# Patient Record
Sex: FEMALE | Race: WHITE | Employment: OTHER | URBAN - METROPOLITAN AREA
[De-identification: names, ages, dates, MRNs, and addresses within clinical notes are randomized per-mention and may not be internally consistent; named-entity substitution may affect disease eponyms.]

---

## 2017-11-22 LAB — ANTIBODY: NONREACTIVE

## 2018-07-30 ENCOUNTER — HOSPITAL ENCOUNTER (OUTPATIENT)
Dept: GENERAL RADIOLOGY | Age: 68
Discharge: HOME | End: 2018-07-30
Payer: MEDICARE

## 2018-07-30 DIAGNOSIS — M54.32 BACK PAIN WITH LEFT-SIDED SCIATICA: ICD-10-CM

## 2018-07-30 PROCEDURE — 72100 X-RAY EXAM L-S SPINE 2/3 VWS: CPT

## 2018-11-15 ENCOUNTER — OFFICE VISIT (OUTPATIENT)
Dept: FAMILY MEDICINE CLINIC | Age: 68
End: 2018-11-15
Payer: MEDICARE

## 2018-11-15 VITALS
OXYGEN SATURATION: 96 % | DIASTOLIC BLOOD PRESSURE: 80 MMHG | WEIGHT: 171 LBS | HEIGHT: 63 IN | HEART RATE: 102 BPM | BODY MASS INDEX: 30.3 KG/M2 | SYSTOLIC BLOOD PRESSURE: 138 MMHG

## 2018-11-15 DIAGNOSIS — Z78.0 ASYMPTOMATIC MENOPAUSAL STATE: ICD-10-CM

## 2018-11-15 DIAGNOSIS — E78.00 PURE HYPERCHOLESTEROLEMIA: ICD-10-CM

## 2018-11-15 DIAGNOSIS — Z82.0 FAMILY HISTORY OF ALZHEIMER'S DISEASE: ICD-10-CM

## 2018-11-15 DIAGNOSIS — I10 BENIGN ESSENTIAL HTN: Primary | ICD-10-CM

## 2018-11-15 DIAGNOSIS — Z86.2 HISTORY OF ANEMIA: ICD-10-CM

## 2018-11-15 DIAGNOSIS — K21.9 GASTROESOPHAGEAL REFLUX DISEASE WITHOUT ESOPHAGITIS: ICD-10-CM

## 2018-11-15 DIAGNOSIS — Z12.39 SCREENING FOR BREAST CANCER: ICD-10-CM

## 2018-11-15 DIAGNOSIS — M47.26 OSTEOARTHRITIS OF SPINE WITH RADICULOPATHY, LUMBAR REGION: ICD-10-CM

## 2018-11-15 PROCEDURE — G8400 PT W/DXA NO RESULTS DOC: HCPCS | Performed by: INTERNAL MEDICINE

## 2018-11-15 PROCEDURE — G8427 DOCREV CUR MEDS BY ELIG CLIN: HCPCS | Performed by: INTERNAL MEDICINE

## 2018-11-15 PROCEDURE — 1123F ACP DISCUSS/DSCN MKR DOCD: CPT | Performed by: INTERNAL MEDICINE

## 2018-11-15 PROCEDURE — 99204 OFFICE O/P NEW MOD 45 MIN: CPT | Performed by: INTERNAL MEDICINE

## 2018-11-15 PROCEDURE — G8482 FLU IMMUNIZE ORDER/ADMIN: HCPCS | Performed by: INTERNAL MEDICINE

## 2018-11-15 PROCEDURE — 1101F PT FALLS ASSESS-DOCD LE1/YR: CPT | Performed by: INTERNAL MEDICINE

## 2018-11-15 PROCEDURE — 1036F TOBACCO NON-USER: CPT | Performed by: INTERNAL MEDICINE

## 2018-11-15 PROCEDURE — 4040F PNEUMOC VAC/ADMIN/RCVD: CPT | Performed by: INTERNAL MEDICINE

## 2018-11-15 PROCEDURE — 1090F PRES/ABSN URINE INCON ASSESS: CPT | Performed by: INTERNAL MEDICINE

## 2018-11-15 PROCEDURE — G8417 CALC BMI ABV UP PARAM F/U: HCPCS | Performed by: INTERNAL MEDICINE

## 2018-11-15 PROCEDURE — 3017F COLORECTAL CA SCREEN DOC REV: CPT | Performed by: INTERNAL MEDICINE

## 2018-11-15 RX ORDER — LISINOPRIL 10 MG/1
10 TABLET ORAL DAILY
COMMUNITY
End: 2019-01-07 | Stop reason: SDUPTHER

## 2018-11-15 ASSESSMENT — PATIENT HEALTH QUESTIONNAIRE - PHQ9
SUM OF ALL RESPONSES TO PHQ QUESTIONS 1-9: 2
SUM OF ALL RESPONSES TO PHQ9 QUESTIONS 1 & 2: 2
2. FEELING DOWN, DEPRESSED OR HOPELESS: 1
SUM OF ALL RESPONSES TO PHQ QUESTIONS 1-9: 2
1. LITTLE INTEREST OR PLEASURE IN DOING THINGS: 1

## 2018-11-15 NOTE — PROGRESS NOTES
well-nourished. HENT:   Head: Normocephalic. Eyes: Pupils are equal, round, and reactive to light. Conjunctivae are normal. No scleral icterus. Neck: Normal range of motion. Neck supple. No thyromegaly present. Cardiovascular: Normal rate and regular rhythm. Pulmonary/Chest: Breath sounds normal. No respiratory distress. Abdominal: Soft. Musculoskeletal: Normal range of motion. She exhibits tenderness. Low back tenderness on palpation   Lymphadenopathy:     She has no cervical adenopathy. Neurological: She is alert and oriented to person, place, and time. No cranial nerve deficit. Psychiatric: She has a normal mood and affect. Her behavior is normal. Judgment and thought content normal.       ASSESSMENT/PLAN:  1. Benign essential HTN    - lisinopril (PRINIVIL;ZESTRIL) 10 MG tablet; Take 10 mg by mouth daily  - Comprehensive Metabolic Panel; Future    2. Pure hypercholesterolemia    - Lipid Panel; Future    3. Osteoarthritis of spine with radiculopathy, lumbar region  -prn OTC med    4. Screening for breast cancer    - [facility] ALEKSEY DIGITAL SCREEN BILATERAL; Future    5. History of anemia    - CBC; Future    6. Asymptomatic menopausal state    - DEXA BONE DENSITY AXIAL SKELETON; Future    7. Gastroesophageal reflux disease without esophagitis    -continue simvastatin     Mayra Lozano received counseling on the following healthy behaviors: nutrition, exercise and medication adherence    Patient given educational materials on Hypertension    I have instructed Mayra Lozano to complete a self tracking handout on Blood Pressures  and instructed them to bring it with them to her next appointment. Discussed use, benefit, and side effects of prescribed medications. Barriers to medication compliance addressed. All patient questions answered. Pt voiced understanding. An  electronic signature was used to authenticate this note.     --So Mathews MD on 11/15/18 at 2:26 PM.

## 2018-11-15 NOTE — PATIENT INSTRUCTIONS
ibuprofen. Some of these medicines can raise blood pressure. · Learn how to check your blood pressure at home. Lifestyle changes  · Stay at a healthy weight. This is especially important if you put on weight around the waist. Losing even 10 pounds can help you lower your blood pressure. · If your doctor recommends it, get more exercise. Walking is a good choice. Bit by bit, increase the amount you walk every day. Try for at least 30 minutes on most days of the week. You also may want to swim, bike, or do other activities. · Avoid or limit alcohol. Talk to your doctor about whether you can drink any alcohol. · Try to limit how much sodium you eat to less than 2,300 milligrams (mg) a day. Your doctor may ask you to try to eat less than 1,500 mg a day. · Eat plenty of fruits (such as bananas and oranges), vegetables, legumes, whole grains, and low-fat dairy products. · Lower the amount of saturated fat in your diet. Saturated fat is found in animal products such as milk, cheese, and meat. Limiting these foods may help you lose weight and also lower your risk for heart disease. · Do not smoke. Smoking increases your risk for heart attack and stroke. If you need help quitting, talk to your doctor about stop-smoking programs and medicines. These can increase your chances of quitting for good. When should you call for help? Call 911 anytime you think you may need emergency care. This may mean having symptoms that suggest that your blood pressure is causing a serious heart or blood vessel problem. Your blood pressure may be over 180/120.   For example, call 911 if:    · You have symptoms of a heart attack. These may include:  ? Chest pain or pressure, or a strange feeling in the chest.  ? Sweating. ? Shortness of breath. ? Nausea or vomiting. ? Pain, pressure, or a strange feeling in the back, neck, jaw, or upper belly or in one or both shoulders or arms. ? Lightheadedness or sudden weakness.   ? A fast or 130/80, you have high blood pressure, or hypertension. That means the top number is 130 or higher or the bottom number is 80 or higher, or both. Despite what a lot of people think, high blood pressure usually doesn't cause headaches or make you feel dizzy or lightheaded. It usually has no symptoms. But it does increase your risk for heart attack, stroke, and kidney or eye damage. The higher your blood pressure, the more your risk increases. Your doctor will give you a goal for your blood pressure. Your goal will be based on your health and your age. Lifestyle changes, such as eating healthy and being active, are always important to help lower blood pressure. You might also take medicine to reach your blood pressure goal.  Follow-up care is a key part of your treatment and safety. Be sure to make and go to all appointments, and call your doctor if you are having problems. It's also a good idea to know your test results and keep a list of the medicines you take. How can you care for yourself at home? Medical treatment  · If you stop taking your medicine, your blood pressure will go back up. You may take one or more types of medicine to lower your blood pressure. Be safe with medicines. Take your medicine exactly as prescribed. Call your doctor if you think you are having a problem with your medicine. · Talk to your doctor before you start taking aspirin every day. Aspirin can help certain people lower their risk of a heart attack or stroke. But taking aspirin isn't right for everyone, because it can cause serious bleeding. · See your doctor regularly. You may need to see the doctor more often at first or until your blood pressure comes down. · If you are taking blood pressure medicine, talk to your doctor before you take decongestants or anti-inflammatory medicine, such as ibuprofen. Some of these medicines can raise blood pressure. · Learn how to check your blood pressure at home.   Lifestyle changes  · Stay your face, arm, or leg, especially on only one side of your body. ? Sudden vision changes. ? Sudden trouble speaking. ? Sudden confusion or trouble understanding simple statements. ? Sudden problems with walking or balance. ? A sudden, severe headache that is different from past headaches.     · You have severe back or belly pain.    Do not wait until your blood pressure comes down on its own. Get help right away.   Call your doctor now or seek immediate care if:    · Your blood pressure is much higher than normal (such as 180/120 or higher), but you don't have symptoms.     · You think high blood pressure is causing symptoms, such as:  ? Severe headache.  ? Blurry vision.    Watch closely for changes in your health, and be sure to contact your doctor if:    · Your blood pressure measures higher than your doctor recommends at least 2 times. That means the top number is higher or the bottom number is higher, or both.     · You think you may be having side effects from your blood pressure medicine. Where can you learn more? Go to https://PCH International.[employer] R Group. org and sign in to your PPDai account. Enter A874 in the [employer] box to learn more about \"High Blood Pressure: Care Instructions. \"     If you do not have an account, please click on the \"Sign Up Now\" link. Current as of: December 6, 2017  Content Version: 11.8  © 6157-0370 Healthwise, Incorporated. Care instructions adapted under license by [facility] ([facility]). If you have questions about a medical condition or this instruction, always ask your healthcare professional. [place] [address] any warranty or liability for your use of this information. Patient Education        High Blood Pressure: Care Instructions  Your Care Instructions    If your blood pressure is usually above 130/80, you have high blood pressure, or hypertension.  That means the top number is 130 or higher or the bottom number is 80 or higher, or

## 2018-11-20 PROBLEM — K21.9 GASTROESOPHAGEAL REFLUX DISEASE WITHOUT ESOPHAGITIS: Status: ACTIVE | Noted: 2018-11-20

## 2018-11-20 PROBLEM — I10 BENIGN ESSENTIAL HTN: Status: ACTIVE | Noted: 2018-11-20

## 2018-11-20 PROBLEM — Z82.0 FAMILY HISTORY OF ALZHEIMER'S DISEASE: Status: ACTIVE | Noted: 2018-11-20

## 2018-11-20 PROBLEM — E78.00 PURE HYPERCHOLESTEROLEMIA: Status: ACTIVE | Noted: 2018-11-20

## 2018-11-20 ASSESSMENT — ENCOUNTER SYMPTOMS
WHEEZING: 0
BACK PAIN: 0
EYES NEGATIVE: 1
COUGH: 0
SINUS PRESSURE: 0
ABDOMINAL PAIN: 0

## 2018-12-03 ENCOUNTER — HOSPITAL ENCOUNTER (OUTPATIENT)
Dept: GENERAL RADIOLOGY | Age: 68
Discharge: HOME | End: 2018-12-03
Payer: MEDICARE

## 2018-12-03 ENCOUNTER — HOSPITAL ENCOUNTER (OUTPATIENT)
Dept: MAMMOGRAPHY | Age: 68
Discharge: HOME | End: 2018-12-03
Payer: MEDICARE

## 2018-12-03 DIAGNOSIS — Z78.0 ASYMPTOMATIC MENOPAUSAL STATE: ICD-10-CM

## 2018-12-03 DIAGNOSIS — Z12.39 SCREENING FOR BREAST CANCER: ICD-10-CM

## 2018-12-03 PROCEDURE — 77080 DXA BONE DENSITY AXIAL: CPT

## 2018-12-03 PROCEDURE — 77063 BREAST TOMOSYNTHESIS BI: CPT

## 2018-12-06 DIAGNOSIS — Z86.2 HISTORY OF ANEMIA: ICD-10-CM

## 2018-12-06 DIAGNOSIS — E78.00 PURE HYPERCHOLESTEROLEMIA: ICD-10-CM

## 2018-12-06 DIAGNOSIS — I10 BENIGN ESSENTIAL HTN: ICD-10-CM

## 2018-12-06 LAB
A/G RATIO: 2 (ref 1.1–2.2)
ALBUMIN SERPL-MCNC: 4.9 G/DL (ref 3.4–5)
ALP BLD-CCNC: 97 U/L (ref 40–129)
ALT SERPL-CCNC: 98 U/L (ref 10–40)
ANION GAP SERPL CALCULATED.3IONS-SCNC: 18 MMOL/L (ref 3–16)
AST SERPL-CCNC: 64 U/L (ref 15–37)
BILIRUB SERPL-MCNC: 0.7 MG/DL (ref 0–1)
BUN BLDV-MCNC: 12 MG/DL (ref 7–20)
CALCIUM SERPL-MCNC: 10.2 MG/DL (ref 8.3–10.6)
CHLORIDE BLD-SCNC: 99 MMOL/L (ref 99–110)
CHOLESTEROL, TOTAL: 214 MG/DL (ref 0–199)
CO2: 23 MMOL/L (ref 21–32)
CREAT SERPL-MCNC: 0.8 MG/DL (ref 0.6–1.2)
GFR AFRICAN AMERICAN: >60
GFR NON-AFRICAN AMERICAN: >60
GLOBULIN: 2.5 G/DL
GLUCOSE BLD-MCNC: 95 MG/DL (ref 70–99)
HDLC SERPL-MCNC: 74 MG/DL (ref 40–60)
LDL CHOLESTEROL CALCULATED: 127 MG/DL
POTASSIUM SERPL-SCNC: 4.5 MMOL/L (ref 3.5–5.1)
SODIUM BLD-SCNC: 140 MMOL/L (ref 136–145)
TOTAL PROTEIN: 7.4 G/DL (ref 6.4–8.2)
TRIGL SERPL-MCNC: 64 MG/DL (ref 0–150)
VLDLC SERPL CALC-MCNC: 13 MG/DL

## 2018-12-07 LAB
HCT VFR BLD CALC: 37.4 % (ref 36–48)
HEMOGLOBIN: 12.5 G/DL (ref 12–16)
MCH RBC QN AUTO: 33.8 PG (ref 26–34)
MCHC RBC AUTO-ENTMCNC: 33.5 G/DL (ref 31–36)
MCV RBC AUTO: 101 FL (ref 80–100)
PDW BLD-RTO: 13.7 % (ref 12.4–15.4)
PLATELET # BLD: 204 K/UL (ref 135–450)
PMV BLD AUTO: 8.7 FL (ref 5–10.5)
RBC # BLD: 3.7 M/UL (ref 4–5.2)
WBC # BLD: 6.7 K/UL (ref 4–11)

## 2018-12-14 DIAGNOSIS — I10 BENIGN ESSENTIAL HTN: ICD-10-CM

## 2018-12-14 RX ORDER — SIMVASTATIN 20 MG
20 TABLET ORAL NIGHTLY
Qty: 30 TABLET | Refills: 2 | Status: SHIPPED | OUTPATIENT
Start: 2018-12-14 | End: 2019-03-05 | Stop reason: SDUPTHER

## 2018-12-18 ENCOUNTER — PATIENT MESSAGE (OUTPATIENT)
Dept: FAMILY MEDICINE CLINIC | Age: 68
End: 2018-12-18

## 2019-01-07 DIAGNOSIS — I10 BENIGN ESSENTIAL HTN: ICD-10-CM

## 2019-01-07 RX ORDER — LISINOPRIL 10 MG/1
10 TABLET ORAL DAILY
Qty: 30 TABLET | Refills: 2 | Status: SHIPPED | OUTPATIENT
Start: 2019-01-07 | End: 2019-04-04 | Stop reason: SDUPTHER

## 2019-01-07 RX ORDER — LISINOPRIL 10 MG/1
10 TABLET ORAL DAILY
Qty: 30 TABLET | Refills: 2 | Status: CANCELLED | OUTPATIENT
Start: 2019-01-07

## 2019-03-06 RX ORDER — SIMVASTATIN 20 MG
20 TABLET ORAL NIGHTLY
Qty: 30 TABLET | Refills: 3 | Status: SHIPPED | OUTPATIENT
Start: 2019-03-06 | End: 2019-06-29 | Stop reason: SDUPTHER

## 2019-04-04 DIAGNOSIS — I10 BENIGN ESSENTIAL HTN: ICD-10-CM

## 2019-04-05 RX ORDER — LISINOPRIL 10 MG/1
TABLET ORAL
Qty: 30 TABLET | Refills: 1 | Status: SHIPPED | OUTPATIENT
Start: 2019-04-05 | End: 2019-05-27 | Stop reason: SDUPTHER

## 2019-04-08 ENCOUNTER — TELEPHONE (OUTPATIENT)
Dept: FAMILY MEDICINE CLINIC | Age: 69
End: 2019-04-08

## 2019-04-08 NOTE — TELEPHONE ENCOUNTER
----- Message from Sri Menjivar MD sent at 4/5/2019  3:51 PM EDT -----  Contact: bobbi Escobar the patirnt for a wellness pt after May 15, mail her the questionnaire.  Thanks

## 2019-05-21 ENCOUNTER — OFFICE VISIT (OUTPATIENT)
Dept: FAMILY MEDICINE CLINIC | Age: 69
End: 2019-05-21
Payer: MEDICARE

## 2019-05-21 VITALS
HEART RATE: 63 BPM | DIASTOLIC BLOOD PRESSURE: 70 MMHG | OXYGEN SATURATION: 97 % | BODY MASS INDEX: 26.58 KG/M2 | WEIGHT: 150 LBS | SYSTOLIC BLOOD PRESSURE: 118 MMHG | HEIGHT: 63 IN

## 2019-05-21 DIAGNOSIS — E78.5 DYSLIPIDEMIA: ICD-10-CM

## 2019-05-21 DIAGNOSIS — I10 BENIGN ESSENTIAL HTN: ICD-10-CM

## 2019-05-21 DIAGNOSIS — Z00.00 ROUTINE GENERAL MEDICAL EXAMINATION AT A HEALTH CARE FACILITY: Primary | ICD-10-CM

## 2019-05-21 PROCEDURE — 4040F PNEUMOC VAC/ADMIN/RCVD: CPT | Performed by: INTERNAL MEDICINE

## 2019-05-21 PROCEDURE — 3017F COLORECTAL CA SCREEN DOC REV: CPT | Performed by: INTERNAL MEDICINE

## 2019-05-21 PROCEDURE — G0438 PPPS, INITIAL VISIT: HCPCS | Performed by: INTERNAL MEDICINE

## 2019-05-21 PROCEDURE — 1123F ACP DISCUSS/DSCN MKR DOCD: CPT | Performed by: INTERNAL MEDICINE

## 2019-05-21 ASSESSMENT — PATIENT HEALTH QUESTIONNAIRE - PHQ9
SUM OF ALL RESPONSES TO PHQ QUESTIONS 1-9: 0
SUM OF ALL RESPONSES TO PHQ QUESTIONS 1-9: 0

## 2019-05-21 NOTE — PATIENT INSTRUCTIONS
Patient Education        Well Visit, Over 72: Care Instructions  Your Care Instructions    Physical exams can help you stay healthy. Your doctor has checked your overall health and may have suggested ways to take good care of yourself. He or she also may have recommended tests. At home, you can help prevent illness with healthy eating, regular exercise, and other steps. Follow-up care is a key part of your treatment and safety. Be sure to make and go to all appointments, and call your doctor if you are having problems. It's also a good idea to know your test results and keep a list of the medicines you take. How can you care for yourself at home? · Reach and stay at a healthy weight. This will lower your risk for many problems, such as obesity, diabetes, heart disease, and high blood pressure. · Get at least 30 minutes of exercise on most days of the week. Walking is a good choice. You also may want to do other activities, such as running, swimming, cycling, or playing tennis or team sports. · Do not smoke. Smoking can make health problems worse. If you need help quitting, talk to your doctor about stop-smoking programs and medicines. These can increase your chances of quitting for good. · Protect your skin from too much sun. When you're outdoors from 10 a.m. to 4 p.m., stay in the shade or cover up with clothing and a hat with a wide brim. Wear sunglasses that block UV rays. Even when it's cloudy, put broad-spectrum sunscreen (SPF 30 or higher) on any exposed skin. · See a dentist one or two times a year for checkups and to have your teeth cleaned. · Wear a seat belt in the car. · Limit alcohol to 2 drinks a day for men and 1 drink a day for women. Too much alcohol can cause health problems. Follow your doctor's advice about when to have certain tests. These tests can spot problems early. For men and women  · Cholesterol.  Your doctor will tell you how often to have this done based on your overall health and other things that can increase your risk for heart attack and stroke. · Blood pressure. Have your blood pressure checked during a routine doctor visit. Your doctor will tell you how often to check your blood pressure based on your age, your blood pressure results, and other factors. · Diabetes. Ask your doctor whether you should have tests for diabetes. · Vision. Experts recommend that you have yearly exams for glaucoma and other age-related eye problems. · Hearing. Tell your doctor if you notice any change in your hearing. You can have tests to find out how well you hear. · Colon cancer tests. Keep having colon cancer tests as your doctor recommends. You can have one of several types of tests. · Heart attack and stroke risk. At least every 4 to 6 years, you should have your risk for heart attack and stroke assessed. Your doctor uses factors such as your age, blood pressure, cholesterol, and whether you smoke or have diabetes to show what your risk for a heart attack or stroke is over the next 10 years. · Osteoporosis. Talk to your doctor about whether you should have a bone density test to find out whether you have thinning bones. Also ask your doctor about whether you should take calcium and vitamin D supplements. For women  · Pap test and pelvic exam. You may no longer need a Pap test. Talk with your doctor about whether to stop or continue to have Pap tests. · Breast exam and mammogram. Ask how often you should have a mammogram, which is an X-ray of your breasts. A mammogram can spot breast cancer before it can be felt and when it is easiest to treat. · Thyroid disease. Talk to your doctor about whether to have your thyroid checked as part of a regular physical exam. Women have an increased chance of a thyroid problem. For men  · Prostate exam. Talk to your doctor about whether you should have a blood test (called a PSA test) for prostate cancer.  Experts recommend that you discuss the benefits and risks of the test with your doctor before you decide whether to have this test. Some experts say that men ages 79 and older no longer need testing. · Abdominal aortic aneurysm. Ask your doctor whether you should have a test to check for an aneurysm. You may need a test if you ever smoked or if your parent, brother, sister, or child has had an aneurysm. When should you call for help? Watch closely for changes in your health, and be sure to contact your doctor if you have any problems or symptoms that concern you. Where can you learn more? Go to https://chpetamieweb.Kextil. org and sign in to your Fortumo account. Enter X216 in the [employer] box to learn more about \"Well Visit, Over 65: Care Instructions. \"     If you do not have an account, please click on the \"Sign Up Now\" link. Current as of: December 13, 2018  Content Version: 12.0  © 0842-7662 [employer]. Care instructions adapted under license by [facility] ([facility]). If you have questions about a medical condition or this instruction, always ask your healthcare professional. [place] [address] any warranty or liability for your use of this information. Patient Education        Well Visit, Over 72: Care Instructions  Your Care Instructions    Physical exams can help you stay healthy. Your doctor has checked your overall health and may have suggested ways to take good care of yourself. He or she also may have recommended tests. At home, you can help prevent illness with healthy eating, regular exercise, and other steps. Follow-up care is a key part of your treatment and safety. Be sure to make and go to all appointments, and call your doctor if you are having problems. It's also a good idea to know your test results and keep a list of the medicines you take. How can you care for yourself at home? · Reach and stay at a healthy weight.  This will lower your risk for many problems, such as obesity, diabetes, heart disease, and high blood pressure. · Get at least 30 minutes of exercise on most days of the week. Walking is a good choice. You also may want to do other activities, such as running, swimming, cycling, or playing tennis or team sports. · Do not smoke. Smoking can make health problems worse. If you need help quitting, talk to your doctor about stop-smoking programs and medicines. These can increase your chances of quitting for good. · Protect your skin from too much sun. When you're outdoors from 10 a.m. to 4 p.m., stay in the shade or cover up with clothing and a hat with a wide brim. Wear sunglasses that block UV rays. Even when it's cloudy, put broad-spectrum sunscreen (SPF 30 or higher) on any exposed skin. · See a dentist one or two times a year for checkups and to have your teeth cleaned. · Wear a seat belt in the car. · Limit alcohol to 2 drinks a day for men and 1 drink a day for women. Too much alcohol can cause health problems. Follow your doctor's advice about when to have certain tests. These tests can spot problems early. For men and women  · Cholesterol. Your doctor will tell you how often to have this done based on your overall health and other things that can increase your risk for heart attack and stroke. · Blood pressure. Have your blood pressure checked during a routine doctor visit. Your doctor will tell you how often to check your blood pressure based on your age, your blood pressure results, and other factors. · Diabetes. Ask your doctor whether you should have tests for diabetes. · Vision. Experts recommend that you have yearly exams for glaucoma and other age-related eye problems. · Hearing. Tell your doctor if you notice any change in your hearing. You can have tests to find out how well you hear. · Colon cancer tests. Keep having colon cancer tests as your doctor recommends. You can have one of several types of tests. · Heart attack and stroke risk.  At you do not have an account, please click on the \"Sign Up Now\" link. Current as of: December 13, 2018  Content Version: 12.0  © 5309-3768 Healthwise, [employer]. Care instructions adapted under license by [facility] ([facility]). If you have questions about a medical condition or this instruction, always ask your healthcare professional. [address] any warranty or liability for your use of this information. Learning About Anam Aquino  What is a living will? A living will is a legal form you use to write down the kind of care you want at the end of your life. It is used by the health professionals who will treat you if you aren't able to decide for yourself. If you put your wishes in writing, your loved ones and others will know what kind of care you want. They won't need to guess. This can ease your mind and be helpful to others. A living will is not the same as an estate or property will. An estate will explains what you want to happen with your money and property after you die. Is a living will a legal document? A living will is a legal document. Each state has its own laws about living mary. If you move to another state, make sure that your living will is legal in the state where you now live. Or you might use a universal form that has been approved by many states. This kind of form can sometimes be completed and stored online. Your electronic copy will then be available wherever you have a connection to the Internet. In most cases, doctors will respect your wishes even if you have a form from a different state. · You don't need an  to complete a living will. But legal advice can be helpful if your state's laws are unclear, your health history is complicated, or your family can't agree on what should be in your living will. · You can change your living will at any time. Some people find that their wishes about end-of-life care change as their health changes.   · In addition to making a living will, think about completing a medical power of  form. This form lets you name the person you want to make end-of-life treatment decisions for you (your \"health care agent\") if you're not able to. Many hospitals and nursing homes will give you the forms you need to complete a living will and a medical power of . · Your living will is used only if you can't make or communicate decisions for yourself anymore. If you become able to make decisions again, you can accept or refuse any treatment, no matter what you wrote in your living will. · Your state may offer an online registry. This is a place where you can store your living will online so the doctors and nurses who need to treat you can find it right away. What should you think about when creating a living will? Talk about your end-of-life wishes with your family members and your doctor. Let them know what you want. That way the people making decisions for you won't be surprised by your choices. Think about these questions as you make your living will:  · Do you know enough about life support methods that might be used? If not, talk to your doctor so you know what might be done if you can't breathe on your own, your heart stops, or you're unable to swallow. · What things would you still want to be able to do after you receive life-support methods? Would you want to be able to walk? To speak? To eat on your own? To live without the help of machines? · If you have a choice, where do you want to be cared for? In your home? At a hospital or nursing home? · Do you want certain Oriental orthodox practices performed if you become very ill? · If you have a choice at the end of your life, where would you prefer to die? At home? In a hospital or nursing home? Somewhere else? · Would you prefer to be buried or cremated? · Do you want your organs to be donated after you die? What should you do with your living will?   · Make sure that your family members and your health care agent have copies of your living will. · Give your doctor a copy of your living will to keep in your medical record. If you have more than one doctor, make sure that each one has a copy. · You may want to put a copy of your living will where it can be easily found. Where can you learn more? Go to https://chpepiceweb.[email or web address]. org and sign in to your GameMaki account. Enter Q894 in the Mape box to learn more about \"Learning About Living [place]. \"     If you do not have an account, please click on the \"Sign Up Now\" link. Current as of: April 18, 2018  Content Version: 12.0  © 5911-9871 Healthwise, Incorporated. Care instructions adapted under license by [facility] ([facility]). If you have questions about a medical condition or this instruction, always ask your healthcare professional. [place] [address] any warranty or liability for your use of this information. Personalized Preventive Plan for [employer] - 5/21/2019  Medicare offers a range of preventive health benefits. Some of the tests and screenings are paid in full while other may be subject to a deductible, co-insurance, and/or copay. Some of these benefits include a comprehensive review of your medical history including lifestyle, illnesses that may run in your family, and various assessments and screenings as appropriate. After reviewing your medical record and screening and assessments performed today your provider may have ordered immunizations, labs, imaging, and/or referrals for you. A list of these orders (if applicable) as well as your Preventive Care list are included within your After Visit Summary for your review. Other Preventive Recommendations:    · A preventive eye exam performed by an eye specialist is recommended every 1-2 years to screen for glaucoma; cataracts, macular degeneration, and other eye disorders.   · A preventive dental visit is recommended every 6 months. · Try to get at least 150 minutes of exercise per week or 10,000 steps per day on a pedometer . · Order or download the FREE \"Exercise & Physical Activity: Your Everyday Guide\" from [employer]. Call 5-891.364.8606 or search [employer] online. · You need 5830-2558 mg of calcium and 8348-6804 IU of vitamin D per day. It is possible to meet your calcium requirement with diet alone, but a vitamin D supplement is usually necessary to meet this goal.  · When exposed to the sun, use a sunscreen that protects against both UVA and UVB radiation with an SPF of 30 or greater. Reapply every 2 to 3 hours or after sweating, drying off with a towel, or swimming. · Always wear a seat belt when traveling in a car. Always wear a helmet when riding a bicycle or motorcycle.

## 2019-05-21 NOTE — PROGRESS NOTES
(1.6 m)     Body mass index is 26.57 kg/m². Based upon direct observation of the patient, evaluation of cognition reveals recent and remote memory intact. Patient's complete Health Risk Assessment and screening values have been reviewed and are found in Flowsheets. The following problems were reviewed today and where indicated follow up appointments were made and/or referrals ordered. Positive Risk Factor Screenings with Interventions:     General Health:  General  In general, how would you say your health is?: (P) Very Good  In the past 7 days, have you experienced any of the following?  New or Increased Pain, New or Increased Fatigue, Loneliness, Social Isolation, Stress or Anger?: (P) None of These  Do you get the social and emotional support that you need?: (P) Yes  Do you have a Living Will?: (!) (P) No  General Health Risk Interventions:  · none    Hearing/Vision:  Hearing/Vision  Do you or your family notice any trouble with your hearing?: (!) (P) Yes  Do you have difficulty driving, watching TV, or doing any of your daily activities because of your eyesight?: (P) No  Have you had an eye exam within the past year?: (P) Yes  Hearing/Vision Interventions:  · none    Personalized Preventive Plan   Current Health Maintenance Status  Immunization History   Administered Date(s) Administered    Influenza, High Dose (Fluzone 65 yrs and older) 09/27/2018    PPD Test 05/27/2014, 04/06/2016    Pneumococcal 13-valent Conjugate (Rchgnwf15) 11/21/2016    Pneumococcal Polysaccharide (Cifqmoopc83) 10/26/2015    Zoster Subunit (Shingrix) 09/27/2018, 11/29/2018        Health Maintenance   Topic Date Due    Hepatitis C screen  1950    DTaP/Tdap/Td vaccine (1 - Tdap) 06/20/1969    Potassium monitoring  12/06/2019    Creatinine monitoring  12/06/2019    Breast cancer screen  12/03/2020    Lipid screen  12/06/2023    Colon cancer screen colonoscopy  01/15/2025    DEXA (modify frequency per FRAX Shakila Stone MD as PCP - General (Internal Medicine)    Wt Readings from Last 3 Encounters:   05/21/19 150 lb (68 kg)   11/15/18 171 lb (77.6 kg)   01/02/16 137 lb 9.6 oz (62.4 kg)     Vitals:    05/21/19 1254   BP: 118/70   Site: Left Upper Arm   Position: Sitting   Cuff Size: Medium Adult   Pulse: 63   SpO2: 97%   Weight: 150 lb (68 kg)   Height: 5' 3\" (1.6 m)     Body mass index is 26.57 kg/m². Based upon direct observation of the patient, evaluation of cognition reveals recent and remote memory intact. Patient's complete Health Risk Assessment and screening values have been reviewed and are found in Flowsheets. The following problems were reviewed today and where indicated follow up appointments were made and/or referrals ordered. Positive Risk Factor Screenings with Interventions:     General Health:  General  In general, how would you say your health is?: (P) Very Good  In the past 7 days, have you experienced any of the following?  New or Increased Pain, New or Increased Fatigue, Loneliness, Social Isolation, Stress or Anger?: (P) None of These  Do you get the social and emotional support that you need?: (P) Yes  Do you have a Living Will?: (!) (P) No  General Health Risk Interventions:  · none    Hearing/Vision:  Hearing/Vision  Do you or your family notice any trouble with your hearing?: (!) (P) Yes  Do you have difficulty driving, watching TV, or doing any of your daily activities because of your eyesight?: (P) No  Have you had an eye exam within the past year?: (P) Yes  Hearing/Vision Interventions:  · none    Personalized Preventive Plan   Current Health Maintenance Status  Immunization History   Administered Date(s) Administered    Influenza, High Dose (Fluzone 65 yrs and older) 09/27/2018    PPD Test 05/27/2014, 04/06/2016    Pneumococcal 13-valent Conjugate (Gsqpydf50) 11/21/2016    Pneumococcal Polysaccharide (Bqfzmefhc42) 10/26/2015    Zoster Subunit (Shingrix) 09/27/2018, 11/29/2018 Health Maintenance   Topic Date Due    Hepatitis C screen  1950    DTaP/Tdap/Td vaccine (1 - Tdap) 06/20/1969    Potassium monitoring  12/06/2019    Creatinine monitoring  12/06/2019    Breast cancer screen  12/03/2020    Lipid screen  12/06/2023    Colon cancer screen colonoscopy  01/15/2025    DEXA (modify frequency per FRAX score)  Completed    Flu vaccine  Completed    Shingles Vaccine  Completed    Pneumococcal 65+ years Vaccine  Completed     Recommendations for Preventive Services Due: see orders and patient instructions/AVS.  .   Recommended screening schedule for the next 5-10 years is provided to the patient in written form: see Patient Instructions/AVS.

## 2019-05-23 DIAGNOSIS — E78.5 DYSLIPIDEMIA: ICD-10-CM

## 2019-05-23 DIAGNOSIS — I10 BENIGN ESSENTIAL HTN: ICD-10-CM

## 2019-05-23 LAB
A/G RATIO: 2 (ref 1.1–2.2)
ALBUMIN SERPL-MCNC: 5 G/DL (ref 3.4–5)
ALP BLD-CCNC: 79 U/L (ref 40–129)
ALT SERPL-CCNC: 16 U/L (ref 10–40)
ANION GAP SERPL CALCULATED.3IONS-SCNC: 15 MMOL/L (ref 3–16)
AST SERPL-CCNC: 19 U/L (ref 15–37)
BILIRUB SERPL-MCNC: 0.5 MG/DL (ref 0–1)
BUN BLDV-MCNC: 16 MG/DL (ref 7–20)
CALCIUM SERPL-MCNC: 10.7 MG/DL (ref 8.3–10.6)
CHLORIDE BLD-SCNC: 94 MMOL/L (ref 99–110)
CHOLESTEROL, TOTAL: 197 MG/DL (ref 0–199)
CO2: 23 MMOL/L (ref 21–32)
CREAT SERPL-MCNC: 0.7 MG/DL (ref 0.6–1.2)
GFR AFRICAN AMERICAN: >60
GFR NON-AFRICAN AMERICAN: >60
GLOBULIN: 2.5 G/DL
GLUCOSE BLD-MCNC: 91 MG/DL (ref 70–99)
HDLC SERPL-MCNC: 64 MG/DL (ref 40–60)
LDL CHOLESTEROL CALCULATED: 117 MG/DL
POTASSIUM SERPL-SCNC: 4.8 MMOL/L (ref 3.5–5.1)
SODIUM BLD-SCNC: 132 MMOL/L (ref 136–145)
TOTAL PROTEIN: 7.5 G/DL (ref 6.4–8.2)
TRIGL SERPL-MCNC: 79 MG/DL (ref 0–150)
VLDLC SERPL CALC-MCNC: 16 MG/DL

## 2019-05-27 DIAGNOSIS — I10 BENIGN ESSENTIAL HTN: ICD-10-CM

## 2019-05-28 RX ORDER — LISINOPRIL 10 MG/1
TABLET ORAL
Qty: 30 TABLET | Refills: 0 | Status: SHIPPED | OUTPATIENT
Start: 2019-05-28 | End: 2019-07-06 | Stop reason: SDUPTHER

## 2019-05-28 NOTE — TELEPHONE ENCOUNTER
Last OV: 5/21/2019  Future Appointments   Date Time Provider Gilson Redmond   11/5/2019  1:40 PM MD SHARYN Guillermo

## 2019-06-03 DIAGNOSIS — I10 BENIGN ESSENTIAL HTN: ICD-10-CM

## 2019-06-04 RX ORDER — LISINOPRIL 10 MG/1
TABLET ORAL
Qty: 30 TABLET | Refills: 0 | Status: SHIPPED | OUTPATIENT
Start: 2019-06-04 | End: 2019-11-05 | Stop reason: SDUPTHER

## 2019-07-01 RX ORDER — SIMVASTATIN 20 MG
20 TABLET ORAL NIGHTLY
Qty: 30 TABLET | Refills: 2 | Status: SHIPPED | OUTPATIENT
Start: 2019-07-01 | End: 2019-09-03 | Stop reason: SDUPTHER

## 2019-09-03 RX ORDER — SIMVASTATIN 20 MG
20 TABLET ORAL NIGHTLY
Qty: 30 TABLET | Refills: 1 | Status: SHIPPED | OUTPATIENT
Start: 2019-09-03 | End: 2019-10-31 | Stop reason: SDUPTHER

## 2019-09-03 NOTE — TELEPHONE ENCOUNTER
Last ov 05/21/2019   Future Appointments   Date Time Provider Gilson Nyla   11/5/2019  1:40 PM MD SHARYN Stewart

## 2019-10-31 DIAGNOSIS — I10 BENIGN ESSENTIAL HTN: ICD-10-CM

## 2019-10-31 RX ORDER — SIMVASTATIN 20 MG
20 TABLET ORAL NIGHTLY
Qty: 30 TABLET | Refills: 0 | Status: SHIPPED | OUTPATIENT
Start: 2019-10-31 | End: 2019-11-05 | Stop reason: SDUPTHER

## 2019-10-31 RX ORDER — LISINOPRIL 10 MG/1
TABLET ORAL
Qty: 30 TABLET | Refills: 1 | Status: SHIPPED | OUTPATIENT
Start: 2019-10-31 | End: 2019-11-05 | Stop reason: SDUPTHER

## 2019-11-05 ENCOUNTER — OFFICE VISIT (OUTPATIENT)
Dept: FAMILY MEDICINE CLINIC | Age: 69
End: 2019-11-05
Payer: MEDICARE

## 2019-11-05 VITALS
SYSTOLIC BLOOD PRESSURE: 128 MMHG | HEIGHT: 63 IN | DIASTOLIC BLOOD PRESSURE: 88 MMHG | WEIGHT: 160 LBS | HEART RATE: 92 BPM | OXYGEN SATURATION: 98 % | BODY MASS INDEX: 28.35 KG/M2

## 2019-11-05 DIAGNOSIS — I10 BENIGN ESSENTIAL HTN: ICD-10-CM

## 2019-11-05 DIAGNOSIS — E78.00 PURE HYPERCHOLESTEROLEMIA: Primary | ICD-10-CM

## 2019-11-05 DIAGNOSIS — Z12.31 ENCOUNTER FOR SCREENING MAMMOGRAM FOR BREAST CANCER: ICD-10-CM

## 2019-11-05 PROCEDURE — 1036F TOBACCO NON-USER: CPT | Performed by: INTERNAL MEDICINE

## 2019-11-05 PROCEDURE — 4040F PNEUMOC VAC/ADMIN/RCVD: CPT | Performed by: INTERNAL MEDICINE

## 2019-11-05 PROCEDURE — 1090F PRES/ABSN URINE INCON ASSESS: CPT | Performed by: INTERNAL MEDICINE

## 2019-11-05 PROCEDURE — 1123F ACP DISCUSS/DSCN MKR DOCD: CPT | Performed by: INTERNAL MEDICINE

## 2019-11-05 PROCEDURE — 3017F COLORECTAL CA SCREEN DOC REV: CPT | Performed by: INTERNAL MEDICINE

## 2019-11-05 PROCEDURE — G8399 PT W/DXA RESULTS DOCUMENT: HCPCS | Performed by: INTERNAL MEDICINE

## 2019-11-05 PROCEDURE — G8417 CALC BMI ABV UP PARAM F/U: HCPCS | Performed by: INTERNAL MEDICINE

## 2019-11-05 PROCEDURE — 99214 OFFICE O/P EST MOD 30 MIN: CPT | Performed by: INTERNAL MEDICINE

## 2019-11-05 PROCEDURE — G8427 DOCREV CUR MEDS BY ELIG CLIN: HCPCS | Performed by: INTERNAL MEDICINE

## 2019-11-05 PROCEDURE — G8482 FLU IMMUNIZE ORDER/ADMIN: HCPCS | Performed by: INTERNAL MEDICINE

## 2019-11-05 RX ORDER — SIMVASTATIN 20 MG
20 TABLET ORAL NIGHTLY
Qty: 30 TABLET | Refills: 5 | Status: SHIPPED | OUTPATIENT
Start: 2019-11-05 | End: 2020-06-12

## 2019-11-05 RX ORDER — LISINOPRIL 10 MG/1
10 TABLET ORAL DAILY
Qty: 30 TABLET | Refills: 5 | Status: SHIPPED | OUTPATIENT
Start: 2019-11-05 | End: 2020-06-16 | Stop reason: SDUPTHER

## 2019-12-04 ENCOUNTER — HOSPITAL ENCOUNTER (OUTPATIENT)
Dept: MAMMOGRAPHY | Age: 69
Discharge: HOME | End: 2019-12-04
Payer: MEDICARE

## 2019-12-04 DIAGNOSIS — Z12.31 ENCOUNTER FOR SCREENING MAMMOGRAM FOR BREAST CANCER: ICD-10-CM

## 2019-12-04 PROCEDURE — 77063 BREAST TOMOSYNTHESIS BI: CPT

## 2020-06-11 ENCOUNTER — OFFICE VISIT (OUTPATIENT)
Dept: ORTHOPEDIC SURGERY | Age: 70
End: 2020-06-11
Payer: MEDICARE

## 2020-06-11 ENCOUNTER — TELEPHONE (OUTPATIENT)
Dept: ORTHOPEDIC SURGERY | Age: 70
End: 2020-06-11

## 2020-06-11 VITALS
HEIGHT: 63 IN | WEIGHT: 160 LBS | BODY MASS INDEX: 28.35 KG/M2 | TEMPERATURE: 99.3 F | DIASTOLIC BLOOD PRESSURE: 77 MMHG | HEART RATE: 77 BPM | SYSTOLIC BLOOD PRESSURE: 123 MMHG

## 2020-06-11 PROCEDURE — 1090F PRES/ABSN URINE INCON ASSESS: CPT | Performed by: ORTHOPAEDIC SURGERY

## 2020-06-11 PROCEDURE — G8427 DOCREV CUR MEDS BY ELIG CLIN: HCPCS | Performed by: ORTHOPAEDIC SURGERY

## 2020-06-11 PROCEDURE — 3017F COLORECTAL CA SCREEN DOC REV: CPT | Performed by: ORTHOPAEDIC SURGERY

## 2020-06-11 PROCEDURE — G8399 PT W/DXA RESULTS DOCUMENT: HCPCS | Performed by: ORTHOPAEDIC SURGERY

## 2020-06-11 PROCEDURE — 1123F ACP DISCUSS/DSCN MKR DOCD: CPT | Performed by: ORTHOPAEDIC SURGERY

## 2020-06-11 PROCEDURE — G8417 CALC BMI ABV UP PARAM F/U: HCPCS | Performed by: ORTHOPAEDIC SURGERY

## 2020-06-11 PROCEDURE — 4040F PNEUMOC VAC/ADMIN/RCVD: CPT | Performed by: ORTHOPAEDIC SURGERY

## 2020-06-11 PROCEDURE — 99203 OFFICE O/P NEW LOW 30 MIN: CPT | Performed by: ORTHOPAEDIC SURGERY

## 2020-06-11 PROCEDURE — 1036F TOBACCO NON-USER: CPT | Performed by: ORTHOPAEDIC SURGERY

## 2020-06-11 RX ORDER — OMEPRAZOLE 20 MG/1
20 CAPSULE, DELAYED RELEASE ORAL DAILY
Qty: 30 CAPSULE | Refills: 1 | Status: SHIPPED | OUTPATIENT
Start: 2020-06-11 | End: 2021-04-14 | Stop reason: ALTCHOICE

## 2020-06-11 RX ORDER — TRAMADOL HYDROCHLORIDE 50 MG/1
50 TABLET ORAL EVERY 4 HOURS PRN
Qty: 42 TABLET | Refills: 0 | Status: SHIPPED | OUTPATIENT
Start: 2020-06-11 | End: 2020-06-18

## 2020-06-11 RX ORDER — PREDNISONE 10 MG/1
10 TABLET ORAL DAILY
Qty: 20 TABLET | Refills: 0 | Status: SHIPPED | OUTPATIENT
Start: 2020-06-11 | End: 2020-06-21

## 2020-06-11 RX ORDER — TIZANIDINE 4 MG/1
4 TABLET ORAL EVERY 6 HOURS PRN
Qty: 80 TABLET | Refills: 0 | Status: SHIPPED | OUTPATIENT
Start: 2020-06-11 | End: 2020-07-20 | Stop reason: ALTCHOICE

## 2020-06-11 NOTE — TELEPHONE ENCOUNTER
Remi Saint from [address] [place] [address]. is calling requesting a call back regarding patient's prescription for prednisone.   [phone]

## 2020-06-11 NOTE — PROGRESS NOTES
facility-administered medications on file prior to visit. Social History     Socioeconomic History    Marital status:      Spouse name: Not on file    Number of children: Not on file    Years of education: Not on file    Highest education level: Not on file   Occupational History    Not on file   Social Needs    Financial resource strain: Not on file    Food insecurity     Worry: Not on file     Inability: Not on file    Transportation needs     Medical: Not on file     Non-medical: Not on file   Tobacco Use    Smoking status: Former Smoker     Packs/day: 1.00     Years: 15.00     Pack years: 15.00     Last attempt to quit: 1983     Years since quittin.4    Smokeless tobacco: Never Used   Substance and Sexual Activity    Alcohol use: Not on file    Drug use: No    Sexual activity: Not on file   Lifestyle    Physical activity     Days per week: Not on file     Minutes per session: Not on file    Stress: Not on file   Relationships    Social connections     Talks on phone: Not on file     Gets together: Not on file     Attends Worship service: Not on file     Active member of club or organization: Not on file     Attends meetings of clubs or organizations: Not on file     Relationship status: Not on file    Intimate partner violence     Fear of current or ex partner: Not on file     Emotionally abused: Not on file     Physically abused: Not on file     Forced sexual activity: Not on file   Other Topics Concern    Not on file   Social History Narrative    Not on file     No family history on file.     Current Medications:    Current Outpatient Medications   Medication Sig Dispense Refill    predniSONE (DELTASONE) 10 MG tablet Take 1 tablet by mouth daily for 10 days 20 tablet 0    tiZANidine (ZANAFLEX) 4 MG tablet Take 1 tablet by mouth every 6 hours as needed (muscle relaxant) 80 tablet 0    omeprazole (PRILOSEC) 20 MG delayed release capsule Take 1 capsule by mouth daily 30 capsule 1    diclofenac (VOLTAREN) 50 MG EC tablet Take 1 tablet by mouth 2 times daily (with meals) 60 tablet 3    traMADol (ULTRAM) 50 MG tablet Take 1 tablet by mouth every 4 hours as needed for Pain for up to 7 days. Intended supply: 7 days. Take lowest dose possible to manage pain 42 tablet 0    simvastatin (ZOCOR) 20 MG tablet TAKE 1 TABLET BY MOUTh nightly  30 tablet 0    lisinopril (PRINIVIL;ZESTRIL) 10 MG tablet Take 1 tablet by mouth daily 30 tablet 5    Multiple Vitamins-Minerals (THERAPEUTIC MULTIVITAMIN-MINERALS) tablet Take 1 tablet by mouth daily      Vitamin D (CHOLECALCIFEROL) 1000 UNITS CAPS capsule Take 2,000 Units by mouth daily       Multiple Vitamins-Minerals (OCUVITE PRESERVISION PO) Take 1 tablet by mouth daily       No current facility-administered medications for this visit. Allergies: Allergies   Allergen Reactions    Keflet [Cephalexin] Rash       Physical Exam:  Vitals:    06/11/20 1409   BP: 123/77   Pulse: 77   Temp: 99.3 °F (37.4 °C)       Physical Exam   Constitutional: Patient is oriented to person, place, and time and well-developed, well-nourished, and in no distress. HENT:   Head: Normocephalic and atraumatic. Eyes: Pupils are equal, round, and reactive to light. Neck: No tracheal deviation present. No thyromegaly present. Pulmonary/Chest: Effort normal.   Abdominal: Soft. There is no guarding. Musculoskeletal: Patient exhibits tenderness and pain. Neurological: Patient is alert and oriented to person, place, and time. Skin: Skin is warm. Psychiatric: Affect normal.     General: Cassandra Herrera is a healthy and well appearing 71y.o. year old female who is sitting comfortably in our office in acute distress. Alert and oriented. Physical Exam:  RUE:    No gross deformities noted. Sensation is intact to light touch throughout the median, ulnar and radial nerve distribution.    Able to wiggle fingers, gives thumbs up, A-okay and cross index and middle fingers. Full range of motion of the hand, wrist, elbow and shoulder. LUE:   No gross deformities noted. Previous left humerus fracture with some decrease in rom. Sensation is intact to light touch throughout the median, ulnar and radial nerve distribution. Able to wiggle fingers, gives thumbs up, A-okay and cross index and middle fingers. Full range of motion of the hand wrist elbow    RLE:   No gross deformities noted. Sensation is intact to light touch throughout the lower extremity. Able to wiggle toes and plantar and dorsiflex foot. Full range of motion at the ankle, knee and hip  Mild osteoarthritis of the right knee  Walking with trochanteric and IT band pain. Hip without major tenderenss. Irritation of the right lower back and facet to testing with decrease in rom. LLE:   No gross deformities noted. Sensation is intact to light touch throughout the lower extremity. Able to wiggle toes and plantar and dorsiflex foot. Mild osteoarthrits of the left knee. Full range of motion at the ankle, knee and hip      Walking with fairly dramatic loss of normal walking pattern. Short stride length, flexion at the hip. Short based and wider gait overall. Right ankle with posterior tibial insufficiency and rolling of the ankle medially foot laterally. Diagnostics:  Xray   Have reviewed the xrays above from [facility]  and my impression is:no obvious xray degenerative changes of the hip. Degenerative scoliosis of the lumbar spine. 1. Chronic right-sided low back pain without sciatica     - traMADol (ULTRAM) 50 MG tablet; Take 1 tablet by mouth every 4 hours as needed for Pain for up to 7 days. Intended supply: 7 days. Take lowest dose possible to manage pain  Dispense: 42 tablet; Refill: 0  - Ambulatory referral to Physical Therapy      Assessment: leg length discrepancy with posteior tibial insuffiicency.    Right sided iliotibial band pain trachanteric irritation and right sided lower back pain with postural abnormality. Plan: pain medications with muscle relaxants  Anti-inflammatory meds with prilosec  Start on aggressive physical therapy measures for hip and postural control of the pelvis and hip.       [unfilled]     [facility]    Date:    6/13/2020

## 2020-06-12 RX ORDER — SIMVASTATIN 20 MG
20 TABLET ORAL NIGHTLY
Qty: 30 TABLET | Refills: 0 | Status: SHIPPED | OUTPATIENT
Start: 2020-06-12 | End: 2020-06-16

## 2020-06-16 ENCOUNTER — VIRTUAL VISIT (OUTPATIENT)
Dept: FAMILY MEDICINE CLINIC | Age: 70
End: 2020-06-16
Payer: MEDICARE

## 2020-06-16 PROCEDURE — 99214 OFFICE O/P EST MOD 30 MIN: CPT | Performed by: INTERNAL MEDICINE

## 2020-06-16 RX ORDER — LISINOPRIL 10 MG/1
10 TABLET ORAL DAILY
Qty: 90 TABLET | Refills: 1 | Status: SHIPPED | OUTPATIENT
Start: 2020-06-16 | End: 2020-11-09

## 2020-06-16 RX ORDER — SIMVASTATIN 40 MG
40 TABLET ORAL NIGHTLY
Qty: 90 TABLET | Refills: 1 | Status: SHIPPED | OUTPATIENT
Start: 2020-06-16 | End: 2021-01-15

## 2020-06-16 ASSESSMENT — ENCOUNTER SYMPTOMS
WHEEZING: 0
EYES NEGATIVE: 1
ABDOMINAL PAIN: 0
COUGH: 0
SINUS PRESSURE: 0

## 2020-06-16 NOTE — PROGRESS NOTES
Debbi Barrientos MD   predniSONE (DELTASONE) 10 MG tablet Take 1 tablet by mouth daily for 10 days  Dodie Agarwal MD   tiZANidine (ZANAFLEX) 4 MG tablet Take 1 tablet by mouth every 6 hours as needed (muscle relaxant)  Dodie Agarwal MD   omeprazole (PRILOSEC) 20 MG delayed release capsule Take 1 capsule by mouth daily  Dodie Agarwal MD   diclofenac (VOLTAREN) 50 MG EC tablet Take 1 tablet by mouth 2 times daily (with meals)  Dodie Agarwal MD   traMADol (ULTRAM) 50 MG tablet Take 1 tablet by mouth every 4 hours as needed for Pain for up to 7 days. Intended supply: 7 days. Take lowest dose possible to manage pain  Dodie Agarwal MD   lisinopril (PRINIVIL;ZESTRIL) 10 MG tablet Take 1 tablet by mouth daily  Debbi Barrientos MD   Multiple Vitamins-Minerals (THERAPEUTIC MULTIVITAMIN-MINERALS) tablet Take 1 tablet by mouth daily  Historical Provider, MD   Vitamin D (CHOLECALCIFEROL) 1000 UNITS CAPS capsule Take 2,000 Units by mouth daily   Historical Provider, MD   Multiple Vitamins-Minerals (OCUVITE PRESERVISION PO) Take 1 tablet by mouth daily  Historical Provider, MD       Social History     Tobacco Use    Smoking status: Former Smoker     Packs/day: 1.00     Years: 15.00     Pack years: 15.00     Last attempt to quit: 1983     Years since quittin.4    Smokeless tobacco: Never Used   Substance Use Topics    Alcohol use: Not on file    Drug use: No        PHYSICAL EXAMINATION:    Vital Signs: (As obtained by patient/caregiver or practitioner observation)    Blood pressure- not checking    Constitutional: [x] Appears well-developed and well-nourished [x] No apparent distress      [] Abnormal-   Mental status  [x] Alert and awake  [x] Oriented to person/place/time [x]Able to follow commands      Eyes:  EOM    [x]  Normal  [] Abnormal-  Sclera  []  Normal  [] Abnormal -         Discharge [x]  None visible  [] Abnormal -    HENT:   [x] Normocephalic, atraumatic.   [] Abnormal   []

## 2020-06-23 ENCOUNTER — HOSPITAL ENCOUNTER (OUTPATIENT)
Dept: PHYSICAL THERAPY | Age: 70
Setting detail: THERAPIES SERIES
Discharge: HOME | End: 2020-06-23
Payer: MEDICARE

## 2020-06-23 PROCEDURE — 97110 THERAPEUTIC EXERCISES: CPT | Performed by: PHYSICAL THERAPIST

## 2020-06-23 PROCEDURE — 97161 PT EVAL LOW COMPLEX 20 MIN: CPT | Performed by: PHYSICAL THERAPIST

## 2020-06-23 NOTE — FLOWSHEET NOTE
Ankle dorsiflexion(L4) 4+/5 4+/5     Toe extension(L5)     Not assessed   Ankle eversion/plantar flexion(S1)     Not assessed   Hip abd   3+/5  3+/5        Special tests   Comments   SLR + for HS tightness     Slump test Not assessed     Pelvic symmetry  + supine to sit Legs even to R leg long   Ely's + B     Segmental Spinal mobility Hypo, lumbar     Heel walk    Not assessed d/t gait abnormalities, balance deficits and pain   Toe walk   Not assessed d/t gait abnormalities, balance deficits and pain   Tandem walk    Not assessed d/t gait abnormalities, balance deficits and pain              DTRs Left Right Comments   Patellar(L3-L4)         Achilles(S1-S2)                      Joint mobility:               []? Normal               [x]? Hypo L hip              []? Hyper     Palpation: L greater troch, B PSIS     Functional Mobility/Transfers: Requires UE support to get RLE onto table from seated position.     Posture: Mild kyphosis     Gait: Wide MARCELA, hip ER, increased lateral sway    RESTRICTIONS/PRECAUTIONS:     Exercises/Interventions:   ROM/stretches     SKTC 3x30\" B    Prone quad stretch 3x30\" B    Seated HS 3x30\" B    Standing ITB 3x30\" B    Supine Figure 4 HEP                   Strengthening     Glute sets 10x10\" B    Bridges HEP                                                Manual Intervention             Prone PA      GISTM/STM      Lumbar Manip      SI Manip      Hip belt mobs      Hip LA distraction              Plan for next session: HI dowell activation    Therapeutic Exercise and NMR EXR  [x] (25501) Provided verbal/tactile cueing for activities related to strengthening, flexibility, endurance, ROM  for improvements in proximal hip and core control with self care, mobility, lifting and ambulation.  [] (03328) Provided verbal/tactile cueing for activities related to improving balance, coordination, kinesthetic sense, posture, motor skill, proprioception  to assist with core control in self care,

## 2020-06-23 NOTE — PLAN OF CARE
characteristics  [] unstable and unpredictable characteristics;   [x] Clinical decision making of [x] low, [] moderate, [] high complexity using standardized patient assessment instrument and/or measurable assessment of functional outcome. [x] EVAL (LOW) 48353 (typically 20 minutes face-to-face)  [] EVAL (MOD) 75623 (typically 30 minutes face-to-face)  [] EVAL (HIGH) 62458 (typically 45 minutes face-to-face)  [] RE-EVAL            PLAN:      Frequency/Duration:  1-2 days per week for 6 Weeks:  Interventions:  [x]  Therapeutic exercise including: strength training, ROM, for LE, Glutes and core   [x]  NMR activation and proprioception for glutes , LE and Core   [x]  Manual therapy as indicated for Hip complex, LE and spine to include: Dry Needling/IASTM, STM, PROM, Gr I-IV mobilizations, manipulation. [x]  Modalities as needed that may include: thermal agents, E-stim, Biofeedback, US, iontophoresis as indicated  [x]  Patient education on joint protection, postural re-education, activity modification, progression of HEP. HEP instruction:   Alie Bradley access code: Damina Dhaliwal 6/23/20. Printed hand out given. Pt demonstrated proper form of each exercise and expressed verbal understanding of frequency and duration. GOALS:   Patient stated goal:  Be able to walk without pain    [] Progressing: [] Met: [] Not Met: [] Adjusted    Therapist goals for Patient:   Short Term Goals: To be achieved in: 2 weeks 7/7/20  1. Independent in HEP and progression per patient tolerance, in order to prevent re-injury. [] Progressing: [] Met: [] Not Met: [] Adjusted   2. Patient will have a decrease in pain to facilitate improvement in movement, function, and ADLs as indicated by Functional Deficits. [] Progressing: [] Met: [] Not Met: [] Adjusted   3. Patient will report reduced pain to allow for sleeping on L side. [] Progressing: [] Met: [] Not Met: [] Adjusted     Long Term Goals:  To be achieved in:

## 2020-07-01 ENCOUNTER — HOSPITAL ENCOUNTER (OUTPATIENT)
Dept: PHYSICAL THERAPY | Age: 70
Setting detail: THERAPIES SERIES
Discharge: HOME | End: 2020-07-01
Payer: MEDICARE

## 2020-07-01 PROCEDURE — 97110 THERAPEUTIC EXERCISES: CPT | Performed by: PHYSICAL THERAPIST

## 2020-07-01 PROCEDURE — 97140 MANUAL THERAPY 1/> REGIONS: CPT | Performed by: PHYSICAL THERAPIST

## 2020-07-01 PROCEDURE — 97112 NEUROMUSCULAR REEDUCATION: CPT | Performed by: PHYSICAL THERAPIST

## 2020-07-01 NOTE — FLOWSHEET NOTE
[facility] [address] [place] [address] [facility]    Physical Therapy Daily Treatment Note  Date:  2020    Patient Name:  Kaden Black    :  1950  MRN: [id number]  Restrictions/Precautions:    Medical/Treatment Diagnosis Information:  · Diagnosis: M54.5, G89.29 (ICD-10-CM) - Chronic right-sided low back pain without sciatica  · Treatment Diagnosis: M54.5, M25.551, M25.552, R53.1, R38.8  Insurance/Certification information:  PT Insurance Information: PT BENEFTS 2020 FACILITY/ MEDICARE PRIMARY/ PAYS 80%/ NO VISIT LIMIT MED NEC/ ANTHEM SECONDARY/ 20 PAG  Physician Information:  Referring Practitioner: Mikey Osborn MD  Has the plan of care been signed (Y/N):        []  Yes  [x]  No     Date of Patient follow up with Physician: 20      Is this a Progress Report:     []  Yes  [x]  No        If Yes:  Date Range for reporting period:  Beginning  Ending    Progress report will be due (10 Rx or 30 days whichever is less):       Recertification will be due (POC Duration  / 90 days whichever is less): 20        Visit # Insurance Allowable Auth Required   2 MEDICARE []  Yes [x]  No        Functional Scale:    Date assessed:  GALI =34% deficit   20     Latex Allergy:  [x]NO      []YES  Preferred Language for Healthcare:   [x]English       []other:      Pain level:  2/10 back, 4/10 hips    SUBJECTIVE:  Pt states that her R hip is feeling a lot better. Pt states that starting over the weekend she had several episodes of increased pain on the L hip, she did her stretches and that seemed to alleviate some of the symptoms. She wasn't able to raise her leg to get socks/shoes on because of the pain. Pt points to L PSIS and L groin has primary areas of pain and reports that pain wrapped around from one spot to the other and went down the front of her thigh to just below the lateral knee. She feels better today, but still feels tight.  Pt states she has already done bridges, clams, knee to chest and figure 4 prior to PT this morning. OBJECTIVE:   ROM   Comments   Trunk flexion WFL     Trunk extension Limited     Trunk R sidebend WFL     Trunk L sidebend WFL     Trunk R rotation Limited by 25%      Trunk L rotation Limited by 25% Pulling in front of left hip   HS flexibility R SLR 60 deg  L SLR 70 deg     Hip ER R 55  L 40     Hip IR R 10  L 20     Hip ABD R 20  L 20      Hip ext R 0  L 0      Hip flex  R 124, stretch in back  L 125, stretch in back        Strength Left Right Comments   Hip flexion(L2) 3- 3- P! B R>L   Knee extension(L3) 4/5 4-/5     Knee flexion(S1-2) 4/5 4/5     Ankle dorsiflexion(L4) 4+/5 4+/5     Toe extension(L5)     Not assessed   Ankle eversion/plantar flexion(S1)     Not assessed   Hip abd   3+/5  3+/5        Special tests   Comments   SLR + for HS tightness     Slump test Not assessed     Pelvic symmetry  + supine to sit Legs even to R leg long   Ely's + B     Segmental Spinal mobility Hypo, lumbar     Heel walk    Not assessed d/t gait abnormalities, balance deficits and pain   Toe walk   Not assessed d/t gait abnormalities, balance deficits and pain   Tandem walk    Not assessed d/t gait abnormalities, balance deficits and pain              DTRs Left Right Comments   Patellar(L3-L4)         Achilles(S1-S2)                      Joint mobility:               []? Normal               [x]? Hypo L hip              []? Hyper     Palpation: TTP L greater troch, B PSIS     Functional Mobility/Transfers: Requires UE support to get RLE onto table from seated position.     Posture: Mild kyphosis     Gait: Wide MARCELA, hip ER, increased lateral sway    RESTRICTIONS/PRECAUTIONS:     Exercises/Interventions:   ROM/stretches     SKTC     Prone quad stretch     Seated HS 3x30\" B    Standing ITB 3x30\" B    Supine Figure 4 HEP    Knee to opposite shoulder 3x30\" B    Standing hip flexor 3x30\" B Splint stance   Standing ADD stretch 3x30\" L only         Strengthening Glute sets 10x10\" B    Bridges HEP    Clamshells HEP    TA brace 10x10\"    TA march 2x10 B         Supine SLR 2x10                       Manual Intervention         Prone Press up, PSIS mobilization 5x5 PA mobs   GISTM/STM 5' tiger tail, L ITB  3' tiger tail, L ADDs  3' trigger point release, mid/proximal ADDs   Lumbar Manip    SI Manip    Hip belt mobs    Hip LA distraction          Plan for next session: clamshells, TA activation    Therapeutic Exercise and NMR EXR  [x] (82147) Provided verbal/tactile cueing for activities related to strengthening, flexibility, endurance, ROM  for improvements in proximal hip and core control with self care, mobility, lifting and ambulation.  [] (65979) Provided verbal/tactile cueing for activities related to improving balance, coordination, kinesthetic sense, posture, motor skill, proprioception  to assist with core control in self care, mobility, lifting, and ambulation. Therapeutic Activities:    [] (63095 or 50927) Provided verbal/tactile cueing for activities related to improving balance, coordination, kinesthetic sense, posture, motor skill, proprioception and motor activation to allow for proper function  with self care and ADLs  [] (16539) Provided training and instruction to the patient for proper core and proximal hip recruitment and positioning with ambulation re-education     Home Exercise Program:    Daryl Holter access code: Alicia Rogers  Initiated 6/23/20. Printed hand out given. Pt demonstrated proper form of each exercise and expressed verbal understanding of frequency and duration. 7/1/20, updated, printed handout provided.   [x] (10120) Reviewed/Progressed HEP activities related to strengthening, flexibility, endurance, ROM of core, proximal hip and LE for functional self-care, mobility, lifting and ambulation   [] (99728) Reviewed/Progressed HEP activities related to improving balance, coordination, kinesthetic sense, posture, motor skill, proprioception of core, proximal hip and LE for self care, mobility, lifting, and ambulation      Manual Treatments:    [x] (59668) Provided manual therapy to mobilize proximal hip and LS spine soft tissue/joints for the purpose of modulating pain, promoting relaxation,  increasing ROM, reducing/eliminating soft tissue swelling/inflammation/restriction, improving soft tissue extensibility and allowing for proper ROM for normal function with self care, mobility, lifting and ambulation. Charges:  Timed Code Treatment Minutes: 50   Total Treatment Minutes: 50   Time in: 10:00  Time out:10:50    [] EVAL (LOW) 90413 (typically 20 minutes face-to-face)  [] EVAL (MOD) 53288 (typically 30 minutes face-to-face)  [] EVAL (HIGH) 72511 (typically 45 minutes face-to-face)  [] RE-EVAL     [x] KK(67919) x1     [] IONTO  [x] NMR (12785) x     [] VASO  [x] Manual (66060) x1      [] Other:  [] TA x      [] Mech Traction (32233)  [] ES(attended) (18747)      [] ES (un) (15006):     Goals:   Patient stated goal:  Be able to walk without pain    []? Progressing: []? Met: []? Not Met: []? Adjusted     Therapist goals for Patient:   Short Term Goals: To be achieved in: 2 weeks 7/7/20  1. Independent in HEP and progression per patient tolerance, in order to prevent re-injury. []? Progressing: []? Met: []? Not Met: []? Adjusted   2. Patient will have a decrease in pain to facilitate improvement in movement, function, and ADLs as indicated by Functional Deficits. []? Progressing: []? Met: []? Not Met: []? Adjusted   3. Patient will report reduced pain to allow for sleeping on L side. []? Progressing: []? Met: []? Not Met: []? Adjusted      Long Term Goals: To be achieved in: 6 weeks 8/4/20   1. Disability index score of 17% or less for the GALI to assist with reaching prior level of function. []? Progressing: []? Met: []? Not Met: []? Adjusted  2.  Patient will demonstrate increased AROM to WNL, good LS mobility, good hip ROM to allow for proper joint functioning as indicated by patients Functional Deficits.   []? Progressing: []? Met: []? Not Met: []? Adjusted  3. Patient will demonstrate an increase in Strength to good proximal hip and core activation to allow for proper functional mobility as indicated by patients Functional Deficits. []? Progressing: []? Met: []? Not Met: []? Adjusted  4. Patient will return to ADLs, IADLs and functional activities without increased symptoms or restriction. []? Progressing: []? Met: []? Not Met: []? Adjusted  5. Patient will be able to negotiate stairs with reciprocal gait pattern. []? Progressing: []? Met: []? Not Met: []? Adjusted      Overall Progression Towards Functional goals/ Treatment Progress Update:  [] Patient is progressing as expected towards functional goals listed. [] Progression is slowed due to complexities/Impairments listed. [] Progression has been slowed due to co-morbidities. [x] Plan just implemented, too soon to assess goals progression <30days   [] Goals require adjustment due to lack of progress  [] Patient is not progressing as expected and requires additional follow up with physician  [] Other    Prognosis for POC: [x] Good [] Fair  [] Poor      Patient requires continued skilled intervention: [x] Yes  [] No    Treatment/Activity Tolerance:  [x] Patient able to complete treatment  [] Patient limited by fatigue  [] Patient limited by pain     [] Patient limited by other medical complications  [] Other: Pt presents with increased L hip pain this date. Exhibits symmetrical pelvic height in sitting, though L PSIS more prominent than R and + Sam's sign on L. Performed prone PA mobilization to PSIS, pt tolerated well and reported reduction in TTP afterwards. Good tolerance to use of tiger tail and STM along ITB and adductors, pt reported reduction in TTP afterwards.  Pt noted at home that ITB stretch on L had been limited d/t groin pain, reported that the stretch was much more comfortable in clinic today when performed after manual interventions. Pt educated on self STM with rolling pin for ITB, hip flexor/quads and adductors. Performed HF stretch in standing as pt reported that prone was too difficult of a position to get into at home. Pt tolerated progression of exercise program well, reported fatigue with SLR L>R. Pt requires PT follow up to address ROM, strength and functional mobility deficits. Patient education:  6/23/20 Pt educated on diagnosis, prognosis and PT plan of care. Educated on [address]. Pt questions were addressed and answered. Prognosis: [x] Good [] Fair  [] Poor    Patient Requires Follow-up: [x] Yes  [] No    PLAN: See eval  [x] Continue per plan of care [] Alter current plan (see comments)  [] Plan of care initiated [] Hold pending MD visit [] Discharge    Electronically signed by: Jose Luis James PT, DPT, OCS  Physical Therapist  Board Certified Orthopaedic Clinical Specialist  [id number]  [email or web address]    *If patient does not return for further follow ups after this date. Please consider this as the patients discharge from physical therapy.

## 2020-07-06 RX ORDER — SIMVASTATIN 20 MG
20 TABLET ORAL NIGHTLY
Qty: 30 TABLET | Refills: 0 | Status: SHIPPED | OUTPATIENT
Start: 2020-07-06 | End: 2020-07-20

## 2020-07-06 NOTE — TELEPHONE ENCOUNTER
Last ov 06/16/2020   Future Appointments   Date Time Provider Gilson Santizoi   7/7/2020 11:45 AM Mesick Expose, PT [facility]   7/10/2020 10:45 AM Cynthia Expose, PT [facility]   7/15/2020  1:45 PM Mesick Expose, PT [facility]   7/17/2020  1:45 PM Cynthia Expose, PT [facility]   7/23/2020  1:45 PM Sobia Alvarenga MD [address]

## 2020-07-07 ENCOUNTER — HOSPITAL ENCOUNTER (OUTPATIENT)
Dept: PHYSICAL THERAPY | Age: 70
Setting detail: THERAPIES SERIES
Discharge: HOME | End: 2020-07-07
Payer: MEDICARE

## 2020-07-07 NOTE — FLOWSHEET NOTE
The [facility] [place] [facility] [address]    Physical Therapy  Cancellation/No-show Note  Patient Name:  Molly Santiago  :  1950   Date:  2020  Cancelled visits to date: 1  No-shows to date: 0    For today's appointment patient:  [x]  Cancelled  []  Rescheduled appointment  []  No-show     Reason given by patient:  []  Patient ill  []  Conflicting appointment   []  No transportation    []  Conflict with work  []  No reason given  [x]  Other:     Comments:   called, pt admitted to hospital last night     Electronically signed by:  Lisseth Shoemaker, PT, DPT  Physical Therapist  [id number]  [email or web address]

## 2020-07-08 ENCOUNTER — TELEPHONE (OUTPATIENT)
Dept: FAMILY MEDICINE CLINIC | Age: 70
End: 2020-07-08

## 2020-07-08 NOTE — TELEPHONE ENCOUNTER
Pt was called and is scheduled for   Future Appointments   Date Time Provider Gilson Redmond   7/10/2020 10:45 AM Delwyn [place], PT [facility]   7/15/2020  1:45 PM Delwyn [place], PT [facility] PT [facility]   7/17/2020  1:45 PM Delwyn [place], PT [facility] PT [facility]   7/20/2020  1:20 PM [facility]   7/23/2020  1:45 PM Elli Pfeiffer MD [address]

## 2020-07-08 NOTE — TELEPHONE ENCOUNTER
[facility] received a call from:    Name of Caller:     Relationship to patient:      Organization name:    Best contact      Reason for call: patient wants to schedule appointment

## 2020-07-10 ENCOUNTER — HOSPITAL ENCOUNTER (OUTPATIENT)
Dept: PHYSICAL THERAPY | Age: 70
Setting detail: THERAPIES SERIES
Discharge: HOME | End: 2020-07-10
Payer: MEDICARE

## 2020-07-10 PROCEDURE — 97112 NEUROMUSCULAR REEDUCATION: CPT | Performed by: PHYSICAL THERAPIST

## 2020-07-10 PROCEDURE — 97140 MANUAL THERAPY 1/> REGIONS: CPT | Performed by: PHYSICAL THERAPIST

## 2020-07-10 PROCEDURE — 97110 THERAPEUTIC EXERCISES: CPT | Performed by: PHYSICAL THERAPIST

## 2020-07-10 NOTE — FLOWSHEET NOTE
[facility] [address] [place] [address] [facility]    [facility] Therapy Daily Treatment Note  Date:  7/10/2020    Patient Name:  Faisal Mariee    :  1950  MRN: [id number]  Restrictions/Precautions:    Medical/Treatment Diagnosis Information:  · Diagnosis: M54.5, G89.29 (ICD-10-CM) - Chronic right-sided low back pain without sciatica  · Treatment Diagnosis: M54.5, M25.551, M25.552, R53.1, L02.3  Insurance/Certification information:  PT Insurance Information: PT BENEFTS  FACILITY/ MEDICARE PRIMARY/ PAYS 80%/ NO VISIT LIMIT MED NEC/ ANTHEM SECONDARY/ 20 PAG  Physician Information:  Referring Practitioner: Radha Schumacher MD  Has the plan of care been signed (Y/N):        []  Yes  [x]  No     Date of Patient follow up with Physician: 20      Is this a Progress Report:     []  Yes  [x]  No        If Yes:  Date Range for reporting period:  Beginning  Ending    Progress report will be due (10 Rx or 30 days whichever is less): 3/71/24      Recertification will be due (POC Duration  / 90 days whichever is less): 20        Visit # Insurance Allowable Auth Required   2 MEDICARE []  Yes [x]  No        Functional Scale:    Date assessed:  GALI =34% deficit   20     Latex Allergy:  [x]NO      []YES  Preferred Language for Healthcare:   [x]English       []other:      Pain level:  3/10 LBP, 7/10 hips    SUBJECTIVE:  Pt states no increase in pain after LPV, she experiences more pain in the afternoon and right before bed, she puts ice on the adductor and ITB pain which seems to help more than heat, heat more than ice helps the LBP. Pt states she wakes up about every two hours when sleeping due to discomfort, she took a Tramadol this morning because of pain and difficulty going down stairs. Pt was recently admitted to the hospital due to her HR being between 20-40 and extremely low blood pressure, she stayed overnight and has since been feeling better.  Pt states she has already done bridges, clams, knee to chest prior to PT this morning. OBJECTIVE:   ROM   Comments   Trunk flexion WFL     Trunk extension Limited     Trunk R sidebend WFL     Trunk L sidebend WFL     Trunk R rotation Limited by 25%      Trunk L rotation Limited by 25% Pulling in front of left hip   HS flexibility R SLR 60 deg  L SLR 70 deg     Hip ER R 55  L 40     Hip IR R 10  L 20     Hip ABD R 20  L 20      Hip ext R 0  L 0      Hip flex  R 124, stretch in back  L 125, stretch in back        Strength Left Right Comments   Hip flexion(L2) 3- 3- P! B R>L   Knee extension(L3) 4/5 4-/5     Knee flexion(S1-2) 4/5 4/5     Ankle dorsiflexion(L4) 4+/5 4+/5     Toe extension(L5)     Not assessed   Ankle eversion/plantar flexion(S1)     Not assessed   Hip abd   3+/5  3+/5        Special tests   Comments   SLR + for HS tightness     Slump test Not assessed     Pelvic symmetry  + supine to sit Legs even to R leg long   Ely's + B     Segmental Spinal mobility Hypo, lumbar     Heel walk    Not assessed d/t gait abnormalities, balance deficits and pain   Toe walk   Not assessed d/t gait abnormalities, balance deficits and pain   Tandem walk    Not assessed d/t gait abnormalities, balance deficits and pain              DTRs Left Right Comments   Patellar(L3-L4)         Achilles(S1-S2)                      Joint mobility:               []? Normal               [x]? Hypo L hip              []? Hyper     Palpation: TTP L greater troch, B PSIS     Functional Mobility/Transfers: Requires UE support to get RLE onto table from seated position.     Posture: Mild kyphosis     Gait: Wide MARCELA, hip ER, increased lateral sway    RESTRICTIONS/PRECAUTIONS:     Exercises/Interventions:   ROM/stretches     SKTC     Prone quad stretch     Seated HS    Standing ITB    Supine Figure 4 HEP    Knee to opposite shoulder     Standing hip flexor 3x30\" B Splint stance   Standing ADD stretch 3x30\" L only         Strengthening     Glute sets   10x B, relax R  10x B, relax R    Bridges HEP    Clamshells HEP    TA brace    TA march        Supine SLR    Hip flexion 3x10, red tied Supine   Sidelying knee flexion 3x10 B Maintain neutral knee/hip   Sidelying hip flexion  x5 B Maintain neutral knee/hip        Adduction  10x10\" Foam roll                 Manual Intervention         Prone Press up, PSIS mobilization    GISTM/STM 5' tiger tail, L ITB  3' tiger tail, L ADDs   3' tiger tail, L glute     Lumbar Manip    SI Manip    Hip belt mobs    Hip LA distraction          Plan for next session: HI dowell activation    Therapeutic Exercise and NMR EXR  [x] (74170) Provided verbal/tactile cueing for activities related to strengthening, flexibility, endurance, ROM  for improvements in proximal hip and core control with self care, mobility, lifting and ambulation.  [] (96209) Provided verbal/tactile cueing for activities related to improving balance, coordination, kinesthetic sense, posture, motor skill, proprioception  to assist with core control in self care, mobility, lifting, and ambulation. Therapeutic Activities:    [] (98763 or 89767) Provided verbal/tactile cueing for activities related to improving balance, coordination, kinesthetic sense, posture, motor skill, proprioception and motor activation to allow for proper function  with self care and ADLs  [] (09761) Provided training and instruction to the patient for proper core and proximal hip recruitment and positioning with ambulation re-education     Home Exercise Program:    [address] [place] [address] access code: Emy Goldlov  Initiated 6/23/20. Printed hand out given. Pt demonstrated proper form of each exercise and expressed verbal understanding of frequency and duration. 7/1/20, updated, printed handout provided.   [x] (96914) Reviewed/Progressed HEP activities related to strengthening, flexibility, endurance, ROM of core, proximal hip and LE for functional self-care, mobility, lifting and ambulation   [] (68723) Reviewed/Progressed Not Met: []? Adjusted  2. Patient will demonstrate increased AROM to WNL, good LS mobility, good hip ROM to allow for proper joint functioning as indicated by patients Functional Deficits.   []? Progressing: []? Met: []? Not Met: []? Adjusted  3. Patient will demonstrate an increase in Strength to good proximal hip and core activation to allow for proper functional mobility as indicated by patients Functional Deficits. []? Progressing: []? Met: []? Not Met: []? Adjusted  4. Patient will return to ADLs, IADLs and functional activities without increased symptoms or restriction. []? Progressing: []? Met: []? Not Met: []? Adjusted  5. Patient will be able to negotiate stairs with reciprocal gait pattern. []? Progressing: []? Met: []? Not Met: []? Adjusted      Overall Progression Towards Functional goals/ Treatment Progress Update:  [] Patient is progressing as expected towards functional goals listed. [] Progression is slowed due to complexities/Impairments listed. [] Progression has been slowed due to co-morbidities. [x] Plan just implemented, too soon to assess goals progression <30days   [] Goals require adjustment due to lack of progress  [] Patient is not progressing as expected and requires additional follow up with physician  [] Other    Prognosis for POC: [x] Good [] Fair  [] Poor      Patient requires continued skilled intervention: [x] Yes  [] No    Treatment/Activity Tolerance:  [x] Patient able to complete treatment  [] Patient limited by fatigue  [] Patient limited by pain     [] Patient limited by other medical complications  [] Other: Pt tolerated use of tiger tail along L ITB, glutes, and adductor, pt states it felt good. Pt tolerated progression of glute sets and hip flexion with band well, did report mild pain/discomfort during hip flexion on L but able to complete full exercise.  Pt reported fatigue, slight quad stretch, and slight discomfort but tolerable when lying on R hip during sidelying knee flexion. Attempted sidelying hip flexion, pt unable to tolerate secondary to hip pain. Pt required VCs on adduction to not use glutes and slowly and solely use adductors to squeeze foam roll. Pt requires PT follow up to address ROM, strength and functional mobility deficits. Patient education:  6/23/20 Pt educated on diagnosis, prognosis and PT plan of care. Educated on [address]. Pt questions were addressed and answered. Prognosis: [x] Good [] Fair  [] Poor    Patient Requires Follow-up: [x] Yes  [] No    PLAN: See eval  [x] Continue per plan of care [] Alter current plan (see comments)  [] Plan of care initiated [] Hold pending MD visit [] Discharge    Electronically signed by: Lisseth Shoemaker PT, DPT, OCS  Physical Therapist  [id number]  [email or web address]    Jose Lamar, [facility]  Therapist was present, directed the patient's care, made skilled judgement, and was responsible for assessment and treatment of the patient. *If patient does not return for further follow ups after this date. Please consider this as the patients discharge from physical therapy.

## 2020-07-15 ENCOUNTER — HOSPITAL ENCOUNTER (OUTPATIENT)
Dept: PHYSICAL THERAPY | Age: 70
Setting detail: THERAPIES SERIES
Discharge: HOME | End: 2020-07-15
Payer: MEDICARE

## 2020-07-15 PROCEDURE — 97110 THERAPEUTIC EXERCISES: CPT | Performed by: PHYSICAL THERAPIST

## 2020-07-15 PROCEDURE — 97530 THERAPEUTIC ACTIVITIES: CPT | Performed by: PHYSICAL THERAPIST

## 2020-07-15 PROCEDURE — 97140 MANUAL THERAPY 1/> REGIONS: CPT | Performed by: PHYSICAL THERAPIST

## 2020-07-15 NOTE — FLOWSHEET NOTE
The [address] [facility], [place]    Physical Therapy Daily Treatment Note  Date:  7/15/2020    Patient Name:  Skylar Morris    :  1950  MRN: [id number]  Restrictions/Precautions:    Medical/Treatment Diagnosis Information:  · Diagnosis: M54.5, G89.29 (ICD-10-CM) - Chronic right-sided low back pain without sciatica  · Treatment Diagnosis: M54.5, M25.551, M25.552, R53.1, W14.9  Insurance/Certification information:  PT Insurance Information: PT BENEFTS  FACILITY/ MEDICARE PRIMARY/ PAYS 80%/ NO VISIT LIMIT MED NEC/ ANTHEM SECONDARY/ 20 PAG  Physician Information:  Referring Practitioner: Vanda Cornelius MD  Has the plan of care been signed (Y/N):        []  Yes  [x]  No     Date of Patient follow up with Physician: 20      Is this a Progress Report:     []  Yes  [x]  No        If Yes:  Date Range for reporting period:  Beginning  Ending    Progress report will be due (10 Rx or 30 days whichever is less): 73      Recertification will be due (POC Duration  / 90 days whichever is less): 20        Visit # Insurance Allowable Auth Required   2 MEDICARE []  Yes [x]  No        Functional Scale:    Date assessed:  GALI =34% deficit   20     Latex Allergy:  [x]NO      []YES  Preferred Language for Healthcare:   [x]English       []other:      Pain level:  3/10 LBP, 8/10 hips    SUBJECTIVE:  Pt states she is still having a lot of pain. \"I am trying to do everything I can to keep from taking pain medication. \" Pt states that her pain is mostly in the L glute, lateral leg and inner thigh. Pt states that she is starting to get R shoulder pain because she has so much hip pain with sit<>stand she needs to use her arms. Pt states her hip pain is 8/10 today. Pt has done most of her HEP already today before arriving.     OBJECTIVE:   ROM   Comments   Trunk flexion WFL     Trunk extension Limited     Trunk R sidebend WFL     Trunk L sidebend WFL     Trunk R rotation Limited by 25%      Trunk L rotation Limited by 25% Pulling in front of left hip   HS flexibility R SLR 60 deg  L SLR 70 deg     Hip ER R 55  L 40     Hip IR R 10  L 20     Hip ABD R 20  L 20      Hip ext R 0  L 0      Hip flex  R 124, stretch in back  L 125, stretch in back        Strength Left Right Comments   Hip flexion(L2) 3- 3- P! B R>L   Knee extension(L3) 4/5 4-/5     Knee flexion(S1-2) 4/5 4/5     Ankle dorsiflexion(L4) 4+/5 4+/5     Toe extension(L5)     Not assessed   Ankle eversion/plantar flexion(S1)     Not assessed   Hip abd   3+/5  3+/5        Special tests   Comments   SLR + for HS tightness     Slump test Not assessed     Pelvic symmetry  + supine to sit Legs even to R leg long   Ely's + B     Segmental Spinal mobility Hypo, lumbar     Heel walk    Not assessed d/t gait abnormalities, balance deficits and pain   Toe walk   Not assessed d/t gait abnormalities, balance deficits and pain   Tandem walk    Not assessed d/t gait abnormalities, balance deficits and pain              DTRs Left Right Comments   Patellar(L3-L4)         Achilles(S1-S2)                      Joint mobility:               []? Normal               [x]? Hypo L hip              []? Hyper     Palpation: TTP L greater troch, B PSIS     Functional Mobility/Transfers: Requires UE support to get RLE onto table from seated position.     Posture: Mild kyphosis     Gait: Wide MARCELA, hip ER, increased lateral sway    RESTRICTIONS/PRECAUTIONS:     Exercises/Interventions:   ROM/stretches     SKTC     Prone quad stretch     Seated HS    Standing ITB 3x30\" B   Supine Figure 4 HEP    Knee to opposite shoulder     Standing hip flexor  Splint stance   Standing ADD stretch 3x30\" L only         Strengthening     Glute sets       Bridges HEP    Clamshells HEP  3x10 hooklying, B, unilaterally    TA brace    TA march    TA heel slide 2x10 BPillowcase under foot to allow slide       Supine SLR    Hip flexion 3x10, red tied Supine   Sidelying knee flexion 3x10 B Maintain neutral knee/hip   Sidelying hip flexion   Maintain neutral knee/hip        Adduction  10x10\" knees bent, foam roll between knees  10x10\" knees straight, foam roll between ankles                  Manual Intervention         Prone Press up, PSIS mobilization    GISTM/STM 4' tiger tail, L ITB  4' tiger tail, L ADDs   4' tiger tail, L glute     Lumbar Manip    SI Manip    Hip belt mobs    Hip LA distraction          Plan for next session: delmer, TA activation    Therapeutic Exercise and NMR EXR  [x] (12223) Provided verbal/tactile cueing for activities related to strengthening, flexibility, endurance, ROM  for improvements in proximal hip and core control with self care, mobility, lifting and ambulation.  [] (83017) Provided verbal/tactile cueing for activities related to improving balance, coordination, kinesthetic sense, posture, motor skill, proprioception  to assist with core control in self care, mobility, lifting, and ambulation. Therapeutic Activities:    [] (57397 or 68325) Provided verbal/tactile cueing for activities related to improving balance, coordination, kinesthetic sense, posture, motor skill, proprioception and motor activation to allow for proper function  with self care and ADLs  [] (65666) Provided training and instruction to the patient for proper core and proximal hip recruitment and positioning with ambulation re-education     Home Exercise Program:    Betty Fletcher access code: Rocio Britton  Initiated 6/23/20. Printed hand out given. Pt demonstrated proper form of each exercise and expressed verbal understanding of frequency and duration. 7/1/20, updated, printed handout provided.   [x] (24717) Reviewed/Progressed HEP activities related to strengthening, flexibility, endurance, ROM of core, proximal hip and LE for functional self-care, mobility, lifting and ambulation   [] (07609) Reviewed/Progressed HEP activities related to improving balance, coordination, kinesthetic sense, posture, motor skill, proprioception of core, proximal hip and LE for self care, mobility, lifting, and ambulation      Manual Treatments:    [x] (82145) Provided manual therapy to mobilize proximal hip and LS spine soft tissue/joints for the purpose of modulating pain, promoting relaxation,  increasing ROM, reducing/eliminating soft tissue swelling/inflammation/restriction, improving soft tissue extensibility and allowing for proper ROM for normal function with self care, mobility, lifting and ambulation. Charges:  Timed Code Treatment Minutes: 57   Total Treatment Minutes: 57   Time in: 1:45  Time out: 2:42    [] EVAL (LOW) 52983 (typically 20 minutes face-to-face)  [] EVAL (MOD) 70228 (typically 30 minutes face-to-face)  [] EVAL (HIGH) 95294 (typically 45 minutes face-to-face)  [] RE-EVAL     [x] FK(45770) x1     [] IONTO  [x] NMR (49198) x     [] VASO  [x] Manual (34136) x2      [] Other:  [] TA x      [] Mech Traction (72791)  [] ES(attended) (66377)      [] ES (un) (27127):     Goals:   Patient stated goal:  Be able to walk without pain    []? Progressing: []? Met: []? Not Met: []? Adjusted     Therapist goals for Patient:   Short Term Goals: To be achieved in: 2 weeks 7/7/20  1. Independent in HEP and progression per patient tolerance, in order to prevent re-injury. []? Progressing: [x]? Met: []? Not Met: []? Adjusted   2. Patient will have a decrease in pain to facilitate improvement in movement, function, and ADLs as indicated by Functional Deficits. [x]? Progressing: []? Met: []? Not Met: []? Adjusted   3. Patient will report reduced pain to allow for sleeping on L side. [x]? Progressing: []? Met: []? Not Met: []? Adjusted      Long Term Goals: To be achieved in: 6 weeks 8/4/20   1. Disability index score of 17% or less for the GALI to assist with reaching prior level of function. []? Progressing: []? Met: []? Not Met: []? Adjusted  2.  Patient will demonstrate increased AROM to WNL, good LS mobility, good hip ROM to allow for proper joint functioning as indicated by patients Functional Deficits.   []? Progressing: []? Met: []? Not Met: []? Adjusted  3. Patient will demonstrate an increase in Strength to good proximal hip and core activation to allow for proper functional mobility as indicated by patients Functional Deficits. []? Progressing: []? Met: []? Not Met: []? Adjusted  4. Patient will return to ADLs, IADLs and functional activities without increased symptoms or restriction. []? Progressing: []? Met: []? Not Met: []? Adjusted  5. Patient will be able to negotiate stairs with reciprocal gait pattern. []? Progressing: []? Met: []? Not Met: []? Adjusted      Overall Progression Towards Functional goals/ Treatment Progress Update:  [] Patient is progressing as expected towards functional goals listed. [] Progression is slowed due to complexities/Impairments listed. [] Progression has been slowed due to co-morbidities. [x] Plan just implemented, too soon to assess goals progression <30days   [] Goals require adjustment due to lack of progress  [] Patient is not progressing as expected and requires additional follow up with physician  [] Other    Prognosis for POC: [x] Good [] Fair  [] Poor      Patient requires continued skilled intervention: [x] Yes  [] No    Treatment/Activity Tolerance:  [x] Patient able to complete treatment  [] Patient limited by fatigue  [] Patient limited by pain     [] Patient limited by other medical complications  [] Other: Pt continues to exhibit significant TTP along L glutes, lateral quad/ITB (greatest distally), medial quad, distal sartorius. Pt tolerates tiger tail well and reports that it feels good despite there being some pain/discomfort. Pt reported pain by 4th rep of 3rd set on resisted hip flex. R lateral hip pain by middle of 3rd set on sidelying knee flexion, better tolerance on L where she reported some mild tightness.  Pt tolerated progression of core and addition of hooklying clams well, noted muscle activation without muscle pain. Pt requires PT follow up to address ROM, strength and functional mobility deficits. Patient education:  6/23/20 Pt educated on diagnosis, prognosis and PT plan of care. Educated on [address]. Pt questions were addressed and answered. Prognosis: [x] Good [] Fair  [] Poor    Patient Requires Follow-up: [x] Yes  [] No    PLAN: See eval  [x] Continue per plan of care [] Alter current plan (see comments)  [] Plan of care initiated [] Hold pending MD visit [] Discharge    Electronically signed by: Rocio Espinoza PT, DPT, OCS  Physical Therapist  [id number]  [email or web address]      *If patient does not return for further follow ups after this date. Please consider this as the patients discharge from physical therapy.

## 2020-07-17 ENCOUNTER — HOSPITAL ENCOUNTER (OUTPATIENT)
Dept: PHYSICAL THERAPY | Age: 70
Setting detail: THERAPIES SERIES
Discharge: HOME | End: 2020-07-17
Payer: MEDICARE

## 2020-07-17 PROCEDURE — 97112 NEUROMUSCULAR REEDUCATION: CPT | Performed by: PHYSICAL THERAPIST

## 2020-07-17 PROCEDURE — 97110 THERAPEUTIC EXERCISES: CPT | Performed by: PHYSICAL THERAPIST

## 2020-07-17 PROCEDURE — 97140 MANUAL THERAPY 1/> REGIONS: CPT | Performed by: PHYSICAL THERAPIST

## 2020-07-17 NOTE — FLOWSHEET NOTE
[facility] [address] [place] [address] [facility]    [facility] Daily Treatment Note  Date:  2020    Patient Name:  Stephanie Alarcon    :  1950  MRN: [id number]  Restrictions/Precautions:    Medical/Treatment Diagnosis Information:  · Diagnosis: M54.5, G89.29 (ICD-10-CM) - Chronic right-sided low back pain without sciatica  · Treatment Diagnosis: M54.5, M25.551, M25.552, R53.1, T47.6  Insurance/Certification information:  PT Insurance Information: PT BENEFTS  FACILITY/ MEDICARE PRIMARY/ PAYS 80%/ NO VISIT LIMIT MED NEC/ ANTHEM SECONDARY/ 20 PAG  Physician Information:  Referring Practitioner: Dina Pinedo MD  Has the plan of care been signed (Y/N):        []  Yes  [x]  No     Date of Patient follow up with Physician: 20      Is this a Progress Report:     []  Yes  [x]  No        If Yes:  Date Range for reporting period:  Beginning  Ending    Progress report will be due (10 Rx or 30 days whichever is less):       Recertification will be due (POC Duration  / 90 days whichever is less): 20        Visit # Insurance Allowable Auth Required   5 MEDICARE []  Yes [x]  No        Functional Scale:    Date assessed:  GALI =34% deficit   20     Latex Allergy:  [x]NO      []YES  Preferred Language for Healthcare:   [x]English       []other:      Pain level:  3/10 LBP, 8/10 hips    SUBJECTIVE:  Pt states she felt fine after last session. Feeling okay today. Pain the the front/inside of the leg seems to have lessened, but lateral hip pain seems a bit more intense. Pt has completed almost all exercises at home already.     OBJECTIVE:   ROM   Comments   Trunk flexion WFL     Trunk extension Limited     Trunk R sidebend WFL     Trunk L sidebend WFL     Trunk R rotation Limited by 25%      Trunk L rotation Limited by 25% Pulling in front of left hip   HS flexibility R SLR 60 deg  L SLR 70 deg     Hip ER R 55  L 40     Hip IR R 10  L 20     Hip ABD R 20  L 20    Hip ext R 0  L 0      Hip flex  R 124, stretch in back  L 125, stretch in back        Strength Left Right Comments   Hip flexion(L2) 3- 3- P! B R>L   Knee extension(L3) 4/5 4-/5     Knee flexion(S1-2) 4/5 4/5     Ankle dorsiflexion(L4) 4+/5 4+/5     Toe extension(L5)     Not assessed   Ankle eversion/plantar flexion(S1)     Not assessed   Hip abd   3+/5  3+/5        Special tests   Comments   SLR + for HS tightness     Slump test Not assessed     Pelvic symmetry  + supine to sit Legs even to R leg long   Ely's + B     Segmental Spinal mobility Hypo, lumbar     Heel walk    Not assessed d/t gait abnormalities, balance deficits and pain   Toe walk   Not assessed d/t gait abnormalities, balance deficits and pain   Tandem walk    Not assessed d/t gait abnormalities, balance deficits and pain              DTRs Left Right Comments   Patellar(L3-L4)         Achilles(S1-S2)                      Joint mobility:               []? Normal               [x]? Hypo L hip              []? Hyper     Palpation: TTP L greater troch, B PSIS     Functional Mobility/Transfers: Requires UE support to get RLE onto table from seated position.     Posture: Mild kyphosis     Gait: Wide MARCELA, hip ER, increased lateral sway    RESTRICTIONS/PRECAUTIONS:     Exercises/Interventions:   ROM/stretches     SKTC     Prone quad stretch     Seated HS 3x30\" B   Standing ITB 3x30\" B   Supine Figure 4 HEP    Knee to opposite shoulder     Standing hip flexor  Splint stance   Standing ADD stretch 3x30\" L only         Strengthening     Glute sets       Bridges HEP    Clamshells HEP      TA brace    TA march    TA heel slide Pillowcase under foot to allow slide       Supine SLR    Prone SLR 2x10 B    Hip flexion 3x10, red tied Supine   Sidelying knee flexion  Maintain neutral knee/hip   Sidelying hip flexion   Maintain neutral knee/hip   Adduction  10x10\" knees bent, foam roll between knees  10x10\" knees straight, foam roll between ankles         Rockerboard Manual Treatments:    [x] (10907) Provided manual therapy to mobilize proximal hip and LS spine soft tissue/joints for the purpose of modulating pain, promoting relaxation,  increasing ROM, reducing/eliminating soft tissue swelling/inflammation/restriction, improving soft tissue extensibility and allowing for proper ROM for normal function with self care, mobility, lifting and ambulation. Charges:  Timed Code Treatment Minutes: 40   Total Treatment Minutes: 40   Time in: 1:48  Time out: 2:28    [] EVAL (LOW) 83275 (typically 20 minutes face-to-face)  [] EVAL (MOD) 29335 (typically 30 minutes face-to-face)  [] EVAL (HIGH) 44745 (typically 45 minutes face-to-face)  [] RE-EVAL     [x] ZI(29045) x1     [] IONTO  [x] NMR (65559) x     [] VASO  [x] Manual (61483) x1      [] Other:  [] TA x      [] Mech Traction (32116)  [] ES(attended) (77582)      [] ES (un) (99110):     Goals:   Patient stated goal:  Be able to walk without pain    []? Progressing: []? Met: []? Not Met: []? Adjusted     Therapist goals for Patient:   Short Term Goals: To be achieved in: 2 weeks 7/7/20  1. Independent in HEP and progression per patient tolerance, in order to prevent re-injury. []? Progressing: [x]? Met: []? Not Met: []? Adjusted   2. Patient will have a decrease in pain to facilitate improvement in movement, function, and ADLs as indicated by Functional Deficits. [x]? Progressing: []? Met: []? Not Met: []? Adjusted   3. Patient will report reduced pain to allow for sleeping on L side. [x]? Progressing: []? Met: []? Not Met: []? Adjusted      Long Term Goals: To be achieved in: 6 weeks 8/4/20   1. Disability index score of 17% or less for the GALI to assist with reaching prior level of function. []? Progressing: []? Met: []? Not Met: []? Adjusted  2. Patient will demonstrate increased AROM to WNL, good LS mobility, good hip ROM to allow for proper joint functioning as indicated by patients Functional Deficits.   []? by RB balance, difficulty keeping board level but minimal use of UE on wall for balance. Pt with improved gait this date, walking more quickly. Pt requires PT follow up to address ROM, strength and functional mobility deficits. Patient education:  6/23/20 Pt educated on diagnosis, prognosis and PT plan of care. Educated on [employer]. Pt questions were addressed and answered. Prognosis: [x] Good [] Fair  [] Poor    Patient Requires Follow-up: [x] Yes  [] No    PLAN: See eval  [x] Continue per plan of care [] Alter current plan (see comments)  [] Plan of care initiated [] Hold pending MD visit [] Discharge    Electronically signed by: Rachel Spence PT, DPT, OCS  Physical Therapist  [id number]  [email or web address]      *If patient does not return for further follow ups after this date. Please consider this as the patients discharge from physical therapy.

## 2020-07-20 ENCOUNTER — OFFICE VISIT (OUTPATIENT)
Dept: FAMILY MEDICINE CLINIC | Age: 70
End: 2020-07-20
Payer: MEDICARE

## 2020-07-20 VITALS
TEMPERATURE: 98.4 F | OXYGEN SATURATION: 97 % | BODY MASS INDEX: 29.23 KG/M2 | HEIGHT: 63 IN | SYSTOLIC BLOOD PRESSURE: 106 MMHG | DIASTOLIC BLOOD PRESSURE: 82 MMHG | HEART RATE: 85 BPM | WEIGHT: 165 LBS

## 2020-07-20 PROCEDURE — G8427 DOCREV CUR MEDS BY ELIG CLIN: HCPCS | Performed by: NURSE PRACTITIONER

## 2020-07-20 PROCEDURE — G8417 CALC BMI ABV UP PARAM F/U: HCPCS | Performed by: NURSE PRACTITIONER

## 2020-07-20 PROCEDURE — 99213 OFFICE O/P EST LOW 20 MIN: CPT | Performed by: NURSE PRACTITIONER

## 2020-07-20 PROCEDURE — 1036F TOBACCO NON-USER: CPT | Performed by: NURSE PRACTITIONER

## 2020-07-20 PROCEDURE — G8399 PT W/DXA RESULTS DOCUMENT: HCPCS | Performed by: NURSE PRACTITIONER

## 2020-07-20 PROCEDURE — 3017F COLORECTAL CA SCREEN DOC REV: CPT | Performed by: NURSE PRACTITIONER

## 2020-07-20 PROCEDURE — 1090F PRES/ABSN URINE INCON ASSESS: CPT | Performed by: NURSE PRACTITIONER

## 2020-07-20 PROCEDURE — 4040F PNEUMOC VAC/ADMIN/RCVD: CPT | Performed by: NURSE PRACTITIONER

## 2020-07-20 PROCEDURE — 1123F ACP DISCUSS/DSCN MKR DOCD: CPT | Performed by: NURSE PRACTITIONER

## 2020-07-20 PROCEDURE — 1111F DSCHRG MED/CURRENT MED MERGE: CPT | Performed by: NURSE PRACTITIONER

## 2020-07-20 RX ORDER — TRAMADOL HYDROCHLORIDE 50 MG/1
50 TABLET ORAL EVERY 6 HOURS PRN
COMMUNITY
End: 2020-08-27 | Stop reason: SDUPTHER

## 2020-07-20 ASSESSMENT — PATIENT HEALTH QUESTIONNAIRE - PHQ9
1. LITTLE INTEREST OR PLEASURE IN DOING THINGS: 0
SUM OF ALL RESPONSES TO PHQ9 QUESTIONS 1 & 2: 0
SUM OF ALL RESPONSES TO PHQ QUESTIONS 1-9: 0
SUM OF ALL RESPONSES TO PHQ QUESTIONS 1-9: 0
2. FEELING DOWN, DEPRESSED OR HOPELESS: 0

## 2020-07-20 ASSESSMENT — ENCOUNTER SYMPTOMS
DIARRHEA: 0
COUGH: 0
NAUSEA: 0
VOMITING: 0
SHORTNESS OF BREATH: 0

## 2020-07-20 NOTE — PROGRESS NOTES
Post-Discharge Transitional Care Management Services or Hospital Follow Up    Chief Complaint   Patient presents with    [facility]/ Dizziness/ heart rate and BP low        Addison Reed   YOB: 1950    Date of Office Visit:  7/20/2020  Date of Hospital Admission: 07/06/2020  Date of Hospital Discharge: 07/07/2020    Care management risk score Rising risk (score 2-5) and Complex Care (Scores >=6): 0     Non face to face  following discharge, date last encounter closed (first attempt may have been earlier): *No documented post hospital discharge outreach found in the last 14 days     Call initiated 2 business days of discharge: *No response recorded in the last 14 days    Patient Active Problem List   Diagnosis    Benign essential HTN    Pure hypercholesterolemia    Gastroesophageal reflux disease without esophagitis    Family history of Alzheimer's disease       Allergies   Allergen Reactions    Flexeril [Cyclobenzaprine] Other (See Comments)     Low BP and heart rate    Keflet [Cephalexin] Rash       Medications listed as ordered at the time of discharge from hospital     Medications marked \"taking\" at this time  Outpatient Medications Marked as Taking for the 7/20/20 encounter (Office Visit) with VICENTE Rodriguez CNP   Medication Sig Dispense Refill    traMADol (ULTRAM) 50 MG tablet Take 50 mg by mouth every 6 hours as needed.       lisinopril (PRINIVIL;ZESTRIL) 10 MG tablet Take 1 tablet by mouth daily 90 tablet 1    simvastatin (ZOCOR) 40 MG tablet Take 1 tablet by mouth nightly 90 tablet 1    omeprazole (PRILOSEC) 20 MG delayed release capsule Take 1 capsule by mouth daily 30 capsule 1    diclofenac (VOLTAREN) 50 MG EC tablet Take 1 tablet by mouth 2 times daily (with meals) 60 tablet 3    Multiple Vitamins-Minerals (THERAPEUTIC MULTIVITAMIN-MINERALS) tablet Take 1 tablet by mouth daily      Vitamin D (CHOLECALCIFEROL) 1000 UNITS CAPS capsule Take 2,000 Units by mouth daily       Multiple Vitamins-Minerals (OCUVITE PRESERVISION PO) Take 1 tablet by mouth daily          Medications patient taking as of now reconciled against medications ordered at time of hospital discharge: Yes    Chief Complaint   Patient presents with   [address] from [facility] [place] / 7/6-7/7/ Dizziness/ heart rate and BP low        HPI    Inpatient course: Discharge summary reviewed- see chart. Admitted overnight at [facility] for dizziness, hypotension and bradycardia due to side effect from flexeril. Work up in hospital was unrevealing of other causes. Interval history/Current status: doing well, denies further dizziness. Her blood pressure and heart rate have been stable at home when she is checking and they are normal today. Stopped taking Flexeril. Has follow up with ortho this Thursday for back pain. Vitals:    07/20/20 1327   BP: 106/82   Site: Left Upper Arm   Position: Sitting   Cuff Size: Medium Adult   Pulse: 85   Temp: 98.4 °F (36.9 °C)   SpO2: 97%   Weight: 165 lb (74.8 kg)   Height: 5' 3\" (1.6 m)     Body mass index is 29.23 kg/m². Wt Readings from Last 3 Encounters:   07/20/20 165 lb (74.8 kg)   06/11/20 160 lb (72.6 kg)   11/05/19 160 lb (72.6 kg)     BP Readings from Last 3 Encounters:   07/20/20 106/82   06/11/20 123/77   11/05/19 128/88     Review of Systems   Constitutional: Negative for chills, fatigue and fever. Respiratory: Negative for cough and shortness of breath. Cardiovascular: Negative for chest pain and leg swelling. Gastrointestinal: Negative for diarrhea, nausea and vomiting. Neurological: Negative for dizziness, syncope and headaches. All other systems reviewed and are negative. Physical Exam  Vitals signs and nursing note reviewed. Constitutional:       General: She is not in acute distress. Appearance: Normal appearance. She is well-developed and normal weight.  She is not ill-appearing, toxic-appearing or diaphoretic. HENT:      Head: Normocephalic and atraumatic. Cardiovascular:      Rate and Rhythm: Normal rate and regular rhythm. Heart sounds: Normal heart sounds, S1 normal and S2 normal. No murmur. No friction rub. No gallop. Pulmonary:      Effort: Pulmonary effort is normal. No respiratory distress. Breath sounds: Normal breath sounds. No stridor. No wheezing, rhonchi or rales. Skin:     General: Skin is warm and dry. Nails: There is no clubbing. Neurological:      General: No focal deficit present. Mental Status: She is alert and oriented to person, place, and time. Mental status is at baseline. Cranial Nerves: No cranial nerve deficit. Sensory: No sensory deficit. Psychiatric:         Speech: Speech normal.       Assessment/Plan:  1. Dizziness    2. Hospital discharge follow-up  - AR DISCHARGE MEDS RECONCILED W/ CURRENT OUTPATIENT MED LIST      Symptoms resolved, she is feeling well. Stopped Flexeril. She will continue to monitor BP and HR and call if any further concerns.      Medical Decision Making: low complexity

## 2020-07-21 ENCOUNTER — HOSPITAL ENCOUNTER (OUTPATIENT)
Dept: PHYSICAL THERAPY | Age: 70
Setting detail: THERAPIES SERIES
Discharge: HOME | End: 2020-07-21
Payer: MEDICARE

## 2020-07-21 PROCEDURE — 97110 THERAPEUTIC EXERCISES: CPT | Performed by: PHYSICAL THERAPIST

## 2020-07-21 PROCEDURE — 97140 MANUAL THERAPY 1/> REGIONS: CPT | Performed by: PHYSICAL THERAPIST

## 2020-07-21 PROCEDURE — 97112 NEUROMUSCULAR REEDUCATION: CPT | Performed by: PHYSICAL THERAPIST

## 2020-07-21 NOTE — FLOWSHEET NOTE
The [address] [place] [address] [facility]    Physical Therapy Daily Treatment Note  Date:  2020    Patient Name:  Stephanie Alarcon    :  1950  MRN: [id number]  Restrictions/Precautions:    Medical/Treatment Diagnosis Information:  · Diagnosis: M54.5, G89.29 (ICD-10-CM) - Chronic right-sided low back pain without sciatica  · Treatment Diagnosis: M54.5, M25.551, M25.552, R53.1, L27.2  Insurance/Certification information:  PT Insurance Information: PT BENEFTS  FACILITY/ MEDICARE PRIMARY/ PAYS 80%/ NO VISIT LIMIT MED NEC/ ANTHEM SECONDARY/ 20 PAG  Physician Information:  Referring Practitioner: Dina Pinedo MD  Has the plan of care been signed (Y/N):        []  Yes  [x]  No     Date of Patient follow up with Physician: 20      Is this a Progress Report:     []  Yes  [x]  No        If Yes:  Date Range for reporting period:  Beginning  Ending    Progress report will be due (10 Rx or 30 days whichever is less): 3/99/37      Recertification will be due (POC Duration  / 90 days whichever is less): 20        Visit # Insurance Allowable Auth Required   6 MEDICARE []  Yes [x]  No        Functional Scale:    Date assessed:  GALI =34% deficit   20     Latex Allergy:  [x]NO      []YES  Preferred Language for Healthcare:   [x]English       []other:      Pain level:  3/10 LBP, 8/10 hips    SUBJECTIVE:  Pt reports feeling fine following LPV. Pt states she is feeling sore today, she could only tolerate 2 sets of most of her exercises. Pt states she took yesterday as a rest day, only did glute sets and her legs felt really good. Today after HEP she is having a lot of pain on the inner L thigh, \"feels really tender. \" \"it feels like a deep from the back of my hip to my inner thigh. \"    OBJECTIVE:   ROM   Comments   Trunk flexion WFL     Trunk extension Limited     Trunk R sidebend WFL     Trunk L sidebend WFL     Trunk R rotation Limited by 25%      Trunk L rotation Limited by 25% Pulling in front of left hip   HS flexibility R SLR 60 deg  L SLR 70 deg     Hip ER R 55  L 40     Hip IR R 10  L 20     Hip ABD R 20  L 20      Hip ext R 0  L 0      Hip flex  R 124, stretch in back  L 125, stretch in back        Strength Left Right Comments   Hip flexion(L2) 3- 3- P! B R>L   Knee extension(L3) 4/5 4-/5     Knee flexion(S1-2) 4/5 4/5     Ankle dorsiflexion(L4) 4+/5 4+/5     Toe extension(L5)     Not assessed   Ankle eversion/plantar flexion(S1)     Not assessed   Hip abd   3+/5  3+/5        Special tests   Comments   SLR + for HS tightness     Slump test Not assessed     Pelvic symmetry  + supine to sit Legs even to R leg long   Ely's + B     Segmental Spinal mobility Hypo, lumbar     Heel walk    Not assessed d/t gait abnormalities, balance deficits and pain   Toe walk   Not assessed d/t gait abnormalities, balance deficits and pain   Tandem walk    Not assessed d/t gait abnormalities, balance deficits and pain              DTRs Left Right Comments   Patellar(L3-L4)         Achilles(S1-S2)                      Joint mobility:               []? Normal               [x]? Hypo L hip              []? Hyper     Palpation: TTP L greater troch, B PSIS     Functional Mobility/Transfers: Requires UE support to get RLE onto table from seated position.     Posture: Mild kyphosis     Gait: Wide MARCELA, hip ER, increased lateral sway    RESTRICTIONS/PRECAUTIONS:     Exercises/Interventions:   ROM/stretches     SKTC     Prone quad stretch     Seated HS    Standing ITB    Supine Figure 4    Knee to opposite shoulder    Standing hip flexor Splint stance   Standing ADD stretch         Strengthening     Glute sets       Bridges HEP    Clamshells HEP      TA brace    TA march    TA heel slide Pillowcase under foot to allow slide       Supine SLR    Prone SLR 3x10 B    Hip flexion 3x10, red tied Supine   Sidelying knee flexion  Maintain neutral knee/hip   Sidelying hip flexion   Maintain neutral knee/hip Adduction  10x10\" knees bent, foam roll between knees  10x10\" knees straight, foam roll between ankles         Rockerboard 3x30\" A/P  3x30\" M/L              Quantum- Knee ext 3x10 15#    Quantum- Knee flex 3x10 30#             Manual Intervention         Prone Press up, PSIS mobilization    GISTM/STM 4' tiger tail, L ITB  4' tiger tail, L ADDs   4' tiger tail, L glute     Lumbar Manip    SI Manip    Hip belt mobs    Hip LA distraction          Plan for next session: progress as tolerated    Therapeutic Exercise and NMR EXR  [x] (35052) Provided verbal/tactile cueing for activities related to strengthening, flexibility, endurance, ROM  for improvements in proximal hip and core control with self care, mobility, lifting and ambulation.  [] (94202) Provided verbal/tactile cueing for activities related to improving balance, coordination, kinesthetic sense, posture, motor skill, proprioception  to assist with core control in self care, mobility, lifting, and ambulation. Therapeutic Activities:    [] (42553 or 63129) Provided verbal/tactile cueing for activities related to improving balance, coordination, kinesthetic sense, posture, motor skill, proprioception and motor activation to allow for proper function  with self care and ADLs  [] (64242) Provided training and instruction to the patient for proper core and proximal hip recruitment and positioning with ambulation re-education     Home Exercise Program:    peggy [place] access code: Seth Needs  Initiated 6/23/20. Printed hand out given. Pt demonstrated proper form of each exercise and expressed verbal understanding of frequency and duration. 7/1/20, updated, printed handout provided.   [x] (49888) Reviewed/Progressed HEP activities related to strengthening, flexibility, endurance, ROM of core, proximal hip and LE for functional self-care, mobility, lifting and ambulation   [] (09095) Reviewed/Progressed HEP activities related to improving balance, coordination, kinesthetic sense, posture, motor skill, proprioception of core, proximal hip and LE for self care, mobility, lifting, and ambulation      Manual Treatments:    [x] (20564) Provided manual therapy to mobilize proximal hip and LS spine soft tissue/joints for the purpose of modulating pain, promoting relaxation,  increasing ROM, reducing/eliminating soft tissue swelling/inflammation/restriction, improving soft tissue extensibility and allowing for proper ROM for normal function with self care, mobility, lifting and ambulation. Charges:  Timed Code Treatment Minutes: 40   Total Treatment Minutes: 40   Time in: 11:03  Time out: 11:43    [] EVAL (LOW) 54091 (typically 20 minutes face-to-face)  [] EVAL (MOD) 28133 (typically 30 minutes face-to-face)  [] EVAL (HIGH) 05281 (typically 45 minutes face-to-face)  [] RE-EVAL     [x] OR(65446) x1     [] IONTO  [x] NMR (61128) x 1   [] VASO  [x] Manual (36388) x1      [] Other:  [] TA x      [] Mech Traction (33657)  [] ES(attended) (01422)      [] ES (un) (68808):     Goals:   Patient stated goal:  Be able to walk without pain    []? Progressing: []? Met: []? Not Met: []? Adjusted     Therapist goals for Patient:   Short Term Goals: To be achieved in: 2 weeks 7/7/20  1. Independent in HEP and progression per patient tolerance, in order to prevent re-injury. []? Progressing: [x]? Met: []? Not Met: []? Adjusted   2. Patient will have a decrease in pain to facilitate improvement in movement, function, and ADLs as indicated by Functional Deficits. [x]? Progressing: []? Met: []? Not Met: []? Adjusted   3. Patient will report reduced pain to allow for sleeping on L side. [x]? Progressing: []? Met: []? Not Met: []? Adjusted      Long Term Goals: To be achieved in: 6 weeks 8/4/20   1. Disability index score of 17% or less for the GALI to assist with reaching prior level of function. []? Progressing: []? Met: []? Not Met: []? Adjusted  2.  Patient will demonstrate increased AROM to WNL, good LS mobility, good hip ROM to allow for proper joint functioning as indicated by patients Functional Deficits.   []? Progressing: []? Met: []? Not Met: []? Adjusted  3. Patient will demonstrate an increase in Strength to good proximal hip and core activation to allow for proper functional mobility as indicated by patients Functional Deficits. []? Progressing: []? Met: []? Not Met: []? Adjusted  4. Patient will return to ADLs, IADLs and functional activities without increased symptoms or restriction. []? Progressing: []? Met: []? Not Met: []? Adjusted  5. Patient will be able to negotiate stairs with reciprocal gait pattern. []? Progressing: []? Met: []? Not Met: []? Adjusted      Overall Progression Towards Functional goals/ Treatment Progress Update:  [] Patient is progressing as expected towards functional goals listed. [] Progression is slowed due to complexities/Impairments listed. [] Progression has been slowed due to co-morbidities. [x] Plan just implemented, too soon to assess goals progression <30days   [] Goals require adjustment due to lack of progress  [] Patient is not progressing as expected and requires additional follow up with physician  [] Other    Prognosis for POC: [x] Good [] Fair  [] Poor      Patient requires continued skilled intervention: [x] Yes  [] No    Treatment/Activity Tolerance:  [x] Patient able to complete treatment  [] Patient limited by fatigue  [] Patient limited by pain     [] Patient limited by other medical complications  [] Other: Increased L inner thigh pain today. Unable to complete resisted hip flex on L d/t inner thigh pain. Pt tolerated remainder of exercises well. She was challenged by M/L position of RB, increased sway and difficulty keeping board level, required more use of UE on half wall compared to A/P direction. Pt reported feeling some pain at L PSIS and inner thigh at end of session but less notable compared to when she arrived.  Pt requires PT follow up to address ROM, strength and functional mobility deficits. Patient education:  6/23/20 Pt educated on diagnosis, prognosis and PT plan of care. Educated on [employer]. Pt questions were addressed and answered. Prognosis: [x] Good [] Fair  [] Poor    Patient Requires Follow-up: [x] Yes  [] No    PLAN: See eval  [x] Continue per plan of care [] Alter current plan (see comments)  [] Plan of care initiated [] Hold pending MD visit [] Discharge    Electronically signed by: Shilpi Linares PT, DPT, OCS  Physical Therapist  [email or web address]  [email or web address]      *If patient does not return for further follow ups after this date. Please consider this as the patients discharge from physical therapy.

## 2020-07-23 ENCOUNTER — OFFICE VISIT (OUTPATIENT)
Dept: ORTHOPEDIC SURGERY | Age: 70
End: 2020-07-23
Payer: MEDICARE

## 2020-07-23 ENCOUNTER — HOSPITAL ENCOUNTER (OUTPATIENT)
Dept: PHYSICAL THERAPY | Age: 70
Setting detail: THERAPIES SERIES
Discharge: HOME | End: 2020-07-23
Payer: MEDICARE

## 2020-07-23 VITALS
HEIGHT: 63 IN | HEART RATE: 79 BPM | BODY MASS INDEX: 29.23 KG/M2 | WEIGHT: 165 LBS | SYSTOLIC BLOOD PRESSURE: 132 MMHG | TEMPERATURE: 97 F | DIASTOLIC BLOOD PRESSURE: 85 MMHG

## 2020-07-23 PROBLEM — M54.50 CHRONIC RIGHT-SIDED LOW BACK PAIN WITHOUT SCIATICA: Status: ACTIVE | Noted: 2020-07-23

## 2020-07-23 PROBLEM — G89.29 CHRONIC RIGHT-SIDED LOW BACK PAIN WITHOUT SCIATICA: Status: ACTIVE | Noted: 2020-07-23

## 2020-07-23 PROCEDURE — 97112 NEUROMUSCULAR REEDUCATION: CPT | Performed by: PHYSICAL THERAPIST

## 2020-07-23 PROCEDURE — G8399 PT W/DXA RESULTS DOCUMENT: HCPCS | Performed by: PHYSICIAN ASSISTANT

## 2020-07-23 PROCEDURE — 99214 OFFICE O/P EST MOD 30 MIN: CPT | Performed by: PHYSICIAN ASSISTANT

## 2020-07-23 PROCEDURE — 97110 THERAPEUTIC EXERCISES: CPT | Performed by: PHYSICAL THERAPIST

## 2020-07-23 PROCEDURE — 97140 MANUAL THERAPY 1/> REGIONS: CPT | Performed by: PHYSICAL THERAPIST

## 2020-07-23 PROCEDURE — G8427 DOCREV CUR MEDS BY ELIG CLIN: HCPCS | Performed by: PHYSICIAN ASSISTANT

## 2020-07-23 PROCEDURE — 1036F TOBACCO NON-USER: CPT | Performed by: PHYSICIAN ASSISTANT

## 2020-07-23 PROCEDURE — 4040F PNEUMOC VAC/ADMIN/RCVD: CPT | Performed by: PHYSICIAN ASSISTANT

## 2020-07-23 PROCEDURE — G8417 CALC BMI ABV UP PARAM F/U: HCPCS | Performed by: PHYSICIAN ASSISTANT

## 2020-07-23 PROCEDURE — 1123F ACP DISCUSS/DSCN MKR DOCD: CPT | Performed by: PHYSICIAN ASSISTANT

## 2020-07-23 PROCEDURE — 3017F COLORECTAL CA SCREEN DOC REV: CPT | Performed by: PHYSICIAN ASSISTANT

## 2020-07-23 PROCEDURE — 1090F PRES/ABSN URINE INCON ASSESS: CPT | Performed by: PHYSICIAN ASSISTANT

## 2020-07-23 NOTE — PLAN OF CARE
The [address] [place] [address] and Sports [facility]    Physical Therapy Daily Treatment Note  Date:  2020    Patient Name:  Coco White    :  1950  MRN: [id number]  Restrictions/Precautions:    Medical/Treatment Diagnosis Information:  · Diagnosis: M54.5, G89.29 (ICD-10-CM) - Chronic right-sided low back pain without sciatica  · Treatment Diagnosis: M54.5, M25.551, M25.552, R53.1, B66.7  Insurance/Certification information:  PT Insurance Information: PT BENEFTS  FACILITY/ MEDICARE PRIMARY/ PAYS 80%/ NO VISIT LIMIT MED NEC/ ANTHEM SECONDARY/ 20 PAG  Physician Information:  Referring Practitioner: Rashi Doe MD  Has the plan of care been signed (Y/N):        []  Yes  [x]  No     Date of Patient follow up with Physician: 20      Is this a Progress Report:     [x]  Yes  []  No    Pt has completed 4 weeks of PT. Lumbar and R hip symptoms are improved, but she has had a significant increase in L hip pain since IE. Pt demonstrates significant loss in PROM L hip IR since IE, from 20 to 5 deg. Pt continues have significant gait deviations, excessive hip ER, waddling gait, decreased balance as a result. Discussed [facility] rehab process and time line. GALI completed at IE; however, L hip is significantly more symptomatic than back so WOMAC was completed this date to achieve better picture of functional mobility in regards to hip pain. Pt reported that RB balance increased symptoms following LPV, held this date and performed balance on airex instead. Pt reported some low back pain/crepitus with balance, but her hips felt much better compared to the rockerboard. Pt continues to complete majority of exercises at home prior to coming to PT. Pt requires PT follow up to address ROM, strength and functional mobility deficits.     If Yes:  Date Range for reporting period:  Beginning  Ending    Progress report will be due (10 Rx or 30 days whichever is less): 09      Recertification will be due (POC Duration  / 90 days whichever is less): 8/20/20        Visit # Insurance Allowable Auth Required   7 MEDICARE []  Yes [x]  No        Functional Scale:    Date assessed:  GALI 17/50=34% deficit   6/23/20  WOMAC 66/96=68.75% deficit  7/23/20     Latex Allergy:  [x]NO      []YES  Preferred Language for Healthcare:   [x]English       []other:      Pain level:  3/10 LBP, 8/10 hips    SUBJECTIVE:  Pt saw Dr. Causey Serum prior to PT session, states he told her she needs a hip replacement. Pt states that her L hip/leg continues to be painful, not as bad as Tuesday but still bothersome. \"Seems like it's getting worse instead of better. \"    OBJECTIVE:   ROM   Comments   Trunk flexion WFL     Trunk extension Limited     Trunk R sidebend WFL     Trunk L sidebend WFL     Trunk R rotation Limited by 25%      Trunk L rotation Limited by 25% Pulling in front of left hip   HS flexibility R SLR 60 deg  L SLR 70 deg     Hip ER R 55  L 40     Hip IR R 10  L 5     Hip ABD R 20  L 20      Hip ext R 0  L 0      Hip flex  R 124, stretch in back  L 125, stretch in back        Strength Left Right Comments   Hip flexion(L2) 3- 3- P!  B R>L   Knee extension(L3) 4/5 4-/5     Knee flexion(S1-2) 4/5 4/5     Ankle dorsiflexion(L4) 4+/5 4+/5     Toe extension(L5)     Not assessed   Ankle eversion/plantar flexion(S1)     Not assessed   Hip abd   3+/5  3+/5        Special tests   Comments   SLR + for HS tightness     Slump test Not assessed     Pelvic symmetry  + supine to sit Legs even to R leg long   Ely's + B     Segmental Spinal mobility Hypo, lumbar     Heel walk    Not assessed d/t gait abnormalities, balance deficits and pain   Toe walk   Not assessed d/t gait abnormalities, balance deficits and pain   Tandem walk    Not assessed d/t gait abnormalities, balance deficits and pain              DTRs Left Right Comments   Patellar(L3-L4)         Achilles(S1-S2)                      Joint mobility: activities related to improving balance, coordination, kinesthetic sense, posture, motor skill, proprioception and motor activation to allow for proper function  with self care and ADLs  [] (52430) Provided training and instruction to the patient for proper core and proximal hip recruitment and positioning with ambulation re-education     Home Exercise Program:    Lam Johnson access code: Анна Ramos 6/23/20. Printed hand out given. Pt demonstrated proper form of each exercise and expressed verbal understanding of frequency and duration. 7/1/20, updated, printed handout provided. [x] (43694) Reviewed/Progressed HEP activities related to strengthening, flexibility, endurance, ROM of core, proximal hip and LE for functional self-care, mobility, lifting and ambulation   [] (64222) Reviewed/Progressed HEP activities related to improving balance, coordination, kinesthetic sense, posture, motor skill, proprioception of core, proximal hip and LE for self care, mobility, lifting, and ambulation      Manual Treatments:    [x] (45073) Provided manual therapy to mobilize proximal hip and LS spine soft tissue/joints for the purpose of modulating pain, promoting relaxation,  increasing ROM, reducing/eliminating soft tissue swelling/inflammation/restriction, improving soft tissue extensibility and allowing for proper ROM for normal function with self care, mobility, lifting and ambulation.        Charges:  Timed Code Treatment Minutes: 38   Total Treatment Minutes: 38   Time in: 2:55  Time out: 3:40    [] EVAL (LOW) 90494 (typically 20 minutes face-to-face)  [] EVAL (MOD) 09507 (typically 30 minutes face-to-face)  [] EVAL (HIGH) 13926 (typically 45 minutes face-to-face)  [] RE-EVAL     [x] AO(26778) x1     [] IONTO  [x] NMR (26250) x 1   [] VASO  [x] Manual (22148) x1      [] Other:  [] TA x      [] Mech Traction (52942)  [] ES(attended) (74125)      [] ES (un) (93446):     Goals:   Patient stated goal:  Be able to walk without pain    []? Progressing: []? Met: []? Not Met: []? Adjusted     Therapist goals for Patient:   Short Term Goals: To be achieved in: 2 weeks 7/7/20  1. Independent in HEP and progression per patient tolerance, in order to prevent re-injury. []? Progressing: [x]? Met: []? Not Met: []? Adjusted   2. Patient will have a decrease in pain to facilitate improvement in movement, function, and ADLs as indicated by Functional Deficits. [x]? Progressing: []? Met: []? Not Met: []? Adjusted   3. Patient will report reduced pain to allow for sleeping on L side. [x]? Progressing: []? Met: []? Not Met: []? Adjusted      Long Term Goals: To be achieved in: 6 weeks 8/4/20 + additional 4 weeks 8/20/20    1. Disability index score of 17% or less for the GALI to assist with reaching prior level of function. []? Progressing: []? Met: []? Not Met: []? Adjusted  2. Patient will demonstrate increased AROM to WNL, good LS mobility, good hip ROM to allow for proper joint functioning as indicated by patients Functional Deficits.   []? Progressing: []? Met: []? Not Met: []? Adjusted  3. Patient will demonstrate an increase in Strength to good proximal hip and core activation to allow for proper functional mobility as indicated by patients Functional Deficits. []? Progressing: []? Met: []? Not Met: []? Adjusted  4. Patient will return to ADLs, IADLs and functional activities without increased symptoms or restriction. []? Progressing: []? Met: []? Not Met: []? Adjusted  5. Patient will be able to negotiate stairs with reciprocal gait pattern. []? Progressing: []? Met: []? Not Met: []? Adjusted      Overall Progression Towards Functional goals/ Treatment Progress Update:  [] Patient is progressing as expected towards functional goals listed. [] Progression is slowed due to complexities/Impairments listed. [] Progression has been slowed due to co-morbidities.   [x] Plan just implemented, too soon to assess goals progression <30days   [] Goals require adjustment due to lack of progress  [] Patient is not progressing as expected and requires additional follow up with physician  [] Other    Prognosis for POC: [x] Good [] Fair  [] Poor      Patient requires continued skilled intervention: [x] Yes  [] No    Treatment/Activity Tolerance:  [x] Patient able to complete treatment  [] Patient limited by fatigue  [] Patient limited by pain     [] Patient limited by other medical complications  [] Other: Pt has completed 4 weeks of PT. Lumbar and R hip symptoms are improved, but she has had a significant increase in L hip pain since IE. Pt demonstrates significant loss in PROM L hip IR since IE, from 20 to 5 deg. Pt continues have significant gait deviations, excessive hip ER, waddling gait, decreased balance as a result. Discussed [facility] rehab process and time line. GALI completed at IE; however, L hip is significantly more symptomatic than back so WOMAC was completed this date to achieve better picture of functional mobility in regards to hip pain. Pt reported that RB balance increased symptoms following LPV, held this date and performed balance on airex instead. Pt reported some low back pain/crepitus with balance, but her hips felt much better compared to the rockerboard. Pt continues to complete majority of exercises at home prior to coming to PT. Pt requires PT follow up to address ROM, strength and functional mobility deficits. Patient education:  6/23/20 Pt educated on diagnosis, prognosis and PT plan of care. [facility] on [facility]. Pt questions were addressed and answered. 7/23/20 Educated on [facility] rehab process.     Prognosis: [x] Good [] Fair  [] Poor    Patient Requires Follow-up: [x] Yes  [] No    PLAN: See eval  [x] Continue per plan of care [] Alter current plan (see comments)   [] Plan of care initiated [] Hold pending MD visit [] Discharge    Electronically signed by: Ralph Caba PT, DPT, OCS  Physical Therapist  [id number]  [email or web address]      *If patient does not return for further follow ups after this date. Please consider this as the patients discharge from physical therapy.

## 2020-07-23 NOTE — PROGRESS NOTES
Feeling of right leg giving out. Review of Systems:  Review of Systems   Constitutional: Negative for chills and fever. HENT: Negative for nosebleeds. Eyes: Negative for double vision. Cardiovascular: Negative for chest pain. Gastrointestinal: Negative for abdominal pain. Musculoskeletal: Positive for joint pain and myalgias. Skin: Negative for rash. Neurological: Negative for seizures. Psychiatric/Behavioral: Negative for hallucinations. Past History:  Past Medical History:   Diagnosis Date    Hyperlipidemia     Hypertension      Past Surgical History:   Procedure Laterality Date     SECTION       Current Outpatient Medications on File Prior to Visit   Medication Sig Dispense Refill    traMADol (ULTRAM) 50 MG tablet Take 50 mg by mouth every 6 hours as needed.  lisinopril (PRINIVIL;ZESTRIL) 10 MG tablet Take 1 tablet by mouth daily 90 tablet 1    simvastatin (ZOCOR) 40 MG tablet Take 1 tablet by mouth nightly 90 tablet 1    omeprazole (PRILOSEC) 20 MG delayed release capsule Take 1 capsule by mouth daily 30 capsule 1    diclofenac (VOLTAREN) 50 MG EC tablet Take 1 tablet by mouth 2 times daily (with meals) 60 tablet 3    Multiple Vitamins-Minerals (THERAPEUTIC MULTIVITAMIN-MINERALS) tablet Take 1 tablet by mouth daily      Vitamin D (CHOLECALCIFEROL) 1000 UNITS CAPS capsule Take 2,000 Units by mouth daily       Multiple Vitamins-Minerals (OCUVITE PRESERVISION PO) Take 1 tablet by mouth daily       No current facility-administered medications on file prior to visit.       Social History     Socioeconomic History    Marital status:      Spouse name: Not on file    Number of children: Not on file    Years of education: Not on file    Highest education level: Not on file   Occupational History    Not on file   Social Needs    Financial resource strain: Not on file    Food insecurity     Worry: Not on file     Inability: Not on file   [facility] Transportation needs     Medical: Not on file     Non-medical: Not on file   Tobacco Use    Smoking status: Former Smoker     Packs/day: 1.00     Years: 15.00     Pack years: 15.00     Last attempt to quit: 1983     Years since quittin.5    Smokeless tobacco: Never Used   Substance and Sexual Activity    Alcohol use: Not on file    Drug use: No    Sexual activity: Yes   Lifestyle    Physical activity     Days per week: Not on file     Minutes per session: Not on file    Stress: Not on file   Relationships    Social connections     Talks on phone: Not on file     Gets together: Not on file     Attends Restoration service: Not on file     Active member of club or organization: Not on file     Attends meetings of clubs or organizations: Not on file     Relationship status: Not on file    Intimate partner violence     Fear of current or ex partner: Not on file     Emotionally abused: Not on file     Physically abused: Not on file     Forced sexual activity: Not on file   Other Topics Concern    Not on file   Social History Narrative    Not on file     No family history on file. Current Medications:    Current Outpatient Medications   Medication Sig Dispense Refill    traMADol (ULTRAM) 50 MG tablet Take 50 mg by mouth every 6 hours as needed.       lisinopril (PRINIVIL;ZESTRIL) 10 MG tablet Take 1 tablet by mouth daily 90 tablet 1    simvastatin (ZOCOR) 40 MG tablet Take 1 tablet by mouth nightly 90 tablet 1    omeprazole (PRILOSEC) 20 MG delayed release capsule Take 1 capsule by mouth daily 30 capsule 1    diclofenac (VOLTAREN) 50 MG EC tablet Take 1 tablet by mouth 2 times daily (with meals) 60 tablet 3    Multiple Vitamins-Minerals (THERAPEUTIC MULTIVITAMIN-MINERALS) tablet Take 1 tablet by mouth daily      Vitamin D (CHOLECALCIFEROL) 1000 UNITS CAPS capsule Take 2,000 Units by mouth daily       Multiple Vitamins-Minerals (OCUVITE PRESERVISION PO) Take 1 tablet by mouth daily       No current facility-administered medications for this visit. Allergies: Allergies   Allergen Reactions    Flexeril [Cyclobenzaprine] Other (See Comments)     Low BP and heart rate    Keflet [Cephalexin] Rash       Physical Exam:  Vitals:    07/23/20 1349   BP: 132/85   Pulse: 79   Temp: 97 °F (36.1 °C)       Physical Exam   Constitutional: Patient is oriented to person, place, and time and well-developed, well-nourished, and in no distress. HENT:   Head: Normocephalic and atraumatic. Eyes: Pupils are equal, round, and reactive to light. Neck: No tracheal deviation present. No thyromegaly present. Pulmonary/Chest: Effort normal.   Abdominal: Soft. There is no guarding. Musculoskeletal: Patient exhibits tenderness and pain. Neurological: Patient is alert and oriented to person, place, and time. Skin: Skin is warm. Psychiatric: Affect normal.     General: Favio Rico is a healthy and well appearing 79y.o. year old female who is sitting comfortably in our office in acute distress. Alert and oriented. Physical Exam:  RUE:    No gross deformities noted. Sensation is intact to light touch throughout the median, ulnar and radial nerve distribution. Able to wiggle fingers, gives thumbs up, A-okay and cross index and middle fingers. Full range of motion of the hand, wrist, elbow and shoulder. LUE:   No gross deformities noted. Previous left humerus fracture with some decrease in rom. Sensation is intact to light touch throughout the median, ulnar and radial nerve distribution. Able to wiggle fingers, gives thumbs up, A-okay and cross index and middle fingers. Full range of motion of the hand wrist elbow    RLE:   No gross deformities noted. Sensation is intact to light touch throughout the lower extremity. Able to wiggle toes and plantar and dorsiflex foot.   Full range of motion at the ankle, knee and hip  Mild osteoarthritis of the right knee  Walking with trochanteric and IT band forward at this point. Due to the collapse of the femoral head this will progress and get worse and worse with pain we can try to manage with conservative management like injections and physical therapy and utilization of medications to help control it however ultimately does require total hip replacement. -Gave patient order for physical therapy for evaluation treatment of bilateral hip osteoarthritis as well. -Advised for patient to follow-up on an as-needed basis or for when she is ready to move forward with total hip arthroplasty on the left side discussed that this is a precedent over the back in regards to moving forward with any kind of surgical intervention as the hips may have been contributing to a lot of the back instability for a long time. Discussed with the patient all potential complications of the surgical intervention field and answered all questions regarding surgical intervention    Assessment: leg length discrepancy with posteior tibial insuffiicency. Right sided iliotibial band pain trachanteric irritation and right sided lower back pain with postural abnormality. Plan: pain medications with muscle relaxants  Anti-inflammatory meds with prilosec  Start on aggressive physical therapy measures for hip and postural control of the pelvis and hip.       [unfilled]     Cookie Socks    Date:    7/23/2020

## 2020-07-24 ENCOUNTER — TELEPHONE (OUTPATIENT)
Dept: ORTHOPEDIC SURGERY | Age: 70
End: 2020-07-24

## 2020-07-27 ENCOUNTER — HOSPITAL ENCOUNTER (OUTPATIENT)
Dept: PHYSICAL THERAPY | Age: 70
Setting detail: THERAPIES SERIES
Discharge: HOME | End: 2020-07-27
Payer: MEDICARE

## 2020-07-27 PROCEDURE — 97110 THERAPEUTIC EXERCISES: CPT | Performed by: PHYSICAL THERAPIST

## 2020-07-27 PROCEDURE — 97112 NEUROMUSCULAR REEDUCATION: CPT | Performed by: PHYSICAL THERAPIST

## 2020-07-27 PROCEDURE — 97140 MANUAL THERAPY 1/> REGIONS: CPT | Performed by: PHYSICAL THERAPIST

## 2020-07-27 NOTE — FLOWSHEET NOTE
The [address] [place] [address] and [facility]    Physical Therapy Daily Treatment Note  Date:  2020    Patient Name:  Arnulfo Jeronimo    :  1950  MRN: [id number]  Restrictions/Precautions:    Medical/Treatment Diagnosis Information:  · Diagnosis: M54.5, G89.29 (ICD-10-CM) - Chronic right-sided low back pain without sciatica  · Treatment Diagnosis: M54.5, M25.551, M25.552, R53.1, U96.0  Insurance/Certification information:  PT Insurance Information: PT [employer]ENEFT[employer] 2020 FACILITY/ MEDICARE PRIMARY/ PAYS 80%/ NO VISIT LIMIT MED NEC/ ANTHEM SECONDARY/ 20 PAG  Physician Information:  Referring Practitioner: Davon Bunch MD  Has the plan of care been signed (Y/N):        []  Yes  [x]  No     Date of Patient follow up with Physician: 20      Is this a Progress Report:     [x]  Yes  []  No     If Yes:  Date Range for reporting period:  Beginning  Ending    Progress report will be due (10 Rx or 30 days whichever is less): 83      Recertification will be due (POC Duration  / 90 days whichever is less): 20        Visit # Insurance Allowable Auth Required   7 MEDICARE []  Yes [x]  No        Functional Scale:    Date assessed:  GALI 1750=34% deficit   20  WOMAC 66/96=68.75% deficit  20     Latex Allergy:  [x]NO      []YES  Preferred Language for Healthcare:   [x]English       []other:      Pain level:  3/10 LBP, 8/10 hips    SUBJECTIVE:  Pt has been using a cane on the R side since LPV, feels she is not swaying side to side as much and she feels more stable. Was talking to Moira Monique prior to PT session about moving forward with HARRIET, potential for surgery to be scheduled in the next few weeks. Pt states that LAD felt good at LPV.     OBJECTIVE:   ROM   Comments   Trunk flexion WFL     Trunk extension Limited     Trunk R sidebend WFL     Trunk L sidebend WFL     Trunk R rotation Limited by 25%      Trunk L rotation Limited by 25% Pulling in front of left hip   HS flexibility R SLR 60 deg  L SLR 70 deg     Hip ER R 55  L 40     Hip IR R 10  L 5     Hip ABD R 20  L 20      Hip ext R 0  L 0      Hip flex  R 124, stretch in back  L 125, stretch in back        Strength Left Right Comments   Hip flexion(L2) 3- 3- P! B R>L   Knee extension(L3) 4/5 4-/5     Knee flexion(S1-2) 4/5 4/5     Ankle dorsiflexion(L4) 4+/5 4+/5     Toe extension(L5)     Not assessed   Ankle eversion/plantar flexion(S1)     Not assessed   Hip abd   3+/5  3+/5        Special tests   Comments   SLR + for HS tightness     Slump test Not assessed     Pelvic symmetry  + supine to sit Legs even to R leg long   Ely's + B     Segmental Spinal mobility Hypo, lumbar     Heel walk    Not assessed d/t gait abnormalities, balance deficits and pain   Toe walk   Not assessed d/t gait abnormalities, balance deficits and pain   Tandem walk    Not assessed d/t gait abnormalities, balance deficits and pain              DTRs Left Right Comments   Patellar(L3-L4)         Achilles(S1-S2)                      Joint mobility:               []? Normal               [x]? Hypo L hip              []? Hyper     Palpation: TTP L greater troch, B PSIS     Functional Mobility/Transfers: Requires UE support to get RLE onto table from seated position.     Posture: Mild kyphosis     Gait: Wide MARCELA, hip ER, increased lateral sway    RESTRICTIONS/PRECAUTIONS:     Exercises/Interventions:   ROM/stretches     SKTC     Prone quad stretch     Seated HS    Standing ITB 3x30\" B   Supine Figure 4    Knee to opposite shoulder    Standing hip flexor Splint stance   Standing ADD stretch         Strengthening     Glute sets       Bridges HEP    Clamshells HEP      TA brace    TA march    TA heel slide Pillowcase under foot to allow slide       Supine SLR    Prone SLR 3x10 B    Hip flexion 3x10, red tied, R only Supine   Sidelying knee flexion  Maintain neutral knee/hip   Sidelying hip flexion   Maintain neutral knee/hip   Adduction  10x10\" knees bent, foam proprioception of core, proximal hip and LE for self care, mobility, lifting, and ambulation      Manual Treatments:    [x] (38485) Provided manual therapy to mobilize proximal hip and LS spine soft tissue/joints for the purpose of modulating pain, promoting relaxation,  increasing ROM, reducing/eliminating soft tissue swelling/inflammation/restriction, improving soft tissue extensibility and allowing for proper ROM for normal function with self care, mobility, lifting and ambulation. Charges:  Timed Code Treatment Minutes: 40   Total Treatment Minutes: 40   Time in: 10:50  Time out: 11:30    [] EVAL (LOW) 81371 (typically 20 minutes face-to-face)  [] EVAL (MOD) 34726 (typically 30 minutes face-to-face)  [] EVAL (HIGH) 67668 (typically 45 minutes face-to-face)  [] RE-EVAL     [x] XB(00111) x1     [] IONTO  [x] NMR (25793) x 1   [] VASO  [x] Manual (19180) x1      [] Other:  [] TA x      [] Mech Traction (12468)  [] ES(attended) (92885)      [] ES (un) (41006):     Goals:   Patient stated goal:  Be able to walk without pain    []? Progressing: []? Met: []? Not Met: []? Adjusted     Therapist goals for Patient:   Short Term Goals: To be achieved in: 2 weeks 7/7/20  1. Independent in HEP and progression per patient tolerance, in order to prevent re-injury. []? Progressing: [x]? Met: []? Not Met: []? Adjusted   2. Patient will have a decrease in pain to facilitate improvement in movement, function, and ADLs as indicated by Functional Deficits. [x]? Progressing: []? Met: []? Not Met: []? Adjusted   3. Patient will report reduced pain to allow for sleeping on L side. [x]? Progressing: []? Met: []? Not Met: []? Adjusted      Long Term Goals: To be achieved in: 6 weeks 8/4/20 + additional 4 weeks 8/20/20    1. Disability index score of 17% or less for the GALI to assist with reaching prior level of function. []? Progressing: []? Met: []? Not Met: []? Adjusted  2.  Patient will demonstrate increased AROM to requires PT follow up to address ROM, strength and functional mobility deficits. Patient education:  6/23/20 Pt educated on diagnosis, prognosis and PT plan of care. Educated on [employer]. Pt questions were addressed and answered. 7/23/20 Educated on [place] rehab process. Prognosis: [x] Good [] Fair  [] Poor    Patient Requires Follow-up: [x] Yes  [] No    PLAN: See eval  [x] Continue per plan of care [] Alter current plan (see comments)   [] Plan of care initiated [] Hold pending MD visit [] Discharge    Electronically signed by: Cynthia Haney PT, DPT, OCS  Physical Therapist  [id number]  [email or web address]      *If patient does not return for further follow ups after this date. Please consider this as the patients discharge from physical therapy.

## 2020-07-28 ENCOUNTER — TELEPHONE (OUTPATIENT)
Dept: FAMILY MEDICINE CLINIC | Age: 70
End: 2020-07-28

## 2020-07-28 NOTE — TELEPHONE ENCOUNTER
Spoke with patient and her hemoglobin was 7.5 on 7/6/20 and when it was rechecked on 7/7/20 it was 12.3. This is when she was in [place].  She is having hip surgery on august 17 and is going to call back to schedule a pre op appointment

## 2020-07-29 ENCOUNTER — HOSPITAL ENCOUNTER (OUTPATIENT)
Dept: PHYSICAL THERAPY | Age: 70
Setting detail: THERAPIES SERIES
Discharge: HOME | End: 2020-07-29
Payer: MEDICARE

## 2020-07-29 ENCOUNTER — TELEPHONE (OUTPATIENT)
Dept: ORTHOPEDIC SURGERY | Age: 70
End: 2020-07-29

## 2020-08-06 ENCOUNTER — TELEPHONE (OUTPATIENT)
Dept: ORTHOPEDIC SURGERY | Age: 70
End: 2020-08-06

## 2020-08-06 NOTE — TELEPHONE ENCOUNTER
Auth: NPR  Date: 08/17/20  Reference # [id number]  Spoke with: NONE  Type of SX: OUTPATIENT  Location: [id number]  [facility] [place] area: L HIP  Insurance: MEDICARE

## 2020-08-07 NOTE — PROGRESS NOTES
[facility] / [facility] ([facility]) [address] [facility], [address]    Acknowledgment of Informed Consent for Surgical or Medical Procedure and Sedation  I agree to allow doctor(s) MUNA VINSON and his/her associates or assistants, including residents and/or other qualified medical practitioner to perform the following medical treatment or procedure and to administer or direct the administration of sedation as necessary:  Procedure(s): LEFT TOTAL HIP [address]  My doctor has explained the following regarding the proposed procedure:   the explanation of the procedure   the benefits of the procedure   the potential problems that might occur during recuperation   the risks and side effects of the procedure which could include but are not limited to severe blood loss, infection, stroke or death   the benefits, risks and side effect of alternative procedures including the consequences of declining this procedure or any alternative procedures   the likelihood of achieving satisfactory results. I acknowledge no guarantee or assurance has been made to me regarding the results. I understand that during the course of this treatment/procedure, unforeseen conditions can occur which require an additional or different procedure. I agree to allow my physician or assistants to perform such extension of the original procedure as they may find necessary. I understand that sedation will often result in temporary impairment of memory and fine motor skills and that sedation can occasionally progress to a state of deep sedation or general anesthesia. I understand the risks of anesthesia for surgery include, but are not limited to, sore throat, hoarseness, injury to face, mouth, or teeth; nausea; headache; injury to blood vessels or nerves; death, brain damage, or paralysis.     I understand that if I have a Limitation of Treatment order in effect during my hospitalization, the order may or may not be in effect during this procedure. I give my doctor permission to give me blood or blood products. I understand that there are risks with receiving blood such as hepatitis, AIDS, fever, or allergic reaction. I acknowledge that the risks, benefits, and alternatives of this treatment have been explained to me and that no express or implied warranty has been given by the hospital, any blood bank, or any person or entity as to the blood or blood components transfused. At the discretion of my doctor, I agree to allow observers, equipment/product representatives and allow photographing, and/or televising of the procedure, provided my name or identity is maintained confidentially. I agree the hospital may dispose of or use for scientific or educational purposes any tissue, fluid, or body parts which may be removed.     ________________________________Date________Time______ am/pm  ([place] [facility])  Patient or Signature of Closest Relative or Legal Guardian    ________________________________Date________Time______am/pm      Page 1 of  1  Witness

## 2020-08-11 ENCOUNTER — HOSPITAL ENCOUNTER (OUTPATIENT)
Dept: PREADMISSION TESTING | Age: 70
Discharge: HOME | End: 2020-08-15
Payer: MEDICARE

## 2020-08-11 ENCOUNTER — OFFICE VISIT (OUTPATIENT)
Dept: PRIMARY CARE CLINIC | Age: 70
End: 2020-08-11
Payer: MEDICARE

## 2020-08-11 LAB
ABO/RH: NORMAL
ANION GAP SERPL CALCULATED.3IONS-SCNC: 15 MMOL/L (ref 3–16)
ANTIBODY SCREEN: NORMAL
APTT: 27.2 SEC (ref 24.2–36.2)
BASOPHILS ABSOLUTE: 0.1 K/UL (ref 0–0.2)
BASOPHILS RELATIVE PERCENT: 1.5 %
BUN BLDV-MCNC: 20 MG/DL (ref 7–20)
C-REACTIVE PROTEIN: 1.9 MG/L (ref 0–5.1)
CALCIUM SERPL-MCNC: 10.1 MG/DL (ref 8.3–10.6)
CHLORIDE BLD-SCNC: 96 MMOL/L (ref 99–110)
CO2: 23 MMOL/L (ref 21–32)
CREAT SERPL-MCNC: 1 MG/DL (ref 0.6–1.2)
EKG ATRIAL RATE: 89 BPM
EKG DIAGNOSIS: NORMAL
EKG P AXIS: 54 DEGREES
EKG P-R INTERVAL: 140 MS
EKG Q-T INTERVAL: 350 MS
EKG QRS DURATION: 84 MS
EKG QTC CALCULATION (BAZETT): 425 MS
EKG R AXIS: 77 DEGREES
EKG T AXIS: 50 DEGREES
EKG VENTRICULAR RATE: 89 BPM
EOSINOPHILS ABSOLUTE: 0.1 K/UL (ref 0–0.6)
EOSINOPHILS RELATIVE PERCENT: 0.8 %
GFR AFRICAN AMERICAN: >60
GFR NON-AFRICAN AMERICAN: 55
GLUCOSE BLD-MCNC: 139 MG/DL (ref 70–99)
HCT VFR BLD CALC: 35.3 % (ref 36–48)
HEMOGLOBIN: 12.1 G/DL (ref 12–16)
INR BLD: 1.03 (ref 0.86–1.14)
LYMPHOCYTES ABSOLUTE: 2.1 K/UL (ref 1–5.1)
LYMPHOCYTES RELATIVE PERCENT: 23.9 %
MCH RBC QN AUTO: 33.1 PG (ref 26–34)
MCHC RBC AUTO-ENTMCNC: 34.3 G/DL (ref 31–36)
MCV RBC AUTO: 96.5 FL (ref 80–100)
MONOCYTES ABSOLUTE: 0.7 K/UL (ref 0–1.3)
MONOCYTES RELATIVE PERCENT: 7.6 %
NEUTROPHILS ABSOLUTE: 5.9 K/UL (ref 1.7–7.7)
NEUTROPHILS RELATIVE PERCENT: 66.2 %
PDW BLD-RTO: 13.8 % (ref 12.4–15.4)
PLATELET # BLD: 252 K/UL (ref 135–450)
PMV BLD AUTO: 7.6 FL (ref 5–10.5)
POTASSIUM SERPL-SCNC: 4 MMOL/L (ref 3.5–5.1)
PROTHROMBIN TIME: 12 SEC (ref 10–13.2)
RBC # BLD: 3.66 M/UL (ref 4–5.2)
SEDIMENTATION RATE, ERYTHROCYTE: 50 MM/HR (ref 0–30)
SODIUM BLD-SCNC: 134 MMOL/L (ref 136–145)
WBC # BLD: 8.9 K/UL (ref 4–11)

## 2020-08-11 PROCEDURE — 85652 RBC SED RATE AUTOMATED: CPT

## 2020-08-11 PROCEDURE — 93005 ELECTROCARDIOGRAM TRACING: CPT | Performed by: ORTHOPAEDIC SURGERY

## 2020-08-11 PROCEDURE — G8417 CALC BMI ABV UP PARAM F/U: HCPCS | Performed by: NURSE PRACTITIONER

## 2020-08-11 PROCEDURE — 99211 OFF/OP EST MAY X REQ PHY/QHP: CPT | Performed by: NURSE PRACTITIONER

## 2020-08-11 PROCEDURE — 83036 HEMOGLOBIN GLYCOSYLATED A1C: CPT

## 2020-08-11 PROCEDURE — 85610 PROTHROMBIN TIME: CPT

## 2020-08-11 PROCEDURE — 85025 COMPLETE CBC W/AUTO DIFF WBC: CPT

## 2020-08-11 PROCEDURE — G8428 CUR MEDS NOT DOCUMENT: HCPCS | Performed by: NURSE PRACTITIONER

## 2020-08-11 PROCEDURE — 93010 ELECTROCARDIOGRAM REPORT: CPT | Performed by: INTERNAL MEDICINE

## 2020-08-11 PROCEDURE — 85730 THROMBOPLASTIN TIME PARTIAL: CPT

## 2020-08-11 PROCEDURE — 86140 C-REACTIVE PROTEIN: CPT

## 2020-08-11 PROCEDURE — 86850 RBC ANTIBODY SCREEN: CPT

## 2020-08-11 PROCEDURE — 86900 BLOOD TYPING SEROLOGIC ABO: CPT

## 2020-08-11 PROCEDURE — 86901 BLOOD TYPING SEROLOGIC RH(D): CPT

## 2020-08-11 PROCEDURE — 80048 BASIC METABOLIC PNL TOTAL CA: CPT

## 2020-08-12 LAB
ESTIMATED AVERAGE GLUCOSE: 128.4 MG/DL
HBA1C MFR BLD: 6.1 %

## 2020-08-12 NOTE — PROGRESS NOTES
[facility] PRE-SURGICAL TESTING INSTRUCTIONS                              PRIOR TO PROCEDURE DATE:  1. Please follow any guidelines/instructions prior to your procedure as advised by your surgeon. 2. Arrange for someone to drive you home and be with you for the first 24 hours after discharge for your safety after your procedure for which you received sedation. Ensure it is someone we can share information with regarding your discharge. 3. You must contact your surgeon for instructions IF:   You are taking any blood thinners, aspirin, anti-inflammatory or vitamin E.   There is a change in your physical condition such as a cold, fever, rash, cuts, sores or any other infection, especially near your surgical site. 4. Do not drink alcohol the day before or day of your procedure. 5. A Pre-op History and Physical for surgery MUST be completed by your Physician or Urgent Care within 30 days of your procedure date. Please bring a copy with you on the day of your procedure and along with any other testing performed. THE DAY OF YOUR PROCEDURE:  1. Follow instructions for ARRIVAL TIME as DIRECTED BY YOUR SURGEON. I    2. Enter the MAIN entrance from [facility] and follow the signs to the free [email or web address] or [email or web address] parking (offered free of charge 6am-5pm). 3. Enter the Main Entrance of the hospital (do not enter from the lower level of the parking garage). Upon entrance, check in with the  at the main desk on your left. If no one is available at the desk, proceed into the [facility] Waiting Room and go through the door directly into the [facility]. There is a Check-in desk ACROSS from Room 5 (marked with a sign hanging from the ceiling). The phone number for the surgery center is [phone]. 4. Please call [phone] option #2 option #2 if you have not been preregistered yet. On the day of your procedure bring your insurance card and photo ID.  You will be registered at your bedside once brought back to your room. 5. DO NOT EAT ANYTHING eight hours prior to surgery. May have 8 ounces of water 4 hours prior to surgery. 6. MEDICATIONS    Take the following medications with a SMALL sip of water:    Use your usual dose of inhalers the morning of surgery. BRING your rescue inhaler with you to hospital.    Anesthesia does NOT want you to take insulin the morning of surgery. They will control your blood sugar while you are at the hospital. Please contact your ordering physician for instructions regarding your insulin the night before your procedure. If you have an insulin pump, please keep it set on basal rate. 7. Do not swallow water when brushing teeth. No gum, candy, mints or ice chips. Refrain from smoking or at least decrease the amount. 8. Dress in loose, comfortable clothing appropriate for redressing after your procedure. Do not wear jewelry (including body piercings), make-up (especially NO eye make-up), fingernail polish (NO toenail polish if foot/leg surgery), lotion, powders or metal hairclips. 9. Dentures, glasses, or contacts will need to be removed before your procedure. Bring cases for your glasses, contacts, dentures, or hearing aids to protect them while you are in surgery. 10. If you use a CPAP, please bring it with you on the day of your procedure. 11. We recommend that valuable personal  belongings such as cash, cell phones, e-tablets or jewelry, be left at home during your stay. The hospital will not be responsible for valuables that are not secured in the hospital safe. However, if your insurance requires a co-pay, you may want to bring a method of payment, i.e. Check or credit card, if you wish to pay your co-pay the day of surgery. 12. If you are to stay overnight, you may bring a bag with personal items.  Please have any large items you may need brought in by your family after your arrival to your hospital

## 2020-08-12 NOTE — PROGRESS NOTES
Snoring? Do you snore loudly (loud enough to be heard through closed doors, or your bed partner elbows you for snoring at night)? No    Tired? Do you often feel tired, fatigued, or sleepy during the daytime (such as falling asleep during driving)? No    Observed? Has anyone observed you stop breathing or choking/gasping during your sleep? No    Pressure? Do you have or are being treated for high blood pressure? Yes    Neck Size? (measured around Shays apple)  For male, is your shirt collar 17 inches or larger? For female, is your shirt collar 16 inches or larger? No    Age older than 48years old? No    Gender = Male  No    Body Mass Index more than 35 kg/m2?   No    Risk of JONATHAN Scoring criteria:    [x] Low risk:  Yes to 0 - 2 questions    [] Intermediate risk:  Yes to 3 - 4 questions    [] High risk:  Yes to 5 - 8 questions

## 2020-08-13 ENCOUNTER — TELEPHONE (OUTPATIENT)
Dept: ORTHOPEDIC SURGERY | Age: 70
End: 2020-08-13

## 2020-08-13 ENCOUNTER — OFFICE VISIT (OUTPATIENT)
Dept: FAMILY MEDICINE CLINIC | Age: 70
End: 2020-08-13
Payer: MEDICARE

## 2020-08-13 VITALS
SYSTOLIC BLOOD PRESSURE: 124 MMHG | TEMPERATURE: 97.3 F | HEIGHT: 63 IN | RESPIRATION RATE: 18 BRPM | HEART RATE: 86 BPM | OXYGEN SATURATION: 98 % | DIASTOLIC BLOOD PRESSURE: 82 MMHG | WEIGHT: 162 LBS | BODY MASS INDEX: 28.7 KG/M2

## 2020-08-13 DIAGNOSIS — G89.29 CHRONIC RIGHT-SIDED LOW BACK PAIN WITHOUT SCIATICA: Primary | ICD-10-CM

## 2020-08-13 DIAGNOSIS — M16.11 PRIMARY OSTEOARTHRITIS OF RIGHT HIP: ICD-10-CM

## 2020-08-13 DIAGNOSIS — M54.50 CHRONIC RIGHT-SIDED LOW BACK PAIN WITHOUT SCIATICA: Primary | ICD-10-CM

## 2020-08-13 DIAGNOSIS — M16.12 PRIMARY OSTEOARTHRITIS OF LEFT HIP: ICD-10-CM

## 2020-08-13 PROBLEM — Z23 NEED FOR PROPHYLACTIC VACCINATION AGAINST DIPHTHERIA-TETANUS-PERTUSSIS (DTP): Status: ACTIVE | Noted: 2020-08-13

## 2020-08-13 PROBLEM — Z01.818 PREOP EXAMINATION: Status: ACTIVE | Noted: 2020-08-13

## 2020-08-13 LAB
SARS-COV-2: NOT DETECTED
SOURCE: NORMAL

## 2020-08-13 PROCEDURE — 3017F COLORECTAL CA SCREEN DOC REV: CPT | Performed by: FAMILY MEDICINE

## 2020-08-13 PROCEDURE — 1090F PRES/ABSN URINE INCON ASSESS: CPT | Performed by: FAMILY MEDICINE

## 2020-08-13 PROCEDURE — G8427 DOCREV CUR MEDS BY ELIG CLIN: HCPCS | Performed by: FAMILY MEDICINE

## 2020-08-13 PROCEDURE — 1036F TOBACCO NON-USER: CPT | Performed by: FAMILY MEDICINE

## 2020-08-13 PROCEDURE — 99214 OFFICE O/P EST MOD 30 MIN: CPT | Performed by: FAMILY MEDICINE

## 2020-08-13 PROCEDURE — G8417 CALC BMI ABV UP PARAM F/U: HCPCS | Performed by: FAMILY MEDICINE

## 2020-08-13 PROCEDURE — 4040F PNEUMOC VAC/ADMIN/RCVD: CPT | Performed by: FAMILY MEDICINE

## 2020-08-13 PROCEDURE — G8399 PT W/DXA RESULTS DOCUMENT: HCPCS | Performed by: FAMILY MEDICINE

## 2020-08-13 PROCEDURE — 1123F ACP DISCUSS/DSCN MKR DOCD: CPT | Performed by: FAMILY MEDICINE

## 2020-08-13 RX ORDER — TRAMADOL HYDROCHLORIDE 50 MG/1
50 TABLET ORAL EVERY 6 HOURS PRN
Qty: 28 TABLET | Refills: 0 | Status: ON HOLD | OUTPATIENT
Start: 2020-08-13 | End: 2020-08-18 | Stop reason: HOSPADM

## 2020-08-13 ASSESSMENT — ENCOUNTER SYMPTOMS
COUGH: 0
BACK PAIN: 0
CHOKING: 0
ABDOMINAL PAIN: 0
STRIDOR: 0
CHEST TIGHTNESS: 0
WHEEZING: 0
SHORTNESS OF BREATH: 0

## 2020-08-13 NOTE — PROGRESS NOTES
Abnormal labs routed to surgeon. Patient is seeing PCP today. I will discuss her low sodium level with anesthesia after her PCP visit (hopefully it is addressed with PCP as it appears to be chronically low).

## 2020-08-13 NOTE — PROGRESS NOTES
Subjective:      Patient ID: Joy Jackson is a 79 y.o. female. STEPHEN Mccormick is here for a preop exam prior to left hip surgery. She has had labs and an EKG which are within normal limits. Review of Systems   Constitutional: Negative for activity change, appetite change, chills, diaphoresis, fatigue, fever and unexpected weight change. Respiratory: Negative for cough, choking, chest tightness, shortness of breath, wheezing and stridor. Cardiovascular: Negative for chest pain, palpitations and leg swelling. Gastrointestinal: Negative for abdominal pain. Genitourinary: Negative for difficulty urinating. Musculoskeletal: Positive for arthralgias and gait problem. Negative for back pain. Skin: Negative for rash. Neurological: Negative for dizziness. Objective:   Physical Exam  Vitals signs and nursing note reviewed. Constitutional:       Appearance: She is well-developed. HENT:      Head: Normocephalic and atraumatic. Right Ear: External ear normal.      Left Ear: External ear normal.      Nose: Nose normal.   Eyes:      Conjunctiva/sclera: Conjunctivae normal.      Pupils: Pupils are equal, round, and reactive to light. Neck:      Musculoskeletal: Normal range of motion and neck supple. Thyroid: No thyromegaly. Vascular: No JVD. Trachea: No tracheal deviation. Cardiovascular:      Rate and Rhythm: Normal rate and regular rhythm. Heart sounds: Normal heart sounds. No murmur. No friction rub. No gallop. Pulmonary:      Effort: Pulmonary effort is normal. No respiratory distress. Breath sounds: Normal breath sounds. No stridor. No wheezing or rales. Chest:      Chest wall: No tenderness. Abdominal:      General: Bowel sounds are normal. There is no distension. Palpations: Abdomen is soft. There is no mass. Tenderness: There is no abdominal tenderness. There is no guarding or rebound. Musculoskeletal: Normal range of motion.          General: No tenderness. Lymphadenopathy:      Cervical: No cervical adenopathy. Skin:     General: Skin is warm and dry. Coloration: Skin is not pale. Findings: No erythema or rash. Neurological:      Mental Status: She is alert and oriented to person, place, and time. Cranial Nerves: No cranial nerve deficit. Motor: No abnormal muscle tone. Coordination: Coordination normal.      Deep Tendon Reflexes: Reflexes are normal and symmetric. Reflexes normal.         Assessment and plan      1. Need for prophylactic vaccination against diphtheria-tetanus-pertussis (DTP)-referred to local pharmacy    - Tdap (ADACEL) 5-2-15.5 LF-MCG/0.5 injection; Inject 0.5 mLs into the muscle once for 1 dose  Dispense: 0.5 mL; Refill: 0    2. Preop examination-patient is cleared for the procedure      3.  Primary osteoarthritis of left hip-treatment per surgeon        Shanel Campo DO

## 2020-08-14 ENCOUNTER — ANESTHESIA EVENT (OUTPATIENT)
Dept: OPERATING ROOM | Age: 70
End: 2020-08-14
Payer: MEDICARE

## 2020-08-14 LAB — CULTURE NOSE: NORMAL

## 2020-08-14 RX ORDER — OXYCODONE HCL 10 MG/1
20 TABLET, FILM COATED, EXTENDED RELEASE ORAL ONCE
Status: CANCELLED | OUTPATIENT
Start: 2020-08-14 | End: 2020-08-14

## 2020-08-17 ENCOUNTER — APPOINTMENT (OUTPATIENT)
Dept: GENERAL RADIOLOGY | Age: 70
End: 2020-08-17
Attending: ORTHOPAEDIC SURGERY
Payer: MEDICARE

## 2020-08-17 ENCOUNTER — ANESTHESIA (OUTPATIENT)
Dept: OPERATING ROOM | Age: 70
End: 2020-08-17
Payer: MEDICARE

## 2020-08-17 ENCOUNTER — HOSPITAL ENCOUNTER (OUTPATIENT)
Age: 70
Setting detail: OBSERVATION
Discharge: HOME | End: 2020-08-18
Attending: ORTHOPAEDIC SURGERY | Admitting: ORTHOPAEDIC SURGERY
Payer: MEDICARE

## 2020-08-17 VITALS — OXYGEN SATURATION: 96 % | SYSTOLIC BLOOD PRESSURE: 90 MMHG | TEMPERATURE: 98.2 F | DIASTOLIC BLOOD PRESSURE: 53 MMHG

## 2020-08-17 PROBLEM — Z96.642 STATUS POST TOTAL HIP REPLACEMENT, LEFT: Status: ACTIVE | Noted: 2020-08-17

## 2020-08-17 LAB
ABO/RH: NORMAL
ANTIBODY SCREEN: NORMAL

## 2020-08-17 PROCEDURE — 2500000003 HC RX 250 WO HCPCS: Performed by: NURSE ANESTHETIST, CERTIFIED REGISTERED

## 2020-08-17 PROCEDURE — 27130 TOTAL HIP ARTHROPLASTY: CPT | Performed by: ORTHOPAEDIC SURGERY

## 2020-08-17 PROCEDURE — 3600000005 HC SURGERY LEVEL 5 BASE: Performed by: ORTHOPAEDIC SURGERY

## 2020-08-17 PROCEDURE — 6360000002 HC RX W HCPCS: Performed by: NURSE ANESTHETIST, CERTIFIED REGISTERED

## 2020-08-17 PROCEDURE — 86900 BLOOD TYPING SEROLOGIC ABO: CPT

## 2020-08-17 PROCEDURE — 3209999900 FLUORO FOR SURGICAL PROCEDURES

## 2020-08-17 PROCEDURE — 2720000010 HC SURG SUPPLY STERILE: Performed by: ORTHOPAEDIC SURGERY

## 2020-08-17 PROCEDURE — 2580000003 HC RX 258: Performed by: PHYSICIAN ASSISTANT

## 2020-08-17 PROCEDURE — 3700000001 HC ADD 15 MINUTES (ANESTHESIA): Performed by: ORTHOPAEDIC SURGERY

## 2020-08-17 PROCEDURE — 7100000000 HC PACU RECOVERY - FIRST 15 MIN: Performed by: ORTHOPAEDIC SURGERY

## 2020-08-17 PROCEDURE — 6360000002 HC RX W HCPCS: Performed by: ANESTHESIOLOGY

## 2020-08-17 PROCEDURE — 2500000003 HC RX 250 WO HCPCS: Performed by: ORTHOPAEDIC SURGERY

## 2020-08-17 PROCEDURE — 7100000001 HC PACU RECOVERY - ADDTL 15 MIN: Performed by: ORTHOPAEDIC SURGERY

## 2020-08-17 PROCEDURE — 86850 RBC ANTIBODY SCREEN: CPT

## 2020-08-17 PROCEDURE — 6360000002 HC RX W HCPCS: Performed by: ORTHOPAEDIC SURGERY

## 2020-08-17 PROCEDURE — 2580000003 HC RX 258: Performed by: ANESTHESIOLOGY

## 2020-08-17 PROCEDURE — 6370000000 HC RX 637 (ALT 250 FOR IP): Performed by: ORTHOPAEDIC SURGERY

## 2020-08-17 PROCEDURE — 73501 X-RAY EXAM HIP UNI 1 VIEW: CPT

## 2020-08-17 PROCEDURE — 2709999900 HC NON-CHARGEABLE SUPPLY: Performed by: ORTHOPAEDIC SURGERY

## 2020-08-17 PROCEDURE — 2580000003 HC RX 258: Performed by: ORTHOPAEDIC SURGERY

## 2020-08-17 PROCEDURE — 3600000015 HC SURGERY LEVEL 5 ADDTL 15MIN: Performed by: ORTHOPAEDIC SURGERY

## 2020-08-17 PROCEDURE — 3700000000 HC ANESTHESIA ATTENDED CARE: Performed by: ORTHOPAEDIC SURGERY

## 2020-08-17 PROCEDURE — 6370000000 HC RX 637 (ALT 250 FOR IP): Performed by: PHYSICIAN ASSISTANT

## 2020-08-17 PROCEDURE — C1776 JOINT DEVICE (IMPLANTABLE): HCPCS | Performed by: ORTHOPAEDIC SURGERY

## 2020-08-17 PROCEDURE — 64447 NJX AA&/STRD FEMORAL NRV IMG: CPT | Performed by: ANESTHESIOLOGY

## 2020-08-17 PROCEDURE — 86901 BLOOD TYPING SEROLOGIC RH(D): CPT

## 2020-08-17 PROCEDURE — G0378 HOSPITAL OBSERVATION PER HR: HCPCS

## 2020-08-17 PROCEDURE — 27130 TOTAL HIP ARTHROPLASTY: CPT | Performed by: PHYSICIAN ASSISTANT

## 2020-08-17 PROCEDURE — 2500000003 HC RX 250 WO HCPCS: Performed by: ANESTHESIOLOGY

## 2020-08-17 DEVICE — R3 3 HOLE ACETABULAR SHELL 50MM
Type: IMPLANTABLE DEVICE | Site: HIP | Status: FUNCTIONAL
Brand: R3 ACETABULAR

## 2020-08-17 DEVICE — HIP H2 TOT ADV OTHER HD IMPL CAPPED H2 SN: Type: IMPLANTABLE DEVICE | Site: HIP | Status: FUNCTIONAL

## 2020-08-17 DEVICE — REFLECTION SPHERICAL HEAD SCREW 35MM
Type: IMPLANTABLE DEVICE | Site: HIP | Status: FUNCTIONAL
Brand: REFLECTION

## 2020-08-17 DEVICE — R3 20 DEGREE XLPE ACETABULAR LINER                                    32MM INNER DIAMETER X OUTER DIAMETER 50MM
Type: IMPLANTABLE DEVICE | Site: HIP | Status: FUNCTIONAL
Brand: R3

## 2020-08-17 DEVICE — REFLECTION SPHERICAL HEAD SCREW 30MM
Type: IMPLANTABLE DEVICE | Site: HIP | Status: FUNCTIONAL
Brand: REFLECTION

## 2020-08-17 DEVICE — ANTHOLOGY STANDARD OFFSET POROUS                                    PLUS HA SIZE 8
Type: IMPLANTABLE DEVICE | Site: HIP | Status: FUNCTIONAL
Brand: ANTHOLOGY

## 2020-08-17 DEVICE — OXINIUM FEMORAL HEAD 12/14 TAPER                                    32MM +4
Type: IMPLANTABLE DEVICE | Site: HIP | Status: FUNCTIONAL
Brand: OXINIUM

## 2020-08-17 RX ORDER — LISINOPRIL 10 MG/1
10 TABLET ORAL DAILY
Status: DISCONTINUED | OUTPATIENT
Start: 2020-08-17 | End: 2020-08-18 | Stop reason: HOSPADM

## 2020-08-17 RX ORDER — OXYCODONE HYDROCHLORIDE 5 MG/1
5 TABLET ORAL EVERY 4 HOURS PRN
Status: DISCONTINUED | OUTPATIENT
Start: 2020-08-17 | End: 2020-08-18 | Stop reason: HOSPADM

## 2020-08-17 RX ORDER — PROMETHAZINE HYDROCHLORIDE 12.5 MG/1
12.5 TABLET ORAL EVERY 6 HOURS PRN
Status: DISCONTINUED | OUTPATIENT
Start: 2020-08-17 | End: 2020-08-18 | Stop reason: HOSPADM

## 2020-08-17 RX ORDER — PANTOPRAZOLE SODIUM 40 MG/1
40 TABLET, DELAYED RELEASE ORAL
Status: DISCONTINUED | OUTPATIENT
Start: 2020-08-18 | End: 2020-08-18 | Stop reason: HOSPADM

## 2020-08-17 RX ORDER — ACETAMINOPHEN 500 MG
1000 TABLET ORAL ONCE
Status: COMPLETED | OUTPATIENT
Start: 2020-08-17 | End: 2020-08-17

## 2020-08-17 RX ORDER — OXYCODONE HCL 10 MG/1
10 TABLET, FILM COATED, EXTENDED RELEASE ORAL ONCE
Status: COMPLETED | OUTPATIENT
Start: 2020-08-17 | End: 2020-08-17

## 2020-08-17 RX ORDER — FENTANYL CITRATE 50 UG/ML
25 INJECTION, SOLUTION INTRAMUSCULAR; INTRAVENOUS EVERY 5 MIN PRN
Status: DISCONTINUED | OUTPATIENT
Start: 2020-08-17 | End: 2020-08-17 | Stop reason: HOSPADM

## 2020-08-17 RX ORDER — OXYCODONE HYDROCHLORIDE 5 MG/1
10 TABLET ORAL EVERY 4 HOURS PRN
Status: DISCONTINUED | OUTPATIENT
Start: 2020-08-17 | End: 2020-08-18 | Stop reason: HOSPADM

## 2020-08-17 RX ORDER — SENNA AND DOCUSATE SODIUM 50; 8.6 MG/1; MG/1
1 TABLET, FILM COATED ORAL 2 TIMES DAILY
Status: DISCONTINUED | OUTPATIENT
Start: 2020-08-17 | End: 2020-08-18 | Stop reason: HOSPADM

## 2020-08-17 RX ORDER — LIDOCAINE HYDROCHLORIDE 20 MG/ML
INJECTION, SOLUTION INTRAVENOUS PRN
Status: DISCONTINUED | OUTPATIENT
Start: 2020-08-17 | End: 2020-08-17 | Stop reason: SDUPTHER

## 2020-08-17 RX ORDER — SODIUM CHLORIDE, SODIUM LACTATE, POTASSIUM CHLORIDE, CALCIUM CHLORIDE 600; 310; 30; 20 MG/100ML; MG/100ML; MG/100ML; MG/100ML
INJECTION, SOLUTION INTRAVENOUS CONTINUOUS
Status: DISCONTINUED | OUTPATIENT
Start: 2020-08-17 | End: 2020-08-17

## 2020-08-17 RX ORDER — GLYCOPYRROLATE 1 MG/5 ML
SYRINGE (ML) INTRAVENOUS PRN
Status: DISCONTINUED | OUTPATIENT
Start: 2020-08-17 | End: 2020-08-17 | Stop reason: SDUPTHER

## 2020-08-17 RX ORDER — ONDANSETRON 2 MG/ML
4 INJECTION INTRAMUSCULAR; INTRAVENOUS
Status: DISCONTINUED | OUTPATIENT
Start: 2020-08-17 | End: 2020-08-17 | Stop reason: HOSPADM

## 2020-08-17 RX ORDER — PROCHLORPERAZINE EDISYLATE 5 MG/ML
5 INJECTION INTRAMUSCULAR; INTRAVENOUS
Status: DISCONTINUED | OUTPATIENT
Start: 2020-08-17 | End: 2020-08-17 | Stop reason: HOSPADM

## 2020-08-17 RX ORDER — DEXAMETHASONE SODIUM PHOSPHATE 4 MG/ML
INJECTION, SOLUTION INTRA-ARTICULAR; INTRALESIONAL; INTRAMUSCULAR; INTRAVENOUS; SOFT TISSUE PRN
Status: DISCONTINUED | OUTPATIENT
Start: 2020-08-17 | End: 2020-08-17 | Stop reason: SDUPTHER

## 2020-08-17 RX ORDER — BUPIVACAINE HYDROCHLORIDE 2.5 MG/ML
INJECTION, SOLUTION EPIDURAL; INFILTRATION; INTRACAUDAL
Status: COMPLETED | OUTPATIENT
Start: 2020-08-17 | End: 2020-08-17

## 2020-08-17 RX ORDER — SUCCINYLCHOLINE CHLORIDE 20 MG/ML
INJECTION INTRAMUSCULAR; INTRAVENOUS PRN
Status: DISCONTINUED | OUTPATIENT
Start: 2020-08-17 | End: 2020-08-17 | Stop reason: SDUPTHER

## 2020-08-17 RX ORDER — TIZANIDINE 4 MG/1
4 TABLET ORAL EVERY 6 HOURS PRN
Status: DISCONTINUED | OUTPATIENT
Start: 2020-08-17 | End: 2020-08-18 | Stop reason: HOSPADM

## 2020-08-17 RX ORDER — SODIUM CHLORIDE 450 MG/100ML
INJECTION, SOLUTION INTRAVENOUS CONTINUOUS
Status: DISCONTINUED | OUTPATIENT
Start: 2020-08-17 | End: 2020-08-18 | Stop reason: HOSPADM

## 2020-08-17 RX ORDER — SODIUM CHLORIDE 0.9 % (FLUSH) 0.9 %
10 SYRINGE (ML) INJECTION EVERY 12 HOURS SCHEDULED
Status: DISCONTINUED | OUTPATIENT
Start: 2020-08-17 | End: 2020-08-18 | Stop reason: HOSPADM

## 2020-08-17 RX ORDER — FENTANYL CITRATE 50 UG/ML
100 INJECTION, SOLUTION INTRAMUSCULAR; INTRAVENOUS ONCE
Status: COMPLETED | OUTPATIENT
Start: 2020-08-17 | End: 2020-08-17

## 2020-08-17 RX ORDER — FENTANYL CITRATE 50 UG/ML
50 INJECTION, SOLUTION INTRAMUSCULAR; INTRAVENOUS EVERY 5 MIN PRN
Status: DISCONTINUED | OUTPATIENT
Start: 2020-08-17 | End: 2020-08-17 | Stop reason: HOSPADM

## 2020-08-17 RX ORDER — HYDRALAZINE HYDROCHLORIDE 20 MG/ML
5 INJECTION INTRAMUSCULAR; INTRAVENOUS EVERY 10 MIN PRN
Status: DISCONTINUED | OUTPATIENT
Start: 2020-08-17 | End: 2020-08-17 | Stop reason: HOSPADM

## 2020-08-17 RX ORDER — SODIUM CHLORIDE 0.9 % (FLUSH) 0.9 %
10 SYRINGE (ML) INJECTION PRN
Status: DISCONTINUED | OUTPATIENT
Start: 2020-08-17 | End: 2020-08-17 | Stop reason: HOSPADM

## 2020-08-17 RX ORDER — SODIUM CHLORIDE 0.9 % (FLUSH) 0.9 %
10 SYRINGE (ML) INJECTION PRN
Status: DISCONTINUED | OUTPATIENT
Start: 2020-08-17 | End: 2020-08-18 | Stop reason: HOSPADM

## 2020-08-17 RX ORDER — DEXAMETHASONE SODIUM PHOSPHATE 4 MG/ML
10 INJECTION, SOLUTION INTRA-ARTICULAR; INTRALESIONAL; INTRAMUSCULAR; INTRAVENOUS; SOFT TISSUE ONCE
Status: COMPLETED | OUTPATIENT
Start: 2020-08-17 | End: 2020-08-17

## 2020-08-17 RX ORDER — ONDANSETRON 2 MG/ML
INJECTION INTRAMUSCULAR; INTRAVENOUS PRN
Status: DISCONTINUED | OUTPATIENT
Start: 2020-08-17 | End: 2020-08-17 | Stop reason: SDUPTHER

## 2020-08-17 RX ORDER — FENTANYL CITRATE 50 UG/ML
INJECTION, SOLUTION INTRAMUSCULAR; INTRAVENOUS PRN
Status: DISCONTINUED | OUTPATIENT
Start: 2020-08-17 | End: 2020-08-17 | Stop reason: SDUPTHER

## 2020-08-17 RX ORDER — ONDANSETRON 2 MG/ML
4 INJECTION INTRAMUSCULAR; INTRAVENOUS EVERY 6 HOURS PRN
Status: DISCONTINUED | OUTPATIENT
Start: 2020-08-17 | End: 2020-08-18 | Stop reason: HOSPADM

## 2020-08-17 RX ORDER — ZOLPIDEM TARTRATE 5 MG/1
5 TABLET ORAL NIGHTLY PRN
Status: DISCONTINUED | OUTPATIENT
Start: 2020-08-17 | End: 2020-08-18 | Stop reason: HOSPADM

## 2020-08-17 RX ORDER — ATORVASTATIN CALCIUM 40 MG/1
40 TABLET, FILM COATED ORAL DAILY
Status: DISCONTINUED | OUTPATIENT
Start: 2020-08-17 | End: 2020-08-18 | Stop reason: HOSPADM

## 2020-08-17 RX ORDER — M-VIT,TX,IRON,MINS/CALC/FOLIC 27MG-0.4MG
1 TABLET ORAL DAILY
Status: DISCONTINUED | OUTPATIENT
Start: 2020-08-17 | End: 2020-08-18 | Stop reason: HOSPADM

## 2020-08-17 RX ORDER — ROCURONIUM BROMIDE 10 MG/ML
INJECTION, SOLUTION INTRAVENOUS PRN
Status: DISCONTINUED | OUTPATIENT
Start: 2020-08-17 | End: 2020-08-17 | Stop reason: SDUPTHER

## 2020-08-17 RX ORDER — OXYCODONE HYDROCHLORIDE AND ACETAMINOPHEN 5; 325 MG/1; MG/1
1 TABLET ORAL PRN
Status: DISCONTINUED | OUTPATIENT
Start: 2020-08-17 | End: 2020-08-17 | Stop reason: HOSPADM

## 2020-08-17 RX ORDER — MEPERIDINE HYDROCHLORIDE 25 MG/ML
12.5 INJECTION INTRAMUSCULAR; INTRAVENOUS; SUBCUTANEOUS EVERY 5 MIN PRN
Status: DISCONTINUED | OUTPATIENT
Start: 2020-08-17 | End: 2020-08-17 | Stop reason: HOSPADM

## 2020-08-17 RX ORDER — CELECOXIB 200 MG/1
400 CAPSULE ORAL ONCE
Status: COMPLETED | OUTPATIENT
Start: 2020-08-17 | End: 2020-08-17

## 2020-08-17 RX ORDER — VITAMIN B COMPLEX
2000 TABLET ORAL DAILY
Status: DISCONTINUED | OUTPATIENT
Start: 2020-08-18 | End: 2020-08-18 | Stop reason: HOSPADM

## 2020-08-17 RX ORDER — MAGNESIUM HYDROXIDE 1200 MG/15ML
LIQUID ORAL CONTINUOUS PRN
Status: COMPLETED | OUTPATIENT
Start: 2020-08-17 | End: 2020-08-17

## 2020-08-17 RX ORDER — SODIUM CHLORIDE 0.9 % (FLUSH) 0.9 %
10 SYRINGE (ML) INJECTION EVERY 12 HOURS SCHEDULED
Status: DISCONTINUED | OUTPATIENT
Start: 2020-08-17 | End: 2020-08-17 | Stop reason: HOSPADM

## 2020-08-17 RX ORDER — PREGABALIN 150 MG/1
150 CAPSULE ORAL ONCE
Status: COMPLETED | OUTPATIENT
Start: 2020-08-17 | End: 2020-08-17

## 2020-08-17 RX ORDER — ACETAMINOPHEN 325 MG/1
650 TABLET ORAL EVERY 6 HOURS
Status: DISCONTINUED | OUTPATIENT
Start: 2020-08-17 | End: 2020-08-18 | Stop reason: HOSPADM

## 2020-08-17 RX ORDER — PREDNISONE 10 MG/1
10 TABLET ORAL DAILY
Status: DISCONTINUED | OUTPATIENT
Start: 2020-08-17 | End: 2020-08-18 | Stop reason: HOSPADM

## 2020-08-17 RX ORDER — VANCOMYCIN HYDROCHLORIDE 1 G/20ML
INJECTION, POWDER, LYOPHILIZED, FOR SOLUTION INTRAVENOUS PRN
Status: DISCONTINUED | OUTPATIENT
Start: 2020-08-17 | End: 2020-08-17 | Stop reason: ALTCHOICE

## 2020-08-17 RX ORDER — BUPIVACAINE HYDROCHLORIDE 5 MG/ML
30 INJECTION, SOLUTION EPIDURAL; INTRACAUDAL ONCE
Status: DISCONTINUED | OUTPATIENT
Start: 2020-08-17 | End: 2020-08-18 | Stop reason: HOSPADM

## 2020-08-17 RX ORDER — DIPHENHYDRAMINE HYDROCHLORIDE 50 MG/ML
12.5 INJECTION INTRAMUSCULAR; INTRAVENOUS
Status: DISCONTINUED | OUTPATIENT
Start: 2020-08-17 | End: 2020-08-17 | Stop reason: HOSPADM

## 2020-08-17 RX ORDER — OXYCODONE HYDROCHLORIDE AND ACETAMINOPHEN 5; 325 MG/1; MG/1
2 TABLET ORAL PRN
Status: DISCONTINUED | OUTPATIENT
Start: 2020-08-17 | End: 2020-08-17 | Stop reason: HOSPADM

## 2020-08-17 RX ORDER — PROPOFOL 10 MG/ML
INJECTION, EMULSION INTRAVENOUS PRN
Status: DISCONTINUED | OUTPATIENT
Start: 2020-08-17 | End: 2020-08-17 | Stop reason: SDUPTHER

## 2020-08-17 RX ORDER — NEOSTIGMINE METHYLSULFATE 5 MG/5 ML
SYRINGE (ML) INTRAVENOUS PRN
Status: DISCONTINUED | OUTPATIENT
Start: 2020-08-17 | End: 2020-08-17 | Stop reason: SDUPTHER

## 2020-08-17 RX ORDER — LABETALOL 20 MG/4 ML (5 MG/ML) INTRAVENOUS SYRINGE
5 EVERY 10 MIN PRN
Status: DISCONTINUED | OUTPATIENT
Start: 2020-08-17 | End: 2020-08-17 | Stop reason: HOSPADM

## 2020-08-17 RX ADMIN — DEXAMETHASONE SODIUM PHOSPHATE 10 MG: 4 INJECTION, SOLUTION INTRAMUSCULAR; INTRAVENOUS at 11:27

## 2020-08-17 RX ADMIN — PHENYLEPHRINE HYDROCHLORIDE 100 MCG: 10 INJECTION, SOLUTION INTRAMUSCULAR; INTRAVENOUS; SUBCUTANEOUS at 17:46

## 2020-08-17 RX ADMIN — FENTANYL CITRATE 50 MCG: 50 INJECTION INTRAMUSCULAR; INTRAVENOUS at 16:56

## 2020-08-17 RX ADMIN — FENTANYL CITRATE 50 MCG: 50 INJECTION, SOLUTION INTRAMUSCULAR; INTRAVENOUS at 18:32

## 2020-08-17 RX ADMIN — PHENYLEPHRINE HYDROCHLORIDE 100 MCG: 10 INJECTION, SOLUTION INTRAMUSCULAR; INTRAVENOUS; SUBCUTANEOUS at 17:58

## 2020-08-17 RX ADMIN — SODIUM CHLORIDE, SODIUM LACTATE, POTASSIUM CHLORIDE, AND CALCIUM CHLORIDE: 600; 310; 30; 20 INJECTION, SOLUTION INTRAVENOUS at 16:22

## 2020-08-17 RX ADMIN — CELECOXIB 400 MG: 200 CAPSULE ORAL at 11:28

## 2020-08-17 RX ADMIN — FENTANYL CITRATE 100 MCG: 50 INJECTION, SOLUTION INTRAMUSCULAR; INTRAVENOUS at 13:11

## 2020-08-17 RX ADMIN — ROCURONIUM BROMIDE 10 MG: 10 INJECTION INTRAVENOUS at 16:25

## 2020-08-17 RX ADMIN — ROCURONIUM BROMIDE 10 MG: 10 INJECTION INTRAVENOUS at 16:38

## 2020-08-17 RX ADMIN — PHENYLEPHRINE HYDROCHLORIDE 50 MCG: 10 INJECTION, SOLUTION INTRAMUSCULAR; INTRAVENOUS; SUBCUTANEOUS at 17:27

## 2020-08-17 RX ADMIN — ATORVASTATIN CALCIUM 40 MG: 40 TABLET, FILM COATED ORAL at 21:17

## 2020-08-17 RX ADMIN — Medication 0.6 MG: at 17:57

## 2020-08-17 RX ADMIN — FENTANYL CITRATE 50 MCG: 50 INJECTION INTRAMUSCULAR; INTRAVENOUS at 16:43

## 2020-08-17 RX ADMIN — BUPIVACAINE HYDROCHLORIDE 55 ML: 2.5 INJECTION, SOLUTION EPIDURAL; INFILTRATION; INTRACAUDAL; PERINEURAL at 13:18

## 2020-08-17 RX ADMIN — PROPOFOL 150 MG: 10 INJECTION, EMULSION INTRAVENOUS at 15:39

## 2020-08-17 RX ADMIN — PREGABALIN 150 MG: 150 CAPSULE ORAL at 11:28

## 2020-08-17 RX ADMIN — FENTANYL CITRATE 50 MCG: 50 INJECTION, SOLUTION INTRAMUSCULAR; INTRAVENOUS at 18:40

## 2020-08-17 RX ADMIN — Medication 3 MG: at 17:57

## 2020-08-17 RX ADMIN — TRANEXAMIC ACID 1090 MG: 1 INJECTION, SOLUTION INTRAVENOUS at 15:53

## 2020-08-17 RX ADMIN — ROCURONIUM BROMIDE 5 MG: 10 INJECTION INTRAVENOUS at 15:39

## 2020-08-17 RX ADMIN — HYDROMORPHONE HYDROCHLORIDE 0.5 MG: 1 INJECTION, SOLUTION INTRAMUSCULAR; INTRAVENOUS; SUBCUTANEOUS at 18:57

## 2020-08-17 RX ADMIN — ACETAMINOPHEN 650 MG: 325 TABLET ORAL at 21:17

## 2020-08-17 RX ADMIN — ACETAMINOPHEN 1000 MG: 500 TABLET ORAL at 11:28

## 2020-08-17 RX ADMIN — ONDANSETRON 4 MG: 2 INJECTION INTRAMUSCULAR; INTRAVENOUS at 17:22

## 2020-08-17 RX ADMIN — OXYCODONE HYDROCHLORIDE 10 MG: 10 TABLET, FILM COATED, EXTENDED RELEASE ORAL at 11:30

## 2020-08-17 RX ADMIN — LISINOPRIL 10 MG: 10 TABLET ORAL at 21:17

## 2020-08-17 RX ADMIN — PHENYLEPHRINE HYDROCHLORIDE 50 MCG: 10 INJECTION, SOLUTION INTRAMUSCULAR; INTRAVENOUS; SUBCUTANEOUS at 17:32

## 2020-08-17 RX ADMIN — SODIUM CHLORIDE: 4.5 INJECTION, SOLUTION INTRAVENOUS at 19:02

## 2020-08-17 RX ADMIN — Medication 10 ML: at 21:19

## 2020-08-17 RX ADMIN — PREDNISONE 10 MG: 10 TABLET ORAL at 21:17

## 2020-08-17 RX ADMIN — FENTANYL CITRATE 50 MCG: 50 INJECTION INTRAMUSCULAR; INTRAVENOUS at 15:39

## 2020-08-17 RX ADMIN — ROCURONIUM BROMIDE 35 MG: 10 INJECTION INTRAVENOUS at 15:46

## 2020-08-17 RX ADMIN — FENTANYL CITRATE 50 MCG: 50 INJECTION INTRAMUSCULAR; INTRAVENOUS at 16:17

## 2020-08-17 RX ADMIN — DEXAMETHASONE SODIUM PHOSPHATE 4 MG: 4 INJECTION, SOLUTION INTRAMUSCULAR; INTRAVENOUS at 17:22

## 2020-08-17 RX ADMIN — LIDOCAINE HYDROCHLORIDE 50 MG: 20 INJECTION, SOLUTION INTRAVENOUS at 15:39

## 2020-08-17 RX ADMIN — SODIUM CHLORIDE, SODIUM LACTATE, POTASSIUM CHLORIDE, AND CALCIUM CHLORIDE: 600; 310; 30; 20 INJECTION, SOLUTION INTRAVENOUS at 11:27

## 2020-08-17 RX ADMIN — DOCUSATE SODIUM 50 MG AND SENNOSIDES 8.6 MG 1 TABLET: 8.6; 5 TABLET, FILM COATED ORAL at 21:17

## 2020-08-17 RX ADMIN — SUCCINYLCHOLINE CHLORIDE 110 MG: 20 INJECTION, SOLUTION INTRAMUSCULAR; INTRAVENOUS; PARENTERAL at 15:39

## 2020-08-17 ASSESSMENT — PULMONARY FUNCTION TESTS
PIF_VALUE: 19
PIF_VALUE: 20
PIF_VALUE: 19
PIF_VALUE: 15
PIF_VALUE: 19
PIF_VALUE: 19
PIF_VALUE: 14
PIF_VALUE: 19
PIF_VALUE: 20
PIF_VALUE: 19
PIF_VALUE: 17
PIF_VALUE: 20
PIF_VALUE: 19
PIF_VALUE: 19
PIF_VALUE: 20
PIF_VALUE: 15
PIF_VALUE: 20
PIF_VALUE: 20
PIF_VALUE: 19
PIF_VALUE: 21
PIF_VALUE: 18
PIF_VALUE: 2
PIF_VALUE: 19
PIF_VALUE: 17
PIF_VALUE: 19
PIF_VALUE: 19
PIF_VALUE: 14
PIF_VALUE: 18
PIF_VALUE: 14
PIF_VALUE: 1
PIF_VALUE: 14
PIF_VALUE: 19
PIF_VALUE: 18
PIF_VALUE: 20
PIF_VALUE: 20
PIF_VALUE: 17
PIF_VALUE: 1
PIF_VALUE: 18
PIF_VALUE: 18
PIF_VALUE: 19
PIF_VALUE: 18
PIF_VALUE: 20
PIF_VALUE: 20
PIF_VALUE: 18
PIF_VALUE: 1
PIF_VALUE: 14
PIF_VALUE: 22
PIF_VALUE: 21
PIF_VALUE: 18
PIF_VALUE: 19
PIF_VALUE: 21
PIF_VALUE: 19
PIF_VALUE: 18
PIF_VALUE: 19
PIF_VALUE: 19
PIF_VALUE: 20
PIF_VALUE: 14
PIF_VALUE: 19
PIF_VALUE: 14
PIF_VALUE: 19
PIF_VALUE: 1
PIF_VALUE: 17
PIF_VALUE: 20
PIF_VALUE: 19
PIF_VALUE: 17
PIF_VALUE: 14
PIF_VALUE: 19
PIF_VALUE: 18
PIF_VALUE: 20
PIF_VALUE: 19
PIF_VALUE: 20
PIF_VALUE: 19
PIF_VALUE: 19
PIF_VALUE: 20
PIF_VALUE: 18
PIF_VALUE: 18
PIF_VALUE: 20
PIF_VALUE: 17
PIF_VALUE: 19
PIF_VALUE: 18
PIF_VALUE: 14
PIF_VALUE: 20
PIF_VALUE: 18
PIF_VALUE: 19
PIF_VALUE: 14
PIF_VALUE: 20
PIF_VALUE: 18
PIF_VALUE: 20
PIF_VALUE: 20
PIF_VALUE: 4
PIF_VALUE: 19
PIF_VALUE: 22
PIF_VALUE: 17
PIF_VALUE: 21
PIF_VALUE: 14
PIF_VALUE: 21
PIF_VALUE: 17
PIF_VALUE: 14
PIF_VALUE: 19
PIF_VALUE: 2
PIF_VALUE: 14
PIF_VALUE: 18
PIF_VALUE: 20
PIF_VALUE: 20
PIF_VALUE: 19
PIF_VALUE: 18
PIF_VALUE: 19
PIF_VALUE: 20
PIF_VALUE: 20
PIF_VALUE: 17
PIF_VALUE: 17
PIF_VALUE: 14
PIF_VALUE: 19
PIF_VALUE: 9
PIF_VALUE: 18
PIF_VALUE: 20
PIF_VALUE: 19
PIF_VALUE: 19
PIF_VALUE: 17
PIF_VALUE: 20
PIF_VALUE: 14
PIF_VALUE: 21
PIF_VALUE: 19
PIF_VALUE: 17
PIF_VALUE: 18
PIF_VALUE: 22
PIF_VALUE: 19
PIF_VALUE: 18
PIF_VALUE: 19
PIF_VALUE: 21
PIF_VALUE: 19
PIF_VALUE: 19
PIF_VALUE: 18
PIF_VALUE: 19
PIF_VALUE: 18
PIF_VALUE: 18
PIF_VALUE: 20
PIF_VALUE: 19
PIF_VALUE: 19
PIF_VALUE: 20
PIF_VALUE: 19
PIF_VALUE: 14
PIF_VALUE: 19
PIF_VALUE: 18
PIF_VALUE: 19
PIF_VALUE: 17
PIF_VALUE: 20
PIF_VALUE: 19
PIF_VALUE: 20
PIF_VALUE: 21
PIF_VALUE: 19
PIF_VALUE: 14
PIF_VALUE: 20
PIF_VALUE: 15
PIF_VALUE: 8
PIF_VALUE: 19

## 2020-08-17 ASSESSMENT — PAIN SCALES - GENERAL
PAINLEVEL_OUTOF10: 8
PAINLEVEL_OUTOF10: 7
PAINLEVEL_OUTOF10: 0
PAINLEVEL_OUTOF10: 1
PAINLEVEL_OUTOF10: 5
PAINLEVEL_OUTOF10: 0

## 2020-08-17 ASSESSMENT — PAIN DESCRIPTION - LOCATION
LOCATION: HIP

## 2020-08-17 ASSESSMENT — PAIN DESCRIPTION - DESCRIPTORS
DESCRIPTORS: ACHING;DISCOMFORT
DESCRIPTORS: ACHING;DISCOMFORT
DESCRIPTORS: ACHING
DESCRIPTORS: ACHING

## 2020-08-17 ASSESSMENT — PAIN DESCRIPTION - FREQUENCY
FREQUENCY: CONTINUOUS

## 2020-08-17 ASSESSMENT — PAIN DESCRIPTION - PAIN TYPE
TYPE: SURGICAL PAIN

## 2020-08-17 ASSESSMENT — PAIN DESCRIPTION - PROGRESSION: CLINICAL_PROGRESSION: GRADUALLY WORSENING

## 2020-08-17 ASSESSMENT — PAIN - FUNCTIONAL ASSESSMENT
PAIN_FUNCTIONAL_ASSESSMENT: PREVENTS OR INTERFERES SOME ACTIVE ACTIVITIES AND ADLS
PAIN_FUNCTIONAL_ASSESSMENT: 0-10

## 2020-08-17 ASSESSMENT — PAIN DESCRIPTION - ORIENTATION
ORIENTATION: LEFT

## 2020-08-17 ASSESSMENT — PAIN DESCRIPTION - ONSET
ONSET: ON-GOING

## 2020-08-17 NOTE — PROGRESS NOTES
Patient is alert and oriented x 4, ambulates a cane at baseline for the past 3 weeks.  Elonda Brunner in the waiting room. Patient has glasses and an upper partial that needs removed prior to surgery. This RN called OR team regarding Vanc administration, OR will call when ready for SDS to start Vanc.

## 2020-08-17 NOTE — ANESTHESIA PRE PROCEDURE
Department of Anesthesiology  Preprocedure Note       Name:  Aby Livingston   Age:  79 y.o.  :  1950                                          MRN:  [id number]         Date:  2020      Surgeon: Meme Marmolejo):  Sheela Earl MD    Procedure: Procedure(s):  LEFT TOTAL HIP ARTHROPLASTY ANTERIOR APPROACH    Medications prior to admission:   Prior to Admission medications    Medication Sig Start Date End Date Taking? Authorizing Provider   traMADol (ULTRAM) 50 MG tablet Take 1 tablet by mouth every 6 hours as needed for Pain for up to 7 days. Intended supply: 7 days. Take lowest dose possible to manage pain 20 Yes Cherisse Canavan Wigger, PA   traMADol (ULTRAM) 50 MG tablet Take 50 mg by mouth every 6 hours as needed.    Yes Historical Provider, MD   lisinopril (PRINIVIL;ZESTRIL) 10 MG tablet Take 1 tablet by mouth daily 20  Yes Dara Cueva MD   simvastatin (ZOCOR) 40 MG tablet Take 1 tablet by mouth nightly 20  Yes Dara Cueva MD   omeprazole (PRILOSEC) 20 MG delayed release capsule Take 1 capsule by mouth daily 20  Yes Sheela Earl MD   Multiple Vitamins-Minerals (THERAPEUTIC MULTIVITAMIN-MINERALS) tablet Take 1 tablet by mouth daily   Yes Historical Provider, MD   Vitamin D (CHOLECALCIFEROL) 1000 UNITS CAPS capsule Take 2,000 Units by mouth daily    Yes Historical Provider, MD   Multiple Vitamins-Minerals (OCUVITE PRESERVISION PO) Take 1 tablet by mouth daily   Yes Historical Provider, MD   diclofenac (VOLTAREN) 50 MG EC tablet Take 1 tablet by mouth 2 times daily (with meals)  Patient not taking: Reported on 2020   Sheela Earl MD       Current medications:    Current Facility-Administered Medications   Medication Dose Route Frequency Provider Last Rate Last Dose    tranexamic acid (CYKLOKAPRON) 1,090 mg in sodium chloride 0.9 % 50 mL IVPB  1,090 mg Intravenous Once Sheela Earl MD        ortho mix (with morphine) injection   Injection On Call Mazin Zepeda MD        vancomycin (VANCOCIN) 1,000 mg in dextrose 5 % 250 mL IVPB  15 mg/kg Intravenous Once Mazin Zepeda MD        lactated ringers infusion   Intravenous Continuous Heydi Lizarraga  mL/hr at 20 1127      sodium chloride flush 0.9 % injection 10 mL  10 mL Intravenous 2 times per day Heydi Lizarraga MD        sodium chloride flush 0.9 % injection 10 mL  10 mL Intravenous PRN Heydi Lizarraga MD        lidocaine 1 % injection 1 mL  1 mL Intradermal Once PRN Heydi Lizarraga MD        bupivacaine (PF) (MARCAINE) 0.5 % injection 150 mg  30 mL Intradermal Once Dottie Braswell MD        fentaNYL (SUBLIMAZE) injection 100 mcg  100 mcg Intravenous Once Dottie Braswell MD           Allergies:     Allergies   Allergen Reactions    Flexeril [Cyclobenzaprine] Other (See Comments)     Low BP and heart rate    Keflet [Cephalexin] Rash       Problem List:    Patient Active Problem List   Diagnosis Code    Benign essential HTN I10    Pure hypercholesterolemia E78.00    Gastroesophageal reflux disease without esophagitis K21.9    Family history of Alzheimer's disease Z82.0    Chronic right-sided low back pain without sciatica M54.5, G89.29    Need for prophylactic vaccination against diphtheria-tetanus-pertussis (DTP) Z23    Preop examination Z01.818    Primary osteoarthritis of left hip M16.12       Past Medical History:        Diagnosis Date    Arthritis     GERD (gastroesophageal reflux disease)     Hyperlipidemia     Hypertension        Past Surgical History:        Procedure Laterality Date     SECTION         Social History:    Social History     Tobacco Use    Smoking status: Former Smoker     Packs/day: 1.00     Years: 15.00     Pack years: 15.00     Last attempt to quit: 1983     Years since quittin.6    Smokeless tobacco: Never Used   Substance Use Topics    Alcohol use: Not on file                                Counseling given: Not Answered      Vital Signs (Current):   Vitals:    08/12/20 1606 08/17/20 1027   BP:  139/84   Pulse:  74   Resp:  16   Temp:  98 °F (36.7 °C)   TempSrc:  Oral   SpO2:  97%   Weight: 160 lb (72.6 kg) 160 lb (72.6 kg)   Height: 5' 3\" (1.6 m) 5' 3\" (1.6 m)                                              BP Readings from Last 3 Encounters:   08/17/20 139/84   08/13/20 124/82   07/23/20 132/85       NPO Status: Time of last liquid consumption: 2359                        Time of last solid consumption: 1930                        Date of last liquid consumption: 08/16/20                        Date of last solid food consumption: 08/16/20    BMI:   Wt Readings from Last 3 Encounters:   08/17/20 160 lb (72.6 kg)   08/13/20 162 lb (73.5 kg)   07/23/20 165 lb (74.8 kg)     Body mass index is 28.34 kg/m². CBC:   Lab Results   Component Value Date    WBC 8.9 08/11/2020    RBC 3.66 08/11/2020    HGB 12.1 08/11/2020    HCT 35.3 08/11/2020    MCV 96.5 08/11/2020    RDW 13.8 08/11/2020     08/11/2020       CMP:   Lab Results   Component Value Date     08/11/2020    K 4.0 08/11/2020    CL 96 08/11/2020    CO2 23 08/11/2020    BUN 20 08/11/2020    CREATININE 1.0 08/11/2020    GFRAA >60 08/11/2020    AGRATIO 2.0 05/23/2019    LABGLOM 55 08/11/2020    GLUCOSE 139 08/11/2020    PROT 7.5 05/23/2019    CALCIUM 10.1 08/11/2020    BILITOT 0.5 05/23/2019    ALKPHOS 79 05/23/2019    AST 19 05/23/2019    ALT 16 05/23/2019       POC Tests: No results for input(s): POCGLU, POCNA, POCK, POCCL, POCBUN, POCHEMO, POCHCT in the last 72 hours.     Coags:   Lab Results   Component Value Date    PROTIME 12.0 08/11/2020    INR 1.03 08/11/2020    APTT 27.2 08/11/2020       HCG (If Applicable): No results found for: PREGTESTUR, PREGSERUM, HCG, HCGQUANT     ABGs: No results found for: PHART, PO2ART, RRA5LWN, WIO2KJK, BEART, Z9PVWDGF     Type & Screen (If Applicable):  No results found for: LABABO, LABRH    Drug/Infectious Status (If Applicable):  No results found for: HIV, HEPCAB    COVID-19 Screening (If Applicable):   Lab Results   Component Value Date    COVID19 Not Detected 08/11/2020         Anesthesia Evaluation   no history of anesthetic complications:   Airway: Mallampati: II  TM distance: >3 FB   Neck ROM: full  Mouth opening: > = 3 FB Dental:    (+) partials      Pulmonary:       (-) asthma and sleep apnea                           Cardiovascular:  Exercise tolerance: good (>4 METS),   (+) hypertension:,     (-) CAD,  angina and  KAN                Neuro/Psych:      (-) seizures and CVA           GI/Hepatic/Renal:   (+) GERD: well controlled,           Endo/Other:        (-) diabetes mellitus               Abdominal:           Vascular:                                      Anesthesia Plan      general     ASA 3     (-npo MN  -admitted last month to May with near syncope, hypotension and bradycardia into 30s after starting cyclobenzoprine. Pt given epi and atropine and improved sx, troponins negative and ekg wnl at that time  -uses a cane to walk, denies sob or cough    Echo 7/2020  The left ventricle is mildly dilated. There is normal left ventricular wall thickness. Left ventricular systolic function is normal. (LVEF>/=55%)  Overall left ventricular ejection fraction is estimated to be 55-60%. The diastolic function is impaired and classified as Grade 1 (impaired  relaxation). There is mild mitral annular calcification. There is mild mitral regurgitation. The left atrium is moderately dilated.)  Induction: intravenous. MIPS: Postoperative opioids intended. Anesthetic plan and risks discussed with patient. Plan discussed with CRNA.     Attending anesthesiologist reviewed and agrees with Pre Eval content            Stefani Yarbrough MD   8/17/2020

## 2020-08-17 NOTE — H&P
Joy Jackson    [id number]    [facility] Same Day Surgery Update H & P  Department of General Surgery   Surgical Service   Pre-operative History and Physical  Last H & P within the last 30 days. DIAGNOSIS:   Osteoarthritis of one hip, left [M16.12]    PROCEDURE:  MN TOTAL HIP ARTHROPLASTY [42336] (LEFT TOTAL HIP ARTHROPLASTY ANTERIOR APPROACH)     HISTORY OF PRESENT ILLNESS:   Patient is a 79 y.o. female with chronic left hip pain and limited ROM in the setting of arthrosis. The symptoms have been recalcitrant to conservative treatment and the patient presents today for the above procedure. Covid 19:  Patient denies fever, chills, cough or known exposure to Covid-19.   Patient reports they have been quarantined at home since Covid-19 test.      Past Medical History:        Diagnosis Date    Arthritis     GERD (gastroesophageal reflux disease)     Hyperlipidemia     Hypertension      Past Surgical History:        Procedure Laterality Date     SECTION       Past Social History:  Social History     Socioeconomic History    Marital status:      Spouse name: None    Number of children: None    Years of education: None    Highest education level: None   Occupational History    None   Social Needs    Financial resource strain: None    Food insecurity     Worry: None     Inability: None    Transportation needs     Medical: None     Non-medical: None   Tobacco Use    Smoking status: Former Smoker     Packs/day: 1.00     Years: 15.00     Pack years: 15.00     Last attempt to quit: 1983     Years since quittin.6    Smokeless tobacco: Never Used   Substance and Sexual Activity    Alcohol use: None    Drug use: No    Sexual activity: Yes   Lifestyle    Physical activity     Days per week: None     Minutes per session: None    Stress: None   Relationships    Social connections     Talks on phone: None     Gets together: None     Attends Voodoo service: None     Active member of club or organization: None     Attends meetings of clubs or organizations: None     Relationship status: None    Intimate partner violence     Fear of current or ex partner: None     Emotionally abused: None     Physically abused: None     Forced sexual activity: None   Other Topics Concern    None   Social History Narrative    None         Medications Prior to Admission:      Prior to Admission medications    Medication Sig Start Date End Date Taking? Authorizing Provider   traMADol (ULTRAM) 50 MG tablet Take 50 mg by mouth every 6 hours as needed. Yes Historical Provider, MD   lisinopril (PRINIVIL;ZESTRIL) 10 MG tablet Take 1 tablet by mouth daily 6/16/20  Yes Jose G Tellez MD   simvastatin (ZOCOR) 40 MG tablet Take 1 tablet by mouth nightly 6/16/20  Yes Jose G Tellez MD   omeprazole (PRILOSEC) 20 MG delayed release capsule Take 1 capsule by mouth daily 6/11/20  Yes Hernandez Peres MD   Multiple Vitamins-Minerals (THERAPEUTIC MULTIVITAMIN-MINERALS) tablet Take 1 tablet by mouth daily   Yes Historical Provider, MD   Vitamin D (CHOLECALCIFEROL) 1000 UNITS CAPS capsule Take 2,000 Units by mouth daily    Yes Historical Provider, MD   Multiple Vitamins-Minerals (OCUVITE PRESERVISION PO) Take 1 tablet by mouth daily   Yes Historical Provider, MD   traMADol (ULTRAM) 50 MG tablet Take 1 tablet by mouth every 6 hours as needed for Pain for up to 7 days. Intended supply: 7 days.  Take lowest dose possible to manage pain 8/13/20 8/20/20  JESSICA Perry   diclofenac (VOLTAREN) 50 MG EC tablet Take 1 tablet by mouth 2 times daily (with meals)  Patient not taking: Reported on 8/13/2020 6/11/20   Hernandez Peres MD         Allergies:  Flexeril [cyclobenzaprine] and Keflet [cephalexin]    PHYSICAL EXAM:      /84   Pulse 74   Temp 98 °F (36.7 °C) (Oral)   Resp 16   Ht 5' 3\" (1.6 m)   Wt 160 lb (72.6 kg)   SpO2 97%   BMI 28.34 kg/m²      Airway:  Airway patent with no audible stridor    Heart: Regular rate and rhythm, No murmur noted    Lungs:  No increased work of breathing, good air exchange, clear to auscultation bilaterally, no crackles or wheezing    Abdomen:  Soft, non-distended, non-tender, normal active bowel sounds, no masses palpated    ASSESSMENT AND PLAN    Patient is a 79 y.o. female with above specified procedure planned. 1.  Patient seen and focused exam done today- no new changes since last physical exam on 8/13/20    2. Access to ancillary services are available per request of the provider.     Kendall Champion, APRN - CNP     8/17/2020

## 2020-08-17 NOTE — PROGRESS NOTES
Patient admitted to PACU # 15 from OR at 1823 post LEFT TOTAL HIP ARTHROPLASTY ANTERIOR APPROACH - Left   per Dr. Manjeet Urena. Attached to PACU monitoring system and report received from anesthesia provider. Patient was reported to be hemodynamically stable during procedure. Patient drowsy on admission and in pain, pain meds given per [facility]

## 2020-08-17 NOTE — ANESTHESIA PROCEDURE NOTES
Peripheral Block    Patient location during procedure: pre-op  Start time: 8/17/2020 1:00 PM  End time: 8/17/2020 1:04 PM  Staffing  Anesthesiologist: Halie Arellano MD  Performed: anesthesiologist   Preanesthetic Checklist  Completed: patient identified, site marked, surgical consent, pre-op evaluation, timeout performed, IV checked, risks and benefits discussed, monitors and equipment checked, anesthesia consent given, oxygen available and patient being monitored  Peripheral Block  Patient position: supine  Prep: ChloraPrep  Patient monitoring: cardiac monitor, continuous pulse ox, frequent blood pressure checks and IV access  Block type: Femoral and Fascia iliaca  Laterality: left  Injection technique: single-shot  Procedures: ultrasound guided  Infiltration strength: 1 %  Dose: 3 mL  Provider prep: mask and sterile gloves  Needle  Needle type: combined needle/nerve stimulator   Needle gauge: 21 G  Needle length: 10 cm  Needle localization: ultrasound guidance  Assessment  Injection assessment: negative aspiration for heme, no paresthesia on injection and local visualized surrounding nerve on ultrasound  Paresthesia pain: none  Slow fractionated injection: yes  Hemodynamics: stable  Additional Notes  Immediately prior to procedure a \"time out\" was called to verify the correct patient, allergies, laterality, procedure and equipment. Time out performed with linda RN    Local Anesthetic: 0.2 %  Bupivacaine   Amount: 55 ml  in 5 ml increments after negative aspiration each time. Iliopsoas Muscle and Fascia Iliaca, Femoral artery (Deep artery to the thigh take off), Femoral Vein and Femoral Nerve are identified; the tip of the need and the spread of the local anesthetic around the Femoral nerve are visualized. The Femoral nerve appeared to be anatomically normal and there were no abnormal pathologically findings seen.          Medications Administered  Bupivacaine (PF) (MARCAINE) 0.25 % injection, 55 mL  Reason for

## 2020-08-17 NOTE — OP NOTE
were discussed. The relative increase risk of iatrogenic fracture, cutaneous nerve injury with a direct anterior approach were discussed along with potential wound healing problems versus the relative risks of dislocation, leg length discrepancy with posterior approach and they elected an anterior approach at this time. They gave informed consent to proceed. Description of Procedure: The patient was taken to the operating room at [facility]. and a after satisfactory induction of general endotracheal anesthesia after preoperative regional block (fascia iliacus). The  lefthip and lower extremity were then prepped and draped in the usual sterile fashion for a direct anterior approach. An approximately 10 cm longitudinal incision was made beginning 2 cm distal and posterior to the ASIS. Sharp dissection was carried down through the skin and subcutaneous tissue. The fascia over the fascia celina was incised longitudinally in line with the skin incision. Kocher retractors were placed. The tensor fascia muscle was peeled off the medial wall of fascia developing the interval between it and the rectus femoris. Circumflex vessels were identified and cauterized and Cobra retractors were placed extra capsularly about the femoral neck. The capsule was opened in an inverted T fashion and tagged for later repair. Retractors were then placed intracapsularly and traction was applied. Proximal femoral resection was carried out a fingerbreadth proximal to the lesser trochanter in a napkin ring fashion. The femoral head was extracted using the power corkscrew and excellent direct visualization of the acetabulum was obtained. Retractor was placed anteriorly inferiorly and directly posteriorly as well as directly inferiorly allowing excellent circumferential exposure. The labrum was excised along the soft tissue from the floor of the acetabulum. A curette was used to identify the true medial wall.  Initial medialization femur was then again exposed and the stem was impacted anatomically with gentle blows of the mallet to the appropriate depth to [facility] the broach. The femoral head was implanted securely. Reduction was carried out with a plastic skid. Shuck test range of motion and stability were excellent as indicated above with the trial.     The wound was copiously irrigated with pulsatile lavage and normal saline and betadine wash. The anterior capsule was meticulously repaired with interrupted Ethibond -0 suture along with the elevated portion of the rectus femoris. Excellent anterior capsule repair was obtained. The anterior capsule and deep fascia was then injected with additional 60 cc of injection cocktail. The fascia over the fascia celina was closed with a running number 2 Stratofix  Suture with an additional layer of 1 Stratofix. Subcutaneous layer was closed with 2-0 Stratofix with Durabond and Perinea  applied along with an Acticoat dressing. The patient was reversed from anesthesia, taken out the fracture table, extubated and taken to the recovery room in stable condition, tolerated the procedure well. Sponge and needle counts correct times 2. Irricept wash, vancomycin powder and Orthomix were used intraoperatively. Complications: None  Disposition: Admission after recovery  Condition: Stable  Attending Attestation: I was present and scrubbed for the entire procedure.     Signed by: Dina Pinedo, 8/17/2020      Electronically signed by Dina Pinedo MD on 8/17/2020 at 5:32 PM

## 2020-08-17 NOTE — PROGRESS NOTES
PACU Transfer Note    Current Allergies: Flexeril [cyclobenzaprine] and Keflet [cephalexin]    Pt meets criteria as per Aubrey Score and ASPAN Standards to transfer to next phase of care. No results for input(s): POCGLU in the last 72 hours. Vitals:    08/17/20 1930   BP: (!) 133/90   Pulse: 96   Resp: 20   Temp: 98 °F (36.7 °C)   SpO2: 97%     BP within   20% of pt's admitting BP as per AUBREY SCORE    SpO2: 97 %    O2 Flow Rate (L/min): 2 L/min      Intake/Output Summary (Last 24 hours) at 8/17/2020 1955  Last data filed at 8/17/2020 1930  Gross per 24 hour   Intake 1900 ml   Output 150 ml   Net 1750 ml       Pain assessment:  present - adequately treated    Pain Level: 5(states this pain is tolerable)    No other skin issues noted. Is patient incontinent: no       Handoff report given at bedside.    Family updated and directed to pt room      8/17/2020 7:55 PM

## 2020-08-18 VITALS
HEIGHT: 63 IN | SYSTOLIC BLOOD PRESSURE: 115 MMHG | HEART RATE: 84 BPM | OXYGEN SATURATION: 97 % | TEMPERATURE: 99.1 F | DIASTOLIC BLOOD PRESSURE: 73 MMHG | BODY MASS INDEX: 28.35 KG/M2 | RESPIRATION RATE: 16 BRPM | WEIGHT: 160 LBS

## 2020-08-18 PROBLEM — D50.0 BLOOD LOSS ANEMIA: Status: ACTIVE | Noted: 2020-08-18

## 2020-08-18 PROBLEM — S05.02XA CORNEAL ABRASION OF BOTH EYES: Status: ACTIVE | Noted: 2020-08-18

## 2020-08-18 PROBLEM — R73.9 HYPERGLYCEMIA: Status: ACTIVE | Noted: 2020-08-18

## 2020-08-18 PROBLEM — S05.01XA CORNEAL ABRASION OF BOTH EYES: Status: ACTIVE | Noted: 2020-08-18

## 2020-08-18 LAB
ANION GAP SERPL CALCULATED.3IONS-SCNC: 14 MMOL/L (ref 3–16)
BUN BLDV-MCNC: 17 MG/DL (ref 7–20)
CALCIUM SERPL-MCNC: 8.8 MG/DL (ref 8.3–10.6)
CHLORIDE BLD-SCNC: 94 MMOL/L (ref 99–110)
CO2: 22 MMOL/L (ref 21–32)
CREAT SERPL-MCNC: 0.9 MG/DL (ref 0.6–1.2)
GFR AFRICAN AMERICAN: >60
GFR NON-AFRICAN AMERICAN: >60
GLUCOSE BLD-MCNC: 163 MG/DL (ref 70–99)
GLUCOSE BLD-MCNC: 184 MG/DL (ref 70–99)
GLUCOSE BLD-MCNC: 214 MG/DL (ref 70–99)
HCT VFR BLD CALC: 24.8 % (ref 36–48)
HEMOGLOBIN: 8.6 G/DL (ref 12–16)
MCH RBC QN AUTO: 33.3 PG (ref 26–34)
MCHC RBC AUTO-ENTMCNC: 34.6 G/DL (ref 31–36)
MCV RBC AUTO: 96.1 FL (ref 80–100)
PDW BLD-RTO: 13.9 % (ref 12.4–15.4)
PERFORMED ON: ABNORMAL
PERFORMED ON: ABNORMAL
PLATELET # BLD: 203 K/UL (ref 135–450)
PMV BLD AUTO: 8.2 FL (ref 5–10.5)
POTASSIUM SERPL-SCNC: 4.6 MMOL/L (ref 3.5–5.1)
RBC # BLD: 2.58 M/UL (ref 4–5.2)
SODIUM BLD-SCNC: 130 MMOL/L (ref 136–145)
WBC # BLD: 11.8 K/UL (ref 4–11)

## 2020-08-18 PROCEDURE — G0378 HOSPITAL OBSERVATION PER HR: HCPCS

## 2020-08-18 PROCEDURE — 99213 OFFICE O/P EST LOW 20 MIN: CPT | Performed by: INTERNAL MEDICINE

## 2020-08-18 PROCEDURE — 36415 COLL VENOUS BLD VENIPUNCTURE: CPT

## 2020-08-18 PROCEDURE — 6360000002 HC RX W HCPCS: Performed by: PHYSICIAN ASSISTANT

## 2020-08-18 PROCEDURE — 2580000003 HC RX 258: Performed by: INTERNAL MEDICINE

## 2020-08-18 PROCEDURE — 6370000000 HC RX 637 (ALT 250 FOR IP): Performed by: INTERNAL MEDICINE

## 2020-08-18 PROCEDURE — 97165 OT EVAL LOW COMPLEX 30 MIN: CPT

## 2020-08-18 PROCEDURE — 6370000000 HC RX 637 (ALT 250 FOR IP): Performed by: PHYSICIAN ASSISTANT

## 2020-08-18 PROCEDURE — 99024 POSTOP FOLLOW-UP VISIT: CPT | Performed by: PHYSICIAN ASSISTANT

## 2020-08-18 PROCEDURE — 97161 PT EVAL LOW COMPLEX 20 MIN: CPT

## 2020-08-18 PROCEDURE — 96365 THER/PROPH/DIAG IV INF INIT: CPT

## 2020-08-18 PROCEDURE — 85027 COMPLETE CBC AUTOMATED: CPT

## 2020-08-18 PROCEDURE — 97530 THERAPEUTIC ACTIVITIES: CPT

## 2020-08-18 PROCEDURE — 97116 GAIT TRAINING THERAPY: CPT

## 2020-08-18 PROCEDURE — 80048 BASIC METABOLIC PNL TOTAL CA: CPT

## 2020-08-18 PROCEDURE — 6360000002 HC RX W HCPCS: Performed by: INTERNAL MEDICINE

## 2020-08-18 PROCEDURE — 97535 SELF CARE MNGMENT TRAINING: CPT

## 2020-08-18 PROCEDURE — 2580000003 HC RX 258: Performed by: PHYSICIAN ASSISTANT

## 2020-08-18 PROCEDURE — 94150 VITAL CAPACITY TEST: CPT

## 2020-08-18 PROCEDURE — 97110 THERAPEUTIC EXERCISES: CPT

## 2020-08-18 RX ORDER — INSULIN LISPRO 100 [IU]/ML
0-6 INJECTION, SOLUTION INTRAVENOUS; SUBCUTANEOUS
Status: DISCONTINUED | OUTPATIENT
Start: 2020-08-18 | End: 2020-08-18 | Stop reason: HOSPADM

## 2020-08-18 RX ORDER — DEXTROSE MONOHYDRATE 25 G/50ML
12.5 INJECTION, SOLUTION INTRAVENOUS PRN
Status: DISCONTINUED | OUTPATIENT
Start: 2020-08-18 | End: 2020-08-18 | Stop reason: HOSPADM

## 2020-08-18 RX ORDER — INSULIN LISPRO 100 [IU]/ML
0-3 INJECTION, SOLUTION INTRAVENOUS; SUBCUTANEOUS NIGHTLY
Status: DISCONTINUED | OUTPATIENT
Start: 2020-08-18 | End: 2020-08-18 | Stop reason: HOSPADM

## 2020-08-18 RX ORDER — SENNA AND DOCUSATE SODIUM 50; 8.6 MG/1; MG/1
1 TABLET, FILM COATED ORAL 2 TIMES DAILY
Qty: 60 TABLET | Refills: 0 | Status: SHIPPED | OUTPATIENT
Start: 2020-08-18 | End: 2020-11-05 | Stop reason: ALTCHOICE

## 2020-08-18 RX ORDER — NICOTINE POLACRILEX 4 MG
15 LOZENGE BUCCAL PRN
Status: DISCONTINUED | OUTPATIENT
Start: 2020-08-18 | End: 2020-08-18 | Stop reason: HOSPADM

## 2020-08-18 RX ORDER — ERYTHROMYCIN 5 MG/G
OINTMENT OPHTHALMIC 3 TIMES DAILY
Status: DISCONTINUED | OUTPATIENT
Start: 2020-08-18 | End: 2020-08-18

## 2020-08-18 RX ORDER — DEXTROSE MONOHYDRATE 50 MG/ML
100 INJECTION, SOLUTION INTRAVENOUS PRN
Status: DISCONTINUED | OUTPATIENT
Start: 2020-08-18 | End: 2020-08-18 | Stop reason: HOSPADM

## 2020-08-18 RX ORDER — SULFAMETHOXAZOLE AND TRIMETHOPRIM 800; 160 MG/1; MG/1
1 TABLET ORAL 2 TIMES DAILY
Qty: 20 TABLET | Refills: 0 | Status: SHIPPED | OUTPATIENT
Start: 2020-08-18 | End: 2020-08-28

## 2020-08-18 RX ORDER — ZOLPIDEM TARTRATE 5 MG/1
5 TABLET ORAL NIGHTLY PRN
Qty: 14 TABLET | Refills: 0 | Status: SHIPPED | OUTPATIENT
Start: 2020-08-18 | End: 2020-09-01

## 2020-08-18 RX ORDER — OXYCODONE HYDROCHLORIDE AND ACETAMINOPHEN 5; 325 MG/1; MG/1
1 TABLET ORAL EVERY 6 HOURS PRN
Qty: 28 TABLET | Refills: 0 | Status: SHIPPED | OUTPATIENT
Start: 2020-08-18 | End: 2020-08-25

## 2020-08-18 RX ORDER — WARFARIN SODIUM 2.5 MG/1
2.5 TABLET ORAL DAILY
Qty: 30 TABLET | Refills: 3 | Status: SHIPPED | OUTPATIENT
Start: 2020-08-18 | End: 2020-09-24 | Stop reason: ALTCHOICE

## 2020-08-18 RX ADMIN — INSULIN LISPRO 1 UNITS: 100 INJECTION, SOLUTION INTRAVENOUS; SUBCUTANEOUS at 13:15

## 2020-08-18 RX ADMIN — LISINOPRIL 10 MG: 10 TABLET ORAL at 09:59

## 2020-08-18 RX ADMIN — Medication 10 ML: at 10:00

## 2020-08-18 RX ADMIN — VANCOMYCIN HYDROCHLORIDE 1000 MG: 10 INJECTION, POWDER, LYOPHILIZED, FOR SOLUTION INTRAVENOUS at 04:01

## 2020-08-18 RX ADMIN — ACETAMINOPHEN 650 MG: 325 TABLET ORAL at 14:33

## 2020-08-18 RX ADMIN — ATORVASTATIN CALCIUM 40 MG: 40 TABLET, FILM COATED ORAL at 11:47

## 2020-08-18 RX ADMIN — Medication 2000 UNITS: at 09:59

## 2020-08-18 RX ADMIN — SODIUM CHLORIDE: 4.5 INJECTION, SOLUTION INTRAVENOUS at 13:22

## 2020-08-18 RX ADMIN — ACETAMINOPHEN 650 MG: 325 TABLET ORAL at 04:01

## 2020-08-18 RX ADMIN — ACETAMINOPHEN 650 MG: 325 TABLET ORAL at 09:58

## 2020-08-18 RX ADMIN — IRON SUCROSE 200 MG: 20 INJECTION, SOLUTION INTRAVENOUS at 10:00

## 2020-08-18 RX ADMIN — MULTIPLE VITAMINS W/ MINERALS TAB 1 TABLET: TAB at 09:59

## 2020-08-18 RX ADMIN — PREDNISONE 10 MG: 10 TABLET ORAL at 10:00

## 2020-08-18 RX ADMIN — DOCUSATE SODIUM 50 MG AND SENNOSIDES 8.6 MG 1 TABLET: 8.6; 5 TABLET, FILM COATED ORAL at 10:00

## 2020-08-18 RX ADMIN — PANTOPRAZOLE SODIUM 40 MG: 40 TABLET, DELAYED RELEASE ORAL at 06:36

## 2020-08-18 ASSESSMENT — PAIN DESCRIPTION - ORIENTATION: ORIENTATION: LEFT

## 2020-08-18 ASSESSMENT — PAIN - FUNCTIONAL ASSESSMENT: PAIN_FUNCTIONAL_ASSESSMENT: PREVENTS OR INTERFERES SOME ACTIVE ACTIVITIES AND ADLS

## 2020-08-18 ASSESSMENT — PAIN DESCRIPTION - DESCRIPTORS: DESCRIPTORS: ACHING

## 2020-08-18 ASSESSMENT — PAIN DESCRIPTION - PROGRESSION: CLINICAL_PROGRESSION: GRADUALLY WORSENING

## 2020-08-18 ASSESSMENT — PAIN SCALES - GENERAL
PAINLEVEL_OUTOF10: 2
PAINLEVEL_OUTOF10: 1
PAINLEVEL_OUTOF10: 1

## 2020-08-18 ASSESSMENT — PAIN DESCRIPTION - PAIN TYPE: TYPE: SURGICAL PAIN

## 2020-08-18 ASSESSMENT — PAIN DESCRIPTION - ONSET: ONSET: ON-GOING

## 2020-08-18 ASSESSMENT — PAIN DESCRIPTION - LOCATION: LOCATION: HIP

## 2020-08-18 ASSESSMENT — PAIN DESCRIPTION - FREQUENCY: FREQUENCY: CONTINUOUS

## 2020-08-18 NOTE — CONSULTS
Probable corneal abrasion bilaterally. 2.  Hyperglycemia postop in a patient with prediabetes. 3.  Postop anemia, secondary to blood loss. PLAN:  1.  IV iron. 2.  Sliding scale insulin. 3.  Erythromycin ophthalmic ointment. We will notify Anesthesia of  probable corneal abrasions. She will follow up with Ophthalmology as an  outpatient if this does not clear. 4.  Physical therapy, incentive spirometry, and early ambulation. We  will continue her home meds as well.         Krystin La MD    D: 08/18/2020 8:59:34       T: 08/18/2020 9:17:48     [email or web address]  Job#: [id number]     Doc#: [id number]    CC:

## 2020-08-18 NOTE — DISCHARGE SUMMARY
PRN  HYDROmorphone (DILAUDID) injection 0.25 mg, 0.25 mg, Intravenous, Q3H PRN **OR** HYDROmorphone (DILAUDID) injection 0.5 mg, 0.5 mg, Intravenous, Q3H PRN  promethazine (PHENERGAN) tablet 12.5 mg, 12.5 mg, Oral, Q6H PRN **OR** ondansetron (ZOFRAN) injection 4 mg, 4 mg, Intravenous, Q6H PRN  aspirin EC tablet 325 mg, 325 mg, Oral, BID  tiZANidine (ZANAFLEX) tablet 4 mg, 4 mg, Oral, Q6H PRN  zolpidem (AMBIEN) tablet 5 mg, 5 mg, Oral, Nightly PRN  predniSONE (DELTASONE) tablet 10 mg, 10 mg, Oral, Daily    Post-operatively the patients diet was advanced as tolerated and their dressing was changed on POD #1. The incision is dressing in place, clean, dry and intact with no signs of infection. The patient remained neurovascularly intact in the left lower and had intact pulses distally. Patients calf remained soft and showed no evidence of DVT. The patient was able to move their left lower extremity without any problems post-operatively. Physical therapy and occupational therapy were consulted and began working with the patient post-operatively. The patient progressed with PT/OT as would be expected and continued to improve through their stay. The patients pain was initially controlled with IV medications but we were able to transition to oral pain medications soon after arrival to the floor and their pain remained under good control through their hospital stay. From a medical standpoint the patient remained stable and continued to have the medicine team follow throughout their stay. The patient will be discharged at this time to Home  with their current diet restrictions and will continue to follow the precautions outlined to them by us and PT/OT. Condition on Discharge: Stable    Plan  Return visit in 10 days. .  Patient was instructed on the use of pain medications, the signs and symptoms of infection, and was given our number to call should they have any questions or concerns following discharge.   We will plan for patient to continue utilization of erythromycin ophthalmic ointment for bilateral eyes for corneal abrasions advised for follow-up with ophthalmology if limited to no improvement is noted over the next 4 to 5 days. We will plan for continued laboratory monitoring for postsurgical anemia and utilization of IV iron supplementation as well as prenatal vitamin supplementation when patient is discharged.       Electronically signed by JESSICA Riley on 8/18/2020 at 1:29 PM

## 2020-08-18 NOTE — ANESTHESIA POSTPROCEDURE EVALUATION
Department of Anesthesiology  Postprocedure Note    Patient: Joy Jackson  MRN: [id number]  YOB: 1950  Date of evaluation: 8/17/2020  Time:  10:38 PM     Procedure Summary     Date:  08/17/20 Room / Location:  [address] [place] [address] [address] [facility]    Anesthesia Start:  1537 Anesthesia Stop:  0770    Procedure:  LEFT TOTAL HIP ARTHROPLASTY ANTERIOR APPROACH (Left Hip) Diagnosis:       Osteoarthritis of one hip, left      (Osteoarthritis of one hip, left [M16.12])    Surgeon:  Jennifer Voss MD Responsible Provider:  Kj Martínez MD    Anesthesia Type:  general ASA Status:  3          Anesthesia Type: general    Dae Phase I: Dae Score: 9    Dae Phase II:      Last vitals: Reviewed and per EMR flowsheets.        Anesthesia Post Evaluation    Patient location during evaluation: PACU  Patient participation: complete - patient participated  Level of consciousness: awake and alert  Pain score: 5  Airway patency: patent  Nausea & Vomiting: no nausea and no vomiting  Complications: no  Cardiovascular status: hemodynamically stable  Respiratory status: acceptable  Hydration status: euvolemic

## 2020-08-18 NOTE — PROGRESS NOTES
Department of Orthopedic Surgery  Physician Assistant   Progress Note    Subjective:     Post-Operative Day: 1 Status Post left Total Hip Arthroplasty  Systemic or Specific Complaints: Noted irritation to bilateral eyes status post the procedure. Objective:     Patient Vitals for the past 24 hrs:   BP Temp Temp src Pulse Resp SpO2   08/18/20 1124 -- -- -- -- -- 98 %   08/18/20 1030 (!) 95/56 98.4 °F (36.9 °C) Oral 79 16 96 %   08/18/20 0848 119/82 -- -- 108 -- --   08/18/20 0847 112/74 -- -- 103 -- --   08/18/20 0830 109/72 -- -- 94 -- --   08/18/20 0630 98/67 98.1 °F (36.7 °C) Oral 78 16 96 %   08/18/20 0315 114/61 98 °F (36.7 °C) Oral 86 18 95 %   08/17/20 2000 (!) 122/57 98.4 °F (36.9 °C) Oral 89 18 97 %   08/17/20 1930 (!) 133/90 98 °F (36.7 °C) Temporal 96 20 97 %   08/17/20 1915 121/68 -- -- 78 13 98 %   08/17/20 1900 (!) 140/80 -- -- 83 18 100 %   08/17/20 1845 127/77 -- -- 81 14 99 %   08/17/20 1830 121/81 -- -- 84 19 98 %   08/17/20 1825 120/76 -- -- 81 16 99 %   08/17/20 1823 122/77 98 °F (36.7 °C) Temporal 85 18 100 %       General: alert, appears stated age and cooperative   Wound: Wound clean and dry no evidence of infection. , No Erythema, No Edema and No Drainage   Motion: Painful range of Motion   DVT Exam: No evidence of DVT seen on physical exam.  Negative Lupillo's sign. No cords or calf tenderness. No significant calf/ankle edema. NVI in lower extremity. Thigh swollen but soft. Moving foot and ankle. Data Review  CBC:   Lab Results   Component Value Date    WBC 11.8 08/18/2020    RBC 2.58 08/18/2020    HGB 8.6 08/18/2020    HCT 24.8 08/18/2020     08/18/2020       Assessment:     Status Post left Total Hip Arthroplasty. Doing well postoperatively. Plan:      1: Discharge today, Return to Clinic: 10 to 14 days:  2:  Continue Deep venous thrombosis prophylaxis  3:  Continue physical therapy  4:  Continue Pain Control  5:  We will finish IV iron infusion today to ideally prevent need for transfusion.   6: We will plan for follow-up with ophthalmology if corneal abrasion does not improve over the next few days with utilization of erythromycin ointment      Electronically signed by JESSICA Ramos on 8/18/2020 at 1:26 PM

## 2020-08-18 NOTE — DISCHARGE INSTR - COC
Continuity of Care Form    Patient Name: Dwight Brennan   :  1950  MRN:  [id number]    Admit date:  2020  Discharge date:  ***    Code Status Order: Full Code   Advance Directives:   Advance Care Flowsheet Documentation     Date/Time Healthcare Directive Type of Healthcare Directive Copy in [address] Agent's Name Healthcare Agent's Phone Number    20 0205  No, patient does not have an advance directive for healthcare treatment -- -- -- -- --    20 1608  No, patient does not have an advance directive for healthcare treatment -- -- -- -- --          Admitting Physician:  Dilia Rivero MD  PCP: Oscar Bhatti MD    Discharging Nurse: [facility] Unit/Room#: 6750/2030-65  Discharging Unit Phone Number: ***    Emergency Contact:   Extended Emergency Contact Information  Primary Emergency Contact:  [place] Phone: [phone]  Mobile Phone: [phone]  Relation: Spouse  Secondary Emergency Contact: Bel Dinero  Address: [place] Phone: [phone]  Relation: Child    Past Surgical History:  Past Surgical History:   Procedure Laterality Date     SECTION      TOTAL HIP ARTHROPLASTY Left 2020    LEFT TOTAL HIP ARTHROPLASTY ANTERIOR APPROACH performed by Dilia Rivero MD at [address]       Immunization History:   Immunization History   Administered Date(s) Administered    Influenza Vaccine, unspecified formulation 2016, 2017    Influenza Virus Vaccine 10/25/2006, 10/15/2008, 10/15/2009, 2013, 09/15/2014, 2015, 2018    Influenza, High Dose (Fluzone 65 yrs and older) 2018, 10/02/2019    Influenza, MDCK Quadv, IM, PF (Flucelvax 4 yrs and older) 2018    Influenza, Triv, inactivated, subunit, adjuvanted, IM (Fluad 65 yrs and older) 10/09/2019    PPD Test 2014, 2016    Pneumococcal Conjugate 13-valent Darian Dugan) 10/26/2015, 11/21/2016    Pneumococcal Conjugate Vaccine 11/21/2016    Pneumococcal Polysaccharide ([id number]) 10/26/2015    Zoster Live (Zostavax) 09/30/2012    Zoster Recombinant (Shingrix) 09/27/2018, 11/29/2018       Active Problems:  Patient Active Problem List   Diagnosis Code    Benign essential HTN I10    Pure hypercholesterolemia E78.00    Gastroesophageal reflux disease without esophagitis K21.9    Family history of Alzheimer's disease Z82.0    Chronic right-sided low back pain without sciatica M54.5, G89.29    Need for prophylactic vaccination against diphtheria-tetanus-pertussis (DTP) Z23    Preop examination Z01.818    Primary osteoarthritis of left hip M16.12    Status post total hip replacement, left U29.953    Corneal abrasion of both eyes S05. Scheryl Banco. 02XA    Hyperglycemia R73.9    Blood loss anemia D50.0       Isolation/Infection:   Isolation          No Isolation        Patient Infection Status     None to display          Nurse Assessment:  Last Vital Signs: /73   Pulse 84   Temp 99.1 °F (37.3 °C) (Oral)   Resp 16   Ht 5' 3\" (1.6 m)   Wt 160 lb (72.6 kg)   SpO2 97%   BMI 28.34 kg/m²     Last documented pain score (0-10 scale): Pain Level: 2  Last Weight:   Wt Readings from Last 1 Encounters:   08/17/20 160 lb (72.6 kg)     Mental Status:  {IP PT MENTAL STATUS:47505}    IV Access:  { TIO IV ACCESS:202255475}    Nursing Mobility/ADLs:  Walking   {[facility] DME EXQK:948316034}  Transfer  {P DME QLLE:728263504}  Bathing  {P DME RBNR:815107790}  Dressing  {P DME GQHW:392174883}  Toileting  {P DME UIMO:821024112}  Feeding  {[facility] DME SOSU:185830491}  Med Admin  {[facility] DME IKBA:645862497}  Med Delivery   { TIO MED Delivery:458496035}    Wound Care Documentation and Therapy:        Elimination:  Continence:   · Bowel: {YES / IW:50291}  · Bladder: {YES / YK:70490}  Urinary Catheter: {Urinary Catheter:847358463}   Colostomy/Ileostomy/Ileal Conduit: {YES / MW:99777}       Date of Last BM: ***    Intake/Output Summary (Last 24 hours) at 2020 1656  Last data filed at 2020 1416  Gross per 24 hour   Intake 1380 ml   Output 450 ml   Net 930 ml     I/O last 3 completed shifts: In: 2380 [P.O.:530;  I.V.:1850]  Out: 450 [Urine:300; Blood:150]    Safety Concerns:     [facility] Safety Concerns:114637669}    Impairments/Disabilities:      [facility] Impairments/Disabilities:610736286}    Nutrition Therapy:  Current Nutrition Therapy:   [facility] Diet List:894545782}    Routes of Feeding: {P DME Other Feedings:837424630}  Liquids: {Slp liquid thickness:08999}  Daily Fluid Restriction: {CHP DME Yes amt example:207917403}  Last Modified Barium Swallow with Video (Video Swallowing Test): {Done Not Done RGAC:792930560}    Treatments at the Time of Hospital Discharge:   Respiratory Treatments: ***  Oxygen Therapy:  {Therapy; copd oxygen:95448}  Ventilator:    { CC Vent ELVH:691930528}    Rehab Therapies: {THERAPEUTIC INTERVENTION:0193786564}  Weight Bearing Status/Restrictions: [address] [place] [address] Weight Bearin}  Other Medical Equipment (for information only, NOT a DME order):  {EQUIPMENT:768868560}  Other Treatments: ***    Patient's personal belongings (please select all that are sent with patient):  {[facility] DME Belongings:049132451}    RN SIGNATURE:  {Esignature:113673194}    CASE MANAGEMENT/SOCIAL WORK SECTION    Inpatient Status Date: ***    Readmission Risk Assessment Score:  Readmission Risk              Risk of Unplanned Readmission:        0           Discharging to Facility/ Agency   · Name:   · Address:  · Phone:  · Fax:    Dialysis Facility (if applicable)   · Name:  · Address:  · Dialysis Schedule:  · Phone:  · Fax:    / signature: {Esignature:458434309}    PHYSICIAN SECTION    Prognosis: {Prognosis:4020969296}    Condition at Discharge: [facility] Patient Condition:424583158}    Rehab Potential (if transferring to Rehab): {Prognosis:1816207439}    Recommended Labs or Other Treatments After Discharge: ***    Physician Certification: I certify the above information and transfer of Coco White  is necessary for the continuing treatment of the diagnosis listed and that she requires {Admit to Appropriate Level of Care:07044} for {GREATER/LESS:967153279} 30 days.      Update Admission H&P: {CHP DME Changes in EJJCO:392694543}    PHYSICIAN SIGNATURE:  {Esignature:363156663}

## 2020-08-18 NOTE — PROGRESS NOTES
Pt is A&O x4, VSS. Minimal pain at this time that is relieved by scheduled Tylenol. Denies n/b at this time/ Tolerating diet at this time. Voiding adequately per bathroom. Dressing is CD&I. Pt will be discharged home today. Will continue to monitor.

## 2020-08-18 NOTE — PROGRESS NOTES
4 Eyes Admission Assessment     I agree as the admission nurse that 2 RN's have performed a thorough Head to Toe Skin Assessment on the patient. ALL assessment sites listed below have been assessed on admission. Areas assessed by both nurses: ***  [x]   Head, Face, and Ears   [x]   Shoulders, Back, and Chest  [x]   Arms, Elbows, and Hands   [x]   Coccyx, Sacrum, and Ischum  [x]   Legs, Feet, and Heels        Does the Patient have Skin Breakdown?   No         Yoshi Prevention initiated:  NA   Wound Care Orders initiated:  NA      WOC nurse consulted for Pressure Injury (Stage 3,4, Unstageable, DTI, NWPT, and Complex wounds) or Yoshi score 18 or lower:  No      Nurse 1 eSignature: Electronically signed by Vinicius Morales RN on 8/17/20 at 9:41 PM EDT    **SHARE this note so that the co-signing nurse is able to place an eSignature**    Nurse 2 eSignature: {Esignature:[id number]}

## 2020-08-18 NOTE — PROGRESS NOTES
Physical Therapy  Facility/Department: [facility] 5T ORTHO/NEURO  Daily Treatment Note  NAME: Susan Leija  : 1950  MRN: [id number]    Date of Service: 2020    Discharge Recommendations: Susan Leija scored a 20/24 on the AM-PAC short mobility form. Current research shows that an AM-PAC score of 18 or greater is typically associated with a discharge to the patient's home setting. Based on the patient's AM-PAC score and their current functional mobility deficits, it is recommended that the patient have 2-3 sessions per week of Physical Therapy at d/c to increase the patient's independence. At this time, this patient demonstrates the endurance and safety to discharge home with HHPT and a follow up treatment frequency of 2-3x/wk. Please see assessment section for further patient specific details. If patient discharges prior to next session this note will serve as a discharge summary. Please see below for the latest assessment towards goals. PT Equipment Recommendations  Equipment Needed: No    Assessment   Body structures, Functions, Activity limitations: Decreased functional mobility ; Decreased endurance  Assessment: Pt continues to be limited by LLE weakness, with x2 instances of L knee buckling, requiring physical assist to recover. Pt able to ascend/descend 6 stairs and 1 curb, she states that she feels comfortable d/c'ing at this time. Pt and daughter educated to have family member present for stairs with use of gait belt for increased safety. PT recommends initial 24 hr supervision/assist with use of RW and HHPT for increased safety. Treatment Diagnosis: mobility impairment due to L THR  Patient Education: Stairs, HEP  REQUIRES PT FOLLOW UP: Yes  Activity Tolerance  Activity Tolerance: Patient limited by endurance; Patient Tolerated treatment well     Patient Diagnosis(es): The encounter diagnosis was Status post total hip replacement, left.     has a past medical history of Arthritis, GERD pt would like crow hose. Pt should spend most of the day up in chair, but can take breaks in bed to reduce risk of bedsores. Pt is able to walk frequently throughout the day, but should rest if she feels that she needs to. Pt's daughter may take safety belt home for pt, she should transfer RW up/down stairs for pt, should be down stairs from pt when guarding on stairs, pt should ascend/descend sideways until home PT says otherwise. PT also notifying RN that pt's daughter would like to be present for d/c education prior to pt leaving the hospital.  Pt's daughter with no other questions at this time. G-Code     OutComes Score                                                     AM-PAC Score  AM-PAC Inpatient Mobility Raw Score : 20 (08/18/20 1609)  AM-PAC Inpatient T-Scale Score : 47.67 (08/18/20 1609)  Mobility Inpatient CMS 0-100% Score: 35.83 (08/18/20 1609)  Mobility Inpatient CMS G-Code Modifier : CJ (08/18/20 1609)          Goals  Short term goals  Time Frame for Short term goals: discharge -all ongoing  Short term goal 1: sit to/from supine supervision  Short term goal 2: sit to/from stand supervision  Short term goal 3: ambulate 100 ft with walker supervision WBAT L  Short term goal 4: states 5/5 anterior hip precautions  Short term goal 5: up/down curb step with walker supervision  Patient Goals   Patient goals : return home POD 1    Plan    Plan  Times per week: 7  Times per day: Twice a day  Current Treatment Recommendations: Endurance Training, Transfer Training, Gait Training, Stair training, Functional Mobility Training  Safety Devices  Type of devices:  All fall risk precautions in place, Call light within reach, Chair alarm in place, Gait belt, Left in chair, Nurse notified     Therapy Time   Individual Concurrent Group Co-treatment   Time In 1323         Time Out 1431         Minutes 68              Timed Code Treatment Minutes:   68    Total Treatment Minutes:  Gilson Flanagan PT    This note to serve as discharge summary if patient discharged before next session.

## 2020-08-18 NOTE — PLAN OF CARE
Problem: Falls - Risk of:  Goal: Will remain free from falls  Description: Will remain free from falls  8/18/2020 0813 by Marjorie Mcdonald RN  Outcome: Ongoing  Fall precautions in place. Bed is in lowest position, wheels locked, bed alarm on, non skid socks on. Call light and bedside table within reach. Pt calls out appropriately. Pt is up x2 with a rebecca powell. Will continue to assess and monitor.       Problem: Pain - Acute:  Goal: Pain level will decrease  Description: Pain level will decrease  8/18/2020 0813 by Marjorie Mcdonald RN  Outcome: Ongoing

## 2020-08-18 NOTE — PROGRESS NOTES
position/activity restrictions: up with assist, WBAT L, anterior hip precautions, peripheral nerve block    Subjective   General  Chart Reviewed: Yes  Additional Pertinent Hx: Pt admitted 8/17 for OA of L hip. SX 8/17: LEFT TOTAL HIP ARTHROPLASTY ANTERIOR APPROACH                   PMH: HTN, Arthritis, GERD  Family / Caregiver Present: No  Diagnosis: OA of L hip  Subjective  Subjective: Pt found in chair. Pt agreeable to OT eval and Tx. Pt alert, pleasant, and cooperative  Patient Currently in Pain: Yes(rates L hip pain 1/10)    Social/Functional History  Social/Functional History  Lives With: Spouse(dtr here from [facility])  Type of Home: House  Home Layout: Multi-level(split level-7 steps up with rail to bedroom level but pt plans to stay on lower level (6 steps))  Home Access: (one porch step in with post available)  Bathroom Shower/Tub: (plans to stay on lower level with half bath only; full bath is tub/shower with shower chair)  Bathroom Toilet: Standard(RTS; sink nearby)  Home Equipment: Standard walker, Cane, Wheelchair-manual, Reacher(using cane most of time prior to surgery)  ADL Assistance: Independent  Homemaking Assistance: (pt performs laundry (has reacher))  Ambulation Assistance: Independent(with cane mainly but using walker prior to for practice)  Transfer Assistance: Independent  Active : Yes  Occupation: Retired  Type of occupation: nurse  Additional Comments: Pt reports one fall in July with no injury. Objective  Treatment included functional transfer training, ADL's and pt. education.   Vision: Impaired  Vision Exceptions: Wears glasses at all times  Hearing: Within functional limits    Orientation  Overall Orientation Status: Within Functional Limits     Balance  Sitting Balance: Independent  Standing Balance: Stand by assistance  Standing Balance  Time: 3 min x1; 1 min x2  Activity: stance; functional mobility  Comment: Pt demo grooming task at sink with SBA  Functional Mobility  Functional - Mobility Device: Rolling Walker  Activity: To/from bathroom  Assist Level: Stand by assistance  Functional Mobility Comments: Pt denies dizziness and light headiness. Pt demo one knee buckle when walking back from [facility] to the chair. Toilet Transfers  Toilet - Technique: Ambulating(RW)  Equipment Used: Standard toilet(with grab bars)  Toilet Transfer: Stand by assistance  Toilet Transfers Comments: v. cues for hand placement. Pt expressed that \"it was much easier to get up from the [address] with RTS\". Pt also expressed that she has a RTS at home. Tub Transfers  Tub Transfers Comments: Pt edu on tub transfers - good verbalized understanding  ADL  Feeding: Independent; Beverage management  Grooming: Stand by assistance  LE Dressing: Stand by assistance; Increased time to complete  Toileting: Independent  Additional Comments: Pt denies concerns with dizziness and light headiness. Pt edu on shower/bathing post-surgery- good verbalized understanding.   Tone RUE  RUE Tone: Normotonic  Tone LUE  LUE Tone: Normotonic  Coordination  Movements Are Fluid And Coordinated: Yes  Coordination and Movement description: Right UE;Left UE     Bed mobility  Supine to Sit: Contact guard assistance(use of gait belt as leg ; slow and effortful; HOB up and use of rail)  Scooting: Contact guard assistance(use of gait belt as leg )  Transfers  Sit to stand: Stand by assistance  Stand to sit: Stand by assistance  Transfer Comments: v. cues for hand placement     Cognition  Overall Cognitive Status: WFL    LUE AROM (degrees)  LUE AROM : WFL  Left Hand AROM (degrees)  Left Hand AROM: WFL  RUE AROM (degrees)  RUE AROM : WFL  Right Hand AROM (degrees)  Right Hand AROM: WFL  LUE Strength  Gross LUE Strength: WFL  RUE Strength  Gross RUE Strength: WFL     Hand Dominance  Hand Dominance: Right     Plan   Plan  Times per week: D/c OT services    AM-PAC Score  AM-PAC Inpatient Daily Activity Raw Score: 21 (08/18/20

## 2020-08-18 NOTE — PROGRESS NOTES
Patient admitted to room 5506. Patient is AAOx4. VSS. Patient up to bathroom this shift, knees began to buckle so used a stedy to get back to the bed from the bathroom. Patient is voiding adequately. Patient still has numbness to left hip. Patient is resting in bed at this time with all fall precautions in place. Will continue to monitor.

## 2020-08-18 NOTE — PROGRESS NOTES
Physical Therapy    Facility/Department: [facility] 5T ORTHO/NEURO  Initial Assessment    NAME: Debra Bah  : 1950  MRN: [id number]    Date of Service: 2020    Discharge Recommendations: Debra Bah scored a 22/24 on the AM-PAC short mobility form. Current research shows that an AM-PAC score of 18 or greater is typically associated with a discharge to the patient's home setting. Based on the patient's AM-PAC score and their current functional mobility deficits, it is recommended that the patient have 2-3 sessions per week of Physical Therapy at d/c to increase the patient's independence. At this time, this patient demonstrates the endurance and safety to discharge home with  (home services) and a follow up treatment frequency of 2-3x/wk. Please see assessment section for further patient specific details. If patient discharges prior to next session this note will serve as a discharge summary. Please see below for the latest assessment towards goals. PT Equipment Recommendations  Equipment Needed: No    Assessment   Body structures, Functions, Activity limitations: Decreased functional mobility ; Decreased endurance  Assessment: 78 yo admitted 20 for L THR. Pt demo mobility below her reported baseline of independent at home with cane. Pt demo L nerve block still intact somewhat this am. Pt plans to return home at discharge. Anticipate she will continue to progress with nerve block worn off. Pt also c/o decreased vision post op and MDs addressing. If home, recommend 24 hour assist initially, home PT, use of walker (has). Treatment Diagnosis: mobility impairment due to L THR  Decision Making: Low Complexity  Patient Education: Pt educated on PT role, anterior hip precautions (both verbally and in writing), home dispo, WBAT L and she verbalized understanding. REQUIRES PT FOLLOW UP: Yes  Activity Tolerance  Activity Tolerance: Patient limited by endurance; Patient Tolerated treatment well Patient Diagnosis(es): There were no encounter diagnoses. has a past medical history of Arthritis, GERD (gastroesophageal reflux disease), Hyperlipidemia, and Hypertension. has a past surgical history that includes  section and Total hip arthroplasty (Left, 2020). Restrictions  Position Activity Restriction  Other position/activity restrictions: up with assist, WBAT L, anterior hip precautions, peripheral nerve block     Vision/Hearing  Vision: Impaired  Vision Exceptions: Wears glasses at all times  Hearing: Within functional limits       Subjective  General  Chart Reviewed: Yes  Additional Pertinent Hx: 78 yo admitted 20 for L THR per Dr. Isatu Rousseau. Pmhx: HTN,  Family / Caregiver Present: No  Diagnosis: L THR  Follows Commands: Within Functional Limits  Subjective  Subjective: Pt found supine in bed and agreeable to PT. \" My eyes are all scratchy.  \"  Pain Screening  Patient Currently in Pain: Yes(rates L hip pain 1/10)         Orientation  Orientation  Overall Orientation Status: Within Functional Limits     Social/Functional History  Social/Functional History  Lives With: Spouse(dtr here from [place])  Type of Home: House  Home Layout: Multi-level(split level-7 steps up with rail to bedroom level but pt plans to stay on lower level (6 steps))  Home Access: (one porch step in with post available)  Bathroom Shower/Tub: (plans to stay on lower level with half bath only; full bath is tub/shower with shower chair)  Bathroom Toilet: Standard(RTS; sink nearby)  Home Equipment: Standard walker, Cane, Wheelchair-manual, Reacher(using cane most of time prior to surgery)  ADL Assistance: Independent  Homemaking Assistance: (pt performs laundry (has reacher))  Ambulation Assistance: Independent(with cane mainly but using walker prior to for practice)  Transfer Assistance: Independent  Active : Yes  Occupation: Retired  Type of occupation: nurse  Additional Comments: Pt reports one fall in Minutes: 57      Total Treatment Minutes:  79       Danielle Marcos, PT

## 2020-08-18 NOTE — PLAN OF CARE
Problem: Falls - Risk of:  Goal: Will remain free from falls  Description: Will remain free from falls  Outcome: Ongoing     Problem: Mobility - Impaired:  Goal: Achieve maximum mobility level  Description: Achieve maximum mobility level  Outcome: Ongoing     Problem: Pain - Acute:  Goal: Pain level will decrease  Description: Pain level will decrease  Outcome: Ongoing

## 2020-08-18 NOTE — CARE COORDINATION
Case Management Assessment           Initial Evaluation                Date / Time of Evaluation: 8/18/2020 4:59 PM                 Assessment Completed by: Antionette Dowling     I spoke with patient regarding discharge needs. Pt is from home with her spouse and will return there at d/c with her daughter's assistance. Patient has all DME at home and has agreed to [facility]. Called referral to [facility] and they are able to pull orders. Daughter will transport to home. Patient Name: Addison Reed     YOB: 1950  Diagnosis: Osteoarthritis of one hip, left [M16.12]  Status post total hip replacement, left [Z96.642]     Date / Time: 8/17/2020 10:23 AM    Patient Admission Status: Observation    If patient is discharged prior to next notation, then this note serves as note for discharge by case management.      Current PCP: Sudarshan Weaver MD  Clinic Patient: No    Chart Reviewed: Yes  Patient/ Family Interviewed: Yes    Initial assessment completed at bedside with: patient and daughter    Hospitalization in the last 30 days: No    Emergency Contacts:  Extended Emergency Contact Information  Primary Emergency Contact: [address] Phone: [phone]  Mobile Phone: [phone]  Relation: Spouse  Secondary Emergency Contact: Bel Dinero  [address] [place] [address] Phone: [phone]  Relation: Child    Advance Directives:   Code Status: [address]: No  Agent: NA  Contact Number: NA    Financial  Payor: Miki Silva / Plan: MEDICARE PART A AND B / Product Type: *No Product type* /     Pre-cert required for SNF: No    Pharmacy    [facility] [address] [facility], [address]  Blaine Riley  [address] [place] [address] [place]  Phone: [phone] Fax: [phone]      Potential assistance Purchasing Medications: Potential Assistance Purchasing Medications: No  Does Patient want to participate in local refill/ meds to beds program?: Not Assessed    Meds To Beds General Rules:  1. Can ONLY be done Monday- Friday between 8:30am-5pm  2. Prescription(s) must be in pharmacy by 3pm to be filled same day  3. Copy of patient's insurance/ prescription drug card and patient face sheet must be sent along with the prescription(s)  4. Cost of Rx cannot be added to hospital bill. If financial assistance is needed, please contact unit  or ;  or  CANNOT provide pharmacy voucher for patients co-pays  5.  Patients can then  the prescription on their way out of the hospital at discharge, or pharmacy can deliver to the bedside if staff is available. (payment due at time of pick-up or delivery - cash, check, or card accepted)     Able to afford home medications/ co-pay costs: Yes    ADLS  Support Systems: Spouse/Significant Other    PT AM-PAC: 20 /24  OT AM-PAC: 21 /24    [place]: multi-level home  Steps:  A few to enter    Plans to RETURN to current housing: Yes  Barriers to RETURNING to current housing: none noted    Marium Wareholly Mani  Currently ACTIVE with [address]: Yes  [address]: [address]  Phone: [phone]  Fax: [phone]    DISCHARGE PLAN:  Disposition: Home with [address]: [facility] for discharge: family     Factors facilitating achievement of predicted outcomes: Family support, Cooperative and Pleasant    Barriers to discharge: none noted    Additional Case Management Notes: NA    The Plan for Transition of Care is related to the following treatment goals of Osteoarthritis of one hip, left [M16.12]  Status post total hip replacement, left [R89.002]    The Patient and/or patient representative [facility] and her family were provided with a choice of provider and agrees with the discharge plan Yes    Freedom of choice list was provided with basic dialogue that supports the patient's individualized plan of care/goals and shares the quality data associated with the providers.  Yes    Care Transition patient: No    Denny Sosa RN  [facility]  Case Management Department  Ph: [phone]   Fax: [phone]

## 2020-08-19 ENCOUNTER — TELEPHONE (OUTPATIENT)
Dept: ORTHOPEDIC SURGERY | Age: 70
End: 2020-08-19

## 2020-08-27 ENCOUNTER — OFFICE VISIT (OUTPATIENT)
Dept: ORTHOPEDIC SURGERY | Age: 70
End: 2020-08-27

## 2020-08-27 ENCOUNTER — TELEPHONE (OUTPATIENT)
Dept: ORTHOPEDIC SURGERY | Age: 70
End: 2020-08-27

## 2020-08-27 VITALS
TEMPERATURE: 97.5 F | BODY MASS INDEX: 28.35 KG/M2 | SYSTOLIC BLOOD PRESSURE: 107 MMHG | DIASTOLIC BLOOD PRESSURE: 67 MMHG | HEIGHT: 63 IN | HEART RATE: 79 BPM | WEIGHT: 160 LBS

## 2020-08-27 PROCEDURE — 99024 POSTOP FOLLOW-UP VISIT: CPT | Performed by: ORTHOPAEDIC SURGERY

## 2020-08-27 RX ORDER — TRAMADOL HYDROCHLORIDE 50 MG/1
50 TABLET ORAL EVERY 6 HOURS PRN
Qty: 42 TABLET | Refills: 1 | Status: SHIPPED | OUTPATIENT
Start: 2020-08-27 | End: 2020-09-03

## 2020-08-27 NOTE — PROGRESS NOTES
Patient Name: Addison Reed MRN: <[id number]>   Age: 79 y.o. YOB: 1950   Sex: female         [address] Complaint   Patient presents with    Post-Op Check     LT HARRIET 8/17/20       HISTORY OF PRESENT ILLNESS   Patient returns for their first postoperative evaluation status post total hip replacement. The patient is doing very well. They have minimal complaints of pain. There is moderate swelling about the Hip. The patient has been doing home physical therapy. They deny any calf swelling or numbness or tingling down the leg     Assessment   PHYSICAL EXAM   Vital Signs:    Vitals:    08/27/20 1347   BP: 107/67   Pulse: 79   Temp: 97.5 °F (36.4 °C)       Examination of the hip shows a well-healed incision. Edges are well opposed. There is mild swelling. There is no drainage. Range of motion is up to 100. There is no calf tenderness. The patient is neurovascularly intact. No signs of DVT. Calf nontender    Gross distal sensation is decreased in the lateral thigh from the area of the incision as expected. RADIOLOGY   X-Rays reviewed: AP and lateral x-rays of the hip show excellent alignment of the total hip prosthesis. There is no loosening no fractures noted. Xray   Have reviewed the xrays above from 08/27/20   and my impression is:    IMPRESSION   2 weeks status post total hip replacement    PLAN   The patient is doing well 2 weeks status post total hip replacement. ICD-10-CM    1. Status post total hip replacement, left  Z96.642 XR HIP 2-3 VW W PELVIS LEFT     traMADol (ULTRAM) 50 MG tablet     We will continue home physical therapy for aggressive range of motion and strengthening. They will continue ice and elevation for the hip. They will continue aspirin therapy for DVT prophylaxis.

## 2020-09-04 ENCOUNTER — TELEPHONE (OUTPATIENT)
Dept: ORTHOPEDIC SURGERY | Age: 70
End: 2020-09-04

## 2020-09-12 PROBLEM — Z01.818 PREOP EXAMINATION: Status: RESOLVED | Noted: 2020-08-13 | Resolved: 2020-09-12

## 2020-09-24 ENCOUNTER — OFFICE VISIT (OUTPATIENT)
Dept: ORTHOPEDIC SURGERY | Age: 70
End: 2020-09-24

## 2020-09-24 VITALS
WEIGHT: 160 LBS | BODY MASS INDEX: 28.35 KG/M2 | SYSTOLIC BLOOD PRESSURE: 125 MMHG | DIASTOLIC BLOOD PRESSURE: 79 MMHG | HEART RATE: 84 BPM | HEIGHT: 63 IN | TEMPERATURE: 97 F

## 2020-09-24 PROCEDURE — 99024 POSTOP FOLLOW-UP VISIT: CPT | Performed by: ORTHOPAEDIC SURGERY

## 2020-09-24 PROCEDURE — MISCD282 ADJUSTA LIFT: Performed by: ORTHOPAEDIC SURGERY

## 2020-09-24 RX ORDER — TIZANIDINE 4 MG/1
4 TABLET ORAL 4 TIMES DAILY PRN
Qty: 40 TABLET | Refills: 0 | Status: SHIPPED | OUTPATIENT
Start: 2020-09-24 | End: 2021-03-04

## 2020-09-24 RX ORDER — PREDNISONE 1 MG/1
5 TABLET ORAL DAILY
Qty: 20 TABLET | Refills: 0 | Status: SHIPPED | OUTPATIENT
Start: 2020-09-24 | End: 2020-10-14

## 2020-09-24 NOTE — PROGRESS NOTES
Patient Name: Jagdeep Armstrong MRN: <[id number]>   Age: 79 y.o. YOB: 1950   Sex: female         [address] Complaint   Patient presents with    Post-Op Check     LT HARRIET 8/17/20       HISTORY OF PRESENT ILLNESS   Patient returns for their second postoperative evaluation status post total hip replacement. More issues with low back pain. The patient is doing very well. They have minimal complaints of pain. There is moderate swelling about the Hip. The patient has been doing home physical therapy. They deny any calf swelling or numbness or tingling down the leg     Assessment   PHYSICAL EXAM   Vital Signs:    Vitals:    09/24/20 1357   BP: 125/79   Pulse: 84   Temp: 97 °F (36.1 °C)     Examination of the hip shows a well-healed incision. There is mild swelling. There is no drainage. Range of motion is 0-90°. There is no calf tenderness. The patient is neurovascularly intact. Gait still shows small limp. RADIOLOGY     Xray   Have reviewed the xrays above from 8/27/20  and my impression is:  X-Rays reviewed: AP and lateral x-rays of the hip show excellent alignment of the total hip prosthesis. There is no loosening no fractures noted. IMPRESSION   5-6 weeks status post total hip replacement    PLAN   The patient is doing well 5-6 weeks status post total hip replacement. The patient will slowly resume normal activities. ICD-10-CM    1. Status post total hip replacement, left  Z96.642      Back and gluteal issues with difficulty regainign good stability with single leg stance. May benefit from shoe lift on the opposite side and continued use of cane.

## 2020-11-05 ENCOUNTER — OFFICE VISIT (OUTPATIENT)
Dept: ORTHOPEDIC SURGERY | Age: 70
End: 2020-11-05

## 2020-11-05 ENCOUNTER — TELEPHONE (OUTPATIENT)
Dept: FAMILY MEDICINE CLINIC | Age: 70
End: 2020-11-05

## 2020-11-05 VITALS
TEMPERATURE: 97.2 F | WEIGHT: 160 LBS | SYSTOLIC BLOOD PRESSURE: 116 MMHG | HEART RATE: 86 BPM | DIASTOLIC BLOOD PRESSURE: 71 MMHG | HEIGHT: 63 IN | BODY MASS INDEX: 28.35 KG/M2

## 2020-11-05 PROCEDURE — 99024 POSTOP FOLLOW-UP VISIT: CPT | Performed by: PHYSICIAN ASSISTANT

## 2020-11-05 PROCEDURE — MISCD282 ADJUSTA LIFT: Performed by: PHYSICIAN ASSISTANT

## 2020-11-05 NOTE — PROGRESS NOTES
Patient Name: Fany Artis MRN: <[id number]>   Age: 79 y.o. YOB: 1950   Sex: female         [address] Complaint   Patient presents with    Post-Op Check     LT HARRIET 8/17/20       HISTORY OF PRESENT ILLNESS   Patient returns for their third postoperative evaluation status post total hip replacement. Patient notes continued issues with the low back but notes they are improving states they are decreasing in severity rates and is mild. Notes in regards to the hip overall she feels very good with mild irritations around the incision and on the lateral aspect where there is still some numbness. Rates her overall pain is a 0 out of 10 but does note that the lateral aspect and the intermittent zingers/sharp pains around the incision bother her from time to time but notes they are very mild in nature. Helpful to begin more aggressive exercise regiment and is wanting to know what she should and should not be doing as far as motions and mobility. The patient is doing very well. They have minimal complaints of pain. There is minimal swelling about the Hip. The patient has been doing home physical therapy exercises on her own. They deny any calf swelling or numbness or tingling down the leg     Assessment   PHYSICAL EXAM   Vital Signs:    Vitals:    11/05/20 1347   BP: 116/71   Pulse: 86   Temp: 97.2 °F (36.2 °C)     Examination of the hip shows a well-healed incision. There is mild swelling. There is no drainage. Range of motion is 0-110°. There is no calf tenderness. The patient is neurovascularly intact. Gait still shows small limp. RADIOLOGY     Xray   Have reviewed the xrays above from 8/27/20  and my impression is:  X-Rays reviewed: AP and lateral x-rays of the hip show excellent alignment of the total hip prosthesis. There is no loosening no fractures noted.     IMPRESSION   12 weeks status post total hip replacement    PLAN   The patient is doing well 12 weeks status post total hip replacement. The patient will slowly resume normal activities. ICD-10-CM    1. Status post total hip replacement, left  T46.003 [employer] Lift $10     Back and gluteal issues with difficulty regainign good stability with single leg stance. Advised patient to slowly begin increasing exercise regiment to incorporate low back as well as to increase range of motion her hip advised patient to do this in a slow stable and controlled manner to avoid any complications. Advised for follow-up in 6 months. May benefit from shoe lift on the opposite side and continued use of cane.

## 2020-11-05 NOTE — TELEPHONE ENCOUNTER
----- Message from Chiquita Orantes MD sent at 11/4/2020  9:25 AM EST -----  Regarding: wellness  Call pt, she  has not been in for her wellness, can schedule her for Dec. 15 or Dec 16

## 2020-11-09 RX ORDER — LISINOPRIL 10 MG/1
TABLET ORAL
Qty: 90 TABLET | Refills: 0 | Status: SHIPPED | OUTPATIENT
Start: 2020-11-09 | End: 2021-01-15

## 2020-12-07 ENCOUNTER — HOSPITAL ENCOUNTER (OUTPATIENT)
Dept: MAMMOGRAPHY | Age: 70
Discharge: HOME | End: 2020-12-07
Payer: MEDICARE

## 2020-12-07 PROCEDURE — 77063 BREAST TOMOSYNTHESIS BI: CPT

## 2020-12-08 RX ORDER — WARFARIN SODIUM 5 MG/1
TABLET ORAL
COMMUNITY
End: 2021-03-04

## 2020-12-08 RX ORDER — TRAMADOL HYDROCHLORIDE 50 MG/1
TABLET ORAL
Status: ON HOLD | COMMUNITY
Start: 2020-11-15 | End: 2021-05-13 | Stop reason: HOSPADM

## 2020-12-14 ENCOUNTER — TELEPHONE (OUTPATIENT)
Dept: FAMILY MEDICINE CLINIC | Age: 70
End: 2020-12-14

## 2020-12-14 LAB
CHOLESTEROL, TOTAL: 211 MG/DL (ref 0–199)
HCT VFR BLD CALC: 33.4 % (ref 36–48)
HDLC SERPL-MCNC: 66 MG/DL (ref 40–60)
HEMOGLOBIN: 11.2 G/DL (ref 12–16)
LDL CHOLESTEROL CALCULATED: 108 MG/DL
MCH RBC QN AUTO: 32.8 PG (ref 26–34)
MCHC RBC AUTO-ENTMCNC: 33.6 G/DL (ref 31–36)
MCV RBC AUTO: 97.5 FL (ref 80–100)
PDW BLD-RTO: 15 % (ref 12.4–15.4)
PLATELET # BLD: 224 K/UL (ref 135–450)
PMV BLD AUTO: 8.3 FL (ref 5–10.5)
RBC # BLD: 3.43 M/UL (ref 4–5.2)
TRIGL SERPL-MCNC: 186 MG/DL (ref 0–150)
VLDLC SERPL CALC-MCNC: 37 MG/DL
WBC # BLD: 5.9 K/UL (ref 4–11)

## 2020-12-14 ASSESSMENT — LIFESTYLE VARIABLES
AUDIT-C TOTAL SCORE: 0
AUDIT-C TOTAL SCORE: 4
HOW MANY STANDARD DRINKS CONTAINING ALCOHOL DO YOU HAVE ON A TYPICAL DAY: ONE OR TWO
AUDIT TOTAL SCORE: 0
HOW OFTEN DURING THE LAST YEAR HAVE YOU FOUND THAT YOU WERE NOT ABLE TO STOP DRINKING ONCE YOU HAD STARTED: 0
AUDIT TOTAL SCORE: 4
HOW OFTEN DURING THE LAST YEAR HAVE YOU BEEN UNABLE TO REMEMBER WHAT HAPPENED THE NIGHT BEFORE BECAUSE YOU HAD BEEN DRINKING: NEVER
HOW OFTEN DO YOU HAVE SIX OR MORE DRINKS ON ONE OCCASION: NEVER
HAVE YOU OR SOMEONE ELSE BEEN INJURED AS A RESULT OF YOUR DRINKING: NO
HOW OFTEN DURING THE LAST YEAR HAVE YOU FAILED TO DO WHAT WAS NORMALLY EXPECTED FROM YOU BECAUSE OF DRINKING: NEVER
HOW OFTEN DURING THE LAST YEAR HAVE YOU FAILED TO DO WHAT WAS NORMALLY EXPECTED FROM YOU BECAUSE OF DRINKING: 0
HOW OFTEN DURING THE LAST YEAR HAVE YOU NEEDED AN ALCOHOLIC DRINK FIRST THING IN THE MORNING TO GET YOURSELF GOING AFTER A NIGHT OF HEAVY DRINKING: 0
HAS A RELATIVE, FRIEND, DOCTOR, OR ANOTHER HEALTH PROFESSIONAL EXPRESSED CONCERN ABOUT YOUR DRINKING OR SUGGESTED YOU CUT DOWN: 0
HOW OFTEN DURING THE LAST YEAR HAVE YOU NEEDED AN ALCOHOLIC DRINK FIRST THING IN THE MORNING TO GET YOURSELF GOING AFTER A NIGHT OF HEAVY DRINKING: NEVER
HOW OFTEN DURING THE LAST YEAR HAVE YOU HAD A FEELING OF GUILT OR REMORSE AFTER DRINKING: NEVER
HOW OFTEN DURING THE LAST YEAR HAVE YOU FOUND THAT YOU WERE NOT ABLE TO STOP DRINKING ONCE YOU HAD STARTED: NEVER
HOW OFTEN DO YOU HAVE SIX OR MORE DRINKS ON ONE OCCASION: 0
HAVE YOU OR SOMEONE ELSE BEEN INJURED AS A RESULT OF YOUR DRINKING: 0
HOW OFTEN DURING THE LAST YEAR HAVE YOU HAD A FEELING OF GUILT OR REMORSE AFTER DRINKING: 0
HOW OFTEN DURING THE LAST YEAR HAVE YOU BEEN UNABLE TO REMEMBER WHAT HAPPENED THE NIGHT BEFORE BECAUSE YOU HAD BEEN DRINKING: 0
HAS A RELATIVE, FRIEND, DOCTOR, OR ANOTHER HEALTH PROFESSIONAL EXPRESSED CONCERN ABOUT YOUR DRINKING OR SUGGESTED YOU CUT DOWN: NO
HOW OFTEN DO YOU HAVE A DRINK CONTAINING ALCOHOL: 4
HOW OFTEN DO YOU HAVE A DRINK CONTAINING ALCOHOL: FOUR OR MORE TIMES A WEEK
HOW MANY STANDARD DRINKS CONTAINING ALCOHOL DO YOU HAVE ON A TYPICAL DAY: 0

## 2020-12-14 ASSESSMENT — PATIENT HEALTH QUESTIONNAIRE - PHQ9
SUM OF ALL RESPONSES TO PHQ QUESTIONS 1-9: 2
SUM OF ALL RESPONSES TO PHQ QUESTIONS 1-9: 2
1. LITTLE INTEREST OR PLEASURE IN DOING THINGS: 1
SUM OF ALL RESPONSES TO PHQ QUESTIONS 1-9: 2
SUM OF ALL RESPONSES TO PHQ9 QUESTIONS 1 & 2: 2
2. FEELING DOWN, DEPRESSED OR HOPELESS: 1

## 2020-12-14 NOTE — TELEPHONE ENCOUNTER
Pt is over at [email or web address] and there is only an order for the CBC. She stated she usually has a renal panel and lipid done as well. Can we get an order for this.     Please contact [phone]

## 2020-12-16 ENCOUNTER — OFFICE VISIT (OUTPATIENT)
Dept: FAMILY MEDICINE CLINIC | Age: 70
End: 2020-12-16
Payer: MEDICARE

## 2020-12-16 VITALS
TEMPERATURE: 98.8 F | WEIGHT: 162 LBS | HEIGHT: 62 IN | HEART RATE: 81 BPM | BODY MASS INDEX: 29.81 KG/M2 | OXYGEN SATURATION: 95 % | RESPIRATION RATE: 16 BRPM | DIASTOLIC BLOOD PRESSURE: 88 MMHG | SYSTOLIC BLOOD PRESSURE: 120 MMHG

## 2020-12-16 PROCEDURE — 3017F COLORECTAL CA SCREEN DOC REV: CPT | Performed by: INTERNAL MEDICINE

## 2020-12-16 PROCEDURE — G8482 FLU IMMUNIZE ORDER/ADMIN: HCPCS | Performed by: INTERNAL MEDICINE

## 2020-12-16 PROCEDURE — G0439 PPPS, SUBSEQ VISIT: HCPCS | Performed by: INTERNAL MEDICINE

## 2020-12-16 PROCEDURE — 4040F PNEUMOC VAC/ADMIN/RCVD: CPT | Performed by: INTERNAL MEDICINE

## 2020-12-16 PROCEDURE — 1123F ACP DISCUSS/DSCN MKR DOCD: CPT | Performed by: INTERNAL MEDICINE

## 2020-12-16 NOTE — PROGRESS NOTES
Medicare Annual Wellness Visit  Name: Henrry Calero Date: 2020   MRN: <[id number]> Sex: Female   Age: 79 y.o. Ethnicity: Non-/Non    : 1950 Race: Mili Srinivasan is here for Medicare AWV (mehnaz is NOT fasting) and Hypertension    Screenings for behavioral, psychosocial and functional/safety risks, and cognitive dysfunction are all negative except as indicated below. These results, as well as other patient data from the [address] form, are documented in Flowsheets linked to this Encounter. Allergies   Allergen Reactions    Flexeril [Cyclobenzaprine] Other (See Comments)     Low BP and heart rate    Keflet [Cephalexin] Rash       Prior to Visit Medications    Medication Sig Taking? Authorizing Provider   traMADol (ULTRAM) 50 MG tablet TAKE 1 TABLET BY MOUTH EVERY SIX HOURS AS NEEDED FOR PAIN FOR UP TO 7 DAYS. REDUCE DOSES TAKEN AS PAIN BECOMES MANAGEABLE.  Yes Historical Provider, MD   lisinopril (PRINIVIL;ZESTRIL) 10 MG tablet TAKE 1 TABLET BY MOUTH ONE TIME A DAY  Yes Wilfredo Gallardo MD   diclofenac (VOLTAREN) 50 MG EC tablet Take 1 tablet by mouth 2 times daily (with meals) Yes JESSICA Lazar   simvastatin (ZOCOR) 40 MG tablet Take 1 tablet by mouth nightly Yes Wilfredo Gallardo MD   omeprazole (PRILOSEC) 20 MG delayed release capsule Take 1 capsule by mouth daily Yes Corinna Bae MD   Multiple Vitamins-Minerals (THERAPEUTIC MULTIVITAMIN-MINERALS) tablet Take 1 tablet by mouth daily Yes Historical Provider, MD   Vitamin D (CHOLECALCIFEROL) 1000 UNITS CAPS capsule Take 2,000 Units by mouth daily  Yes Historical Provider, MD   Multiple Vitamins-Minerals (OCUVITE PRESERVISION PO) Take 1 tablet by mouth daily Yes Historical Provider, MD   warfarin (COUMADIN) 5 MG tablet warfarin sodium 5 MG Oral Tablet        Active  Historical Provider, MD   tiZANidine (ZANAFLEX) 4 MG tablet Take 1 tablet by mouth 4 times daily as needed (muscle spasm.) Patient not taking: Reported on 2020  Blayne Easley MD       Past Medical History:   Diagnosis Date    Arthritis     GERD (gastroesophageal reflux disease)     Hyperlipidemia     Hypertension        Past Surgical History:   Procedure Laterality Date     SECTION      TOTAL HIP ARTHROPLASTY Left 2020    LEFT TOTAL HIP ARTHROPLASTY ANTERIOR APPROACH performed by Blayne Easley MD at [address]       No family history on file. CareTeam (Including outside providers/suppliers regularly involved in providing care):   Patient Care Team:  Wellington Mcburney, MD as PCP - General (Internal Medicine)  Wellington Mcburney, MD as PCP - [facility] Provider    Wt Readings from Last 3 Encounters:   20 162 lb (73.5 kg)   20 160 lb (72.6 kg)   20 160 lb (72.6 kg)     Vitals:    20 0809   BP: 120/88   Site: Right Upper Arm   Position: Sitting   Cuff Size: Medium Adult   Pulse: 81   Resp: 16   Temp: 98.8 °F (37.1 °C)   TempSrc: Temporal   SpO2: 95%   Weight: 162 lb (73.5 kg)   Height: 5' 2\" (1.575 m)     Body mass index is 29.63 kg/m². Based upon direct observation of the patient, evaluation of cognition reveals recent and remote memory intact. Patient's complete Health Risk Assessment and screening values have been reviewed and are found in Flowsheets. The following problems were reviewed today and where indicated follow up appointments were made and/or referrals ordered. Positive Risk Factor Screenings with Interventions:          General Health and ACP:  General  In general, how would you say your health is?: Good  In the past 7 days, have you experienced any of the following?  New or Increased Pain, New or Increased Fatigue, Loneliness, Social Isolation, Stress or Anger?: None of These  Do you get the social and emotional support that you need?: Yes  Do you have a Living Will?: (!) No  Advance Directives Power of  Living Will ACP-Advance Directive ACP-Power of     Not on File Not on File Not on File Not on File      General Health Risk Interventions:  · none        Personalized Preventive Plan   Current Health Maintenance Status  Immunization History   Administered Date(s) Administered    Influenza Vaccine, unspecified formulation 09/23/2016, 09/14/2017    Influenza Virus Vaccine 10/25/2006, 10/15/2008, 10/15/2009, 09/17/2013, 09/15/2014, 09/25/2015, 09/27/2018    Influenza, High Dose (Fluzone 65 yrs and older) 09/27/2018, 10/02/2019, 09/29/2020    Influenza, MDCK Quadv, IM, PF (Flucelvax 4 yrs and older) 09/27/2018    Influenza, Quadv, adjuvanted, 65 yrs +, IM, PF (Fluad) 09/29/2020    Influenza, Triv, inactivated, subunit, adjuvanted, IM (Fluad 65 yrs and older) 10/09/2019    PPD Test 05/27/2014, 04/06/2016    Pneumococcal Conjugate 13-valent ([id number]) 10/26/2015, 11/21/2016    Pneumococcal Conjugate Vaccine 11/21/2016    Pneumococcal Polysaccharide ([id number]) 10/26/2015, 11/21/2016    Tdap (Boostrix, Adacel) 11/24/2020    Zoster Live (Zostavax) 09/30/2012    Zoster Recombinant (Shingrix) 09/27/2018, 11/29/2018        Health Maintenance   Topic Date Due    Annual Wellness Visit (AWV)  05/29/2019    A1C test (Diabetic or Prediabetic)  08/11/2021    Potassium monitoring  08/18/2021    Creatinine monitoring  08/18/2021    Lipid screen  12/14/2021    Breast cancer screen  12/07/2022    Colon cancer screen colonoscopy  01/15/2025    DTaP/Tdap/Td vaccine (2 - Td) 11/24/2030    DEXA (modify frequency per FRAX score)  Completed    Flu vaccine  Completed    Shingles Vaccine  Completed    Pneumococcal 65+ years Vaccine  Completed    Hepatitis C screen  Completed    Hepatitis A vaccine  Aged Out    Hepatitis B vaccine  Aged Out    Hib vaccine  Aged Out    Meningococcal (ACWY) vaccine  Aged Out Recommendations for Preventive Services Due: see orders and patient instructions/AVS.  . Recommended screening schedule for the next 5-10 years is provided to the patient in written form: see Patient Instructions/AVS.               Medicare Annual Wellness Visit  Name: Mariana Carreno Date: 2020   MRN: <[id number]> Sex: Female   Age: 79 y.o. Ethnicity: Non-/Non    : 1950 Race: Jorge Ortiz is here for Medicare AWV (mehnaz is NOT fasting) and Hypertension    Screenings for behavioral, psychosocial and functional/safety risks, and cognitive dysfunction are all negative except as indicated below. These results, as well as other patient data from the [address] form, are documented in Flowsheets linked to this Encounter. Allergies   Allergen Reactions    Flexeril [Cyclobenzaprine] Other (See Comments)     Low BP and heart rate    Keflet [Cephalexin] Rash       Prior to Visit Medications    Medication Sig Taking? Authorizing Provider   traMADol (ULTRAM) 50 MG tablet TAKE 1 TABLET BY MOUTH EVERY SIX HOURS AS NEEDED FOR PAIN FOR UP TO 7 DAYS. REDUCE DOSES TAKEN AS PAIN BECOMES MANAGEABLE.  Yes Historical Provider, MD   lisinopril (PRINIVIL;ZESTRIL) 10 MG tablet TAKE 1 TABLET BY MOUTH ONE TIME A DAY  Yes Jalen Malone MD   diclofenac (VOLTAREN) 50 MG EC tablet Take 1 tablet by mouth 2 times daily (with meals) Yes JESSICA Garner   simvastatin (ZOCOR) 40 MG tablet Take 1 tablet by mouth nightly Yes Jalen Malone MD   omeprazole (PRILOSEC) 20 MG delayed release capsule Take 1 capsule by mouth daily Yes Racheal Bledsoe MD   Multiple Vitamins-Minerals (THERAPEUTIC MULTIVITAMIN-MINERALS) tablet Take 1 tablet by mouth daily Yes Historical Provider, MD   Vitamin D (CHOLECALCIFEROL) 1000 UNITS CAPS capsule Take 2,000 Units by mouth daily  Yes Historical Provider, MD Multiple Vitamins-Minerals (OCUVITE PRESERVISION PO) Take 1 tablet by mouth daily Yes Historical Provider, MD   warfarin (COUMADIN) 5 MG tablet warfarin sodium 5 MG Oral Tablet        Active  Historical Provider, MD   tiZANidine (ZANAFLEX) 4 MG tablet Take 1 tablet by mouth 4 times daily as needed (muscle spasm.)  Patient not taking: Reported on 2020  Coleman Lara MD       Past Medical History:   Diagnosis Date    Arthritis     GERD (gastroesophageal reflux disease)     Hyperlipidemia     Hypertension        Past Surgical History:   Procedure Laterality Date     SECTION      TOTAL HIP ARTHROPLASTY Left 2020    LEFT TOTAL HIP ARTHROPLASTY ANTERIOR APPROACH [facility]. No pertinent family history. CareTeam (Including outside providers/suppliers regularly involved in providing care):   Patient Care Team:  Florence Gandhi MD as PCP - General (Internal Medicine)  Florence Gandhi MD as PCP - [facility] Empaneled Provider    Wt Readings from Last 3 Encounters:   20 162 lb (73.5 kg)   20 160 lb (72.6 kg)   20 160 lb (72.6 kg)     Vitals:    20 0809   BP: 120/88   Site: Right Upper Arm   Position: Sitting   Cuff Size: Medium Adult   Pulse: 81   Resp: 16   Temp: 98.8 °F (37.1 °C)   TempSrc: Temporal   SpO2: 95%   Weight: 162 lb (73.5 kg)   Height: 5' 2\" (1.575 m)     Body mass index is 29.63 kg/m². Based upon direct observation of the patient, evaluation of cognition reveals recent and remote memory intact. Patient's complete Health Risk Assessment and screening values have been reviewed and are found in Flowsheets. The following problems were reviewed today and where indicated follow up appointments were made and/or referrals ordered.     Positive Risk Factor Screenings with Interventions:          General Health and ACP:  General  In general, how would you say your health is?: Good In the past 7 days, have you experienced any of the following?  New or Increased Pain, New or Increased Fatigue, Loneliness, Social Isolation, Stress or Anger?: None of These  Do you get the social and emotional support that you need?: Yes  Do you have a Living Will?: (!) No  Advance Directives     Power of [facility] Will ACP-Advance Directive ACP-Power of     Not on File Not on File Not on File Not on File      General Health Risk Interventions:  · none        Personalized Preventive Plan   Current Health Maintenance Status  Immunization History   Administered Date(s) Administered    Influenza Vaccine, unspecified formulation 09/23/2016, 09/14/2017    Influenza Virus Vaccine 10/25/2006, 10/15/2008, 10/15/2009, 09/17/2013, 09/15/2014, 09/25/2015, 09/27/2018    Influenza, High Dose (Fluzone 65 yrs and older) 09/27/2018, 10/02/2019, 09/29/2020    Influenza, MDCK Quadv, IM, PF (Flucelvax 4 yrs and older) 09/27/2018    Influenza, Quadv, adjuvanted, 65 yrs +, IM, PF (Fluad) 09/29/2020    Influenza, Triv, inactivated, subunit, adjuvanted, IM (Fluad 65 yrs and older) 10/09/2019    PPD Test 05/27/2014, 04/06/2016    Pneumococcal Conjugate 13-valent ([id number]) 10/26/2015, 11/21/2016    Pneumococcal Conjugate Vaccine 11/21/2016    Pneumococcal Polysaccharide ([id number]) 10/26/2015, 11/21/2016    Tdap (Boostrix, Adacel) 11/24/2020    Zoster Live (Zostavax) 09/30/2012    Zoster Recombinant (Shingrix) 09/27/2018, 11/29/2018        Health Maintenance   Topic Date Due    Annual Wellness Visit (AWV)  05/29/2019    A1C test (Diabetic or Prediabetic)  08/11/2021    Potassium monitoring  08/18/2021    Creatinine monitoring  08/18/2021    Lipid screen  12/14/2021    Breast cancer screen  12/07/2022    Colon cancer screen colonoscopy  01/15/2025    DTaP/Tdap/Td vaccine (2 - Td) 11/24/2030    DEXA (modify frequency per FRAX score)  Completed    Flu vaccine  Completed  Shingles Vaccine  Completed    Pneumococcal 65+ years Vaccine  Completed    Hepatitis C screen  Completed    Hepatitis A vaccine  Aged Out    Hepatitis B vaccine  Aged Out    Hib vaccine  Aged Out    Meningococcal (ACWY) vaccine  Aged Out     Recommendations for [employer] Due: see orders and patient instructions/AVS.  .   Recommended screening schedule for the next 5-10 years is provided to the patient in written form: see Patient Instructions/AVS.

## 2020-12-16 NOTE — PATIENT INSTRUCTIONS
A living will, also called a declaration, is a legal form. It tells your family and your doctor your wishes when you can't speak for yourself. It's used by the health professionals who will treat you as you near the end of your life or if you get seriously hurt or ill. If you put your wishes in writing, your loved ones and others will know what kind of care you want. They won't need to guess. This can ease your mind and be helpful to others. And you can change or cancel your living will at any time. A living will is not the same as an estate or property will. An estate will explains what you want to happen with your money and property after you die. How do you use it? A living will is used to describe the kinds of treatment or life support you want as you near the end of your life or if you get seriously hurt or ill. Keep these facts in mind about living mary. Your living will is used only if you can't speak or make decisions for yourself. Most often, one or more doctors must certify that you can't speak or decide for yourself before your living will takes effect. If you get better and can speak for yourself again, you can accept or refuse any treatment. It doesn't matter what you said in your living will. Some states may limit your right to refuse treatment in certain cases. For example, you may need to clearly state in your living will that you don't want artificial hydration and nutrition, such as being fed through a tube. Is a living will a legal document? A living will is a legal document. Each state has its own laws about living mary. And a living will may be called something else in your state. Here are some things to know about living mary. You don't need an  to complete a living will. But legal advice can be helpful if your state's laws are unclear. It can also help if your health history is complicated or your family can't agree on what should be in your living will. You can change your living will at any time. Some people find that their wishes about end-of-life care change as their health changes. If you make big changes to your living will, complete a new form. If you move to another state, make sure that your living will is legal in the state where you now live. In most cases, doctors will respect your wishes even if you have a form from a different state. You might use a universal form that has been approved by many states. This kind of form can sometimes be filled out and stored online. Your digital copy will then be available wherever you have a connection to the internet. The doctors and nurses who need to treat you can find it right away. Your state may offer an online registry. This is another place where you can store your living will online. It's a good idea to get your living will notarized. This means using a person called a [employer] to watch two people sign, or witness, your living will. What should you know when you create a living will? Here are some questions to ask yourself as you make your living will:  Do you know enough about life support methods that might be used? If not, talk to your doctor so you know what might be done if you can't breathe on your own, your heart stops, or you can't swallow. What things would you still want to be able to do after you receive life-support methods? Would you want to be able to walk? To speak? To eat on your own? To live without the help of machines? Do you want certain [employer] practices performed if you become very ill? If you have a choice, where do you want to be cared for? In your home? At a hospital or nursing home? If you have a choice at the end of your life, where would you prefer to die? At home? In a hospital or nursing home? Somewhere else? Would you prefer to be buried or cremated? Do you want your organs to be donated after you die? What should you do with your living will? Make sure that your family members and your health care agent have copies of your living will (also called a declaration). Give your doctor a copy of your living will. Ask him or her to keep it as part of your medical record. If you have more than one doctor, make sure that each one has a copy. Put a copy of your living will where it can be easily found. For example, some people may put a copy on their refrigerator door. If you are using a digital copy, be sure your doctor, family members, and health care agent know how to find and access it. Where can you learn more? Go to https://chpepiceweb.[employer]. org and sign in to your My [employer] account. Enter D125 in the Trigemina box to learn more about \"Learning About Living Zoya Kerns. \"     If you do not have an account, please click on the \"Sign Up Now\" link. Current as of: December 9, 2019               Content Version: 12.6  © 5964-3282 [employer], [employer]. Care instructions adapted under license by [facility] [address] . If you have questions about a medical condition or this instruction, always ask your healthcare professional. [place] [address] any warranty or liability for your use of this information. ·   Personalized Preventive Plan for Frances Bhatti - 12/16/2020  Medicare offers a range of preventive health benefits. Some of the tests and screenings are paid in full while other may be subject to a deductible, co-insurance, and/or copay. Some of these benefits include a comprehensive review of your medical history including lifestyle, illnesses that may run in your family, and various assessments and screenings as appropriate. After reviewing your medical record and screening and assessments performed today your provider may have ordered immunizations, labs, imaging, and/or referrals for you. A list of these orders (if applicable) as well as your Preventive Care list are included within your After Visit Summary for your review. Other Preventive Recommendations:    · A preventive eye exam performed by an eye specialist is recommended every 1-2 years to screen for glaucoma; cataracts, macular degeneration, and other eye disorders. · A preventive dental visit is recommended every 6 months. · Try to get at least 150 minutes of exercise per week or 10,000 steps per day on a pedometer . · Order or download the FREE \"Exercise & Physical Activity: Your Everyday Guide\" from [employer]. Call 7-579.761.4520 or search [employer] online. · You need 9444-9612 mg of calcium and 9230-4660 IU of vitamin D per day. It is possible to meet your calcium requirement with diet alone, but a vitamin D supplement is usually necessary to meet this goal.  · When exposed to the sun, use a sunscreen that protects against both UVA and UVB radiation with an SPF of 30 or greater. Reapply every 2 to 3 hours or after sweating, drying off with a towel, or swimming. · Always wear a seat belt when traveling in a car. Always wear a helmet when riding a bicycle or motorcycle.

## 2020-12-21 ENCOUNTER — TELEPHONE (OUTPATIENT)
Dept: FAMILY MEDICINE CLINIC | Age: 70
End: 2020-12-21

## 2020-12-21 NOTE — TELEPHONE ENCOUNTER
----- Message from Yessi Calderon MD sent at 12/20/2020 10:14 PM EST -----  Call pt , I think we missed taking her CMP on her last visit. She needs it, order in Epic.

## 2020-12-21 NOTE — TELEPHONE ENCOUNTER
----- Message from Wilfredo Gallardo MD sent at 12/20/2020 10:14 PM EST -----  Call pt , I think we missed taking her CMP on her last visit. She needs it, order in Epic.

## 2020-12-22 DIAGNOSIS — I10 BENIGN ESSENTIAL HTN: ICD-10-CM

## 2020-12-22 DIAGNOSIS — E78.00 PURE HYPERCHOLESTEROLEMIA: ICD-10-CM

## 2020-12-22 DIAGNOSIS — R73.03 PREDIABETES: ICD-10-CM

## 2020-12-23 ENCOUNTER — TELEPHONE (OUTPATIENT)
Dept: FAMILY MEDICINE CLINIC | Age: 70
End: 2020-12-23

## 2020-12-23 NOTE — TELEPHONE ENCOUNTER
----- Message from Davis Frankel MD sent at 12/23/2020  4:09 PM EST -----  I sent her resultd of CBC and lipid through my chart. , but her blood work  did not show up. Tell her her kidney function is little low and k her liver functions are slightly elevated. Give her an appointment in 2 ks to discuss her blood work and will recheck her liver tests.

## 2021-01-15 DIAGNOSIS — I10 BENIGN ESSENTIAL HTN: ICD-10-CM

## 2021-01-15 RX ORDER — SIMVASTATIN 40 MG
40 TABLET ORAL NIGHTLY
Qty: 90 TABLET | Refills: 0 | Status: SHIPPED | OUTPATIENT
Start: 2021-01-15 | End: 2021-06-07

## 2021-01-15 RX ORDER — LISINOPRIL 10 MG/1
TABLET ORAL
Qty: 90 TABLET | Refills: 0 | Status: ON HOLD | OUTPATIENT
Start: 2021-01-15 | End: 2021-05-13 | Stop reason: HOSPADM

## 2021-03-04 RX ORDER — OMEPRAZOLE 20 MG/1
20 TABLET, DELAYED RELEASE ORAL DAILY
COMMUNITY

## 2021-04-07 RX ORDER — AMOXICILLIN 500 MG/1
1000 CAPSULE ORAL ONCE
Qty: 2 CAPSULE | Refills: 0 | Status: SHIPPED | OUTPATIENT
Start: 2021-04-07 | End: 2021-04-07

## 2021-04-14 ENCOUNTER — HOSPITAL ENCOUNTER (OUTPATIENT)
Dept: GENERAL RADIOLOGY | Age: 71
Discharge: HOME | End: 2021-04-14
Payer: MEDICARE

## 2021-04-14 ENCOUNTER — OFFICE VISIT (OUTPATIENT)
Dept: FAMILY MEDICINE CLINIC | Age: 71
End: 2021-04-14
Payer: MEDICARE

## 2021-04-14 ENCOUNTER — TELEPHONE (OUTPATIENT)
Dept: GASTROENTEROLOGY | Age: 71
End: 2021-04-14

## 2021-04-14 VITALS
WEIGHT: 154.4 LBS | DIASTOLIC BLOOD PRESSURE: 72 MMHG | RESPIRATION RATE: 16 BRPM | HEIGHT: 62 IN | HEART RATE: 77 BPM | OXYGEN SATURATION: 97 % | BODY MASS INDEX: 28.41 KG/M2 | TEMPERATURE: 97.3 F | SYSTOLIC BLOOD PRESSURE: 118 MMHG

## 2021-04-14 DIAGNOSIS — M85.851 OSTEOPENIA OF RIGHT HIP: ICD-10-CM

## 2021-04-14 DIAGNOSIS — M25.551 RIGHT HIP PAIN: ICD-10-CM

## 2021-04-14 DIAGNOSIS — M54.50 CHRONIC RIGHT-SIDED LOW BACK PAIN WITHOUT SCIATICA: ICD-10-CM

## 2021-04-14 DIAGNOSIS — Z12.11 SCREENING FOR COLON CANCER: ICD-10-CM

## 2021-04-14 DIAGNOSIS — G89.29 CHRONIC RIGHT-SIDED LOW BACK PAIN WITHOUT SCIATICA: ICD-10-CM

## 2021-04-14 DIAGNOSIS — Z12.11 COLON CANCER SCREENING: Primary | ICD-10-CM

## 2021-04-14 DIAGNOSIS — E55.9 VITAMIN D DEFICIENCY: ICD-10-CM

## 2021-04-14 DIAGNOSIS — L65.9 HAIR LOSS: ICD-10-CM

## 2021-04-14 DIAGNOSIS — I10 BENIGN ESSENTIAL HTN: Primary | ICD-10-CM

## 2021-04-14 DIAGNOSIS — E78.00 PURE HYPERCHOLESTEROLEMIA: ICD-10-CM

## 2021-04-14 PROCEDURE — G8427 DOCREV CUR MEDS BY ELIG CLIN: HCPCS | Performed by: INTERNAL MEDICINE

## 2021-04-14 PROCEDURE — 4040F PNEUMOC VAC/ADMIN/RCVD: CPT | Performed by: INTERNAL MEDICINE

## 2021-04-14 PROCEDURE — G8399 PT W/DXA RESULTS DOCUMENT: HCPCS | Performed by: INTERNAL MEDICINE

## 2021-04-14 PROCEDURE — 73502 X-RAY EXAM HIP UNI 2-3 VIEWS: CPT

## 2021-04-14 PROCEDURE — 3017F COLORECTAL CA SCREEN DOC REV: CPT | Performed by: INTERNAL MEDICINE

## 2021-04-14 PROCEDURE — 99214 OFFICE O/P EST MOD 30 MIN: CPT | Performed by: INTERNAL MEDICINE

## 2021-04-14 PROCEDURE — 1123F ACP DISCUSS/DSCN MKR DOCD: CPT | Performed by: INTERNAL MEDICINE

## 2021-04-14 PROCEDURE — 1036F TOBACCO NON-USER: CPT | Performed by: INTERNAL MEDICINE

## 2021-04-14 PROCEDURE — 1090F PRES/ABSN URINE INCON ASSESS: CPT | Performed by: INTERNAL MEDICINE

## 2021-04-14 PROCEDURE — 72100 X-RAY EXAM L-S SPINE 2/3 VWS: CPT

## 2021-04-14 PROCEDURE — G8417 CALC BMI ABV UP PARAM F/U: HCPCS | Performed by: INTERNAL MEDICINE

## 2021-04-14 RX ORDER — CALCIUM CARBONATE 500(1250)
500 TABLET ORAL DAILY
COMMUNITY

## 2021-04-14 RX ORDER — ALENDRONATE SODIUM 70 MG/1
70 TABLET ORAL
Qty: 4 TABLET | Refills: 5 | Status: SHIPPED | OUTPATIENT
Start: 2021-04-14 | End: 2021-05-21 | Stop reason: ALTCHOICE

## 2021-04-14 RX ORDER — POLYETHYLENE GLYCOL 3350 17 G/17G
POWDER, FOR SOLUTION ORAL
Qty: 238 G | Refills: 1 | Status: SHIPPED | OUTPATIENT
Start: 2021-04-14 | End: 2021-05-06 | Stop reason: ALTCHOICE

## 2021-04-14 RX ORDER — BISACODYL 5 MG
TABLET, DELAYED RELEASE (ENTERIC COATED) ORAL
Qty: 4 TABLET | Refills: 0 | Status: SHIPPED | OUTPATIENT
Start: 2021-04-14 | End: 2021-05-06 | Stop reason: ALTCHOICE

## 2021-04-14 ASSESSMENT — PATIENT HEALTH QUESTIONNAIRE - PHQ9
SUM OF ALL RESPONSES TO PHQ QUESTIONS 1-9: 0
1. LITTLE INTEREST OR PLEASURE IN DOING THINGS: 0
SUM OF ALL RESPONSES TO PHQ QUESTIONS 1-9: 0
SUM OF ALL RESPONSES TO PHQ9 QUESTIONS 1 & 2: 0

## 2021-04-14 NOTE — PROGRESS NOTES
Lawrence Hargrove (:  1950) is a 79 y.o. female,Established patient, here for evaluation of the following chief complaint(s):  3 Month Follow-Up  Hypertension, right hip pain , hyperlipidemia    ASSESSMENT/PLAN:  1. Right hip pain    - XR LUMBAR SPINE (2-3 VIEWS); Future    2. Chronic right-sided low back pain without sciatica    - XR LUMBAR SPINE (2-3 VIEWS); Future    3. Screening for colon cancer    - XR HIP RIGHT (2-3 VIEWS)  [facility]    4. Pure hypercholesterolemia    - Lipid Panel; Future    5. Benign essential HTN  On [id number] mg daily    - Comprehensive Metabolic Panel; Future    6. Osteopenia of right hip    - calcium carbonate (OSCAL) 500 MG TABS tablet; Take 500 mg by mouth daily  - alendronate (FOSAMAX) 70 MG tablet; Take 1 tablet by mouth every 7 days  Dispense: 4 tablet; Refill: 5    7. Vitamin D deficiency    - Vitamin D 25 Hydroxy    8.  Hair loss    - TSH without Reflex  - T4, Free    Return in 6 months        SUBJECTIVE/OBJECTIVE:  HPI  CC-  Came in for follow up  For hypertension controlled with lisinopril 10 mg daily              Chemistry        Component Value Date/Time     (L) 04/15/2021 1105    K 4.6 04/15/2021 1105    CL 97 (L) 04/15/2021 1105    CO2 23 04/15/2021 1105    BUN 21 (H) 04/15/2021 1105    CREATININE 0.9 04/15/2021 1105        Component Value Date/Time    CALCIUM 10.5 04/15/2021 1105    ALKPHOS 120 04/15/2021 1105    AST 20 04/15/2021 1105    ALT 22 04/15/2021 1105    BILITOT 0.5 04/15/2021 1105        Hyperlipidemia on simvastatin 40 mg daily  Lab Results   Component Value Date    CHOL 169 04/15/2021    CHOL 211 (H) 2020    CHOL 197 2019     Lab Results   Component Value Date    TRIG 111 04/15/2021    TRIG 186 (H) 2020    TRIG 79 2019     Lab Results   Component Value Date    HDL 52 04/15/2021    HDL 66 (H) 2020    HDL 64 (H) 2019     Lab Results   Component Value Date [address] 04/15/2021    LDLCALC 108 (H) 12/14/2020    LDLCALC 117 (H) 05/23/2019     Hair loss- is under stress over her back and hip and has noticed that she has hair loss the past fw months. Jennifer Hooker check her thyroid function. Left hip doing well post left hip replacement but her right hip and right  Side of her lumbar spine is hurting . Requesting x-rays  Before she goes to see the orthopedic surgeon  Has osteopenia of both hips , on calcium and vit D, will add fosamax    Review of Systems   Constitutional: Negative for activity change and unexpected weight change. HENT: Negative for hearing loss and sinus pressure. Eyes: Negative. Respiratory: Negative for cough. Cardiovascular: Negative for chest pain and leg swelling. Gastrointestinal: Negative for abdominal pain. Gerd   Genitourinary: Negative for difficulty urinating. Musculoskeletal: Positive for arthralgias, back pain, gait problem and myalgias. Negative for neck pain. Right  hip joint pain and right low back pain   Skin:        Hair loss   Neurological: Negative for dizziness, weakness, numbness and headaches. Psychiatric/Behavioral: Negative. Physical Exam  Constitutional:       Appearance: She is well-developed. HENT:      Head: Normocephalic. Eyes:      General: No scleral icterus. Conjunctiva/sclera: Conjunctivae normal.      Pupils: Pupils are equal, round, and reactive to light. Neck:      Musculoskeletal: Normal range of motion and neck supple. Thyroid: No thyromegaly. Cardiovascular:      Rate and Rhythm: Normal rate and regular rhythm. Pulmonary:      Effort: No respiratory distress. Breath sounds: Normal breath sounds. Abdominal:      Palpations: Abdomen is soft. Musculoskeletal: Normal range of motion. General: Tenderness present. Comments: Low back tenderness on palpation  Right hip tenderness on palpation   Lymphadenopathy:      Cervical: No cervical adenopathy. Neurological:      Mental Status: She is alert and oriented to person, place, and time. Cranial Nerves: No cranial nerve deficit. Psychiatric:         Behavior: Behavior normal.         Thought Content: Thought content normal.         Judgment: Judgment normal.     Instruction:  - will call in lab results  And adjust meds. accordingly  -will call in x-ray results  -continue BP and cholesterol meds      Reviewed previous notes, tests results and face to face with the patient discussing the diagnosis and importance  of compliance with the treatments as well as documenting on the day of visit. Answered questions and encouraged to call  for any other concerns. An electronic signature was used to authenticate this note.     --Stephan Waldrop MD

## 2021-04-15 DIAGNOSIS — E78.00 PURE HYPERCHOLESTEROLEMIA: ICD-10-CM

## 2021-04-15 DIAGNOSIS — I10 BENIGN ESSENTIAL HTN: ICD-10-CM

## 2021-04-15 LAB
A/G RATIO: 2 (ref 1.1–2.2)
ALBUMIN SERPL-MCNC: 4.9 G/DL (ref 3.4–5)
ALP BLD-CCNC: 120 U/L (ref 40–129)
ALT SERPL-CCNC: 22 U/L (ref 10–40)
ANION GAP SERPL CALCULATED.3IONS-SCNC: 14 MMOL/L (ref 3–16)
AST SERPL-CCNC: 20 U/L (ref 15–37)
BILIRUB SERPL-MCNC: 0.5 MG/DL (ref 0–1)
BUN BLDV-MCNC: 21 MG/DL (ref 7–20)
CALCIUM SERPL-MCNC: 10.5 MG/DL (ref 8.3–10.6)
CHLORIDE BLD-SCNC: 97 MMOL/L (ref 99–110)
CHOLESTEROL, TOTAL: 169 MG/DL (ref 0–199)
CO2: 23 MMOL/L (ref 21–32)
CREAT SERPL-MCNC: 0.9 MG/DL (ref 0.6–1.2)
GFR AFRICAN AMERICAN: >60
GFR NON-AFRICAN AMERICAN: >60
GLOBULIN: 2.5 G/DL
GLUCOSE BLD-MCNC: 94 MG/DL (ref 70–99)
HDLC SERPL-MCNC: 52 MG/DL (ref 40–60)
LDL CHOLESTEROL CALCULATED: 95 MG/DL
POTASSIUM SERPL-SCNC: 4.6 MMOL/L (ref 3.5–5.1)
SODIUM BLD-SCNC: 134 MMOL/L (ref 136–145)
T4 FREE: 1.6 NG/DL (ref 0.9–1.8)
TOTAL PROTEIN: 7.4 G/DL (ref 6.4–8.2)
TRIGL SERPL-MCNC: 111 MG/DL (ref 0–150)
TSH SERPL DL<=0.05 MIU/L-ACNC: 4.51 UIU/ML (ref 0.27–4.2)
VITAMIN D 25-HYDROXY: 66.8 NG/ML
VLDLC SERPL CALC-MCNC: 22 MG/DL

## 2021-04-20 DIAGNOSIS — M25.551 RIGHT HIP PAIN: ICD-10-CM

## 2021-04-20 DIAGNOSIS — E78.00 PURE HYPERCHOLESTEROLEMIA: ICD-10-CM

## 2021-04-20 DIAGNOSIS — I10 BENIGN ESSENTIAL HTN: Primary | ICD-10-CM

## 2021-04-20 DIAGNOSIS — M85.851 OSTEOPENIA OF RIGHT HIP: ICD-10-CM

## 2021-04-20 DIAGNOSIS — L65.9 HAIR LOSS: ICD-10-CM

## 2021-04-20 ASSESSMENT — ENCOUNTER SYMPTOMS
BACK PAIN: 1
COUGH: 0
ABDOMINAL PAIN: 0
EYES NEGATIVE: 1
ROS SKIN COMMENTS: HAIR LOSS
SINUS PRESSURE: 0

## 2021-04-21 ENCOUNTER — TELEPHONE (OUTPATIENT)
Dept: FAMILY MEDICINE CLINIC | Age: 71
End: 2021-04-21

## 2021-04-21 NOTE — TELEPHONE ENCOUNTER
Forgot to mention she can try a small dose of thyroid med for 2 months and see if it will help her hair loss.  Will send to pharmacy

## 2021-04-21 NOTE — TELEPHONE ENCOUNTER
Forgot to mention she can try a small dose of thyroid med for 2 months and see if it will help her hair loss.  Will send to pharmacy - per Dr. Fernandez Old

## 2021-04-22 RX ORDER — LEVOTHYROXINE SODIUM 0.03 MG/1
25 TABLET ORAL DAILY
Qty: 60 TABLET | Refills: 0 | Status: SHIPPED | OUTPATIENT
Start: 2021-04-22 | End: 2021-05-25

## 2021-05-06 ENCOUNTER — OFFICE VISIT (OUTPATIENT)
Dept: PRIMARY CARE CLINIC | Age: 71
End: 2021-05-06
Payer: MEDICARE

## 2021-05-06 ENCOUNTER — VIRTUAL VISIT (OUTPATIENT)
Dept: FAMILY MEDICINE CLINIC | Age: 71
End: 2021-05-06
Payer: MEDICARE

## 2021-05-06 ENCOUNTER — TELEPHONE (OUTPATIENT)
Dept: FAMILY MEDICINE CLINIC | Age: 71
End: 2021-05-06

## 2021-05-06 ENCOUNTER — OFFICE VISIT (OUTPATIENT)
Dept: ORTHOPEDIC SURGERY | Age: 71
End: 2021-05-06
Payer: MEDICARE

## 2021-05-06 VITALS
BODY MASS INDEX: 28.34 KG/M2 | HEIGHT: 62 IN | DIASTOLIC BLOOD PRESSURE: 68 MMHG | SYSTOLIC BLOOD PRESSURE: 104 MMHG | WEIGHT: 154 LBS | HEART RATE: 82 BPM

## 2021-05-06 DIAGNOSIS — I10 BENIGN ESSENTIAL HTN: ICD-10-CM

## 2021-05-06 DIAGNOSIS — M16.11 PRIMARY OSTEOARTHRITIS OF RIGHT HIP: ICD-10-CM

## 2021-05-06 DIAGNOSIS — Z01.818 PRE-OP EVALUATION: ICD-10-CM

## 2021-05-06 DIAGNOSIS — M16.11 PRIMARY OSTEOARTHRITIS OF RIGHT HIP: Primary | ICD-10-CM

## 2021-05-06 DIAGNOSIS — Z96.642 STATUS POST TOTAL HIP REPLACEMENT, LEFT: ICD-10-CM

## 2021-05-06 DIAGNOSIS — Z01.818 PRE-OP EXAMINATION: Primary | ICD-10-CM

## 2021-05-06 LAB
APTT: 28.7 SEC (ref 24.2–36.2)
C-REACTIVE PROTEIN: <3 MG/L (ref 0–5.1)
HCT VFR BLD CALC: 37 % (ref 36–48)
HEMOGLOBIN: 12.3 G/DL (ref 12–16)
INR BLD: 0.95 (ref 0.86–1.14)
MCH RBC QN AUTO: 31.6 PG (ref 26–34)
MCHC RBC AUTO-ENTMCNC: 33.2 G/DL (ref 31–36)
MCV RBC AUTO: 95.1 FL (ref 80–100)
PDW BLD-RTO: 14.8 % (ref 12.4–15.4)
PLATELET # BLD: 257 K/UL (ref 135–450)
PMV BLD AUTO: 7.5 FL (ref 5–10.5)
PROTHROMBIN TIME: 11 SEC (ref 10–13.2)
RBC # BLD: 3.89 M/UL (ref 4–5.2)
SARS-COV-2: NOT DETECTED
SEDIMENTATION RATE, ERYTHROCYTE: 31 MM/HR (ref 0–30)
WBC # BLD: 8.3 K/UL (ref 4–11)

## 2021-05-06 PROCEDURE — 1036F TOBACCO NON-USER: CPT | Performed by: ORTHOPAEDIC SURGERY

## 2021-05-06 PROCEDURE — 99211 OFF/OP EST MAY X REQ PHY/QHP: CPT | Performed by: NURSE PRACTITIONER

## 2021-05-06 PROCEDURE — 99214 OFFICE O/P EST MOD 30 MIN: CPT | Performed by: ORTHOPAEDIC SURGERY

## 2021-05-06 PROCEDURE — 3017F COLORECTAL CA SCREEN DOC REV: CPT | Performed by: ORTHOPAEDIC SURGERY

## 2021-05-06 PROCEDURE — 1090F PRES/ABSN URINE INCON ASSESS: CPT | Performed by: ORTHOPAEDIC SURGERY

## 2021-05-06 PROCEDURE — 1123F ACP DISCUSS/DSCN MKR DOCD: CPT | Performed by: ORTHOPAEDIC SURGERY

## 2021-05-06 PROCEDURE — G2012 BRIEF CHECK IN BY MD/QHP: HCPCS | Performed by: INTERNAL MEDICINE

## 2021-05-06 PROCEDURE — G8399 PT W/DXA RESULTS DOCUMENT: HCPCS | Performed by: ORTHOPAEDIC SURGERY

## 2021-05-06 PROCEDURE — G8427 DOCREV CUR MEDS BY ELIG CLIN: HCPCS | Performed by: ORTHOPAEDIC SURGERY

## 2021-05-06 PROCEDURE — G8417 CALC BMI ABV UP PARAM F/U: HCPCS | Performed by: ORTHOPAEDIC SURGERY

## 2021-05-06 PROCEDURE — 4040F PNEUMOC VAC/ADMIN/RCVD: CPT | Performed by: ORTHOPAEDIC SURGERY

## 2021-05-06 NOTE — PROGRESS NOTES
[facility] / [facility] ([facility]) Gilson Akbar, [address]    Acknowledgment of Informed Consent for Surgical or Medical Procedure and Sedation  I agree to allow doctor(s) MUNA VINSON and his/her associates or assistants, including residents and/or other qualified medical practitioner to perform the following medical treatment or procedure and to administer or direct the administration of sedation as necessary:  Procedure(s): RIGHT HIP ARTHROPLASTY ANTERIOR APPROACH       My doctor has explained the following regarding the proposed procedure:   the explanation of the procedure   the benefits of the procedure   the potential problems that might occur during recuperation   the risks and side effects of the procedure which could include but are not limited to severe blood loss, infection, stroke or death   the benefits, risks and side effect of alternative procedures including the consequences of declining this procedure or any alternative procedures   the likelihood of achieving satisfactory results. I acknowledge no guarantee or assurance has been made to me regarding the results. I understand that during the course of this treatment/procedure, unforeseen conditions can occur which require an additional or different procedure. I agree to allow my physician or assistants to perform such extension of the original procedure as they may find necessary. I understand that sedation will often result in temporary impairment of memory and fine motor skills and that sedation can occasionally progress to a state of deep sedation or general anesthesia. I understand the risks of anesthesia for surgery include, but are not limited to, sore throat, hoarseness, injury to face, mouth, or teeth; nausea; headache; injury to blood vessels or nerves; death, brain damage, or paralysis.     I understand that if I have a Limitation of Treatment order in effect during my hospitalization, the order may or may not be in effect during this procedure. I give my doctor permission to give me blood or blood products. I understand that there are risks with receiving blood such as hepatitis, AIDS, fever, or allergic reaction. I acknowledge that the risks, benefits, and alternatives of this treatment have been explained to me and that no express or implied warranty has been given by the hospital, any blood bank, or any person or entity as to the blood or blood components transfused. At the discretion of my doctor, I agree to allow observers, equipment/product representatives and allow photographing, and/or televising of the procedure, provided my name or identity is maintained confidentially. I agree the hospital may dispose of or use for scientific or educational purposes any tissue, fluid, or body parts which may be removed.     ________________________________Date________Time______ am/pm  ([facility])  Patient or Signature of Closest Relative or Legal Guardian    ________________________________Date________Time______am/pm      Page 1 of  1  Witness

## 2021-05-06 NOTE — PROGRESS NOTES
Harshil Ojeda is a 79 y.o. female evaluated via telephone on 5/6/2021. Consent:  She and/or health care decision maker is aware that that she may receive a bill for this telephone service, depending on her insurance coverage, and has provided verbal consent to proceed: Yes      Documentation:  I communicated with the patient and/or health care decision maker about  Her surgery  Details of this discussion including any medical advice provided:   Patient requesting medical clearance for surgery on 5/10/2021 for right hip surgery. Was seen 4/14/2021 for  Follow up for hypertension  And  Requesting x-rays for her right hip pain. Vitals and physical exam were unremarkable and patient states she does not have any new issues. Pre-op  Blood work and EKG will be done on the morning of surgery    Pending blood work and EKG , waiting for surgery. I affirm this is a Patient Initiated Episode with a Patient who has not had a related appointment within my department in the past 7 days or scheduled within the next 24 hours. Patient identification was verified at the start of the visit: Yes    Total Time: minutes: 5-10 minutes    The visit was conducted pursuant to the emergency declaration under the [facility], [address] [place] [address] authority and the [employer] General Act. Patient identification was verified, and a caregiver was present when appropriate. The patient was located in a state where the provider was credentialed to provide care.     Note: not billable if this call serves to triage the patient into an appointment for the relevant concern      Lissy Holder

## 2021-05-06 NOTE — PROGRESS NOTES
Patient Name: Hany Moore  : 1950  DOS: 2021        Chief Complaint:   Chief Complaint   Patient presents with    Hip Pain     Bilateral hip       History of Present Illness: Hany Moore is a 79 y.o. female who presents with a chief complaint of right hip pain. She has had a left total hip arthroplasty the previous year with good overall results. She has been trying to be active but has been having more issues of pain in the groin and radiating discomfort along the thigh         Medical History  Current Medications:   Prior to Admission medications    Medication Sig Start Date End Date Taking? Authorizing Provider   levothyroxine (SYNTHROID) 25 MCG tablet Take 1 tablet by mouth daily 21   Jermain Marsh MD   calcium carbonate (OSCAL) 500 MG TABS tablet Take 500 mg by mouth daily    Historical Provider, MD   alendronate (FOSAMAX) 70 MG tablet Take 1 tablet by mouth every 7 days 21   Jermain Marsh MD   bisacodyl (BISACODYL) 5 MG EC tablet Use as directed for colonoscopy prep 21   Rukhsana Borjas MD   polyethylene glycol [facility]) 17 GM/SCOOP powder Use as directed for colonoscopy prep 21   Rukhsana Borjas MD   omeprazole (PRILOSEC OTC) 20 MG tablet omeprazole 20 MG Delayed Release Oral Tablet        Active    Historical Provider, MD   lisinopril (PRINIVIL;ZESTRIL) 10 MG tablet TAKE 1 TABLET BY MOUTH ONE TIME A DAY  1/15/21   Jermain Marsh MD   simvastatin (ZOCOR) 40 MG tablet TAKE 1 TABLET BY MOUTH nightly 1/15/21   Jermain Marsh MD   traMADol (ULTRAM) 50 MG tablet TAKE 1 TABLET BY MOUTH EVERY SIX HOURS AS NEEDED FOR PAIN FOR UP TO 7 DAYS.  REDUCE DOSES TAKEN AS PAIN BECOMES MANAGEABLE. 11/15/20   Historical Provider, MD   diclofenac (VOLTAREN) 50 MG EC tablet Take 1 tablet by mouth 2 times daily (with meals) 20   JESSICA Garcia   Multiple Vitamins-Minerals (THERAPEUTIC MULTIVITAMIN-MINERALS) tablet Take 1 tablet by mouth daily    Historical Provider, MD   Vitamin D (CHOLECALCIFEROL) 1000 UNITS CAPS capsule Take 2,000 Units by mouth daily     Historical Provider, MD   Multiple Vitamins-Minerals (OCUVITE PRESERVISION PO) Take 1 tablet by mouth daily    Historical Provider, MD     Allergies:    Allergies   Allergen Reactions    Flexeril [Cyclobenzaprine] Other (See Comments)     Low BP and heart rate    Keflet [Cephalexin] Rash     Past Medical History:   Diagnosis Date    Arthritis     GERD (gastroesophageal reflux disease)     Hyperlipidemia     Hypertension      Social History     Socioeconomic History    Marital status:      Spouse name: Not on file    Number of children: Not on file    Years of education: Not on file    Highest education level: Not on file   Occupational History    Not on file   Social Needs    Financial resource strain: Not on file    Food insecurity     Worry: Not on file     Inability: Not on file    Transportation needs     Medical: Not on file     Non-medical: Not on file   Tobacco Use    Smoking status: Former Smoker     Packs/day: 1.00     Years: 15.00     Pack years: 15.00     Quit date: 1983     Years since quittin.3    Smokeless tobacco: Never Used   Substance and Sexual Activity    Alcohol use: Not on file    Drug use: No    Sexual activity: Yes   Lifestyle    Physical activity     Days per week: Not on file     Minutes per session: Not on file    Stress: Not on file   Relationships    Social connections     Talks on phone: Not on file     Gets together: Not on file     Attends Zoroastrian service: Not on file     Active member of club or organization: Not on file     Attends meetings of clubs or organizations: Not on file     Relationship status: Not on file    Intimate partner violence     Fear of current or ex partner: Not on file     Emotionally abused: Not on file     Physically abused: Not on file     Forced sexual activity: Not on file   Other Topics Concern    Not on file   Social History Narrative    Not on file     No family history on file. Review of Systems:   Review of Systems   Constitutional: Negative for chills and fever. HENT: Negative for nosebleeds. Eyes: Negative for double vision. Cardiovascular: Negative for chest pain. Gastrointestinal: Negative for abdominal pain. Musculoskeletal: Positive for joint pain and myalgias. Skin: Negative for rash. Neurological: Negative for seizures. Psychiatric/Behavioral: Negative for hallucinations. All other systems reviewed and negative     Assessment   Vital Signs:   /68   Pulse 82   Ht 5' 2\" (1.575 m)   Wt 154 lb (69.9 kg)   BMI 28.17 kg/m²     Physical Exam   Constitutional: Patient is oriented to person, place, and time and well-developed, well-nourished, and in no distress. HENT:   Head: Normocephalic and atraumatic. Eyes: Pupils are equal, round, and reactive to light. Neck: No tracheal deviation present. No thyromegaly present. Pulmonary/Chest: Effort normal.   Abdominal: Soft. There is no guarding. Musculoskeletal: Patient exhibits tenderness and pain. Neurological: Patient is alert and oriented to person, place, and time. Skin: Skin is warm. Psychiatric: Affect normal.       Right Hip Examination    Inspection: There is no swelling or ecchymosis. There is no obvious deformity. Palpation: There is tenderness over the greater trochanter. There is anterior groin tenderness. Range of Motion: FLEX80 ER30  IR0    Strength: Hip flexors rated: 4/5. Special Tests: Trendelenberg sign: positive       Skin: There are no rashes, ulcerations or lesions. Gait: Patient walks with a limp. Skin shows no rashes/ecchymosis to the affected area, no hyperesthesias, no discoloration, no temperature or color discrepancies. NEUROLOGICALLY: There is no evidence for sensory or motor deficits in the extremity. Coordination appears full with no spacticity or rigidity. Reflexes appear to be symmetric.    Distal

## 2021-05-06 NOTE — PROGRESS NOTES
[phone]. 4. Please call [phone] option #2 option #2 if you have not been preregistered yet. On the day of your procedure bring your insurance card and photo ID. You will be registered at your bedside once brought back to your room. 5. DO NOT EAT ANYTHING eight hours prior to your arrival for surgery. May have 8 ounces of water 4 hours prior to your arrival for surgery. NOTE: ALL Gastric, Bariatric and Bowel surgery patients MUST follow their surgeon's instructions. 6. MEDICATIONS    Take the following medications with a SMALL sip of water: may have tramadol if needed   Use your usual dose of inhalers the morning of surgery. BRING your rescue inhaler with you to hospital.    Anesthesia does NOT want you to take insulin the morning of surgery. They will control your blood sugar while you are at the hospital. Please contact your ordering physician for instructions regarding your insulin the night before your procedure. If you have an insulin pump, please keep it set on basal rate. 7. Do not swallow water when brushing teeth. No gum, candy, mints or ice chips. Refrain from smoking or at least decrease the amount. 8. Dress in loose, comfortable clothing appropriate for redressing after your procedure. Do not wear jewelry (including body piercings), make-up (especially NO eye make-up), fingernail polish (NO toenail polish if foot/leg surgery), lotion, powders or metal hairclips. 9. Dentures, glasses, or contacts will need to be removed before your procedure. Bring cases for your glasses, contacts, dentures, or hearing aids to protect them while you are in surgery. 10. If you use a CPAP, please bring it with you on the day of your procedure. 11. We recommend that valuable personal  belongings such as cash, cell phones, e-tablets or jewelry, be left at home during your stay. The hospital will not be responsible for valuables that are not secured in the hospital safe.  However, if your insurance requires a co-pay, you may want to bring a method of payment, i.e. Check or credit card, if you wish to pay your co-pay the day of surgery. 12. If you are to stay overnight, you may bring a bag with personal items. Please have any large items you may need brought in by your family after your arrival to your hospital room. 15. If you have a Living Will or Durable Power of , please bring a copy on the day of your procedure. 15. With your permission, one family member may accompany you while you are being prepared for surgery. Once you are ready, additional family members may join you. HOW WE KEEP YOU SAFE and WORK TO PREVENT SURGICAL SITE INFECTIONS:  1. Health care workers should always check your ID bracelet to verify your name and birth date. You will be asked many times to state your name, date of birth, and allergies. 2. Health care workers should always clean their hands with soap or alcohol gel before providing care to you. It is okay to ask anyone if they cleaned their hands before they touch you. 3. You will be actively involved in verifying the type of procedure you are having and ensuring the correct surgical site. This will be confirmed multiple times prior to your procedure. Do NOT eli your surgery site UNLESS instructed to by your surgeon. 4. Do not shave or wax for 72 hours prior to procedure near your operative site. Shaving with a razor can irritate your skin and make it easier to develop an infection. On the day of your procedure, any hair that needs to be removed near the surgical site will be clipped by a healthcare worker using a special clippers designed to avoid skin irritation. 5. When you are in the operating room, your surgical site will be cleansed with a special soap, and in most cases, you will be given an antibiotic before the surgery begins. What to expect AFTER YOUR PROCEDURE:  1.  Immediately following your procedure, your will

## 2021-05-06 NOTE — TELEPHONE ENCOUNTER
Pt called stating that she is needing to be cleared for surgery on 05/10/2021 for Right hip with Dr. Lord Valdez. I  couldn't offer her an appointment with someone on Friday but there is no openings, so I suggested she reach back out to surgeon. She was upset and stated that she just had an appointment with Dr. Tangela Hernandez in April, she should be able to just clear her.

## 2021-05-07 ENCOUNTER — TELEPHONE (OUTPATIENT)
Dept: ORTHOPEDIC SURGERY | Age: 71
End: 2021-05-07

## 2021-05-07 ENCOUNTER — HOSPITAL ENCOUNTER (OUTPATIENT)
Age: 71
Discharge: HOME | End: 2021-05-07
Payer: MEDICARE

## 2021-05-07 ENCOUNTER — TELEPHONE (OUTPATIENT)
Dept: FAMILY MEDICINE CLINIC | Age: 71
End: 2021-05-07

## 2021-05-07 ENCOUNTER — ANESTHESIA EVENT (OUTPATIENT)
Dept: OPERATING ROOM | Age: 71
DRG: 470 | End: 2021-05-07
Payer: MEDICARE

## 2021-05-07 LAB
ESTIMATED AVERAGE GLUCOSE: 125.5 MG/DL
HBA1C MFR BLD: 6 %

## 2021-05-07 PROCEDURE — 93005 ELECTROCARDIOGRAM TRACING: CPT | Performed by: ORTHOPAEDIC SURGERY

## 2021-05-07 NOTE — PROGRESS NOTES
5-7 Casimiro Green 9600 with Sammy Beaulieu at Dr. Bonifacio Mcneal office. This case was added on 5-6, therefore Dr. Monica Tadeo wanted testing done DOS. He is aware that this is a 0730 case, but there was no where else to put this case. Pt did try to go in and see her pcp, but was only given a virtual visit. She did see her pcp on 4-14 for this problem. /    [id number] spoke with pt. Earlier today. She plans to have EKG done tonight at a [facility] facility at 1630. However, EKG is not yet in [facility].  Order for EKG DOS not DCed as it does not yet appear that one was done.  Abram Hsu

## 2021-05-07 NOTE — TELEPHONE ENCOUNTER
LM: checking to see if she is set for her surgery on Monday. Her surgery is at 7:30 and she does need to be there at 5:30.

## 2021-05-08 LAB
EKG ATRIAL RATE: 187 BPM
EKG DIAGNOSIS: NORMAL
EKG Q-T INTERVAL: 358 MS
EKG QRS DURATION: 88 MS
EKG QTC CALCULATION (BAZETT): 464 MS
EKG R AXIS: 81 DEGREES
EKG T AXIS: 75 DEGREES
EKG VENTRICULAR RATE: 101 BPM

## 2021-05-08 PROCEDURE — 93010 ELECTROCARDIOGRAM REPORT: CPT | Performed by: INTERNAL MEDICINE

## 2021-05-10 ENCOUNTER — APPOINTMENT (OUTPATIENT)
Dept: GENERAL RADIOLOGY | Age: 71
DRG: 470 | End: 2021-05-10
Attending: ORTHOPAEDIC SURGERY
Payer: MEDICARE

## 2021-05-10 ENCOUNTER — HOSPITAL ENCOUNTER (INPATIENT)
Age: 71
LOS: 3 days | Discharge: HOME | DRG: 470 | End: 2021-05-14
Attending: ORTHOPAEDIC SURGERY | Admitting: ORTHOPAEDIC SURGERY
Payer: MEDICARE

## 2021-05-10 ENCOUNTER — ANESTHESIA (OUTPATIENT)
Dept: OPERATING ROOM | Age: 71
DRG: 470 | End: 2021-05-10
Payer: MEDICARE

## 2021-05-10 VITALS
DIASTOLIC BLOOD PRESSURE: 58 MMHG | OXYGEN SATURATION: 100 % | SYSTOLIC BLOOD PRESSURE: 103 MMHG | RESPIRATION RATE: 9 BRPM | TEMPERATURE: 96.3 F

## 2021-05-10 DIAGNOSIS — Z96.641 STATUS POST TOTAL HIP REPLACEMENT, RIGHT: Primary | ICD-10-CM

## 2021-05-10 DIAGNOSIS — M16.11 OSTEOARTHRITIS OF RIGHT HIP, UNSPECIFIED OSTEOARTHRITIS TYPE: ICD-10-CM

## 2021-05-10 LAB
A/G RATIO: 1.6 (ref 1.1–2.2)
ABO/RH: NORMAL
ALBUMIN SERPL-MCNC: 4.9 G/DL (ref 3.4–5)
ALP BLD-CCNC: 127 U/L (ref 40–129)
ALT SERPL-CCNC: 43 U/L (ref 10–40)
ANION GAP SERPL CALCULATED.3IONS-SCNC: 17 MMOL/L (ref 3–16)
ANTIBODY SCREEN: NORMAL
AST SERPL-CCNC: 38 U/L (ref 15–37)
BILIRUB SERPL-MCNC: 1.2 MG/DL (ref 0–1)
BUN BLDV-MCNC: 12 MG/DL (ref 7–20)
CALCIUM SERPL-MCNC: 10.5 MG/DL (ref 8.3–10.6)
CHLORIDE BLD-SCNC: 90 MMOL/L (ref 99–110)
CO2: 24 MMOL/L (ref 21–32)
CREAT SERPL-MCNC: 1 MG/DL (ref 0.6–1.2)
EKG ATRIAL RATE: 83 BPM
EKG DIAGNOSIS: NORMAL
EKG P AXIS: 45 DEGREES
EKG P-R INTERVAL: 146 MS
EKG Q-T INTERVAL: 388 MS
EKG QRS DURATION: 92 MS
EKG QTC CALCULATION (BAZETT): 455 MS
EKG R AXIS: 35 DEGREES
EKG T AXIS: 52 DEGREES
EKG VENTRICULAR RATE: 83 BPM
GFR AFRICAN AMERICAN: >60
GFR NON-AFRICAN AMERICAN: 55
GLOBULIN: 3 G/DL
GLUCOSE BLD-MCNC: 100 MG/DL (ref 70–99)
INR BLD: 1.08 (ref 0.86–1.14)
MRSA SCREEN RT-PCR: NORMAL
POTASSIUM SERPL-SCNC: 3.7 MMOL/L (ref 3.5–5.1)
PREALBUMIN: 41.5 MG/DL (ref 20–40)
PROTHROMBIN TIME: 12.5 SEC (ref 10–13.2)
SODIUM BLD-SCNC: 131 MMOL/L (ref 136–145)
TOTAL PROTEIN: 7.9 G/DL (ref 6.4–8.2)

## 2021-05-10 PROCEDURE — 2580000003 HC RX 258: Performed by: ORTHOPAEDIC SURGERY

## 2021-05-10 PROCEDURE — 27130 TOTAL HIP ARTHROPLASTY: CPT | Performed by: ORTHOPAEDIC SURGERY

## 2021-05-10 PROCEDURE — 3600000005 HC SURGERY LEVEL 5 BASE: Performed by: ORTHOPAEDIC SURGERY

## 2021-05-10 PROCEDURE — 6360000002 HC RX W HCPCS: Performed by: NURSE ANESTHETIST, CERTIFIED REGISTERED

## 2021-05-10 PROCEDURE — 7100000001 HC PACU RECOVERY - ADDTL 15 MIN: Performed by: ORTHOPAEDIC SURGERY

## 2021-05-10 PROCEDURE — 3209999900 FLUORO FOR SURGICAL PROCEDURES

## 2021-05-10 PROCEDURE — 97530 THERAPEUTIC ACTIVITIES: CPT

## 2021-05-10 PROCEDURE — 2500000003 HC RX 250 WO HCPCS: Performed by: NURSE ANESTHETIST, CERTIFIED REGISTERED

## 2021-05-10 PROCEDURE — 6360000002 HC RX W HCPCS: Performed by: ORTHOPAEDIC SURGERY

## 2021-05-10 PROCEDURE — 6360000002 HC RX W HCPCS: Performed by: PHYSICIAN ASSISTANT

## 2021-05-10 PROCEDURE — 7100000000 HC PACU RECOVERY - FIRST 15 MIN: Performed by: ORTHOPAEDIC SURGERY

## 2021-05-10 PROCEDURE — 97116 GAIT TRAINING THERAPY: CPT

## 2021-05-10 PROCEDURE — 3700000001 HC ADD 15 MINUTES (ANESTHESIA): Performed by: ORTHOPAEDIC SURGERY

## 2021-05-10 PROCEDURE — G0378 HOSPITAL OBSERVATION PER HR: HCPCS

## 2021-05-10 PROCEDURE — 51798 US URINE CAPACITY MEASURE: CPT

## 2021-05-10 PROCEDURE — 87086 URINE CULTURE/COLONY COUNT: CPT

## 2021-05-10 PROCEDURE — 84134 ASSAY OF PREALBUMIN: CPT

## 2021-05-10 PROCEDURE — 73502 X-RAY EXAM HIP UNI 2-3 VIEWS: CPT

## 2021-05-10 PROCEDURE — 2709999900 HC NON-CHARGEABLE SUPPLY: Performed by: ORTHOPAEDIC SURGERY

## 2021-05-10 PROCEDURE — 86923 COMPATIBILITY TEST ELECTRIC: CPT

## 2021-05-10 PROCEDURE — 86900 BLOOD TYPING SEROLOGIC ABO: CPT

## 2021-05-10 PROCEDURE — 2580000003 HC RX 258: Performed by: ANESTHESIOLOGY

## 2021-05-10 PROCEDURE — P9045 ALBUMIN (HUMAN), 5%, 250 ML: HCPCS | Performed by: NURSE ANESTHETIST, CERTIFIED REGISTERED

## 2021-05-10 PROCEDURE — 94150 VITAL CAPACITY TEST: CPT

## 2021-05-10 PROCEDURE — 97161 PT EVAL LOW COMPLEX 20 MIN: CPT

## 2021-05-10 PROCEDURE — P9016 RBC LEUKOCYTES REDUCED: HCPCS

## 2021-05-10 PROCEDURE — 2500000003 HC RX 250 WO HCPCS: Performed by: ORTHOPAEDIC SURGERY

## 2021-05-10 PROCEDURE — 3600000015 HC SURGERY LEVEL 5 ADDTL 15MIN: Performed by: ORTHOPAEDIC SURGERY

## 2021-05-10 PROCEDURE — 2720000010 HC SURG SUPPLY STERILE: Performed by: ORTHOPAEDIC SURGERY

## 2021-05-10 PROCEDURE — 0SR90JZ REPLACEMENT OF RIGHT HIP JOINT WITH SYNTHETIC SUBSTITUTE, OPEN APPROACH: ICD-10-PCS | Performed by: ORTHOPAEDIC SURGERY

## 2021-05-10 PROCEDURE — 88304 TISSUE EXAM BY PATHOLOGIST: CPT

## 2021-05-10 PROCEDURE — 87641 MR-STAPH DNA AMP PROBE: CPT

## 2021-05-10 PROCEDURE — 2580000003 HC RX 258: Performed by: PHYSICIAN ASSISTANT

## 2021-05-10 PROCEDURE — 85610 PROTHROMBIN TIME: CPT

## 2021-05-10 PROCEDURE — 36415 COLL VENOUS BLD VENIPUNCTURE: CPT

## 2021-05-10 PROCEDURE — 86901 BLOOD TYPING SEROLOGIC RH(D): CPT

## 2021-05-10 PROCEDURE — 97535 SELF CARE MNGMENT TRAINING: CPT

## 2021-05-10 PROCEDURE — 6370000000 HC RX 637 (ALT 250 FOR IP): Performed by: PHYSICIAN ASSISTANT

## 2021-05-10 PROCEDURE — 97165 OT EVAL LOW COMPLEX 30 MIN: CPT

## 2021-05-10 PROCEDURE — 80053 COMPREHEN METABOLIC PANEL: CPT

## 2021-05-10 PROCEDURE — 6370000000 HC RX 637 (ALT 250 FOR IP): Performed by: ORTHOPAEDIC SURGERY

## 2021-05-10 PROCEDURE — 88311 DECALCIFY TISSUE: CPT

## 2021-05-10 PROCEDURE — 86850 RBC ANTIBODY SCREEN: CPT

## 2021-05-10 PROCEDURE — C1776 JOINT DEVICE (IMPLANTABLE): HCPCS | Performed by: ORTHOPAEDIC SURGERY

## 2021-05-10 PROCEDURE — 3700000000 HC ANESTHESIA ATTENDED CARE: Performed by: ORTHOPAEDIC SURGERY

## 2021-05-10 DEVICE — HIP H2 TOT ADV OTHER HD IMPL CAPPED H2 SN: Type: IMPLANTABLE DEVICE | Site: HIP | Status: FUNCTIONAL

## 2021-05-10 DEVICE — OXINIUM FEMORAL HEAD 12/14 TAPER                                    22 MM +0
Type: IMPLANTABLE DEVICE | Site: HIP | Status: FUNCTIONAL
Brand: OXINIUM

## 2021-05-10 DEVICE — ANTHOLOGY HIGH OFFSET POROUS PLUS                                    HA SIZE 8
Type: IMPLANTABLE DEVICE | Site: HIP | Status: FUNCTIONAL
Brand: ANTHOLOGY

## 2021-05-10 DEVICE — REFLECTION SPHERICAL HEAD SCREW 30MM
Type: IMPLANTABLE DEVICE | Site: HIP | Status: FUNCTIONAL
Brand: REFLECTION

## 2021-05-10 DEVICE — OR3O DUAL MOBILITY LINER 38/50
Type: IMPLANTABLE DEVICE | Site: HIP | Status: FUNCTIONAL
Brand: OR3O DUAL MOBILITY

## 2021-05-10 DEVICE — R3 3 HOLE ACETABULAR SHELL 50MM
Type: IMPLANTABLE DEVICE | Site: HIP | Status: FUNCTIONAL
Brand: R3 ACETABULAR

## 2021-05-10 DEVICE — REFLECTION SPHERICAL HEAD SCREW 35MM
Type: IMPLANTABLE DEVICE | Site: HIP | Status: FUNCTIONAL
Brand: REFLECTION

## 2021-05-10 DEVICE — OR3O DUAL MOBILITY XLPE INSERT 22/38
Type: IMPLANTABLE DEVICE | Site: HIP | Status: FUNCTIONAL
Brand: OR3O DUAL MOBILITY

## 2021-05-10 RX ORDER — PREDNISONE 10 MG/1
10 TABLET ORAL DAILY
Status: DISCONTINUED | OUTPATIENT
Start: 2021-05-10 | End: 2021-05-11

## 2021-05-10 RX ORDER — GLYCOPYRROLATE 1 MG/5 ML
SYRINGE (ML) INTRAVENOUS PRN
Status: DISCONTINUED | OUTPATIENT
Start: 2021-05-10 | End: 2021-05-10 | Stop reason: SDUPTHER

## 2021-05-10 RX ORDER — LEVOTHYROXINE SODIUM 0.03 MG/1
25 TABLET ORAL DAILY
Status: DISCONTINUED | OUTPATIENT
Start: 2021-05-10 | End: 2021-05-10

## 2021-05-10 RX ORDER — ALBUMIN, HUMAN INJ 5% 5 %
SOLUTION INTRAVENOUS PRN
Status: DISCONTINUED | OUTPATIENT
Start: 2021-05-10 | End: 2021-05-10 | Stop reason: SDUPTHER

## 2021-05-10 RX ORDER — SODIUM CHLORIDE 9 MG/ML
25 INJECTION, SOLUTION INTRAVENOUS PRN
Status: DISCONTINUED | OUTPATIENT
Start: 2021-05-10 | End: 2021-05-10 | Stop reason: HOSPADM

## 2021-05-10 RX ORDER — SIMVASTATIN 40 MG
40 TABLET ORAL NIGHTLY
Status: DISCONTINUED | OUTPATIENT
Start: 2021-05-10 | End: 2021-05-14 | Stop reason: HOSPADM

## 2021-05-10 RX ORDER — ACETAMINOPHEN 500 MG
1000 TABLET ORAL ONCE
Status: COMPLETED | OUTPATIENT
Start: 2021-05-10 | End: 2021-05-10

## 2021-05-10 RX ORDER — SODIUM CHLORIDE 0.9 % (FLUSH) 0.9 %
10 SYRINGE (ML) INJECTION PRN
Status: DISCONTINUED | OUTPATIENT
Start: 2021-05-10 | End: 2021-05-14 | Stop reason: HOSPADM

## 2021-05-10 RX ORDER — HYDROMORPHONE HCL 110MG/55ML
PATIENT CONTROLLED ANALGESIA SYRINGE INTRAVENOUS PRN
Status: DISCONTINUED | OUTPATIENT
Start: 2021-05-10 | End: 2021-05-10 | Stop reason: SDUPTHER

## 2021-05-10 RX ORDER — SENNA AND DOCUSATE SODIUM 50; 8.6 MG/1; MG/1
1 TABLET, FILM COATED ORAL 2 TIMES DAILY
Status: DISCONTINUED | OUTPATIENT
Start: 2021-05-10 | End: 2021-05-14 | Stop reason: HOSPADM

## 2021-05-10 RX ORDER — DEXAMETHASONE SODIUM PHOSPHATE 4 MG/ML
INJECTION, SOLUTION INTRA-ARTICULAR; INTRALESIONAL; INTRAMUSCULAR; INTRAVENOUS; SOFT TISSUE PRN
Status: DISCONTINUED | OUTPATIENT
Start: 2021-05-10 | End: 2021-05-10 | Stop reason: SDUPTHER

## 2021-05-10 RX ORDER — FENTANYL CITRATE 50 UG/ML
INJECTION, SOLUTION INTRAMUSCULAR; INTRAVENOUS PRN
Status: DISCONTINUED | OUTPATIENT
Start: 2021-05-10 | End: 2021-05-10 | Stop reason: SDUPTHER

## 2021-05-10 RX ORDER — ACETAMINOPHEN 325 MG/1
650 TABLET ORAL EVERY 6 HOURS
Status: DISCONTINUED | OUTPATIENT
Start: 2021-05-10 | End: 2021-05-14 | Stop reason: HOSPADM

## 2021-05-10 RX ORDER — LIDOCAINE HYDROCHLORIDE 10 MG/ML
1 INJECTION, SOLUTION EPIDURAL; INFILTRATION; INTRACAUDAL; PERINEURAL
Status: DISCONTINUED | OUTPATIENT
Start: 2021-05-10 | End: 2021-05-10 | Stop reason: HOSPADM

## 2021-05-10 RX ORDER — ONDANSETRON 2 MG/ML
INJECTION INTRAMUSCULAR; INTRAVENOUS PRN
Status: DISCONTINUED | OUTPATIENT
Start: 2021-05-10 | End: 2021-05-10 | Stop reason: SDUPTHER

## 2021-05-10 RX ORDER — VANCOMYCIN HYDROCHLORIDE 1 G/20ML
INJECTION, POWDER, LYOPHILIZED, FOR SOLUTION INTRAVENOUS PRN
Status: DISCONTINUED | OUTPATIENT
Start: 2021-05-10 | End: 2021-05-10 | Stop reason: ALTCHOICE

## 2021-05-10 RX ORDER — MIDAZOLAM HYDROCHLORIDE 1 MG/ML
INJECTION INTRAMUSCULAR; INTRAVENOUS PRN
Status: DISCONTINUED | OUTPATIENT
Start: 2021-05-10 | End: 2021-05-10 | Stop reason: SDUPTHER

## 2021-05-10 RX ORDER — ONDANSETRON 2 MG/ML
4 INJECTION INTRAMUSCULAR; INTRAVENOUS EVERY 6 HOURS PRN
Status: DISCONTINUED | OUTPATIENT
Start: 2021-05-10 | End: 2021-05-14 | Stop reason: HOSPADM

## 2021-05-10 RX ORDER — OXYCODONE HYDROCHLORIDE 5 MG/1
10 TABLET ORAL EVERY 4 HOURS PRN
Status: DISCONTINUED | OUTPATIENT
Start: 2021-05-10 | End: 2021-05-14 | Stop reason: HOSPADM

## 2021-05-10 RX ORDER — MAGNESIUM HYDROXIDE 1200 MG/15ML
LIQUID ORAL CONTINUOUS PRN
Status: COMPLETED | OUTPATIENT
Start: 2021-05-10 | End: 2021-05-10

## 2021-05-10 RX ORDER — CALCIUM CARBONATE 500(1250)
500 TABLET ORAL DAILY
Status: DISCONTINUED | OUTPATIENT
Start: 2021-05-10 | End: 2021-05-14 | Stop reason: HOSPADM

## 2021-05-10 RX ORDER — WARFARIN SODIUM 4 MG/1
4 TABLET ORAL DAILY
Status: DISCONTINUED | OUTPATIENT
Start: 2021-05-10 | End: 2021-05-14 | Stop reason: HOSPADM

## 2021-05-10 RX ORDER — PREGABALIN 150 MG/1
150 CAPSULE ORAL
Status: COMPLETED | OUTPATIENT
Start: 2021-05-10 | End: 2021-05-10

## 2021-05-10 RX ORDER — PHENYLEPHRINE HYDROCHLORIDE 10 MG/ML
INJECTION INTRAVENOUS PRN
Status: DISCONTINUED | OUTPATIENT
Start: 2021-05-10 | End: 2021-05-10 | Stop reason: SDUPTHER

## 2021-05-10 RX ORDER — ROCURONIUM BROMIDE 10 MG/ML
INJECTION, SOLUTION INTRAVENOUS PRN
Status: DISCONTINUED | OUTPATIENT
Start: 2021-05-10 | End: 2021-05-10 | Stop reason: SDUPTHER

## 2021-05-10 RX ORDER — PROPOFOL 10 MG/ML
INJECTION, EMULSION INTRAVENOUS PRN
Status: DISCONTINUED | OUTPATIENT
Start: 2021-05-10 | End: 2021-05-10 | Stop reason: SDUPTHER

## 2021-05-10 RX ORDER — PROMETHAZINE HYDROCHLORIDE 12.5 MG/1
12.5 TABLET ORAL EVERY 6 HOURS PRN
Status: DISCONTINUED | OUTPATIENT
Start: 2021-05-10 | End: 2021-05-14 | Stop reason: HOSPADM

## 2021-05-10 RX ORDER — M-VIT,TX,IRON,MINS/CALC/FOLIC 27MG-0.4MG
1 TABLET ORAL DAILY
Status: DISCONTINUED | OUTPATIENT
Start: 2021-05-10 | End: 2021-05-14 | Stop reason: HOSPADM

## 2021-05-10 RX ORDER — CELECOXIB 200 MG/1
400 CAPSULE ORAL
Status: COMPLETED | OUTPATIENT
Start: 2021-05-10 | End: 2021-05-10

## 2021-05-10 RX ORDER — OXYCODONE HYDROCHLORIDE 5 MG/1
5 TABLET ORAL EVERY 4 HOURS PRN
Status: DISCONTINUED | OUTPATIENT
Start: 2021-05-10 | End: 2021-05-14 | Stop reason: HOSPADM

## 2021-05-10 RX ORDER — SODIUM CHLORIDE 9 MG/ML
25 INJECTION, SOLUTION INTRAVENOUS PRN
Status: DISCONTINUED | OUTPATIENT
Start: 2021-05-10 | End: 2021-05-14 | Stop reason: HOSPADM

## 2021-05-10 RX ORDER — SODIUM CHLORIDE 0.9 % (FLUSH) 0.9 %
10 SYRINGE (ML) INJECTION EVERY 12 HOURS SCHEDULED
Status: DISCONTINUED | OUTPATIENT
Start: 2021-05-10 | End: 2021-05-14 | Stop reason: HOSPADM

## 2021-05-10 RX ORDER — OXYCODONE HCL 10 MG/1
10 TABLET, FILM COATED, EXTENDED RELEASE ORAL
Status: COMPLETED | OUTPATIENT
Start: 2021-05-10 | End: 2021-05-10

## 2021-05-10 RX ORDER — SODIUM CHLORIDE 0.9 % (FLUSH) 0.9 %
5-40 SYRINGE (ML) INJECTION EVERY 12 HOURS SCHEDULED
Status: DISCONTINUED | OUTPATIENT
Start: 2021-05-10 | End: 2021-05-10 | Stop reason: HOSPADM

## 2021-05-10 RX ORDER — VANCOMYCIN HYDROCHLORIDE 1 G/20ML
INJECTION, POWDER, LYOPHILIZED, FOR SOLUTION INTRAVENOUS PRN
Status: DISCONTINUED | OUTPATIENT
Start: 2021-05-10 | End: 2021-05-10 | Stop reason: SDUPTHER

## 2021-05-10 RX ORDER — PANTOPRAZOLE SODIUM 40 MG/1
40 TABLET, DELAYED RELEASE ORAL EVERY MORNING
Status: DISCONTINUED | OUTPATIENT
Start: 2021-05-10 | End: 2021-05-11

## 2021-05-10 RX ORDER — DEXAMETHASONE SODIUM PHOSPHATE 10 MG/ML
10 INJECTION, SOLUTION INTRAMUSCULAR; INTRAVENOUS ONCE
Status: COMPLETED | OUTPATIENT
Start: 2021-05-10 | End: 2021-05-10

## 2021-05-10 RX ORDER — SODIUM CHLORIDE 0.9 % (FLUSH) 0.9 %
5-40 SYRINGE (ML) INJECTION PRN
Status: DISCONTINUED | OUTPATIENT
Start: 2021-05-10 | End: 2021-05-10 | Stop reason: HOSPADM

## 2021-05-10 RX ORDER — LISINOPRIL 10 MG/1
10 TABLET ORAL DAILY
Status: DISCONTINUED | OUTPATIENT
Start: 2021-05-10 | End: 2021-05-12

## 2021-05-10 RX ORDER — LIDOCAINE HCL/PF 100 MG/5ML
SYRINGE (ML) INJECTION PRN
Status: DISCONTINUED | OUTPATIENT
Start: 2021-05-10 | End: 2021-05-10 | Stop reason: SDUPTHER

## 2021-05-10 RX ORDER — VITAMIN B COMPLEX
2000 TABLET ORAL DAILY
Status: DISCONTINUED | OUTPATIENT
Start: 2021-05-10 | End: 2021-05-14 | Stop reason: HOSPADM

## 2021-05-10 RX ORDER — SODIUM CHLORIDE, SODIUM LACTATE, POTASSIUM CHLORIDE, CALCIUM CHLORIDE 600; 310; 30; 20 MG/100ML; MG/100ML; MG/100ML; MG/100ML
INJECTION, SOLUTION INTRAVENOUS CONTINUOUS
Status: DISCONTINUED | OUTPATIENT
Start: 2021-05-10 | End: 2021-05-10

## 2021-05-10 RX ORDER — SODIUM CHLORIDE 450 MG/100ML
INJECTION, SOLUTION INTRAVENOUS CONTINUOUS
Status: DISCONTINUED | OUTPATIENT
Start: 2021-05-10 | End: 2021-05-11

## 2021-05-10 RX ADMIN — MIDAZOLAM HYDROCHLORIDE 1 MG: 2 INJECTION, SOLUTION INTRAMUSCULAR; INTRAVENOUS at 07:41

## 2021-05-10 RX ADMIN — SIMVASTATIN 40 MG: 40 TABLET, FILM COATED ORAL at 20:49

## 2021-05-10 RX ADMIN — Medication 0.2 MG: at 07:41

## 2021-05-10 RX ADMIN — PREGABALIN 150 MG: 150 CAPSULE ORAL at 06:46

## 2021-05-10 RX ADMIN — Medication 2000 UNITS: at 15:17

## 2021-05-10 RX ADMIN — DOCUSATE SODIUM 50 MG AND SENNOSIDES 8.6 MG 1 TABLET: 8.6; 5 TABLET, FILM COATED ORAL at 20:49

## 2021-05-10 RX ADMIN — FENTANYL CITRATE 50 MCG: 50 INJECTION, SOLUTION INTRAMUSCULAR; INTRAVENOUS at 07:49

## 2021-05-10 RX ADMIN — ROCURONIUM BROMIDE 15 MG: 10 INJECTION INTRAVENOUS at 10:00

## 2021-05-10 RX ADMIN — PHENYLEPHRINE HYDROCHLORIDE 80 MCG: 10 INJECTION INTRAVENOUS at 09:33

## 2021-05-10 RX ADMIN — TRANEXAMIC ACID 1020 MG: 1 INJECTION, SOLUTION INTRAVENOUS at 07:58

## 2021-05-10 RX ADMIN — MIDAZOLAM HYDROCHLORIDE 1 MG: 2 INJECTION, SOLUTION INTRAMUSCULAR; INTRAVENOUS at 07:50

## 2021-05-10 RX ADMIN — Medication 80 MG: at 07:50

## 2021-05-10 RX ADMIN — DEXAMETHASONE SODIUM PHOSPHATE 10 MG: 10 INJECTION, SOLUTION INTRAMUSCULAR; INTRAVENOUS at 06:46

## 2021-05-10 RX ADMIN — HYDROMORPHONE HYDROCHLORIDE 0.5 MG: 2 INJECTION, SOLUTION INTRAMUSCULAR; INTRAVENOUS; SUBCUTANEOUS at 08:14

## 2021-05-10 RX ADMIN — DEXAMETHASONE SODIUM PHOSPHATE 10 MG: 4 INJECTION, SOLUTION INTRAMUSCULAR; INTRAVENOUS at 08:15

## 2021-05-10 RX ADMIN — CALCIUM 500 MG: 500 TABLET ORAL at 15:16

## 2021-05-10 RX ADMIN — PHENYLEPHRINE HYDROCHLORIDE 120 MCG: 10 INJECTION INTRAVENOUS at 09:50

## 2021-05-10 RX ADMIN — PHENYLEPHRINE HYDROCHLORIDE 80 MCG: 10 INJECTION INTRAVENOUS at 10:17

## 2021-05-10 RX ADMIN — CELECOXIB 400 MG: 200 CAPSULE ORAL at 06:45

## 2021-05-10 RX ADMIN — ONDANSETRON 4 MG: 2 INJECTION INTRAMUSCULAR; INTRAVENOUS at 08:15

## 2021-05-10 RX ADMIN — VANCOMYCIN HYDROCHLORIDE 1000 MG: 10 INJECTION, POWDER, LYOPHILIZED, FOR SOLUTION INTRAVENOUS at 07:00

## 2021-05-10 RX ADMIN — DOCUSATE SODIUM 50 MG AND SENNOSIDES 8.6 MG 1 TABLET: 8.6; 5 TABLET, FILM COATED ORAL at 15:17

## 2021-05-10 RX ADMIN — ACETAMINOPHEN 650 MG: 325 TABLET ORAL at 15:16

## 2021-05-10 RX ADMIN — ACETAMINOPHEN 1000 MG: 500 TABLET ORAL at 06:46

## 2021-05-10 RX ADMIN — WARFARIN SODIUM 4 MG: 4 TABLET ORAL at 17:40

## 2021-05-10 RX ADMIN — PANTOPRAZOLE SODIUM 40 MG: 40 TABLET, DELAYED RELEASE ORAL at 15:16

## 2021-05-10 RX ADMIN — SODIUM CHLORIDE, POTASSIUM CHLORIDE, SODIUM LACTATE AND CALCIUM CHLORIDE: 600; 310; 30; 20 INJECTION, SOLUTION INTRAVENOUS at 06:45

## 2021-05-10 RX ADMIN — PROPOFOL 150 MG: 10 INJECTION, EMULSION INTRAVENOUS at 07:52

## 2021-05-10 RX ADMIN — FENTANYL CITRATE 50 MCG: 50 INJECTION, SOLUTION INTRAMUSCULAR; INTRAVENOUS at 08:02

## 2021-05-10 RX ADMIN — PHENYLEPHRINE HYDROCHLORIDE 80 MCG: 10 INJECTION INTRAVENOUS at 10:04

## 2021-05-10 RX ADMIN — VANCOMYCIN HYDROCHLORIDE 1000 MG: 10 INJECTION, POWDER, LYOPHILIZED, FOR SOLUTION INTRAVENOUS at 20:54

## 2021-05-10 RX ADMIN — PREDNISONE 10 MG: 10 TABLET ORAL at 15:16

## 2021-05-10 RX ADMIN — OXYCODONE HYDROCHLORIDE 10 MG: 10 TABLET, FILM COATED, EXTENDED RELEASE ORAL at 06:46

## 2021-05-10 RX ADMIN — PHENYLEPHRINE HYDROCHLORIDE 80 MCG: 10 INJECTION INTRAVENOUS at 08:27

## 2021-05-10 RX ADMIN — ROCURONIUM BROMIDE 15 MG: 10 INJECTION INTRAVENOUS at 09:27

## 2021-05-10 RX ADMIN — ACETAMINOPHEN 650 MG: 325 TABLET ORAL at 20:49

## 2021-05-10 RX ADMIN — VANCOMYCIN HYDROCHLORIDE 1000 MG: 1 INJECTION, POWDER, LYOPHILIZED, FOR SOLUTION INTRAVENOUS at 07:20

## 2021-05-10 RX ADMIN — Medication 1 TABLET: at 15:17

## 2021-05-10 RX ADMIN — ALBUMIN (HUMAN) 250 ML: 12.5 SOLUTION INTRAVENOUS at 10:00

## 2021-05-10 RX ADMIN — PHENYLEPHRINE HYDROCHLORIDE 80 MCG: 10 INJECTION INTRAVENOUS at 08:57

## 2021-05-10 RX ADMIN — SODIUM CHLORIDE, POTASSIUM CHLORIDE, SODIUM LACTATE AND CALCIUM CHLORIDE: 600; 310; 30; 20 INJECTION, SOLUTION INTRAVENOUS at 09:10

## 2021-05-10 RX ADMIN — SUGAMMADEX 200 MG: 100 INJECTION, SOLUTION INTRAVENOUS at 10:37

## 2021-05-10 RX ADMIN — ROCURONIUM BROMIDE 50 MG: 10 INJECTION INTRAVENOUS at 07:53

## 2021-05-10 ASSESSMENT — PULMONARY FUNCTION TESTS
PIF_VALUE: 20
PIF_VALUE: 18
PIF_VALUE: 19
PIF_VALUE: 18
PIF_VALUE: 19
PIF_VALUE: 19
PIF_VALUE: 18
PIF_VALUE: 2
PIF_VALUE: 20
PIF_VALUE: 20
PIF_VALUE: 17
PIF_VALUE: 20
PIF_VALUE: 23
PIF_VALUE: 20
PIF_VALUE: 18
PIF_VALUE: 17
PIF_VALUE: 18
PIF_VALUE: 17
PIF_VALUE: 20
PIF_VALUE: 19
PIF_VALUE: 17
PIF_VALUE: 3
PIF_VALUE: 18
PIF_VALUE: 16
PIF_VALUE: 18
PIF_VALUE: 17
PIF_VALUE: 20
PIF_VALUE: 17
PIF_VALUE: 20
PIF_VALUE: 17
PIF_VALUE: 19
PIF_VALUE: 18
PIF_VALUE: 19
PIF_VALUE: 17
PIF_VALUE: 16
PIF_VALUE: 18
PIF_VALUE: 19
PIF_VALUE: 17
PIF_VALUE: 19
PIF_VALUE: 18
PIF_VALUE: 18
PIF_VALUE: 20
PIF_VALUE: 15
PIF_VALUE: 15
PIF_VALUE: 19
PIF_VALUE: 18
PIF_VALUE: 18
PIF_VALUE: 20
PIF_VALUE: 19
PIF_VALUE: 17
PIF_VALUE: 17
PIF_VALUE: 18
PIF_VALUE: 19
PIF_VALUE: 15
PIF_VALUE: 17
PIF_VALUE: 20
PIF_VALUE: 19
PIF_VALUE: 18
PIF_VALUE: 18
PIF_VALUE: 17
PIF_VALUE: 18
PIF_VALUE: 20
PIF_VALUE: 20
PIF_VALUE: 17
PIF_VALUE: 3
PIF_VALUE: 20
PIF_VALUE: 15
PIF_VALUE: 19
PIF_VALUE: 18
PIF_VALUE: 17
PIF_VALUE: 19
PIF_VALUE: 1
PIF_VALUE: 18
PIF_VALUE: 15
PIF_VALUE: 16
PIF_VALUE: 18
PIF_VALUE: 17
PIF_VALUE: 18
PIF_VALUE: 16
PIF_VALUE: 17
PIF_VALUE: 17
PIF_VALUE: 18
PIF_VALUE: 1
PIF_VALUE: 16
PIF_VALUE: 20
PIF_VALUE: 17
PIF_VALUE: 22
PIF_VALUE: 16
PIF_VALUE: 17
PIF_VALUE: 16
PIF_VALUE: 18
PIF_VALUE: 19
PIF_VALUE: 17
PIF_VALUE: 18
PIF_VALUE: 16
PIF_VALUE: 19
PIF_VALUE: 19
PIF_VALUE: 17
PIF_VALUE: 18
PIF_VALUE: 17
PIF_VALUE: 19
PIF_VALUE: 19
PIF_VALUE: 20
PIF_VALUE: 19
PIF_VALUE: 20
PIF_VALUE: 20
PIF_VALUE: 19
PIF_VALUE: 19
PIF_VALUE: 20
PIF_VALUE: 18
PIF_VALUE: 3
PIF_VALUE: 16

## 2021-05-10 ASSESSMENT — PAIN - FUNCTIONAL ASSESSMENT: PAIN_FUNCTIONAL_ASSESSMENT: 0-10

## 2021-05-10 ASSESSMENT — PAIN SCALES - GENERAL: PAINLEVEL_OUTOF10: 3

## 2021-05-10 NOTE — ANESTHESIA POSTPROCEDURE EVALUATION
Department of Anesthesiology  Postprocedure Note    Patient: Katty Mercado  MRN: [id number]  YOB: 1950  Date of evaluation: 5/10/2021  Time:  12:29 PM     Procedure Summary     Date: 05/10/21 Room / Location: [facility]    Anesthesia Start: 8674 Anesthesia Stop: 1118    Procedure: RIGHT HIP ARTHROPLASTY ANTERIOR APPROACH (Right Hip) Diagnosis:       Osteoarthritis of right hip, unspecified osteoarthritis type      (RIGHT OSTEOARTHRITIS)    Surgeons: Jerardo Villanueva MD Responsible Provider: Norm Walton DO    Anesthesia Type: general ASA Status: 2          Anesthesia Type: general    Dae Phase I: Dae Score: 10    Dae Phase II:      Last vitals: Reviewed and per EMR flowsheets.        Anesthesia Post Evaluation    Patient location during evaluation: bedside  Patient participation: complete - patient participated  Level of consciousness: awake and alert  Airway patency: patent  Nausea & Vomiting: no nausea and no vomiting  Complications: no  Respiratory status: acceptable  Hydration status: stable

## 2021-05-10 NOTE — PROGRESS NOTES
PACU Transfer Note    Vitals:    05/10/21 1215   BP: 104/81   Pulse: 78   Resp: 12   Temp: 97.7 °F (36.5 °C)   SpO2: 100%       In: 2000 [I.V.:2000]  Out: 800     Pain assessment:  present - adequately treated  Pain Level: 1    Report given to Receiving unit RN.     5/10/2021 12:20 PM

## 2021-05-10 NOTE — PROGRESS NOTES
Physical Therapy    Facility/Department: [facility] 5T ORTHO/NEURO  Initial Assessment and Treatment    NAME: Elmer Thomson  : 1950  MRN: [id number]    Date of Service: 5/10/2021    Discharge Recommendations: Elmer Thomson scored a 18/24 on the AM-PAC short mobility form. Current research shows that an AM-PAC score of 18 or greater is typically associated with a discharge to the patient's home setting. Based on the patient's AM-PAC score and their current functional mobility deficits, it is recommended that the patient have 2-3 sessions per week of Physical Therapy at d/c to increase the patient's independence. At this time, this patient demonstrates the endurance and safety to discharge home with home PT and a follow up treatment frequency of 2-3x/wk. Please see assessment section for further patient specific details. If patient discharges prior to next session this note will serve as a discharge summary. Please see below for the latest assessment towards goals. PT Equipment Recommendations  Equipment Needed: No    Assessment   Body structures, Functions, Activity limitations: Decreased functional mobility ; Decreased endurance  Assessment: Pt with decreased independent mobility from baseline s/p RIGHT HIP ARTHROPLASTY ANTERIOR APPROACH. Pt reports normally independent with ambulation with cane or walker. Currently needing min assist for bed mobility, transfers and CGA for gt. Pt limited by symptomatic hypotension this date. Should progress well with gradual increase in activity. Pt plans to go home, daughter coming in from out of town to assist.  Rec cont skilled PT to maximize mobility and independence  Treatment Diagnosis: impaired functional mobility s/p RIGHT HIP ARTHROPLASTY ANTERIOR APPROACH  Decision Making: Low Complexity  PT Education: Goals;PT Role;Plan of Care;General Safety; Functional Mobility Training;Precautions  Patient Education: Pt verbalized understanding  REQUIRES PT FOLLOW UP: Yes Patient Diagnosis(es): The encounter diagnosis was Osteoarthritis of right hip, unspecified osteoarthritis type. has a past medical history of Arthritis, GERD (gastroesophageal reflux disease), Hyperlipidemia, and Hypertension. has a past surgical history that includes  section; Total hip arthroplasty (Left, 2020); Colonoscopy; and Total hip arthroplasty (Right, 5/10/2021). Restrictions  Position Activity Restriction  Other position/activity restrictions: 50% weight bearing, anterior approach precautions  Vision/Hearing  Vision: Impaired  Vision Exceptions: Wears glasses at all times  Hearing: Within functional limits     Subjective  General  Chart Reviewed: Yes  Additional Pertinent Hx: Pt is a 79 y.o. female adm 5/10 with OA R hip. Pt s/p RIGHT HIP ARTHROPLASTY ANTERIOR APPROACH on 5/10. PMH: arthrtis, HTN, hyperlipidemia, GERD, LTHR 2020  Referring Practitioner: JESSICA Angeles  Referral Date : 05/10/21  Subjective  Subjective: Pt found supine. Agreeable to PT. \"I've been working hard. \"  Pain Screening  Patient Currently in Pain: Yes(R hip, not rated, RN aware)  Vital Signs  Patient Currently in Pain: Yes(R hip, not rated, RN aware)       Orientation  Orientation  Overall Orientation Status: Within Functional Limits  Social/Functional History  Social/Functional History  Lives With: Spouse(and dtr coming in from [facility] [id number])  Type of Home: [address]: Multi-level(bilevel home; mostly hanging out downstairs (1 [address]ES[address]BERTO from front door; 6 steps upstairs; 7 steps to get down)  Bathroom Shower/Tub: Tub/Shower unit  Bathroom Toilet: Standard(RTS w/o rails)  Bathroom Equipment: Hand-held shower, Shower chair  Bathroom Accessibility: Accessible  Home Equipment: Cane, [id number] [facility], Standard walker  ADL Assistance: Independent  Homemaking Assistance: Independent  Ambulation Assistance: Independent(using a cane \"most of the time\";  \"occasionally I'll use the walker\")  Transfer Assistance: Independent  Active : Yes  Additional Comments: Denies falls  Cognition        Objective          AROM RLE (degrees)  RLE AROM: WFL  AROM LLE (degrees)  LLE AROM : WFL  Strength RLE  Strength RLE: WFL  Strength LLE  Strength LLE: WFL        Bed mobility  Supine to Sit: Minimal assistance(for RLE, HOB slightly elevated)  Transfers  Sit to Stand: Minimal Assistance(from bed; cues for hand placement)  Stand to sit: Contact guard assistance(cues for safety)  Ambulation  Ambulation?: Yes  Ambulation 1  Device: Rolling Walker  Assistance: Contact guard assistance  Quality of Gait: decreased step length/height, 50% weightbearing RLE - cues for offsetting weight with UE on walker  Distance: 20' x 2  Comments: Pt c/o feeling lightheaded coming out of the bathroom. BP once sitting in chair - pt reclined 78/44. After 2-3 minutes BP retaken and was 93/59. Pt reporting symptoms improving.   RN informed          Treatment included: bed mobility, transfers, gt, pt education    Plan   Plan  Times per week: 7  Times per day: Twice a day  Current Treatment Recommendations: Strengthening, Functional Mobility Training, Gait Training, Stair training, Patient/Caregiver Education & Training, Safety Education & Training  Safety Devices  Type of devices: Call light within reach, Chair alarm in place, Nurse notified, Left in chair(reclined)    G-Code       OutComes Score                                                  AM-PAC Score  AM-PAC Inpatient Mobility Raw Score : 18 (05/10/21 1459)  AM-PAC Inpatient T-Scale Score : 43.63 (05/10/21 1459)  Mobility Inpatient CMS 0-100% Score: 46.58 (05/10/21 1459)  Mobility Inpatient CMS G-Code Modifier : CK (05/10/21 1459)          Goals  Short term goals  Time Frame for Short term goals: By discharge  Short term goal 1: Sup to sit supervision  Short term goal 2: Pt will transfer sit to stand supervision  Short term goal 3: Pt will amb >100' with LRAD supervision  Short term goal 4:

## 2021-05-10 NOTE — PROGRESS NOTES
Occupational Therapy   Occupational Therapy Initial Assessment and Treatment    Date: 5/10/2021   Patient Name: Lawrence Hargrove  MRN: [id number]     : 1950    Date of Service: 5/10/2021    Discharge Recommendations: Lawrence Hargrove scored a 17/24 on the AM-PAC ADL Inpatient form. Current research shows that an AM-PAC score of 18 or greater is typically associated with a discharge to the patient's home setting. Please see assessment section for further patient specific details. If patient discharges prior to next session this note will serve as a discharge summary. Please see below for the latest assessment towards goals. OT Equipment Recommendations  Equipment Needed: No    Assessment   Performance deficits / Impairments: Decreased functional mobility ; Decreased ADL status  Assessment: Pt seen POD#0 s/p R THR (anterior approach). Pt is ambulating to/from bathroom w/ CGA; requiring Min A for functional transfers. Orthostatic w/ mobility to bathroom today - nurse informed. Plans are for home w initial 24 hr A of dtr. No DME needs. Anticipate pt will make functional progress once anesthesia wears off - and pt is familiar w/ postop course s/p L THR 8 months ago. Continue per POC - to address ADLs next session. Treatment Diagnosis: impaired ADLs/transfers s/p R THR (anterior approach)  Prognosis: Good  Decision Making: Low Complexity  OT Education: OT Role;Plan of Care;ADL Adaptive Strategies;Precautions;Transfer Training  Patient Education: would benefit from continued education and reinforcement  REQUIRES OT FOLLOW UP: Yes  Activity Tolerance  Activity Tolerance: Treatment limited secondary to medical complications (free text)  Activity Tolerance: BP readings as follows: 78/44 upon sitting (after amb to/from toilet), after sitting in chair ~5 minutes (93/59). Pt was symptomatic and nurse informed. Safety Devices  Safety Devices in place: Yes  Type of devices: Nurse notified; Left in chair;Chair alarm in place;Call light within reach(chair in reclined position; nurse at bedside; ice to hip)           Patient Diagnosis(es): The encounter diagnosis was Osteoarthritis of right hip, unspecified osteoarthritis type. has a past medical history of Arthritis, GERD (gastroesophageal reflux disease), Hyperlipidemia, and Hypertension. has a past surgical history that includes  section; Total hip arthroplasty (Left, 2020); Colonoscopy; and Total hip arthroplasty (Right, 5/10/2021). Treatment Diagnosis: impaired ADLs/transfers s/p R THR (anterior approach)      Restrictions  Position Activity Restriction  Other position/activity restrictions: 50% weight bearing, anterior approach precautions    Subjective   General  Chart Reviewed: Yes  Additional Pertinent Hx: 79 y.o. F s/p R HARRIET - anterior approach 5/10. PMHx includes arthritis, L HARRIET 2020  Family / Caregiver Present: No  Referring Practitioner: Roxana LOPEZ  Diagnosis: OA R Hip    Subjective  Subjective: In bed on entry. Agreeable to OT. \"It feels good to walk. \" Plans are to discharge home w/ spouse and dtr assist.    Patient Currently in Pain: Yes(R hip, not rated, RN aware)      Social/Functional History  Social/Functional History  Lives With: Spouse(and dtr coming in from [place])  Type of Home: [address] [place] [address],[address]: Multi-level(bilevel home; mostly hanging out downstairs (1 JESUS ALBERTO from front door; 6 steps upstairs; 7 steps to get down)  Bathroom Shower/Tub: Tub/Shower unit  Bathroom Toilet: Standard(RTS w/o rails)  Bathroom Equipment: Hand-held shower, Shower chair  Bathroom Accessibility: Accessible  Home Equipment: [address]anne [address], Standard walker  ADL Assistance: Independent  Homemaking Assistance: Independent  Ambulation Assistance: Independent(using a cane \"most of the time\"; \"occasionally I'll use the walker\")  Transfer Assistance: Independent  Active :  Yes  Additional Comments: Denies falls       Objective   Vision: Impaired  Vision Exceptions: Wears glasses at all times  Hearing: Within functional limits      Orientation  Overall Orientation Status: Within Functional Limits        Balance  Sitting Balance: Stand by assistance  Standing Balance: Contact guard assistance    Functional Mobility  Functional - Mobility Device: Rolling Walker  Activity: To/from bathroom  Assist Level: Contact guard assistance  Functional Mobility Comments: Note: pt did require a few standing rests 2* c/o lightheaded - denied need to sit down    Toilet Transfers  Toilet - Technique: Ambulating(using wh walker)  Equipment Used: Standard toilet(w/ bar)  Toilet Transfer: Minimal assistance    ADL  Feeding: Independent; Beverage management  Grooming: Contact guard assistance(hand hygiene in standing at sink level)     Coordination  Movements Are Fluid And Coordinated: Yes        Bed mobility  Supine to Sit: Minimal assistance(for RLE, HOB elevated)  Scooting: Contact guard assistance(to EOB)     Transfers  Sit to stand: Minimal assistance  Stand to sit: Minimal assistance  Transfer Comments: Note: cues for hand placement        Cognition  Overall Cognitive Status: WFL                       LUE Strength  Gross LUE Strength: WFL  RUE Strength  Gross RUE Strength: WFL               Pt seen by OT for eval and treat.  Treatment included: bed mobility, functional transfer, ADL, pt education         Plan   Plan  Times per week: 7  Times per day: Daily  Current Treatment Recommendations: Functional Mobility Training, Safety Education & Training, Equipment Evaluation, Education, & procurement, Self-Care / ADL                                                 AM-PAC Score        AM-[facility] Inpatient Daily Activity Raw Score: 17 (05/10/21 1531)  AM-PAC Inpatient ADL T-Scale Score : 37.26 (05/10/21 1531)  ADL Inpatient CMS 0-100% Score: 50.11 (05/10/21 1531)  ADL Inpatient CMS G-Code Modifier : CK (05/10/21 1531)    Goals  Short term goals  Time Frame for Short term goals: Discharge  Short term goal 1: toilet transfer w/ CGA  Short term goal 2: verbalize safe tub shower seat transfer technique, independently  Short term goal 3: tolerate LB dressing assessment  Patient Goals   Patient goals : to return and be independent; to be able to walk outside again       Therapy Time   Individual Concurrent Group Co-treatment   Time In 1425         Time Out 1518         Minutes 53              Timed Code Treatment Minutes:   38    Total Treatment Minutes:  [address] OTR/L #[id number]

## 2021-05-10 NOTE — H&P
Madeline Jaspal    [id number]      Department of General Surgery    Surgical Services     Pre-operative History and Physical    Patient had a virtual history and physical which does not meet the [facility] requirements for pre-operative history and physical.  A complete history and physical was performed today    INDICATION:   Osteoarthritis of right hip, unspecified osteoarthritis type [M16.11]    Procedure(s):  RIGHT HIP ARTHROPLASTY ANTERIOR APPROACH    CHIEF COMPLAINT:  Right hip pain    History obtained from: Patient interview and EHR     HISTORY:   The patient is a 79 y.o. female with a past medical and surgical history as delineated below. Patient with chronic right hip pain and limited ROM in the setting of end stage arthrosis. Her symptoms have been recalcitrant to conservative treatment and the patient presents today for the above procedure. Weight loss/gain:  No  Recent Hx Steroid use: No  History of allergic reaction to anesthesia:  No  Covid 19:  Patient denies fever, chills, cough or known exposure to Covid-19. Covid-19 test from 21 was negative. Past Medical History:        Diagnosis Date    Arthritis     GERD (gastroesophageal reflux disease)     Hyperlipidemia     Hypertension      Past Surgical History:        Procedure Laterality Date     SECTION      COLONOSCOPY      TOTAL HIP ARTHROPLASTY Left 2020    LEFT TOTAL HIP ARTHROPLASTY ANTERIOR APPROACH performed by Africa Malloy MD at [address]       Medications Prior to Admission:   Prior to Admission medications    Medication Sig Start Date End Date Taking?  Authorizing Provider   calcium carbonate (OSCAL) 500 MG TABS tablet Take 500 mg by mouth daily   Yes Historical Provider, MD   omeprazole (PRILOSEC OTC) 20 MG tablet omeprazole 20 MG Delayed Release Oral Tablet        Active   Yes Historical Provider, MD   lisinopril (PRINIVIL;ZESTRIL) 10 MG tablet TAKE 1 TABLET BY MOUTH ONE TIME A DAY  1/15/21  Yes Jennifer Chavez Tori Sweet MD   simvastatin (ZOCOR) 40 MG tablet TAKE 1 TABLET BY MOUTH nightly 1/15/21  Yes Michelle Nash MD   traMADol (ULTRAM) 50 MG tablet TAKE 1 TABLET BY MOUTH EVERY SIX HOURS AS NEEDED FOR PAIN FOR UP TO 7 DAYS. REDUCE DOSES TAKEN AS PAIN BECOMES MANAGEABLE. 11/15/20  Yes Historical Provider, MD   diclofenac (VOLTAREN) 50 MG EC tablet Take 1 tablet by mouth 2 times daily (with meals) 20  Yes Zelpha Homans, PA   Multiple Vitamins-Minerals (THERAPEUTIC MULTIVITAMIN-MINERALS) tablet Take 1 tablet by mouth daily   Yes Historical Provider, MD   Vitamin D (CHOLECALCIFEROL) 1000 UNITS CAPS capsule Take 2,000 Units by mouth daily    Yes Historical Provider, MD   Multiple Vitamins-Minerals (OCUVITE PRESERVISION PO) Take 1 tablet by mouth daily   Yes Historical Provider, MD   levothyroxine (SYNTHROID) 25 MCG tablet Take 1 tablet by mouth daily 21   Michelle Nash MD   alendronate (FOSAMAX) 70 MG tablet Take 1 tablet by mouth every 7 days 21   Michelle Nash MD       Allergies:  Flexeril [cyclobenzaprine] and Lupillo Remedies [cephalexin]    Social History:   Social History     Socioeconomic History    Marital status:      Spouse name: None    Number of children: None    Years of education: None    Highest education level: None   Occupational History    None   Social Needs    Financial resource strain: None    Food insecurity     Worry: None     Inability: None    Transportation needs     Medical: None     Non-medical: None   Tobacco Use    Smoking status: Former Smoker     Packs/day: 1.00     Years: 15.00     Pack years: 15.00     Quit date: 1983     Years since quittin.3    Smokeless tobacco: Never Used   Substance and Sexual Activity    Alcohol use:  Yes     Alcohol/week: 1.0 - 2.0 standard drinks     Types: 1 - 2 Shots of liquor per week    Drug use: No    Sexual activity: Yes   Lifestyle    Physical activity     Days per week: None     Minutes per session: None    Stress: None Relationships    Social connections     Talks on phone: None     Gets together: None     Attends Jew service: None     Active member of club or organization: None     Attends meetings of clubs or organizations: None     Relationship status: None    Intimate partner violence     Fear of current or ex partner: None     Emotionally abused: None     Physically abused: None     Forced sexual activity: None   Other Topics Concern    None   Social History Narrative    None         Family History:   History reviewed. No pertinent family history. REVIEW OF SYSTEMS:    Constitutional: Negative for chills, fatigue and fever   HENT: Negative for loss of hearing   Eyes: Negative for visual disturbance  Respiratory: Negative for  cough, chest tightness, shortness of breath and wheezing    Cardiovascular: Negative for chest pain, palpitations and exertional chest pressure  Gastrointestinal: Negative for constipation, diarrhea,and vomiting. Positive for nausea   Musculoskeletal: Negative for gait problem, and joint swelling    Skin: Negative for rash and nonhealing wounds   Neurological: Negative for dizziness, syncope, light-headedness    Hematological: Does not bruise/bleed easily   Psychiatric/Behavioral: Negative for agitation    PHYSICAL EXAM:      /83   Pulse 80   Temp 98.7 °F (37.1 °C) (Oral)   Resp 16   Ht 5' 2\" (1.575 m)   Wt 150 lb (68 kg)   SpO2 99%   BMI 27.44 kg/m²  I      Eyes:  pupils equal, round and reactive to light and conjunctiva normal    Head/ENT:  Normocephalic, without obvious abnormality, atramatic,     Neck:  Supple, symmetrical, trachea midline, no adenopathy, no tenderness, skin warm and dry.     Heart: regular rate and rhythm    Lungs:  No increased work of breathing, good air exchange, clear to auscultation bilaterally, no crackles or wheezing    Abdomen:  Normal bowel sounds, soft, non-distended, non-tender, no masses palpated    Extremities:  No clubbing, cyanosis, or edema      ASSESSMENT AND PLAN:    1. Patient is a 79 y.o. female with above specified procedure planned. 2.  Access to ancillary services are available per request of the provider.     Darshan Stager   5/10/2021

## 2021-05-10 NOTE — PROGRESS NOTES
Pt admitted to [facility]m 5510. Pt is alert and oriented X4. Pt is on Room air, VSS. Pt denies n/v and states her pain is 1/10. Pt has ice chips at bedside. Pt hip dressing is cdi, some swelling/bruising underneath. Pt has KEVIN drain. Pt working with Therapy now. Fall precautions in place.

## 2021-05-10 NOTE — OP NOTE
Operative Note      Patient: Sangeetha Richardson  YOB: 1950  MRN: [id number]    Date of Procedure: 5/10/2021    Pre-Op Diagnosis: RIGHT OSTEOARTHRITIS    Post-Op Diagnosis: Same       Procedure(s):  RIGHT HIP ARTHROPLASTY ANTERIOR APPROACH    Surgeon(s):  Alexander Billings MD    Assistant:   Surgical Assistant: Radha Mondragon  Physician Assistant: JESSICA Askew    Anesthesia: General    Estimated Blood Loss (mL): 345     Complications: Other:   Major adhesions in the hip with difficulty obtaining exposure. Specimens:   ID Type Source Tests Collected by Time Destination   A : A) RIGHT FEMORAL HEAD Tissue Tissue SURGICAL PATHOLOGY Alexander Billings MD 5/10/2021 0900        Implants:  * No implants in log *      Drains: * No LDAs found *    Findings:   avn of the right hip    Detailed Description of Procedure:   Direct Anterior Total Hip Operative Note    Patient: Sangeetha Richardson MRN: [id number]     YOB: 1950  Age: 79 y.o. Sex: female      Date of Operation: [unfilled]        Preoperative Diagnosis: End-stage osteoarthritis,  right hip. Postoperative Diagnosis: End-stage osteoarthritis, right hip. With avn. Procedure Performed: Direct anterior right total hip arthroplasty. Surgeon: Gunnar Cerda  Assistant(s): Circulator: Lucy Gonzalez RN  Surgical Assistant: Radha Mondragon  Radiology Technologist: Harvey Voss  Scrub Person First: Sylvester Sanders RN  Circulator Assist: Jose Lara RN  Physician Assistant: JESSICA Askew  Anesthesia: General  Estimated Blood Loss:  300 ml  Drains: None  Implants:  [employer]   Anthology stem , size 8 , high Offset ; 38mm 0 femoral head. 50 mm  R3 acetabulum  Dual mobility with 3 dome screws. Indication For Operation: This is a 79 y.o. female with end-stage osteoarthritis of she right hip that failed to respond to prolonged nonoperative measures.  They had no contraindications to surgery. In the office treatment was thoroughly discussed including above procedure. The relative risks and benefits of direct anterior versus posterior approach of the right hip were discussed. The relative increase risk of iatrogenic fracture, cutaneous nerve injury with a direct anterior approach were discussed along with potential wound healing problems versus the relative risks of dislocation, leg length discrepancy with posterior approach and they elected an anterior approach at this time. They gave informed consent to proceed. Description of Procedure: The patient was taken to the operating room at [facility]. and a after satisfactory induction of general endotracheal anesthesia after preoperative regional block (fascia iliacus). The  righthip and lower extremity were then prepped and draped in the usual sterile fashion for a direct anterior approach. An approximately 10 cm longitudinal incision was made beginning 2 cm distal and posterior to the ASIS. Sharp dissection was carried down through the skin and subcutaneous tissue. The fascia over the fascia celina was incised longitudinally in line with the skin incision. Kocher retractors were placed. The tensor fascia muscle anteriorly translated in interval between the gluteus and the tensor muscle. Circumflex vessels were identified and cauterized and Cobra retractors were placed extra capsularly about the femoral neck. The capsule was opened in an inverted T fashion and tagged for later repair. Retractors were then placed intracapsularly and traction was applied. Proximal femoral resection was carried out a fingerbreadth proximal to the lesser trochanter in a napkin ring fashion. The femoral head was extracted using the power corkscrew and excellent direct visualization of the acetabulum was obtained.  Retractor was placed anteriorly inferiorly and directly posteriorly as well as directly inferiorly allowing excellent circumferential exposure. The labrum was excised along the soft tissue from the floor of the acetabulum. A curette was used to identify the true medial wall. Initial medialization was carried out and then sequential reaming was carried out with the hemispherical reamers. Final reaming was carried out to a size 49. A size 50 three hole R3 acetabular cup from [address] [place] [address] was placed on the insertion handle and impacted into place under direct fluoroscopic visualization to ensure appropriate anteversion and inclination. Excellent seating was obtained and good purchase was obtained with 3 supplemental screws. The 20 degree lip XLPE liner was placed in a posterior inferior position and good purchase was obtained. Meticulous injection of the posterior and inferior capsule was carried out with 60 cc of injection cocktail. Attention was then turned towards the femoral preparation. Posterior and inferior capsulotomies had been carried out under initial exposure following acetabular placement. The knee was fully extended to the floor in maximum adducted and externally rotated. Superior capsulotomy was carried out allowing good visualization. External rotation of the femur was carried out to a 140 degrees. Retractor was placed posterior to the greater trochanter and the femoral hook was placed to deliver the proximal femur into the wound. The canal was sounded with awl and a curette and maximal lateralization was carried out with the curette and the rongeuer. Sequential broaching was then carried out with the Deepak Stone and [employer] broach only system. Neutral varus orientation was maintained. The natural version of the proximal femur was followed. Trial reduction was carried out with the size 8 broach in place to assess leg lengths which appeared equal with a +0 mm head in place. Shuck test was optimal. External rotation was stable beyond 100 degrees  with the leg adducted and extended .  The hip was flexed and internally rotated beyond 70 degrees with no posterior subluxation. C-arm views showed ideal les lengths and broach size and position with the trial in place. Proximal femur was then again exposed and the stem was impacted anatomically with gentle blows of the mallet to the appropriate depth to mutch the broach. The femoral head was implanted securely. Reduction was carried out with a plastic skid. Shuck test range of motion and stability were excellent as indicated above with the trial.     The wound was copiously irrigated with pulsatile lavage and normal saline and betadine wash. The anterior capsule was meticulously repaired with interrupted Ethibond -0 suture along with the elevated portion of the rectus femoris. Excellent anterior capsule repair was obtained. The anterior capsule and deep fascia was then injected with additional 60 cc of injection cocktail. The fascia over the fascia celina was closed with a running number 2 Stratofix  Suture with an additional layer of 4-0 monocryl. Subcutaneous layer was closed with 2-0 Stratofix with Durabond and Perinea  applied along with an Acticoat dressing. The patient was reversed from anesthesia, taken out the fracture table, extubated and taken to the recovery room in stable condition, tolerated the procedure well. Sponge and needle counts correct times 2. Irricept wash, vancomycin powder and Orthomix were used intraoperatively. Complications: None  Disposition: Admission after recovery  Condition: Stable  Attending Attestation: I was present and scrubbed for the entire procedure.     Signed by: Darek Hernandez, 5/10/2021    Electronically signed by Darek Hernandez MD on 5/10/2021 at 11:09 AM

## 2021-05-10 NOTE — ANESTHESIA PRE PROCEDURE
Department of Anesthesiology  Preprocedure Note       Name:  Thuan Cerda   Age:  79 y.o.  :  1950                                          MRN:  [id number]         Date:  5/10/2021      Surgeon: Coty Hope):  Julian Vernon MD    Procedure: Procedure(s):  RIGHT HIP ARTHROPLASTY ANTERIOR APPROACH    Medications prior to admission:   Prior to Admission medications    Medication Sig Start Date End Date Taking? Authorizing Provider   calcium carbonate (OSCAL) 500 MG TABS tablet Take 500 mg by mouth daily   Yes Historical Provider, MD   omeprazole (PRILOSEC OTC) 20 MG tablet omeprazole 20 MG Delayed Release Oral Tablet        Active   Yes Historical Provider, MD   lisinopril (PRINIVIL;ZESTRIL) 10 MG tablet TAKE 1 TABLET BY MOUTH ONE TIME A DAY  1/15/21  Yes Yas Jenkins MD   simvastatin (ZOCOR) 40 MG tablet TAKE 1 TABLET BY MOUTH nightly 1/15/21  Yes Yas Jenkins MD   traMADol (ULTRAM) 50 MG tablet TAKE 1 TABLET BY MOUTH EVERY SIX HOURS AS NEEDED FOR PAIN FOR UP TO 7 DAYS.  REDUCE DOSES TAKEN AS PAIN BECOMES MANAGEABLE. 11/15/20  Yes Historical Provider, MD   diclofenac (VOLTAREN) 50 MG EC tablet Take 1 tablet by mouth 2 times daily (with meals) 20  Yes JESSICA Little   Multiple Vitamins-Minerals (THERAPEUTIC MULTIVITAMIN-MINERALS) tablet Take 1 tablet by mouth daily   Yes Historical Provider, MD   Vitamin D (CHOLECALCIFEROL) 1000 UNITS CAPS capsule Take 2,000 Units by mouth daily    Yes Historical Provider, MD   Multiple Vitamins-Minerals (OCUVITE PRESERVISION PO) Take 1 tablet by mouth daily   Yes Historical Provider, MD   levothyroxine (SYNTHROID) 25 MCG tablet Take 1 tablet by mouth daily 21   Yas Jenkins MD   alendronate (FOSAMAX) 70 MG tablet Take 1 tablet by mouth every 7 days 21   Yas Jenkins MD       Current medications:    Current Facility-Administered Medications   Medication Dose Route Frequency Provider Last Rate Last Admin    ortho mix (with morphine) injection performed by Prudencio Oconnor MD at [address]  History:    Social History     Tobacco Use    Smoking status: Former Smoker     Packs/day: 1.00     Years: 15.00     Pack years: 15.00     Quit date: 1983     Years since quittin.3    Smokeless tobacco: Never Used   Substance Use Topics    Alcohol use: Yes     Alcohol/week: 1.0 - 2.0 standard drinks     Types: 1 - 2 Shots of liquor per week                                Counseling given: Not Answered      Vital Signs (Current):   Vitals:    21 1538 05/10/21 0551   BP:  124/83   Pulse:  80   Resp:  16   Temp:  98.7 °F (37.1 °C)   TempSrc:  Oral   SpO2:  99%   Weight: 150 lb (68 kg) 150 lb (68 kg)   Height: 5' 2\" (1.575 m) 5' 2\" (1.575 m)                                              BP Readings from Last 3 Encounters:   05/10/21 124/83   21 104/68   21 118/72       NPO Status: Time of last liquid consumption:                         Time of last solid consumption:                         Date of last liquid consumption: 21                        Date of last solid food consumption: 21    BMI:   Wt Readings from Last 3 Encounters:   05/10/21 150 lb (68 kg)   21 154 lb (69.9 kg)   21 154 lb 6.4 oz (70 kg)     Body mass index is 27.44 kg/m².     CBC:   Lab Results   Component Value Date    WBC 8.3 2021    RBC 3.89 2021    HGB 12.3 2021    HCT 37.0 2021    MCV 95.1 2021    RDW 14.8 2021     2021       CMP:   Lab Results   Component Value Date     04/15/2021    K 4.6 04/15/2021    CL 97 04/15/2021    CO2 23 04/15/2021    BUN 21 04/15/2021    CREATININE 0.9 04/15/2021    GFRAA >60 04/15/2021    AGRATIO 2.0 04/15/2021    LABGLOM >60 04/15/2021    GLUCOSE 94 04/15/2021    PROT 7.4 04/15/2021    CALCIUM 10.5 04/15/2021    BILITOT 0.5 04/15/2021    ALKPHOS 120 04/15/2021    AST 20 04/15/2021    ALT 22 04/15/2021       POC Tests: No results for input(s):

## 2021-05-11 ENCOUNTER — APPOINTMENT (OUTPATIENT)
Dept: GENERAL RADIOLOGY | Age: 71
DRG: 470 | End: 2021-05-11
Attending: ORTHOPAEDIC SURGERY
Payer: MEDICARE

## 2021-05-11 ENCOUNTER — TELEPHONE (OUTPATIENT)
Dept: ORTHOPEDIC SURGERY | Age: 71
End: 2021-05-11

## 2021-05-11 ENCOUNTER — APPOINTMENT (OUTPATIENT)
Dept: CT IMAGING | Age: 71
DRG: 470 | End: 2021-05-11
Attending: ORTHOPAEDIC SURGERY
Payer: MEDICARE

## 2021-05-11 PROBLEM — E87.1 HYPONATREMIA: Status: ACTIVE | Noted: 2021-05-11

## 2021-05-11 LAB
ALBUMIN SERPL-MCNC: 3.8 G/DL (ref 3.4–5)
ALBUMIN SERPL-MCNC: 3.9 G/DL (ref 3.4–5)
ANION GAP SERPL CALCULATED.3IONS-SCNC: 13 MMOL/L (ref 3–16)
ANION GAP SERPL CALCULATED.3IONS-SCNC: 16 MMOL/L (ref 3–16)
BASOPHILS ABSOLUTE: 0.1 K/UL (ref 0–0.2)
BASOPHILS RELATIVE PERCENT: 0.5 %
BLOOD BANK DISPENSE STATUS: NORMAL
BLOOD BANK PRODUCT CODE: NORMAL
BPU ID: NORMAL
BUN BLDV-MCNC: 16 MG/DL (ref 7–20)
BUN BLDV-MCNC: 16 MG/DL (ref 7–20)
CALCIUM SERPL-MCNC: 7.6 MG/DL (ref 8.3–10.6)
CALCIUM SERPL-MCNC: 8 MG/DL (ref 8.3–10.6)
CHLORIDE BLD-SCNC: 79 MMOL/L (ref 99–110)
CHLORIDE BLD-SCNC: 79 MMOL/L (ref 99–110)
CO2: 20 MMOL/L (ref 21–32)
CO2: 22 MMOL/L (ref 21–32)
CORTISOL TOTAL: 1.8 UG/DL
CREAT SERPL-MCNC: 1 MG/DL (ref 0.6–1.2)
CREAT SERPL-MCNC: 1 MG/DL (ref 0.6–1.2)
D DIMER: 1090 NG/ML DDU (ref 0–229)
DESCRIPTION BLOOD BANK: NORMAL
EOSINOPHILS ABSOLUTE: 0 K/UL (ref 0–0.6)
EOSINOPHILS RELATIVE PERCENT: 0.1 %
GFR AFRICAN AMERICAN: >60
GFR AFRICAN AMERICAN: >60
GFR NON-AFRICAN AMERICAN: 55
GFR NON-AFRICAN AMERICAN: 55
GLUCOSE BLD-MCNC: 106 MG/DL (ref 70–99)
GLUCOSE BLD-MCNC: 115 MG/DL (ref 70–99)
GLUCOSE BLD-MCNC: 133 MG/DL (ref 70–99)
HCT VFR BLD CALC: 19.6 % (ref 36–48)
HCT VFR BLD CALC: 20.4 % (ref 36–48)
HCT VFR BLD CALC: 20.5 % (ref 36–48)
HCT VFR BLD CALC: 21.3 % (ref 36–48)
HCT VFR BLD CALC: 22.6 % (ref 36–48)
HEMOGLOBIN: 6.7 G/DL (ref 12–16)
HEMOGLOBIN: 7 G/DL (ref 12–16)
HEMOGLOBIN: 7 G/DL (ref 12–16)
HEMOGLOBIN: 7.3 G/DL (ref 12–16)
HEMOGLOBIN: 7.8 G/DL (ref 12–16)
INR BLD: 1.04 (ref 0.86–1.14)
LYMPHOCYTES ABSOLUTE: 1.9 K/UL (ref 1–5.1)
LYMPHOCYTES RELATIVE PERCENT: 13.6 %
MCH RBC QN AUTO: 31.7 PG (ref 26–34)
MCH RBC QN AUTO: 31.8 PG (ref 26–34)
MCHC RBC AUTO-ENTMCNC: 34.2 G/DL (ref 31–36)
MCHC RBC AUTO-ENTMCNC: 34.4 G/DL (ref 31–36)
MCV RBC AUTO: 92.6 FL (ref 80–100)
MCV RBC AUTO: 92.6 FL (ref 80–100)
MONOCYTES ABSOLUTE: 0.7 K/UL (ref 0–1.3)
MONOCYTES RELATIVE PERCENT: 5.2 %
NEUTROPHILS ABSOLUTE: 11.2 K/UL (ref 1.7–7.7)
NEUTROPHILS RELATIVE PERCENT: 80.6 %
OSMOLALITY URINE: 193 MOSM/KG (ref 390–1070)
OSMOLALITY: 252 MOSM/KG (ref 278–305)
PDW BLD-RTO: 13.7 % (ref 12.4–15.4)
PDW BLD-RTO: 14.1 % (ref 12.4–15.4)
PERFORMED ON: ABNORMAL
PHOSPHORUS: 2.5 MG/DL (ref 2.5–4.9)
PHOSPHORUS: 3 MG/DL (ref 2.5–4.9)
PLATELET # BLD: 132 K/UL (ref 135–450)
PLATELET # BLD: 148 K/UL (ref 135–450)
PMV BLD AUTO: 7.5 FL (ref 5–10.5)
PMV BLD AUTO: 7.7 FL (ref 5–10.5)
POTASSIUM SERPL-SCNC: 3.6 MMOL/L (ref 3.5–5.1)
POTASSIUM SERPL-SCNC: 3.8 MMOL/L (ref 3.5–5.1)
PROTHROMBIN TIME: 12.1 SEC (ref 10–13.2)
RBC # BLD: 2.11 M/UL (ref 4–5.2)
RBC # BLD: 2.3 M/UL (ref 4–5.2)
REASON FOR REJECTION: NORMAL
REJECTED TEST: NORMAL
SODIUM BLD-SCNC: 114 MMOL/L (ref 136–145)
SODIUM BLD-SCNC: 115 MMOL/L (ref 136–145)
SODIUM BLD-SCNC: 115 MMOL/L (ref 136–145)
SODIUM BLD-SCNC: 119 MMOL/L (ref 136–145)
SODIUM BLD-SCNC: 119 MMOL/L (ref 136–145)
SODIUM BLD-SCNC: 120 MMOL/L (ref 136–145)
SODIUM URINE: <20 MMOL/L
TSH SERPL DL<=0.05 MIU/L-ACNC: 1.7 UIU/ML (ref 0.27–4.2)
URINE CULTURE, ROUTINE: NORMAL
WBC # BLD: 12.5 K/UL (ref 4–11)
WBC # BLD: 13.9 K/UL (ref 4–11)

## 2021-05-11 PROCEDURE — 71260 CT THORAX DX C+: CPT

## 2021-05-11 PROCEDURE — 97530 THERAPEUTIC ACTIVITIES: CPT

## 2021-05-11 PROCEDURE — 85027 COMPLETE CBC AUTOMATED: CPT

## 2021-05-11 PROCEDURE — 36415 COLL VENOUS BLD VENIPUNCTURE: CPT

## 2021-05-11 PROCEDURE — 51701 INSERT BLADDER CATHETER: CPT

## 2021-05-11 PROCEDURE — 83930 ASSAY OF BLOOD OSMOLALITY: CPT

## 2021-05-11 PROCEDURE — 2580000003 HC RX 258: Performed by: PHYSICIAN ASSISTANT

## 2021-05-11 PROCEDURE — 85379 FIBRIN DEGRADATION QUANT: CPT

## 2021-05-11 PROCEDURE — 6360000004 HC RX CONTRAST MEDICATION: Performed by: STUDENT IN AN ORGANIZED HEALTH CARE EDUCATION/TRAINING PROGRAM

## 2021-05-11 PROCEDURE — 84443 ASSAY THYROID STIM HORMONE: CPT

## 2021-05-11 PROCEDURE — 82024 ASSAY OF ACTH: CPT

## 2021-05-11 PROCEDURE — 97535 SELF CARE MNGMENT TRAINING: CPT

## 2021-05-11 PROCEDURE — 84300 ASSAY OF URINE SODIUM: CPT

## 2021-05-11 PROCEDURE — 82533 TOTAL CORTISOL: CPT

## 2021-05-11 PROCEDURE — 83935 ASSAY OF URINE OSMOLALITY: CPT

## 2021-05-11 PROCEDURE — 51798 US URINE CAPACITY MEASURE: CPT

## 2021-05-11 PROCEDURE — 84295 ASSAY OF SERUM SODIUM: CPT

## 2021-05-11 PROCEDURE — 2580000003 HC RX 258: Performed by: INTERNAL MEDICINE

## 2021-05-11 PROCEDURE — 97110 THERAPEUTIC EXERCISES: CPT

## 2021-05-11 PROCEDURE — 99221 1ST HOSP IP/OBS SF/LOW 40: CPT | Performed by: INTERNAL MEDICINE

## 2021-05-11 PROCEDURE — 85018 HEMOGLOBIN: CPT

## 2021-05-11 PROCEDURE — 85025 COMPLETE CBC W/AUTO DIFF WBC: CPT

## 2021-05-11 PROCEDURE — 51702 INSERT TEMP BLADDER CATH: CPT

## 2021-05-11 PROCEDURE — 6370000000 HC RX 637 (ALT 250 FOR IP): Performed by: STUDENT IN AN ORGANIZED HEALTH CARE EDUCATION/TRAINING PROGRAM

## 2021-05-11 PROCEDURE — 1200000000 HC SEMI PRIVATE

## 2021-05-11 PROCEDURE — 80069 RENAL FUNCTION PANEL: CPT

## 2021-05-11 PROCEDURE — 36430 TRANSFUSION BLD/BLD COMPNT: CPT

## 2021-05-11 PROCEDURE — 97116 GAIT TRAINING THERAPY: CPT

## 2021-05-11 PROCEDURE — 6360000002 HC RX W HCPCS: Performed by: STUDENT IN AN ORGANIZED HEALTH CARE EDUCATION/TRAINING PROGRAM

## 2021-05-11 PROCEDURE — 73610 X-RAY EXAM OF ANKLE: CPT

## 2021-05-11 PROCEDURE — 85610 PROTHROMBIN TIME: CPT

## 2021-05-11 PROCEDURE — 6370000000 HC RX 637 (ALT 250 FOR IP): Performed by: PHYSICIAN ASSISTANT

## 2021-05-11 PROCEDURE — 85014 HEMATOCRIT: CPT

## 2021-05-11 PROCEDURE — 6360000002 HC RX W HCPCS: Performed by: INTERNAL MEDICINE

## 2021-05-11 RX ORDER — SODIUM CHLORIDE 9 MG/ML
INJECTION, SOLUTION INTRAVENOUS CONTINUOUS
Status: DISCONTINUED | OUTPATIENT
Start: 2021-05-11 | End: 2021-05-12

## 2021-05-11 RX ORDER — POTASSIUM CHLORIDE 20 MEQ/1
20 TABLET, EXTENDED RELEASE ORAL ONCE
Status: COMPLETED | OUTPATIENT
Start: 2021-05-11 | End: 2021-05-11

## 2021-05-11 RX ORDER — SODIUM CHLORIDE 9 MG/ML
INJECTION, SOLUTION INTRAVENOUS CONTINUOUS
Status: DISCONTINUED | OUTPATIENT
Start: 2021-05-11 | End: 2021-05-11

## 2021-05-11 RX ORDER — MORPHINE SULFATE 2 MG/ML
2 INJECTION, SOLUTION INTRAMUSCULAR; INTRAVENOUS ONCE
Status: COMPLETED | OUTPATIENT
Start: 2021-05-11 | End: 2021-05-11

## 2021-05-11 RX ORDER — SODIUM CHLORIDE 9 MG/ML
INJECTION, SOLUTION INTRAVENOUS PRN
Status: DISCONTINUED | OUTPATIENT
Start: 2021-05-11 | End: 2021-05-14 | Stop reason: HOSPADM

## 2021-05-11 RX ORDER — PANTOPRAZOLE SODIUM 40 MG/1
40 TABLET, DELAYED RELEASE ORAL
Status: DISCONTINUED | OUTPATIENT
Start: 2021-05-11 | End: 2021-05-14 | Stop reason: HOSPADM

## 2021-05-11 RX ADMIN — DOCUSATE SODIUM 50 MG AND SENNOSIDES 8.6 MG 1 TABLET: 8.6; 5 TABLET, FILM COATED ORAL at 21:38

## 2021-05-11 RX ADMIN — HYDROCORTISONE SODIUM SUCCINATE 50 MG: 100 INJECTION, POWDER, FOR SOLUTION INTRAMUSCULAR; INTRAVENOUS at 21:38

## 2021-05-11 RX ADMIN — ACETAMINOPHEN 650 MG: 325 TABLET ORAL at 13:42

## 2021-05-11 RX ADMIN — ACETAMINOPHEN 650 MG: 325 TABLET ORAL at 03:37

## 2021-05-11 RX ADMIN — DOCUSATE SODIUM 50 MG AND SENNOSIDES 8.6 MG 1 TABLET: 8.6; 5 TABLET, FILM COATED ORAL at 10:12

## 2021-05-11 RX ADMIN — PREDNISONE 10 MG: 10 TABLET ORAL at 10:13

## 2021-05-11 RX ADMIN — Medication 1 TABLET: at 10:13

## 2021-05-11 RX ADMIN — HYDROCORTISONE SODIUM SUCCINATE 50 MG: 100 INJECTION, POWDER, FOR SOLUTION INTRAMUSCULAR; INTRAVENOUS at 13:03

## 2021-05-11 RX ADMIN — IOPAMIDOL 80 ML: 755 INJECTION, SOLUTION INTRAVENOUS at 16:58

## 2021-05-11 RX ADMIN — SODIUM CHLORIDE: 9 INJECTION, SOLUTION INTRAVENOUS at 17:31

## 2021-05-11 RX ADMIN — OXYCODONE 5 MG: 5 TABLET ORAL at 18:41

## 2021-05-11 RX ADMIN — Medication 2000 UNITS: at 10:12

## 2021-05-11 RX ADMIN — IRON SUCROSE 200 MG: 20 INJECTION, SOLUTION INTRAVENOUS at 11:16

## 2021-05-11 RX ADMIN — SODIUM CHLORIDE: 4.5 INJECTION, SOLUTION INTRAVENOUS at 03:40

## 2021-05-11 RX ADMIN — OXYCODONE 5 MG: 5 TABLET ORAL at 08:47

## 2021-05-11 RX ADMIN — SODIUM CHLORIDE: 9 INJECTION, SOLUTION INTRAVENOUS at 10:13

## 2021-05-11 RX ADMIN — SIMVASTATIN 40 MG: 40 TABLET, FILM COATED ORAL at 21:38

## 2021-05-11 RX ADMIN — CALCIUM 500 MG: 500 TABLET ORAL at 10:13

## 2021-05-11 RX ADMIN — MORPHINE SULFATE 2 MG: 2 INJECTION, SOLUTION INTRAMUSCULAR; INTRAVENOUS at 15:04

## 2021-05-11 RX ADMIN — OXYCODONE 5 MG: 5 TABLET ORAL at 23:18

## 2021-05-11 RX ADMIN — POTASSIUM CHLORIDE 20 MEQ: 1500 TABLET, EXTENDED RELEASE ORAL at 13:43

## 2021-05-11 ASSESSMENT — PAIN DESCRIPTION - PROGRESSION: CLINICAL_PROGRESSION: RAPIDLY WORSENING

## 2021-05-11 ASSESSMENT — PAIN DESCRIPTION - ORIENTATION: ORIENTATION: LEFT

## 2021-05-11 ASSESSMENT — PAIN DESCRIPTION - LOCATION: LOCATION: ANKLE

## 2021-05-11 ASSESSMENT — PAIN SCALES - GENERAL
PAINLEVEL_OUTOF10: 10
PAINLEVEL_OUTOF10: 2
PAINLEVEL_OUTOF10: 4

## 2021-05-11 ASSESSMENT — PAIN - FUNCTIONAL ASSESSMENT: PAIN_FUNCTIONAL_ASSESSMENT: ACTIVITIES ARE NOT PREVENTED

## 2021-05-11 ASSESSMENT — PAIN DESCRIPTION - ONSET: ONSET: SUDDEN

## 2021-05-11 ASSESSMENT — PAIN DESCRIPTION - FREQUENCY: FREQUENCY: INTERMITTENT

## 2021-05-11 NOTE — PROGRESS NOTES
Patient was seen and examined and Internal Medicine consult dictated. Low HCT and sodium this AM will repeat blood draws to r/o lab error.

## 2021-05-11 NOTE — PROGRESS NOTES
Physical Therapy  Daily Treatment Note    Discharge Recommendations: Bertrand Tran scored a 18/24 on the AM-PAC short mobility form. Current research shows that an AM-PAC score of 18 or greater is typically associated with a discharge to the patient's home setting. Based on the patient's AM-PAC score and their current functional mobility deficits, it is recommended that the patient have 2-3 sessions per week of Physical Therapy at d/c to increase the patient's independence. At this time, this patient demonstrates the endurance and safety to discharge home with home PT and a follow up treatment frequency of 2-3x/wk. Please see assessment section for further patient specific details. Assessment:  Pt with limited activity tolerance this AM due to decreased BP when OOB and symptomatic during earlier OT session. Pt with good effort. Gait steady with walker. Needing assist with R LE for bed mobility. Plan is for home with  at D/C. Pt would benefit from home PT to work towards baseline function. No new DME needs. HOME HEALTH CARE: LEVEL 1 STANDARD  - Initial home health evaluation to occur within 24-48 hours, in patient home   - Therapy to evaluate with goal of regaining prior level of functioning   - Therapy to evaluate if patient has [address] needs for personal care    Equipment Needs: No new needs    Chart Reviewed: Yes     Other position/activity restrictions: 50% weight bearing, anterior approach precautions   Additional Pertinent Hx: Pt is a 79 y.o. female adm 5/10 with OA R hip. Pt s/p RIGHT HIP ARTHROPLASTY ANTERIOR APPROACH on 5/10. PMH: arthrtis, HTN, hyperlipidemia, GERD, LTHR 8/2020      Diagnosis: OA R hip   Treatment Diagnosis: impaired functional mobility s/p RIGHT HIP ARTHROPLASTY ANTERIOR APPROACH    Subjective: Pt in chair initially.  Ready for PT after multiple attempts (pt with several people in/out of room this AM due to medical issues.)  Pt reports getting dizzy when up with OT earlier today. Hopes to feel better now after getting fluids. Pain: 1/10 low back (chronic) and R hip. \"It helps to have the pillows under my knees. \" Ice packs to hip & back after therapy session. Objective:    Transfers  Sit to stand: SBA from chair (twice)  Stand to sit: SBA into chair; SBA onto bed    Ambulation  Assistance Level: CGA  Assistive device: Walker  Distance: 30 ft in room  Quality of gait: Step-to pattern; decreased pace; moderate reliance on walker for support; PWB R LE (~50%); no LOB    Bed mobility  Sit to Supine: Mod assist for R LE    Exercises  10 reps B ankle pumps, quad sets, glut sets, heel slides, SAQ in supine    Balance  Static stance with walker SBA  Ambulation with wheeled walker CGA    Vitals  BP in chair at start of session: 93/53  BP in bed after walkin/70  Other: Pt denied feeling dizzy/lightheaded with standing or ambulation this session. RN updated. Patient Education  Reviewed anterior hip precautions. Pt recalled 2/3 independently. Demonstrated good understanding with mobility. Calling for assist with needs. Expressed understanding. Safety Devices  Pt left with needs in reach. In bed with bed alarm on. RN updated.      AM-PAC score  AM-PAC Inpatient Mobility Raw Score : 18  AM-PAC Inpatient T-Scale Score : 43.63  Mobility Inpatient CMS 0-100% Score: 46.58  Mobility Inpatient CMS G-Code Modifier : CK    Goals: (as determined and assessed by primary PT)  Time Frame for Short term goals: By discharge  Short term goal 1: Sup to sit supervision   Short term goal 2: Pt will transfer sit to stand supervision   Short term goal 3: Pt will amb >100' with LRAD supervision  Short term goal 4: Pt will up/down 4-6 steps with rail and LRAD SBA     Plan:  Times per week: 7; Times per day: Twice a day  Current Treatment Recommendations: Strengthening, Functional Mobility Training, Gait Training, Stair training, Patient/Caregiver Education & Training, Safety Education & Training    Therapy Time    Individual  Concurrent  Group  Co-treatment    Time In  1121            Time Out  1200            Minutes  39              Timed Code Treatment Minutes: 39  Total Treatment Minutes: 39    Will continue per plan of care in PM. Increased activity/ambulation as tolerated. If patient is discharged prior to next treatment, this note will serve as the discharge summary.     Lai Rodriguez #[phone]

## 2021-05-11 NOTE — PROGRESS NOTES
Pt is alert and oriented. VSS. Pt is encouraged to call when pain increases. Currently awaiting pt to void. All needs are within reach, safety precautions in place.

## 2021-05-11 NOTE — PROGRESS NOTES
Clinical Pharmacy Progress Note    ADMIT DATE: 5/10/2021     80 yo F with PMH of arthritis, GERD, HTN; admitted 5/10 s/p R hip arthroplasty on 5/10. Pharmacy has been consulted for post-op VTE prophylaxis with warfarin by JESSICA Marcial. Goal INR 2-3  Home Anticoagulation Regimen:  · Patient was not taking warfarin prior to admission. · Spoke with patient via telephone on 5/10 -- patient stated she was previously on warfarin after previous HARRIET in August 2020. At that time, she was eventually started on warfarin 5 mg alternating with 2.5 mg every other day. LABORATORY:  Recent Labs     05/11/21  0825 05/11/21  1122   * 115*   K 3.8 3.6   CL 79* 79*   CO2 22 20*   BUN 16 16   CREATININE 1.0 1.0   GLUCOSE 106* 115*     Estimated Creatinine Clearance: 47 mL/min (based on SCr of 1 mg/dL). Lab Results   Component Value Date    WBC 13.9 (H) 05/11/2021    HGB 7.3 (L) 05/11/2021    HCT 21.3 (L) 05/11/2021    MCV 92.6 05/11/2021     05/11/2021       Lab Results   Component Value Date    PROTIME 12.1 05/11/2021    INR 1.04 05/11/2021     VITALS:  BP (!) 91/55   Pulse 67   Temp 98.1 °F (36.7 °C) (Oral)   Resp 18   Ht 5' 2\" (1.575 m)   Wt 150 lb (68 kg)   SpO2 97%   BMI 27.44 kg/m²     DIET: DIET GENERAL;    PROPHYLAXIS:  Stress ulcer: pantoprazole  VTE:  Warfarin    ASSESSMENT/PLAN:    1) VTE prophylaxis s/p HARRIET: pharmacy to dose warfarin   · Goal INR 2-3  · INR today = 1.04, as expected after 1 dose of warfarin. · Will continue warfarin 4 mg daily. · Starting slightly lower starting dose as patient was previously on warfarin 5 mg alternating with 2.5 mg every other day after prevous HARRIET in 8/2020 (~27.5 mg/week)  · Typically takes 4-5 days to reach therapeutic INR. · Daily INR will be monitored / dose will be adjusted as appropriate. Date INR Warfarin   5/6 0.95         5/10 1.08 4 mg   5/11 1.04        Please call with any questions.   Kimberly Couch, PharmD, BCPS  Wireless: [phone]  or [phone]  5/11/2021 1:25 PM

## 2021-05-11 NOTE — CONSULTS
[facility] NEPHROLOGY    [facility]              [phone]                        Assessment/Plan :   Patient is a 79year old female with PMHx of HTN, GERD, HLD, OA who underwent a R hip arthroplasty on 5/10. Nephrology was consulted for hyponatremia    Hypotonic Hypovolemic Hyponatremia  -Na 114 (0825) this AM, repeat check still at 115(1125). -Cortisol low this AM at 1.8, adrenal insufficiency possibly contributing to hyponatremia  -Serum Osm 252  -Ur Osmolality at 193, Na <20  -Likely from blood loss post-op, extrarenal loss vs adrenal insufficiency  -Recheck serum sodium q4hr  -Continue IV Isotonic Saline 100ml/hr, monitor for overcorrection 6-8 in 24 hrs    Anemia  - Continue CBC check,   - Monitor hgb < 7 consider transfusion    HTN  -Hold Lisinopril for hyponatremia and low blood pressure      Post-Op Urinary Retention  -see above  -stone placed  -continue to monitor    [facility] Nephrology would like to thank Romana Regulus, MD   for opportunity to serve this patient      Please call with questions at-   24 Hrs Answering service [phone] or  7 am- 5 pm via Perfect serve or cell phone  Trevon Harry Do          CC/reason for consult :     Hyponatremia     HPI :     Chris Johnson is a 79 y.o. female with a past medical history significant for HTN, HLD, presented to the hospital on 5/10/2021 for a R hip arthroplasty for OA. We were consulted for hyponatremia. On 5/10/21, patient underwent a R Hip Arthroplasty by Dr. Nallely Grewal that was without complications. Post-op, when the patient was attempting to urinate, she had an episode of dizziness and loss of vision. This AM, the patients labs indicate she is hyponatremic at 114 (0825) which was repeated and was still at 115 (1122) and anemic with a Hgb of 7.3. Cortisol level at 1.8  She also had another episode of dizziness this morning.  The patient complains of urinary retention that began yesterday post-op and for which she had been straight cathed and had a stone placed. ROS:     positives in bold   Constitutional:  fever, chills, weakness, weight change, fatigue  Skin:  rash, pruritus, hair loss, bruising, dry skin, petechiae  Head, Face, Neck   headaches, swelling,  cervical adenopathy  Respiratory: shortness of breath, cough, or wheezing  Cardiovascular: chest pain, palpitations, dizzy, edema  Gastrointestinal: nausea, vomiting, diarrhea, constipation,belly pain    Yellow skin, blood in stool  Musculoskeletal:  back pain, muscle weakness, gait problems,       joint pain or swelling. Genitourinary:  dysuria, poor urine flow, flank pain, blood in urine  Neurologic:  vertigo, TIA'S, syncope, seizures, focal weakness  Psychosocial:  insomnia, anxiety, or depression. Additional positive findings:                          All other remaining systems are negative.         PMH/PSH/SH/Family History:     Past Medical History:   Diagnosis Date    Arthritis     GERD (gastroesophageal reflux disease)     Hyperlipidemia     Hypertension        Past Surgical History:   Procedure Laterality Date     SECTION      COLONOSCOPY      TOTAL HIP ARTHROPLASTY Left 2020    LEFT TOTAL HIP ARTHROPLASTY ANTERIOR APPROACH performed by Rachel Ferrell MD at [address] Right 5/10/2021    RIGHT HIP ARTHROPLASTY ANTERIOR APPROACH performed by Rachel Ferrell MD at [address]  History     Socioeconomic History    Marital status:      Spouse name: Not on file    Number of children: Not on file    Years of education: Not on file    Highest education level: Not on file   Occupational History    Not on file   Social Needs    Financial resource strain: Not on file    Food insecurity     Worry: Not on file     Inability: Not on file    Transportation needs     Medical: Not on file     Non-medical: Not on file   Tobacco Use    Smoking status: Former Smoker     Packs/day: 1.00     Years: 15.00     Pack years: 15.00     Quit date: 1983     Years since quittin.3    Smokeless tobacco: Never Used   Substance and Sexual Activity    Alcohol use: Yes     Alcohol/week: 1.0 - 2.0 standard drinks     Types: 1 - 2 Shots of liquor per week    Drug use: No    Sexual activity: Yes   Lifestyle    Physical activity     Days per week: Not on file     Minutes per session: Not on file    Stress: Not on file   Relationships    Social connections     Talks on phone: Not on file     Gets together: Not on file     Attends Sabianism service: Not on file     Active member of club or organization: Not on file     Attends meetings of clubs or organizations: Not on file     Relationship status: Not on file    Intimate partner violence     Fear of current or ex partner: Not on file     Emotionally abused: Not on file     Physically abused: Not on file     Forced sexual activity: Not on file   Other Topics Concern    Not on file   Social History Narrative    Not on file       History reviewed. No pertinent family history.        Medication:      pantoprazole  40 mg Oral QAM AC    iron sucrose  200 mg Intravenous Once    hydrocortisone sodium succinate PF  50 mg Intravenous Q8H    calcium elemental  500 mg Oral Daily    lisinopril  10 mg Oral Daily    therapeutic multivitamin-minerals  1 tablet Oral Daily    simvastatin  40 mg Oral Nightly    Vitamin D  2,000 Units Oral Daily    sodium chloride flush  10 mL Intravenous 2 times per day    acetaminophen  650 mg Oral Q6H    sennosides-docusate sodium  1 tablet Oral BID    warfarin  4 mg Oral Daily     sodium chloride flush, sodium chloride, magnesium hydroxide, oxyCODONE **OR** oxyCODONE, HYDROmorphone **OR** HYDROmorphone, promethazine **OR** ondansetron       Vitals :     Vitals:    21 0730   BP: 109/78   Pulse: 75   Resp: 18   Temp: 97.7 °F (36.5 °C)   SpO2: 97%       I & O :       Intake/Output Summary (Last 24 hours) at 2021 1157  Last data filed at 2021

## 2021-05-11 NOTE — PROGRESS NOTES
Physical Therapy  Daily Treatment Note    Discharge Recommendations: Clau Holguin scored a 18/24 on the AM-PAC short mobility form. Current research shows that an AM-PAC score of 18 or greater is typically associated with a discharge to the patient's home setting. Based on the patient's AM-PAC score and their current functional mobility deficits, it is recommended that the patient have 2-3 sessions per week of Physical Therapy at d/c to increase the patient's independence. At this time, this patient demonstrates the endurance and safety to discharge home with home and a follow up treatment frequency of 2-3x/wk. Please see assessment section for further patient specific details. Assessment:  Pt with good participation for LE exercises. Limited activity today due to BP issues. Plan is for home with  at D/C. Would benefit from home PT. No DME needs. HOME HEALTH CARE: LEVEL 1 STANDARD  - Initial home health evaluation to occur within 24-48 hours, in patient home   - Therapy to evaluate with goal of regaining prior level of functioning   - Therapy to evaluate if patient has [address] needs for personal care    Equipment Needs: No new needs    Chart Reviewed: Yes     Other position/activity restrictions: 50% weight bearing, anterior approach precautions   Additional Pertinent Hx: Pt is a 79 y.o. female adm 5/10 with OA R hip. Pt s/p RIGHT HIP ARTHROPLASTY ANTERIOR APPROACH on 5/10. PMH: arthrtis, HTN, hyperlipidemia, GERD, LTHR 8/2020      Diagnosis: OA R hip   Treatment Diagnosis: impaired functional mobility s/p RIGHT HIP ARTHROPLASTY ANTERIOR APPROACH    *Aware of rapid response earlier this afternoon. Per RN, pt with significant L ankle/lower leg pain, which has now resolved. Pt also had an episode of BP dropping while in bed. RN OKed for bed exercises this afternoon, but no OOB at this time. Subjective: Pt in bed. Agreeable to bed exercises. Pain: \"Low.  The Tylenol and ice are keeping it

## 2021-05-11 NOTE — CONSULTS
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 Postoperatively, she notes some fatigue,  but denies any chest pain, shortness of breath or any other problems. Her labs however postoperatively showed very low sodium and hemoglobin  which are questionably accurate and repeat lab test is pending. MEDICATIONS:  Her present meds as per the med sheet. SOCIAL HISTORY:  She is a former smoker, has not smoked in 30 years. Does not drink excessively. 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 REVIEW OF SYSTEMS:  Otherwise noncontributory. FAMILY HISTORY:  Otherwise noncontributory. PHYSICAL EXAMINATION:  GENERAL:  Currently, she appears in no acute distress. VITAL SIGNS:  Blood pressure is 110/80, pulse rate 80, respiratory rate  12 and easy. HEENT:  Head:  Atraumatic, normocephalic. Eyes:  Sclerae noninjected. NECK:  No jugular venous distention. LUNGS:  Clear with some decreased breath sounds at the base. ABDOMEN:  Soft without tenderness. HEART:  Reveals a normal S1, S2 with a grade 1/6 systolic ejection  murmur. ABDOMEN:  Soft without tenderness. EXTREMITIES:  Reveal evidence of total knee replacement. Pulses are 2+  and symmetrical.  No cyanosis, clubbing, or edema.     LABORATORY DATA:  Preoperatively show a hemoglobin and hematocrit of 12.3 and 37 with a white count of 8.3 and a platelet count of 387, sed  rate is 31. Electrolytes revealed sodium of 131, BUN and creatinine are  17 and 1.0. IMPRESSION:  1. Status post total knee replacement. 2.  Abnormal labs with low sodium and low hemoglobin, questionable  spurious, we will repeat. If hemoglobin below 7, will transfuse;  otherwise, will consider IV iron. 3.  Hold lisinopril for now with low blood pressure and hyponatremia. 4.  Further therapy pending above. Thank you for allowing me to see this most interesting patient. We will  follow with you.         Anival Tanner MD    D: 05/11/2021 8:31:18       T: 05/11/2021 10:16:39     [email or web address]  Job#: [id number]     Doc#: [id number]    CC:

## 2021-05-11 NOTE — PLAN OF CARE
Problem: Pain:  Goal: Pain level will decrease  Outcome: Ongoing   Medicated pt with scheduled Tylenol. Encourage pt to call if pain increases. Will continue to monitor. Problem: Falls - Risk of:  Goal: Will remain free from falls  Outcome: Ongoing   Pt remains free from injury during this shift. Pt is up with assist x 1 person, gait belt and walker. Encourage pt to call for all assistance. Call light is in reach, bed alarm is activated for safety, bed locked and in lowest position. Will continue to monitor.

## 2021-05-11 NOTE — CONSULTS
Internal Medicine Medical Student Consult Note      PCP: Lv Phoenix MD    Date of Admission: 5/10/2021    Chief Complaint:  Hyponatremia    History Of Present Illness: Janki Hall is a 79 y.o. female w/ PMHX of arthritis, GERD, HTN; admitted 5/10 s/p R hip arthroplasty on 5/10. Hospital medicine has been consulted for severe hyponatremia post-op. Patient presented with severe hyponatremia (114) one day post-op. Patient had a 800 cc blood loss during surgery which subsequently dropped her hemoglobin from 12.3 to 7.3. She's also had urinary retention that has been ongoing for about a day. Bladder scan performed this morning showed 250 cc of urine in the bladder. Patient has had prior surgeries which resulted in a drop in Hgb abd HCT but nothing that caused significant hyponatremia like today. Patient denies any lethargy, malaise, dizziness, confusion and headaches. Her only positive ROS was weakness which she attributes to her surgery. She also had a low SBP into the 60s this morning per nursing and patient was given IV NS and BP normalized after. However, patient feels that she is at her baseline and feels okay. Past Medical History:        Diagnosis Date    Arthritis     GERD (gastroesophageal reflux disease)     Hyperlipidemia     Hypertension        Past Surgical History:          Procedure Laterality Date     SECTION      COLONOSCOPY      TOTAL HIP ARTHROPLASTY Left 2020    LEFT TOTAL HIP ARTHROPLASTY ANTERIOR APPROACH performed by Carlos Miller MD at [facility] Right 5/10/2021    RIGHT HIP ARTHROPLASTY ANTERIOR APPROACH performed by Carlos Miller MD at [facility] [address]       Medications Prior to Admission:      Prior to Admission medications    Medication Sig Start Date End Date Taking?  Authorizing Provider   calcium carbonate (OSCAL) 500 MG TABS tablet Take 500 mg by mouth daily   Yes Historical Provider, MD   omeprazole (PRILOSEC OTC) 20 MG tablet omeprazole 20 MG Delayed Release Oral Tablet        Active   Yes Historical Provider, MD   lisinopril (PRINIVIL;ZESTRIL) 10 MG tablet TAKE 1 TABLET BY MOUTH ONE TIME A DAY  1/15/21  Yes Lissy Holder MD   simvastatin (ZOCOR) 40 MG tablet TAKE 1 TABLET BY MOUTH nightly 1/15/21  Yes Lissy Holder MD   traMADol (ULTRAM) 50 MG tablet TAKE 1 TABLET BY MOUTH EVERY SIX HOURS AS NEEDED FOR PAIN FOR UP TO 7 DAYS. REDUCE DOSES TAKEN AS PAIN BECOMES MANAGEABLE. 11/15/20  Yes Historical Provider, MD   diclofenac (VOLTAREN) 50 MG EC tablet Take 1 tablet by mouth 2 times daily (with meals) 11/5/20  Yes JESSICA Flynn   Multiple Vitamins-Minerals (THERAPEUTIC MULTIVITAMIN-MINERALS) tablet Take 1 tablet by mouth daily   Yes Historical Provider, MD   Vitamin D (CHOLECALCIFEROL) 1000 UNITS CAPS capsule Take 2,000 Units by mouth daily    Yes Historical Provider, MD   Multiple Vitamins-Minerals (OCUVITE PRESERVISION PO) Take 1 tablet by mouth daily   Yes Historical Provider, MD   levothyroxine (SYNTHROID) 25 MCG tablet Take 1 tablet by mouth daily 4/22/21   Lissy Holder MD   alendronate (FOSAMAX) 70 MG tablet Take 1 tablet by mouth every 7 days 4/14/21   Lissy Holder MD       Allergies:  Flexeril [cyclobenzaprine] and Keflet [cephalexin]    Social History:      Patient is . TOBACCO:   reports that she quit smoking about 38 years ago. She has a 15.00 pack-year smoking history. She has never used smokeless tobacco.  ETOH:   reports current alcohol use of about 1.0 - 2.0 standard drinks of alcohol per week. History:      History reviewed. No pertinent family history. REVIEW OF SYSTEMS:   Pertinentpositives as noted in the HPI. All other systems reviewed and negative. ROS: Review of Systems    10 point ROS negative except as listed above.     PHYSICALEXAM PERFORMED:    BP (!) 91/55   Pulse 67   Temp 98.1 °F (36.7 °C) (Oral)   Resp 18   Ht 5' 2\" (1.575 m)   Wt 150 lb (68 kg)   SpO2 97%   BMI 27.44 kg/m²     General appearance:  No apparent distress, appears stated age and cooperative. HEENT:  Normal cephalic, atraumaticwithout obvious deformity. Pupils equal, round, and reactive to light. Mild sclera icterus on examination. Extra ocular muscles intact. Respiratory:  Normal respiratory effort. Clear to auscultation, bilaterally without Rales/Wheezes/Rhonchi. Cardiovascular:  Regular rate and rhythm with normal S1/S2 without murmurs, rubs or gallops. Abdomen: Soft,non-tender, non-distended with normal bowel sounds. Musculoskeletal:  No clubbing, cyanosis or edemabilaterally. Full range of motion without deformity. Neurologic: Neurovascularly intact without any focalsensory/motor deficits. Cranial nerves: II-XII intact, grossly non-focal.  Psychiatric:  Alert and oriented,thought content appropriate, normal insight  Peripheral Pulses: +2 palpable, equal bilaterally     Labs:   Cortisol: 1.8 *  Default Normal Ranges   7-9am 4.3-22.4   3-5pm 3.1-16.7     Recent Labs     05/11/21  0554 05/11/21  0825   WBC 12.5* 13.9*   HGB 6.7* 7.3*   HCT 19.6* 21.3*   * 148     Recent Labs     05/10/21  0647 05/11/21  0825 05/11/21  1122   * 114* 115*   K 3.7 3.8 3.6   CL 90* 79* 79*   CO2 24 22 20*   BUN 12 16 16   CREATININE 1.0 1.0 1.0   CALCIUM 10.5 8.0* 7.6*   PHOS  --  3.0 2.5     Recent Labs     05/10/21  0647   AST 38*   ALT 43*   BILITOT 1.2*   ALKPHOS 127     Recent Labs     05/10/21  1355 05/11/21  1122   INR 1.08 1.04     No results for input(s): CKTOTAL, TROPONINI in the last 72 hours. Urinalysis:   Lab Results   Component Value Date    NITRU Negative 01/02/2016    WBCUA 3-5 01/02/2016    BACTERIA Rare 01/02/2016    RBCUA None seen 01/02/2016    BLOODU Negative 01/02/2016    SPECGRAV <=1.005 01/02/2016    GLUCOSEU Negative 01/02/2016       Radiology:     EKG:  I have reviewed the EKG with the following interpretation: normal EKG, normal sinus rhythm.       XR HIP 2-3 VW W PELVIS RIGHT Final Result      Intraoperative fluoroscopy provided as above. See operative report for details. FLUORO FOR SURGICAL PROCEDURES   Final Result      Intraoperative fluoroscopy provided as above. See operative report for details. ASSESSMENT & PLAN:  Waymon Collet is a 79 y.o. female w/ PMHX of arthritis, GERD, HTN; admitted 5/10 s/p R hip arthroplasty on 5/10. Hospital medicine was consulted for severe hyponatremia. Patient's lab values are pertinent for cortisol of 1.8, CBC showing anemia with Hgb of 7.3 and HCT of 21.3, and CMP showing hyponatremia to 114. This values are concerning for an adrenal crisis induced hyponatremia. Low cortisol levels can trigger increased ADH secretion which in turn can increase water retention and cause subsequent hyponatremia, fatigue, malaise, and hypotension. Urine studies (osmolality and sodium), Renal function panel, ACTH stimulation test, serum osmolality should all be ordered to help evaluate patient's fluid balance. Furthermore, patient is anemic on CBC, has slight yellowing of the sclera bilaterally. She should be monitored for Hgb dropping below 7 which will be an indication for transfusion.     Hyponatremia  - Na 114, cortisol 1.8  - suspected adrenal insufficiency   - possible adrenal insufficiency precipitated by recent surgery and loss of blood (800 cc during surgery)  - ACTH stimulation test to help differentiate from primary vs central adrenal insufficiency  - Urine osmolality 192 and FeNa: <20   - start solu-cortef 50 mg IV Q8H   - Serum sodium to monitor levels  - CBC with differential to evaluate for anemia and signs of worsening blood loss  - Hold home lisinopril due to low BP and hyponatremia        DVTProphylaxis: Warfarin  Diet: DIET GENERAL;  Code Status: Full Code    I will discuss the patient with Dr. Catarino Solomon MD.     Quang Mario MD  Internal medicine

## 2021-05-11 NOTE — CONSULTS
Internal Medicine Medical Student Consult Note      PCP: Shirley Whitaker MD    Date of Admission: 5/10/2021    Chief Complaint:  Hyponatremia    History Of Present Illness: Vanda Pate is a 79 y.o. female w/ PMHX of arthritis, GERD, HTN; admitted 5/10 s/p R hip arthroplasty on 5/10. Hospital medicine has been consulted for severe hyponatremia post-op. Patient presented with severe hyponatremia (114) one day post-op. Patient had a 800 cc blood loss during surgery which subsequently dropped her hemoglobin from 12.3 to 7.3. She's also had urinary retention that has been ongoing for about a day. Bladder scan performed this morning showed 250 cc of urine in the bladder. Patient has had prior surgeries which resulted in a drop in Hgb abd HCT but nothing that caused significant hyponatremia like today. Patient denies any lethargy, malaise, dizziness, confusion and headaches. Her only positive ROS was weakness which she attributes to her surgery. She also had a low SBP into the 60s this morning per nursing and patient was given IV NS and BP normalized after. However, patient feels that she is at her baseline and feels okay. Past Medical History:        Diagnosis Date    Arthritis     GERD (gastroesophageal reflux disease)     Hyperlipidemia     Hypertension        Past Surgical History:          Procedure Laterality Date     SECTION      COLONOSCOPY      TOTAL HIP ARTHROPLASTY Left 2020    LEFT TOTAL HIP ARTHROPLASTY ANTERIOR APPROACH performed by Dequan Hook MD at [place] [address] Right 5/10/2021    RIGHT HIP ARTHROPLASTY ANTERIOR APPROACH performed by Dequan Hook MD at [facility] [address]       Medications Prior to Admission:      Prior to Admission medications    Medication Sig Start Date End Date Taking?  Authorizing Provider   calcium carbonate (OSCAL) 500 MG TABS tablet Take 500 mg by mouth daily   Yes Historical Provider, MD   omeprazole (PRILOSEC OTC) 20 MG tablet omeprazole 20 MG Delayed Release Oral Tablet        Active   Yes Historical Provider, MD   lisinopril (PRINIVIL;ZESTRIL) 10 MG tablet TAKE 1 TABLET BY MOUTH ONE TIME A DAY  1/15/21  Yes Adriano Mejia MD   simvastatin (ZOCOR) 40 MG tablet TAKE 1 TABLET BY MOUTH nightly 1/15/21  Yes Adriano Mejia MD   traMADol (ULTRAM) 50 MG tablet TAKE 1 TABLET BY MOUTH EVERY SIX HOURS AS NEEDED FOR PAIN FOR UP TO 7 DAYS. REDUCE DOSES TAKEN AS PAIN BECOMES MANAGEABLE. 11/15/20  Yes Historical Provider, MD   diclofenac (VOLTAREN) 50 MG EC tablet Take 1 tablet by mouth 2 times daily (with meals) 11/5/20  Yes JESSICA Hickman   Multiple Vitamins-Minerals (THERAPEUTIC MULTIVITAMIN-MINERALS) tablet Take 1 tablet by mouth daily   Yes Historical Provider, MD   Vitamin D (CHOLECALCIFEROL) 1000 UNITS CAPS capsule Take 2,000 Units by mouth daily    Yes Historical Provider, MD   Multiple Vitamins-Minerals (OCUVITE PRESERVISION PO) Take 1 tablet by mouth daily   Yes Historical Provider, MD   levothyroxine (SYNTHROID) 25 MCG tablet Take 1 tablet by mouth daily 4/22/21   Adriano Mejia MD   alendronate (FOSAMAX) 70 MG tablet Take 1 tablet by mouth every 7 days 4/14/21   Adriano Mejia MD       Allergies:  Flexeril [cyclobenzaprine] and Keflet [cephalexin]    Social History:      Patient is . TOBACCO:   reports that she quit smoking about 38 years ago. She has a 15.00 pack-year smoking history. She has never used smokeless tobacco.  ETOH:   reports current alcohol use of about 1.0 - 2.0 standard drinks of alcohol per week. History:      History reviewed. No pertinent family history. REVIEW OF SYSTEMS:   Pertinentpositives as noted in the HPI. All other systems reviewed and negative. ROS: Review of Systems    10 point ROS negative except as listed above.     PHYSICALEXAM PERFORMED:    /78   Pulse 75   Temp 97.7 °F (36.5 °C) (Oral)   Resp 18   Ht 5' 2\" (1.575 m)   Wt 150 lb (68 kg)   SpO2 97%   BMI 27.44 kg/m²     General appearance:  No apparent distress, appears stated age and cooperative. HEENT:  Normal cephalic, atraumaticwithout obvious deformity. Pupils equal, round, and reactive to light. Mild sclera icterus on examination. Extra ocular muscles intact. Respiratory:  Normal respiratory effort. Clear to auscultation, bilaterally without Rales/Wheezes/Rhonchi. Cardiovascular:  Regular rate and rhythm with normal S1/S2 without murmurs, rubs or gallops. Abdomen: Soft,non-tender, non-distended with normal bowel sounds. Musculoskeletal:  No clubbing, cyanosis or edemabilaterally. Full range of motion without deformity. Neurologic: Neurovascularly intact without any focalsensory/motor deficits. Cranial nerves: II-XII intact, grossly non-focal.  Psychiatric:  Alert and oriented,thought content appropriate, normal insight  Peripheral Pulses: +2 palpable, equal bilaterally     Labs:   Cortisol: 1.8 *  Default Normal Ranges   7-9am 4.3-22.4   3-5pm 3.1-16.7     Recent Labs     05/11/21  0554 05/11/21  0825   WBC 12.5* 13.9*   HGB 6.7* 7.3*   HCT 19.6* 21.3*   * 148     Recent Labs     05/10/21  0647 05/11/21  0825   * 114*   K 3.7 3.8   CL 90* 79*   CO2 24 22   BUN 12 16   CREATININE 1.0 1.0   CALCIUM 10.5 8.0*   PHOS  --  3.0     Recent Labs     05/10/21  0647   AST 38*   ALT 43*   BILITOT 1.2*   ALKPHOS 127     Recent Labs     05/10/21  1355   INR 1.08     No results for input(s): CKTOTAL, TROPONINI in the last 72 hours. Urinalysis:   Lab Results   Component Value Date    NITRU Negative 01/02/2016    WBCUA 3-5 01/02/2016    BACTERIA Rare 01/02/2016    RBCUA None seen 01/02/2016    BLOODU Negative 01/02/2016    SPECGRAV <=1.005 01/02/2016    GLUCOSEU Negative 01/02/2016       Radiology:     EKG:  I have reviewed the EKG with the following interpretation: normal EKG, normal sinus rhythm. XR HIP 2-3 VW W PELVIS RIGHT   Final Result      Intraoperative fluoroscopy provided as above.  See operative report for details. FLUORO FOR SURGICAL PROCEDURES   Final Result      Intraoperative fluoroscopy provided as above. See operative report for details. ASSESSMENT & PLAN:  Chris Johnson is a 79 y.o. female w/ PMHX of arthritis, GERD, HTN; admitted 5/10 s/p R hip arthroplasty on 5/10. Hospital medicine was consulted for severe hyponatremia. Patient's lab values are pertinent for cortisol of 1.8, CBC showing anemia with Hgb of 7.3 and HCT of 21.3, and CMP showing hyponatremia to 114. This values are concerning for an adrenal crisis induced hyponatremia. Low cortisol levels can trigger increased ADH secretion which in turn can increase water retention and cause subsequent hyponatremia, fatigue, malaise, and hypotension. Urine studies (osmolality and sodium), Renal function panel, ACTH stimulation test, serum osmolality should all be ordered to help evaluate patient's fluid balance. Furthermore, patient is anemic on CBC, has slight yellowing of the sclera bilaterally. She should be monitored for Hgb dropping below 7 which will be an indication for transfusion.     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  - possible adrenal insufficiency precipitated by recent surgery and loss of blood (800 cc during surgery)  - ACTH stimulation test to help differentiate from primary vs central adrenal insufficiency  - Urine osmolality and FeNa to help evaluate for appropriate or inappropriate feedback from kidney to response to increased water retention  - Serum sodium to monitor levels  - CBC with differential to evaluate for anemia and signs of worsening blood loss  - Hold home lisinopril due to low BP and hyponatremia          Active Hospital Problems    Diagnosis Date Noted    Hyponatremia [E87.1] 05/11/2021    Status post total hip replacement, right [Z96.641] 05/10/2021    Hyperlipidemia [E78.5]     Hypothyroidism due to Hashimoto's thyroiditis [E03.8, E06.3]     Hyperglycemia [R73.9] 08/18/2020    Blood loss anemia [D50.0] 08/18/2020    Benign essential HTN [I10] 11/20/2018         DVTProphylaxis: Warfarin  Diet: DIET GENERAL;  Code Status: Full Code    I will discuss the patient with MD Risa Wilcox MS3

## 2021-05-11 NOTE — PROGRESS NOTES
Department of Orthopedic Surgery  Nurse Practitioner   Progress Note    Subjective:     Post-Operative Day: 1 Status Post right Total Hip Arthroplasty  Systemic or Specific Complaints: Patient seen by Dr. Cheng German. Objective:     Patient Vitals for the past 24 hrs:   BP Temp Temp src Pulse Resp SpO2   05/11/21 0730 109/78 97.7 °F (36.5 °C) Oral 75 18 97 %   05/11/21 0253 103/71 97.6 °F (36.4 °C) Oral 73 16 99 %   05/10/21 2255 103/69 97.5 °F (36.4 °C) Oral 77 16 98 %   05/10/21 1900 102/67 97.9 °F (36.6 °C) Oral 83 16 96 %   05/10/21 1516 99/62 97.5 °F (36.4 °C) Oral 85 16 95 %   05/10/21 1425 -- -- -- -- 16 94 %   05/10/21 1230 105/75 96.3 °F (35.7 °C) Axillary 79 14 96 %   05/10/21 1215 104/81 97.7 °F (36.5 °C) Temporal 78 12 100 %   05/10/21 1200 98/70 -- -- 73 12 100 %   05/10/21 1145 91/65 -- -- 73 11 100 %   05/10/21 1130 93/68 -- -- 73 12 100 %   05/10/21 1125 88/61 -- -- 77 13 100 %   05/10/21 1120 93/67 -- -- 81 10 100 %   05/10/21 1115 92/67 -- -- 80 14 97 %   05/10/21 1110 99/74 97.7 °F (36.5 °C) Temporal 82 16 99 %       General: alert, appears stated age, cooperative and no distress   Wound: Wound clean and dry no evidence of infection. Motion: Painful range of Motion   DVT Exam: No evidence of DVT seen on physical exam.       NVI in lower extremity. Thigh swollen but soft. Moving foot and ankle. Data Review  CBC:   Lab Results   Component Value Date    WBC 13.9 05/11/2021    RBC 2.30 05/11/2021    HGB 7.3 05/11/2021    HCT 21.3 05/11/2021     05/11/2021       Assessment:     Status Post right Total Hip Arthroplasty. Doing well postoperatively. Plan:      1: Continues current post-op course : Post-op labs reviewed. Acute blood loss anemia, rechecked by medicine and expected after surgery. Pt also on Coumadin. Ordered PT/INR, pending. Monitor. IV Venofer per medicine. Nephro consulted. Pt with urinary retention, hyponatremia.   2:  Continue Deep venous thrombosis prophylaxis -

## 2021-05-11 NOTE — SIGNIFICANT EVENT
Rapid called around 1330 for severe left ankle pain and hypotension. Patient's SBP reportedly dropped to 70s. She is s/p R hip surgery yesterday. Also her sodium suddenly dropped from 131 to 114 since yesterday, and her hemoglobin is decreased from 12.3 on 5/6 to 6.7 on 5/11. on assessing the patient she stated she began feeling severe pain on her anterior ankle that radiated up her anterior leg below knee level, started a little before 1300, was 10/10. When we arrived she said the pain had fully resolved and was 0/10. She was on 4.5 lpm on nasal canula with O2 saturation of 99%. She said her SBP typically runs in the 80s to 90s at home. Her SBP was in the 90s on my exam. She denied CP, SOB, light-headedness. No rash or tenderness over left ankle and lower leg. No popliteal fossa tenderness. No cough. Clear lung sounds. No obvious murmur. D-Dimer was ordered to r/o DVT. She is on warfarin but INR was just 1.04 this morning. SCDs were in place. Other labs had been recently drawn and were pending. Pain in contralateral ankle would not be expected with fat emboli. Continue to monitor on 5T. Update: D-Dimer 1090, possibly b/c s/p surgery. CTPA ordered. Additionally nephrology is following due to the patient's hyponatremia. IV fluids have been stopped with intent for blood transfusion. Nephrology likely to order additional labs.

## 2021-05-11 NOTE — CONSULTS
Internal Medicine Medical Student Consult Note      PCP: Beatrice Alcala MD    Date of Admission: 5/10/2021    Chief Complaint:  Hyponatremia    History Of Present Illness: Lisa Vidales is a 79 y.o. female w/ PMHX of arthritis, GERD, HTN; admitted 5/10 s/p R hip arthroplasty on 5/10. Hospital medicine has been consulted for severe hyponatremia post-op. Patient presented with severe hyponatremia (114) one day post-op. Patient had a 800 cc blood loss during surgery which subsequently dropped her hemoglobin from 12.3 to 7.3. She's also had urinary retention that has been ongoing for about a day. Bladder scan performed this morning showed 250 cc of urine in the bladder. Patient has had prior surgeries which resulted in a drop in Hgb abd HCT but nothing that caused significant hyponatremia like today. Patient denies any lethargy, malaise, dizziness, confusion and headaches. Her only positive ROS was weakness which she attributes to her surgery. She also had a low SBP into the 60s this morning per nursing and patient was given IV NS and BP normalized after. However, patient feels that she is at her baseline and feels okay. Past Medical History:        Diagnosis Date    Arthritis     GERD (gastroesophageal reflux disease)     Hyperlipidemia     Hypertension        Past Surgical History:          Procedure Laterality Date     SECTION      COLONOSCOPY      TOTAL HIP ARTHROPLASTY Left 2020    LEFT TOTAL HIP ARTHROPLASTY ANTERIOR APPROACH performed by Jordon Shay MD at [place] [address] Right 5/10/2021    RIGHT HIP ARTHROPLASTY ANTERIOR APPROACH performed by Jordon Shay MD at [facility] [address]       Medications Prior to Admission:      Prior to Admission medications    Medication Sig Start Date End Date Taking?  Authorizing Provider   calcium carbonate (OSCAL) 500 MG TABS tablet Take 500 mg by mouth daily   Yes Historical Provider, MD   omeprazole (PRILOSEC OTC) 20 MG tablet omeprazole 20 MG Delayed Release Oral Tablet        Active   Yes Historical Provider, MD   lisinopril (PRINIVIL;ZESTRIL) 10 MG tablet TAKE 1 TABLET BY MOUTH ONE TIME A DAY  1/15/21  Yes Adriano Mejia MD   simvastatin (ZOCOR) 40 MG tablet TAKE 1 TABLET BY MOUTH nightly 1/15/21  Yes Adriano Mejia MD   traMADol (ULTRAM) 50 MG tablet TAKE 1 TABLET BY MOUTH EVERY SIX HOURS AS NEEDED FOR PAIN FOR UP TO 7 DAYS. REDUCE DOSES TAKEN AS PAIN BECOMES MANAGEABLE. 11/15/20  Yes Historical Provider, MD   diclofenac (VOLTAREN) 50 MG EC tablet Take 1 tablet by mouth 2 times daily (with meals) 11/5/20  Yes JESSICA Hickman   Multiple Vitamins-Minerals (THERAPEUTIC MULTIVITAMIN-MINERALS) tablet Take 1 tablet by mouth daily   Yes Historical Provider, MD   Vitamin D (CHOLECALCIFEROL) 1000 UNITS CAPS capsule Take 2,000 Units by mouth daily    Yes Historical Provider, MD   Multiple Vitamins-Minerals (OCUVITE PRESERVISION PO) Take 1 tablet by mouth daily   Yes Historical Provider, MD   levothyroxine (SYNTHROID) 25 MCG tablet Take 1 tablet by mouth daily 4/22/21   Adriano Mejia MD   alendronate (FOSAMAX) 70 MG tablet Take 1 tablet by mouth every 7 days 4/14/21   Adriano Mejia MD       Allergies:  Flexeril [cyclobenzaprine] and Keflet [cephalexin]    Social History:      Patient is . TOBACCO:   reports that she quit smoking about 38 years ago. She has a 15.00 pack-year smoking history. She has never used smokeless tobacco.  ETOH:   reports current alcohol use of about 1.0 - 2.0 standard drinks of alcohol per week. History:      History reviewed. No pertinent family history. REVIEW OF SYSTEMS:   Pertinentpositives as noted in the HPI. All other systems reviewed and negative. ROS: Review of Systems    10 point ROS negative except as listed above.     PHYSICALEXAM PERFORMED:    /78   Pulse 75   Temp 97.7 °F (36.5 °C) (Oral)   Resp 18   Ht 5' 2\" (1.575 m)   Wt 150 lb (68 kg)   SpO2 97%   BMI 27.44 kg/m²     General appearance:  No apparent distress, appears stated age and cooperative. HEENT:  Normal cephalic, atraumaticwithout obvious deformity. Pupils equal, round, and reactive to light. Mild sclera icterus on examination. Extra ocular muscles intact. Respiratory:  Normal respiratory effort. Clear to auscultation, bilaterally without Rales/Wheezes/Rhonchi. Cardiovascular:  Regular rate and rhythm with normal S1/S2 without murmurs, rubs or gallops. Abdomen: Soft,non-tender, non-distended with normal bowel sounds. Musculoskeletal:  No clubbing, cyanosis or edemabilaterally. Full range of motion without deformity. Neurologic: Neurovascularly intact without any focalsensory/motor deficits. Cranial nerves: II-XII intact, grossly non-focal.  Psychiatric:  Alert and oriented,thought content appropriate, normal insight  Peripheral Pulses: +2 palpable, equal bilaterally     Labs:   Cortisol: 1.8 *  Default Normal Ranges   7-9am 4.3-22.4   3-5pm 3.1-16.7     Recent Labs     05/11/21  0554 05/11/21  0825   WBC 12.5* 13.9*   HGB 6.7* 7.3*   HCT 19.6* 21.3*   * 148     Recent Labs     05/10/21  0647 05/11/21  0825   * 114*   K 3.7 3.8   CL 90* 79*   CO2 24 22   BUN 12 16   CREATININE 1.0 1.0   CALCIUM 10.5 8.0*   PHOS  --  3.0     Recent Labs     05/10/21  0647   AST 38*   ALT 43*   BILITOT 1.2*   ALKPHOS 127     Recent Labs     05/10/21  1355   INR 1.08     No results for input(s): CKTOTAL, TROPONINI in the last 72 hours. Urinalysis:   Lab Results   Component Value Date    NITRU Negative 01/02/2016    WBCUA 3-5 01/02/2016    BACTERIA Rare 01/02/2016    RBCUA None seen 01/02/2016    BLOODU Negative 01/02/2016    SPECGRAV <=1.005 01/02/2016    GLUCOSEU Negative 01/02/2016       Radiology:     EKG:  I have reviewed the EKG with the following interpretation: normal EKG, normal sinus rhythm. XR HIP 2-3 VW W PELVIS RIGHT   Final Result      Intraoperative fluoroscopy provided as above.  See operative report for details. FLUORO FOR SURGICAL PROCEDURES   Final Result      Intraoperative fluoroscopy provided as above. See operative report for details. ASSESSMENT & PLAN:  Bart Piedra is a 79 y.o. female w/ PMHX of arthritis, GERD, HTN; admitted 5/10 s/p R hip arthroplasty on 5/10. Hospital medicine was consulted for severe hyponatremia. Patient's lab values are pertinent for cortisol of 1.8, CBC showing anemia with Hgb of 7.3 and HCT of 21.3, and CMP showing hyponatremia to 114. This values are concerning for an adrenal crisis induced hyponatremia. Low cortisol levels can trigger increased ADH secretion which in turn can increase water retention and cause subsequent hyponatremia, fatigue, malaise, and hypotension. Urine studies (osmolality and sodium), Renal function panel, ACTH stimulation test, serum osmolality should all be ordered to help evaluate patient's fluid balance. Furthermore, patient is anemic on CBC, has slight yellowing of the sclera bilaterally. She should be monitored for Hgb dropping below 7 which will be an indication for transfusion.     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  - possible adrenal insufficiency precipitated by recent surgery and loss of blood (800 cc during surgery)  - ACTH stimulation test to help differentiate from primary vs central adrenal insufficiency  - Urine osmolality and FeNa to help evaluate for appropriate or inappropriate feedback from kidney to response to increased water retention  - Serum sodium to monitor levels  - CBC with differential to evaluate for anemia and signs of worsening blood loss  - Hold home lisinopril due to low BP and hyponatremia          Active Hospital Problems    Diagnosis Date Noted    Hyponatremia [E87.1] 05/11/2021    Status post total hip replacement, right [Z96.641] 05/10/2021    Hyperlipidemia [E78.5]     Hypothyroidism due to Hashimoto's thyroiditis [E03.8, E06.3]     Hyperglycemia [R73.9] 08/18/2020    Blood loss anemia [D50.0] 08/18/2020    Benign essential HTN [I10] 11/20/2018         DVTProphylaxis: Warfarin  Diet: DIET GENERAL;  Code Status: Full Code    I will discuss the patient with MD Eben Banerjee MS3

## 2021-05-11 NOTE — TELEPHONE ENCOUNTER
Uriel Spicer at [facility] is calling. She needs orders faxed on patient prior to sx on 5/17.      Fax to [phone]

## 2021-05-11 NOTE — CARE COORDINATION
Case Management Daily Note                    Date: 5/11/2021     Patient Name: Sangeetha Ricahrdson    Date of Admission: 5/10/2021  5:40 AM  YOB: 1950    Length of Stay: 0            Patient Admission Status: Inpatient  Diagnosis:Osteoarthritis of right hip, unspecified osteoarthritis type [M16.11]  Status post total hip replacement, right [Z96.641]  Hyponatremia [E87.1]     ________________________________________________________________________________________  Discharge Plan: Home with [address]: Layla Boucher    Phone: [phone]   Fax: [phone]    Insurance: Payor: Cecil Sanchez / Plan: MEDICARE PART A AND B / Product Type: *No Product type* /   Is pre-cert/notification needed: N/A    Tentative discharge date: 5/12/21    Current barriers: unstable BP    Referrals completed: [address] [facility] [address] [facility] [address]: GONZÁLEZ Pride    Resources/ information provided: N/a   ________________________________________________________________________________________  PT AM-PAC: 18 / 24 per last evaluation on: 5/10    OT AM-PAC: 18 / 24 per last evaluation on: 5/10    DME Needs for discharge: N/A  ________________________________________________________________________________________  Notes/Plan of Care:   SW following, Pt from home w/dttr, POD#1 R hip replacement, [facility], spoke with Jones Nicolasa [phone] at [place] and confirmed that they are following her, DC pending medically stable. [facility] and/or her family were provided with choice of provider; she and/or her family are in agreement with the discharge plan at this time.     Care Transition Patient: Yes    CLIFTON Mendez, [facility]   Case Management Department  Ph: [phone]

## 2021-05-11 NOTE — PROGRESS NOTES
Occupational Therapy  Facility/Department: [facility] 5T ORTHO/NEURO  Daily Treatment Note  NAME: Chris Johnson  : 1950  MRN: [id number]    Date of Service: 2021    Discharge Recommendations: Chris Johnson scored a 18/24 on the AM-PAC ADL Inpatient form. Current research shows that an AM-PAC score of 18 or greater is typically associated with a discharge to the patient's home setting. Based on the patient's AM-PAC score, and their current ADL deficits, it is recommended that the patient have 2-3 sessions per week of Occupational Therapy at d/c to increase the patient's independence. At this time, this patient demonstrates the endurance and safety to discharge home with 24hr assist and a follow up treatment frequency of 2-3x/wk. Please see assessment section for further patient specific details. If patient discharges prior to next session this note will serve as a discharge summary. Please see below for the latest assessment towards goals. OT Equipment Recommendations  Equipment Needed: No  Other: Pt iwth all needs in place at home    Assessment   Performance deficits / Impairments: Decreased functional mobility ; Decreased ADL status  Assessment: Pt with good tolerance for therapy session however becomming light headed after ADL tasks with BP monitored at 62/47 and RN informed. Pt in care of RN at end of session. Pt demonstrating functional mobility to and from bathroom with RW and CGA. ADLS completed in stance at sink with SBA to CGA. Dressing tasks not completed due to low BP. Expect pt to be able to discharge home in care of  when medically ready. Continue OT per POC.       Treatment Diagnosis: impaired ADLs/transfers s/p R THR (anterior approach)  OT Education: OT Role;Plan of Care;ADL Adaptive Strategies;Precautions;Transfer Training  Patient Education: Pt educated on safe home set up  - pt verb understanding  Activity Tolerance  Activity Tolerance: Patient Tolerated treatment well;Patient limited by fatigue;Patient limited by pain  Activity Tolerance: Pt tolerating therapy well however with BP dropping at end of session requiring supine and RN assist. BP monitored at 62/47 then 80/51. Pt left in care of RN. Patient Diagnosis(es): The encounter diagnosis was Osteoarthritis of right hip, unspecified osteoarthritis type. has a past medical history of Arthritis, GERD (gastroesophageal reflux disease), Hyperlipidemia, and Hypertension. has a past surgical history that includes  section; Total hip arthroplasty (Left, 2020); Colonoscopy; and Total hip arthroplasty (Right, 5/10/2021). Restrictions  Position Activity Restriction  Other position/activity restrictions: 50% weight bearing, anterior approach precautions       Diagnosis: OA R Hip  Treatment Diagnosis: impaired ADLs/transfers s/p R THR (anterior approach)    Subjective:   Pt met supine in bed and agreeable to OT treatment    Pain:   Increased pain with movement rated at 5/10. Pt requesting pain meds. RN informed and pain med admin during session    Objective:    Cognition/Orientation:  WFL    Bed mobility   Supine to sit: SBA with significant increased time for completion. Scooting: SBA for scooting to EOB and back in chair. Functional Mobility   Sit to Stand: CGA to Min A from EOB then CGA from toilet  Stand to Sit: CGA with VCs for safe hand placement and positioning. Bed to Chair Transfer:  CGA with VCS for positioning  Commode Transfer: CGA with use of GB    Other: Functional mobility completed to and from bathroom with RW and CGA with slow steady gait. Pt standing at sink for 20 min with SBA for completion of ADL grooming and washing up tasks. Pt encouraged to take a seated rest break however pt declining sitting. Pt taking standing rest breaks upon walking back to chair from bathroom. Pt reported feeling light headed upon sitting in recliner chair.      ADLs   Grooming: SBA for oral care and washing face/hands, oral care requiring increased time  Bathing: CGA for UE washing in stance   UB dressing: CGA for LE washing for buttocks and yehuda in stance: Toileting: CGA   Other: Pt very particular about grooming habits requiring increased time.        Safety Devices in Place:  Pt left in recliner chair with alarm on and call light in reach                Plan  If pt discharges prior to next treatment, this note will serve as discharge summary  Plan  Times per week: 7  Times per day: Daily  Current Treatment Recommendations: Functional Mobility Training, Safety Education & Training, Equipment Evaluation, Education, & procurement, Self-Care / ADL    AM-PAC Score        AM-PAC Inpatient Daily Activity Raw Score: 18 (05/11/21 1033)  AM-PAC Inpatient ADL T-Scale Score : 38.66 (05/11/21 1033)  ADL Inpatient CMS 0-100% Score: 46.65 (05/11/21 1033)  ADL Inpatient CMS G-Code Modifier : CK (05/11/21 1033)    Goals (as determined and assessed by primary OT)  Short term goals  Time Frame for Short term goals: Discharge  Short term goal 1: toilet transfer w/ CGA - goal met 5/11  Short term goal 2: verbalize safe tub shower seat transfer technique, independently - ongoing  Short term goal 3: tolerate LB dressing assessment - ongoing  Patient Goals   Patient goals : to return and be independent; to be able to walk outside again       Therapy Time   Individual Concurrent Group Co-treatment   Time In 0815         Time Out 0917         Minutes 62         Timed Code Treatment Minutes: [address] [facility], R Santhosh Weldon 46

## 2021-05-11 NOTE — CONSULTS
Patient Name: Lawrence Hargrove                                                    Primary Physician: Jovany Mijares MD  Admitting Dx: Osteoarthritis of right hip, unspecified osteoarthritis type [M16.11]  Status post total hip replacement, right [Z96.641]  Hyponatremia [E87.1]    Nephrology [address] [place] [address] Nephrology  [email or web address]                                          Assessment / PLan:     Hyponatremia  · Na 114 (0825) this AM, repeat check still at 115(1125) then jumped to 120 and back down to 115. Continue to follow Na levels q 3 hrs and adjust fluids. May have to place on 3% if not improving. · No severe nausea or pain causing ADH release. She has chronic hyponatremia which she has been told related to ACEi now on hold with low BP. · Denies SSRIs, TCAs, NSAIDs which may contribute to low sodium. · Patient did receive IVF bolus of 1/2 NS which may have contributed. · Cortisol low this AM at 1.8 but did receive steroid bolus which appears to have been given prior to labs. · Serum Osm 252  · Ur Osmolality at 193, Na <20  · Continue NS 100ml/hr, monitor for overcorrection 6-8 in 24 hrs  Anemia  · Serial H/H q 8hrs. Transfuse 1 unit PRBCs today. Expect further drop given fluid boluses. HTN  · Hold Lisinopril for hyponatremia and low blood pressure  Post-Op Urinary Retention  · stone placed with UOP of 550. Please call our office at [phone] or [employer] with any questions or contact me directly. Subjective:     CC / Reason for Consult:  Hyponatremia    HPI / PMHx:  This is a consult for Lawrence Hargrove  requested by Jovany Mijares MD for the reason of  Hyponatremia . Lawrence Hargrove is a 79 y.o. female admitted for OA of Rt Hip with Total Hip Replacement. PMHx of Hyponatremia, Arthritis, HLD, HTN, GERD. Low sodium noted to be around 130 to 134 in 2020 and 2019. Patient denies anxiety or depression medication.   Was aware of low sodium and was told this was related to her Lisinopril. She came in with Sodium of 131 3 weeks prior to admission and again on 5/10/21 the day of surgery. This AM sodium was 114 and patient was noted to be hypotensive so given IVF bolus of NS with sodium coming up to 120 then dropped again to 115. She had IVF bolus of NS. Appears she was given 1/2 NS bolus during surgery though not clear how much fluid she received. I thank Dr. Jamila Limon MD for consulting [place]. [address] [place] [address] Nephrology in the care of your patient. Please call with any questions or concerns. [phone]. Irma Asif    Objective:     SocHx: Quit smoking in 1983. Drinks nightly.      FamHx: NC    Current Medications:  Scheduled Medications:  pantoprazole, 40 mg, QAM AC  hydrocortisone sodium succinate PF, 50 mg, Q8H  calcium elemental, 500 mg, Daily  [Held by provider] lisinopril, 10 mg, Daily  therapeutic multivitamin-minerals, 1 tablet, Daily  simvastatin, 40 mg, Nightly  Vitamin D, 2,000 Units, Daily  sodium chloride flush, 10 mL, 2 times per day  acetaminophen, 650 mg, Q6H  sennosides-docusate sodium, 1 tablet, BID  warfarin, 4 mg, Daily       IV Meds:  sodium chloride  sodium chloride  sodium chloride       PRN Medications:  sodium chloride, , PRN  sodium chloride flush, 10 mL, PRN  sodium chloride, 25 mL, PRN  magnesium hydroxide, 30 mL, Daily PRN  oxyCODONE, 5 mg, Q4H PRN    Or  oxyCODONE, 10 mg, Q4H PRN  HYDROmorphone, 0.25 mg, Q3H PRN    Or  HYDROmorphone, 0.5 mg, Q3H PRN  promethazine, 12.5 mg, Q6H PRN    Or  ondansetron, 4 mg, Q6H PRN        Allergies: Flexeril [cyclobenzaprine] and Keflet [cephalexin]    ROS: Positives in BOLD     GEN:   fevers, chills, sweats, fatigue and weight loss     HEENT:    nasal congestion, sore mouth and sore throat     Resp:    cough, hemoptysis, pneumonia or dyspnea on exertion     Card:  chest pain, chest pressure/discomfort, dyspnea, palpitations,  lower extremity edema     GI:   nausea, vomiting, diarrhea, constipation and abdominal pain     :  dysuria, nocturia, urinary incontinence, hesitancy and hematuria     Derm:   rash, skin lesion(s), pruritus and dryness     Neuro:   headaches, dizziness, seizures, gait problems, tremor and weakness     MS:  Rt hip pain and arthralgias, denies neck pain and back pain     Endo:    nephropathy and cardiovascular disease    PE:      Gen: alert, well appearing, and in no distress and oriented to person, place, and time     HEENT:pupils equal and reactive, extraocular eye movements intact      Neuro: alert, oriented, normal speech, no focal findings or movement disorder noted     Neck:  supple, no significant adenopathy     Cardio: normal rate, regular rhythm, normal S1, S2, no murmurs, rubs, clicks or gallops      Resp: clear to auscultation, no wheezes, rales or rhonchi, symmetric air entry. GI:  soft, nontender, nondistended, no masses or organomegaly. Ext:  peripheral pulses normal, no pedal edema, no clubbing or cyanosis      MS: no joint tenderness, deformity or swelling      DERM: normal coloration and turgor, no rashesor bruising.        Vitals:   Patient Vitals for the past 8 hrs:   BP Temp Temp src Pulse Resp SpO2   05/11/21 1439 97/60 98 °F (36.7 °C) Oral 88 18 99 %   05/11/21 1224 (!) 91/55 98.1 °F (36.7 °C) Oral 67 18 97 %          I/Os:     Intake/Output Summary (Last 24 hours) at 5/11/2021 1630  Last data filed at 5/11/2021 1613  Gross per 24 hour   Intake 2784 ml   Output 1550 ml   Net 1234 ml       LABS:    Lab Results   Component Value Date    CREATININE 1.0 05/11/2021    BUN 16 05/11/2021     05/11/2021    K 3.6 05/11/2021    CL 79 05/11/2021    CO2 20 05/11/2021     No results found for: [id number]   Lab Results   Component Value Date    WBC 13.9 05/11/2021    HGB 7.0 05/11/2021    HCT 20.5 05/11/2021    MCV 92.6 05/11/2021     05/11/2021      No results found for: IRON, TIBC, FERRITIN   No results found for: Prince Rogel Results Component Value Date    CALCIUM 7.6 05/11/2021    CAION 1.16 01/02/2016    PHOS 2.5 05/11/2021

## 2021-05-12 ENCOUNTER — APPOINTMENT (OUTPATIENT)
Dept: VASCULAR LAB | Age: 71
DRG: 470 | End: 2021-05-12
Attending: ORTHOPAEDIC SURGERY
Payer: MEDICARE

## 2021-05-12 LAB
ANION GAP SERPL CALCULATED.3IONS-SCNC: 10 MMOL/L (ref 3–16)
BASOPHILS ABSOLUTE: 0 K/UL (ref 0–0.2)
BASOPHILS RELATIVE PERCENT: 0 %
BUN BLDV-MCNC: 12 MG/DL (ref 7–20)
CALCIUM SERPL-MCNC: 8.2 MG/DL (ref 8.3–10.6)
CHLORIDE BLD-SCNC: 99 MMOL/L (ref 99–110)
CO2: 23 MMOL/L (ref 21–32)
CREAT SERPL-MCNC: 1 MG/DL (ref 0.6–1.2)
EOSINOPHILS ABSOLUTE: 0 K/UL (ref 0–0.6)
EOSINOPHILS RELATIVE PERCENT: 0 %
GFR AFRICAN AMERICAN: >60
GFR NON-AFRICAN AMERICAN: 55
GLUCOSE BLD-MCNC: 125 MG/DL (ref 70–99)
HCT VFR BLD CALC: 22.5 % (ref 36–48)
HCT VFR BLD CALC: 22.9 % (ref 36–48)
HCT VFR BLD CALC: 23.1 % (ref 36–48)
HEMOGLOBIN: 7.7 G/DL (ref 12–16)
HEMOGLOBIN: 7.9 G/DL (ref 12–16)
HEMOGLOBIN: 8.1 G/DL (ref 12–16)
INR BLD: 1.32 (ref 0.86–1.14)
LYMPHOCYTES ABSOLUTE: 1 K/UL (ref 1–5.1)
LYMPHOCYTES RELATIVE PERCENT: 10.1 %
MCH RBC QN AUTO: 32.3 PG (ref 26–34)
MCHC RBC AUTO-ENTMCNC: 34.8 G/DL (ref 31–36)
MCV RBC AUTO: 92.8 FL (ref 80–100)
MONOCYTES ABSOLUTE: 0.6 K/UL (ref 0–1.3)
MONOCYTES RELATIVE PERCENT: 6.1 %
NEUTROPHILS ABSOLUTE: 8.4 K/UL (ref 1.7–7.7)
NEUTROPHILS RELATIVE PERCENT: 83.8 %
PDW BLD-RTO: 14.3 % (ref 12.4–15.4)
PLATELET # BLD: 119 K/UL (ref 135–450)
PMV BLD AUTO: 8 FL (ref 5–10.5)
POTASSIUM REFLEX MAGNESIUM: 4.1 MMOL/L (ref 3.5–5.1)
PROTHROMBIN TIME: 15.3 SEC (ref 10–13.2)
RBC # BLD: 2.49 M/UL (ref 4–5.2)
SODIUM BLD-SCNC: 122 MMOL/L (ref 136–145)
SODIUM BLD-SCNC: 129 MMOL/L (ref 136–145)
SODIUM BLD-SCNC: 129 MMOL/L (ref 136–145)
SODIUM BLD-SCNC: 132 MMOL/L (ref 136–145)
SODIUM BLD-SCNC: 132 MMOL/L (ref 136–145)
SODIUM BLD-SCNC: 134 MMOL/L (ref 136–145)
WBC # BLD: 10 K/UL (ref 4–11)

## 2021-05-12 PROCEDURE — 97535 SELF CARE MNGMENT TRAINING: CPT

## 2021-05-12 PROCEDURE — 6370000000 HC RX 637 (ALT 250 FOR IP): Performed by: PHYSICIAN ASSISTANT

## 2021-05-12 PROCEDURE — 85018 HEMOGLOBIN: CPT

## 2021-05-12 PROCEDURE — 93970 EXTREMITY STUDY: CPT

## 2021-05-12 PROCEDURE — 85014 HEMATOCRIT: CPT

## 2021-05-12 PROCEDURE — 6360000002 HC RX W HCPCS: Performed by: STUDENT IN AN ORGANIZED HEALTH CARE EDUCATION/TRAINING PROGRAM

## 2021-05-12 PROCEDURE — 6370000000 HC RX 637 (ALT 250 FOR IP): Performed by: INTERNAL MEDICINE

## 2021-05-12 PROCEDURE — 2580000003 HC RX 258: Performed by: PHYSICIAN ASSISTANT

## 2021-05-12 PROCEDURE — 36415 COLL VENOUS BLD VENIPUNCTURE: CPT

## 2021-05-12 PROCEDURE — 99232 SBSQ HOSP IP/OBS MODERATE 35: CPT | Performed by: INTERNAL MEDICINE

## 2021-05-12 PROCEDURE — 2580000003 HC RX 258

## 2021-05-12 PROCEDURE — 97116 GAIT TRAINING THERAPY: CPT

## 2021-05-12 PROCEDURE — 80048 BASIC METABOLIC PNL TOTAL CA: CPT

## 2021-05-12 PROCEDURE — 85610 PROTHROMBIN TIME: CPT

## 2021-05-12 PROCEDURE — 85025 COMPLETE CBC W/AUTO DIFF WBC: CPT

## 2021-05-12 PROCEDURE — 6360000002 HC RX W HCPCS: Performed by: INTERNAL MEDICINE

## 2021-05-12 PROCEDURE — 84295 ASSAY OF SERUM SODIUM: CPT

## 2021-05-12 PROCEDURE — 51702 INSERT TEMP BLADDER CATH: CPT

## 2021-05-12 PROCEDURE — 1200000000 HC SEMI PRIVATE

## 2021-05-12 PROCEDURE — 97110 THERAPEUTIC EXERCISES: CPT

## 2021-05-12 RX ORDER — DESMOPRESSIN ACETATE 4 UG/ML
1 INJECTION, SOLUTION INTRAVENOUS; SUBCUTANEOUS ONCE
Status: COMPLETED | OUTPATIENT
Start: 2021-05-12 | End: 2021-05-12

## 2021-05-12 RX ORDER — DEXTROSE MONOHYDRATE 50 MG/ML
INJECTION, SOLUTION INTRAVENOUS CONTINUOUS
Status: DISCONTINUED | OUTPATIENT
Start: 2021-05-12 | End: 2021-05-13

## 2021-05-12 RX ADMIN — HYDROCORTISONE SODIUM SUCCINATE 50 MG: 100 INJECTION, POWDER, FOR SOLUTION INTRAMUSCULAR; INTRAVENOUS at 21:12

## 2021-05-12 RX ADMIN — OXYCODONE 5 MG: 5 TABLET ORAL at 23:10

## 2021-05-12 RX ADMIN — DOCUSATE SODIUM 50 MG AND SENNOSIDES 8.6 MG 1 TABLET: 8.6; 5 TABLET, FILM COATED ORAL at 09:12

## 2021-05-12 RX ADMIN — ACETAMINOPHEN 650 MG: 325 TABLET ORAL at 12:15

## 2021-05-12 RX ADMIN — WARFARIN SODIUM 4 MG: 4 TABLET ORAL at 19:11

## 2021-05-12 RX ADMIN — Medication 2000 UNITS: at 09:12

## 2021-05-12 RX ADMIN — HYDROCORTISONE SODIUM SUCCINATE 50 MG: 100 INJECTION, POWDER, FOR SOLUTION INTRAMUSCULAR; INTRAVENOUS at 12:14

## 2021-05-12 RX ADMIN — SIMVASTATIN 40 MG: 40 TABLET, FILM COATED ORAL at 21:12

## 2021-05-12 RX ADMIN — DESMOPRESSIN ACETATE 1 MCG: 4 INJECTION INTRAVENOUS; SUBCUTANEOUS at 09:12

## 2021-05-12 RX ADMIN — ACETAMINOPHEN 650 MG: 325 TABLET ORAL at 19:11

## 2021-05-12 RX ADMIN — Medication 10 ML: at 09:14

## 2021-05-12 RX ADMIN — ACETAMINOPHEN 650 MG: 325 TABLET ORAL at 07:01

## 2021-05-12 RX ADMIN — Medication 1 TABLET: at 09:12

## 2021-05-12 RX ADMIN — CALCIUM 500 MG: 500 TABLET ORAL at 09:13

## 2021-05-12 RX ADMIN — PANTOPRAZOLE SODIUM 40 MG: 40 TABLET, DELAYED RELEASE ORAL at 06:58

## 2021-05-12 RX ADMIN — OXYCODONE 5 MG: 5 TABLET ORAL at 09:35

## 2021-05-12 RX ADMIN — DEXTROSE MONOHYDRATE: 50 INJECTION, SOLUTION INTRAVENOUS at 07:51

## 2021-05-12 ASSESSMENT — PAIN DESCRIPTION - LOCATION
LOCATION: ANKLE;HIP
LOCATION: HIP
LOCATION: HIP
LOCATION: BACK

## 2021-05-12 ASSESSMENT — PAIN DESCRIPTION - PROGRESSION
CLINICAL_PROGRESSION: GRADUALLY WORSENING
CLINICAL_PROGRESSION: GRADUALLY WORSENING

## 2021-05-12 ASSESSMENT — PAIN SCALES - GENERAL
PAINLEVEL_OUTOF10: 2
PAINLEVEL_OUTOF10: 4
PAINLEVEL_OUTOF10: 4

## 2021-05-12 ASSESSMENT — PAIN DESCRIPTION - ONSET
ONSET: ON-GOING

## 2021-05-12 ASSESSMENT — PAIN DESCRIPTION - FREQUENCY
FREQUENCY: INTERMITTENT
FREQUENCY: CONTINUOUS
FREQUENCY: INTERMITTENT
FREQUENCY: CONTINUOUS

## 2021-05-12 ASSESSMENT — PAIN DESCRIPTION - DESCRIPTORS: DESCRIPTORS: ACHING

## 2021-05-12 ASSESSMENT — PAIN DESCRIPTION - ORIENTATION
ORIENTATION: LEFT
ORIENTATION: LEFT

## 2021-05-12 ASSESSMENT — PAIN - FUNCTIONAL ASSESSMENT
PAIN_FUNCTIONAL_ASSESSMENT: PREVENTS OR INTERFERES SOME ACTIVE ACTIVITIES AND ADLS
PAIN_FUNCTIONAL_ASSESSMENT: ACTIVITIES ARE NOT PREVENTED
PAIN_FUNCTIONAL_ASSESSMENT: PREVENTS OR INTERFERES SOME ACTIVE ACTIVITIES AND ADLS

## 2021-05-12 ASSESSMENT — PAIN DESCRIPTION - PAIN TYPE
TYPE: ACUTE PAIN;SURGICAL PAIN
TYPE: ACUTE PAIN;SURGICAL PAIN

## 2021-05-12 NOTE — PROGRESS NOTES
Department of Orthopedic Surgery  Nurse Practitioner   Progress Note    Subjective:     Post-Operative Day: 2 Status Post right Total Hip Arthroplasty  Systemic or Specific Complaints: Patient seen sitting up in the chair and also walking in the hood with therapy. Pain well controlled, but patient reports more swelling and tenderness in her hip. Patient feeling well overall. Left ankle pain improving. No family at bedside. Objective:     Patient Vitals for the past 24 hrs:   BP Temp Temp src Pulse Resp SpO2   05/12/21 0715 95/60 98.2 °F (36.8 °C) Oral 86 18 98 %   05/12/21 0300 97/61 98.1 °F (36.7 °C) Oral 87 18 98 %   05/11/21 2300 (!) 94/55 98.5 °F (36.9 °C) Oral 88 16 99 %   05/11/21 2145 (!) 98/59 -- -- -- -- --   05/11/21 1900 97/61 98.3 °F (36.8 °C) Oral 94 16 97 %   05/11/21 1741 122/73 98.3 °F (36.8 °C) Oral 108 16 98 %   05/11/21 1727 (!) 92/59 98.3 °F (36.8 °C) Oral 92 18 98 %   05/11/21 1718 94/60 98.2 °F (36.8 °C) Oral 91 16 99 %   05/11/21 1439 97/60 98 °F (36.7 °C) Oral 88 18 99 %   05/11/21 1224 (!) 91/55 98.1 °F (36.7 °C) Oral 67 18 97 %       General: alert, appears stated age, cooperative and no distress   Wound: Wound clean and dry no evidence of infection. KEVIN   Motion: Painful range of Motion   DVT Exam: No evidence of DVT seen on physical exam.       NVI in lower extremity. Thigh swollen but soft. Moving foot and ankle. Data Review  CBC:   Lab Results   Component Value Date    WBC 10.0 05/12/2021    RBC 2.49 05/12/2021    HGB 8.1 05/12/2021    HCT 23.1 05/12/2021     05/12/2021       Assessment:     Status Post right Total Hip Arthroplasty. Doing well postoperatively. Plan:      1: Continues current post-op course : Post-op labs reviewed. Acute blood loss anemia, 1 unit PRBC transfused yesterday per medicine. Serial H&H Q8H per nephro and medicine. Pt's Na improved rapidly. Stop IV fluids and DDAVP per nephro today, continue stone to monitor output. CTPA negative. Dopplers today due to ankle pain and hip swelling, pending. 2:  Continue Deep venous thrombosis prophylaxis - coumadin, will ask pharmacy to dose. Monitor PT/INR.  3:  Continue physical therapy, OT  4:  Continue Pain Control  5:  Discharge pending medical stability. Plan for home with [facility] at discharge. Pt has a walker.      Obed Gaines, CNP

## 2021-05-12 NOTE — PROGRESS NOTES
Occupational Therapy  Facility/Department: [facility] 5T ORTHO/NEURO  Daily Treatment Note  NAME: Hany Moore  : 1950  MRN: [id number]    Date of Service: 2021    Discharge Recommendations: Hany Moore scored a 20/24 on the AM-PAC ADL Inpatient form. Current research shows that an AM-PAC score of 18 or greater is typically associated with a discharge to the patient's home setting. Based on the patient's AM-PAC score, and their current ADL deficits, it is recommended that the patient have 2-3 sessions per week of Occupational Therapy at d/c to increase the patient's independence. At this time, this patient demonstrates the endurance and safety to discharge home with 24 hr assist and a follow up treatment frequency of 2-3x/wk. Please see assessment section for further patient specific details. If patient discharges prior to next session this note will serve as a discharge summary. Please see below for the latest assessment towards goals. OT Equipment Recommendations  Other: Pt with all needs in place at home    Assessment   Performance deficits / Impairments: Decreased functional mobility ; Decreased ADL status  Assessment: Pt actively participated in therapy session this date, SBA for functional mobility with rw and SBA/CGA for transfers. Plan is to d/c home with 24 hr assist. Continue with POC. Treatment Diagnosis: impaired ADLs/transfers s/p R THR (anterior approach)  Prognosis: Good  OT Education: OT Role;Plan of Care;Precautions;Transfer Training  Patient Education: pt demonstrated understanding  REQUIRES OT FOLLOW UP: Yes  Activity Tolerance  Activity Tolerance: Patient Tolerated treatment well  Safety Devices  Safety Devices in place: Yes  Type of devices: Nurse notified; Left in chair;Chair alarm in place;Call light within reach         Patient Diagnosis(es): The encounter diagnosis was Osteoarthritis of right hip, unspecified osteoarthritis type.       has a past medical history of Arthritis,

## 2021-05-12 NOTE — PROGRESS NOTES
mg every other day after prevous HARRIET in 8/2020 (~27.5 mg/week)  · Typically takes 4-5 days to reach therapeutic INR. · Daily INR will be monitored / dose will be adjusted as appropriate. Date INR Warfarin   5/6 0.95         5/10 1.08 4 mg   5/11 1.04 Held    5/12 1.32        Please call with any questions.   Trent Atkins PharmD, BCPS  Wireless: [id number]  or [phone]  5/12/2021 11:27 AM

## 2021-05-12 NOTE — PROGRESS NOTES
Physical Therapy  Daily Treatment Note    Discharge Recommendations: Sabra Knox scored a 18/24 on the AM-PAC short mobility form. Current research shows that an AM-PAC score of 18 or greater is typically associated with a discharge to the patient's home setting. Based on the patient's AM-PAC score and their current functional mobility deficits, it is recommended that the patient have 2-3 sessions per week of Physical Therapy at d/c to increase the patient's independence. At this time, this patient demonstrates the endurance and safety to discharge home with home PT and a follow up treatment frequency of 2-3x/wk. Please see assessment section for further patient specific details. Assessment:  Pt moving well. Steady gait with walker. Did well on stairs. Demonstrates good understanding of anterior hip precautions and 50% WB R LE. Plan is for home with  at D/C. Would benefit from continued home PT to work towards baseline mobility. No new DME needs. HOME HEALTH CARE: LEVEL 1 STANDARD  - Initial home health evaluation to occur within 24-48 hours, in patient home   - Therapy to evaluate with goal of regaining prior level of functioning   - Therapy to evaluate if patient has [address] needs for personal care    Equipment Needs: No new needs    Chart Reviewed: Yes     Other position/activity restrictions: 50% weight bearing, anterior approach precautions   Additional Pertinent Hx: Pt is a 79 y.o. female adm 5/10 with OA R hip. Pt s/p RIGHT HIP ARTHROPLASTY ANTERIOR APPROACH on 5/10. PMH: arthrtis, HTN, hyperlipidemia, GERD, LTHR 8/2020      Diagnosis: OA R hip   Treatment Diagnosis: impaired functional mobility s/p RIGHT HIP ARTHROPLASTY ANTERIOR APPROACH    Subjective: Pt in bed initially. Ready to practice the stairs. States she has 1 curb into the house and then 6 with railing to get to main level. Pain: 3/10 R hip with ambulation. Ice packs to area after therapy.      Objective:          Timed Code Treatment Minutes: 40   Total Treatment Minutes: 40    Will continue per plan of care. If patient is discharged prior to next treatment, this note will serve as the discharge summary.     Lai Rodriguez #[id number]

## 2021-05-12 NOTE — PROGRESS NOTES
Talked with dr Rei Hyman , gave  ddavp  Iv fluids stopped,  floey to stay in for now per dr Rei Hyman

## 2021-05-12 NOTE — PROGRESS NOTES
Up with therapy, gait steady with walker gait belt , 50 percent weight bearing , dr Efren Harry in to see, stone draining clear yellow urine, dextrose iv started right forearm , patent alert oriented times 4 , will monitor

## 2021-05-12 NOTE — PROGRESS NOTES
Pt is alert and oriented. VSS with the exception of low B/P. Pt stated that she runs this know. Pt completed a unit of blood during my shift and tolerated well. Blood sodiums drawn every 3 hrs. Trending upward. No c/o headache, dizziness, or confusion. العراقي in place for urinary retention with adequate output.

## 2021-05-12 NOTE — PROGRESS NOTES
Physical Therapy  Daily Treatment Note    Discharge Recommendations: Chris Johnson scored a 18/24 on the AM-PAC short mobility form. Current research shows that an AM-PAC score of 18 or greater is typically associated with a discharge to the patient's home setting. Based on the patient's AM-PAC score and their current functional mobility deficits, it is recommended that the patient have 2-3 sessions per week of Physical Therapy at d/c to increase the patient's independence. At this time, this patient demonstrates the endurance and safety to discharge home with home PT and a follow up treatment frequency of 2-3x/wk. Please see assessment section for further patient specific details. Assessment:  Pt with improved activity tolerance today. Steady gait with use of standard walker. Needing Min assist for bed mobility, but otherwise needing ~ SBA overall. Anticipate pt will do well at home with assist of . Pt would benefit from home PT to work towards baseline function. No DME needs. Equipment Needs: No new needs    Chart Reviewed: Yes     Other position/activity restrictions: 50% weight bearing, anterior approach precautions   Additional Pertinent Hx: Pt is a 79 y.o. female adm 5/10 with OA R hip. Pt s/p RIGHT HIP ARTHROPLASTY ANTERIOR APPROACH on 5/10. PMH: arthrtis, HTN, hyperlipidemia, GERD, LTHR 8/2020      Diagnosis: OA R hip   Treatment Diagnosis: impaired functional mobility s/p RIGHT HIP ARTHROPLASTY ANTERIOR APPROACH    Subjective: Pt in chair initially. Agreeable to working with PT. Feeling better today. Reports increased swelling R thigh. NP, Bernabe, reports pt will not be D/Shahid today. Denied dizziness during session this AM.     Pain: 2/10 at end of session. R hip and lateral thigh. Ice packs to area after therapy.      Objective:    Transfers  Sit to stand: SBA from chair  Stand to sit: SBA onto bed    Ambulation  Assistance Level: CGA initially, progressing to SBA  Assistive device: Standard walker  Distance: 120 ft in hood  Quality of gait: Step-to pattern; PWB R LE (~50%); moderate reliance on walker for support; steady with walker; decreased pace; good sequencing noted    Bed mobility  Sit to Supine: Min assist for R LE, HOB flat without railing    Exercises  10 reps B ankle pumps, quad sets, glut sets, heel slides, hip abd/add, SAQ in supine    Balance  Sat EOB with SBA  Static stance with walker SBA  Ambulation with wheeled walker SBA    Patient Education  Reviewed 50% WB R LE. Expressed and demonstrated understanding. Calling for assist with needs. Expressed understanding. Safety Devices  Pt left with needs in reach. In bed with bed alarm on. RN aware. AM-PAC score  AM-PAC Inpatient Mobility Raw Score : 18  AM-PAC Inpatient T-Scale Score : 43.63  Mobility Inpatient CMS 0-100% Score: 46.58  Mobility Inpatient CMS G-Code Modifier : CK    Goals: (as determined and assessed by primary PT)  Time Frame for Short term goals: By discharge  Short term goal 1: Sup to sit supervision   Short term goal 2: Pt will transfer sit to stand supervision   Short term goal 3: Pt will amb >100' with LRAD supervision  Short term goal 4: Pt will up/down 4-6 steps with rail and LRAD SBA     Plan:  Times per week: 7; Times per day: Twice a day  Current Treatment Recommendations: Strengthening, Functional Mobility Training, Gait Training, Stair training, Patient/Caregiver Education & Training, Safety Education & Training    Therapy Time    Individual  Concurrent  Group  Co-treatment    Time In  1022            Time Out  1050            Minutes  28              Timed Code Treatment Minutes: 28  Total Treatment Minutes: 28    Will continue per plan of care. Practice stairs prior to going home. If patient is discharged prior to next treatment, this note will serve as the discharge summary.     [place], [place] #[id number]

## 2021-05-12 NOTE — PROGRESS NOTES
Patient Name: Juana Mccallum                                                    Primary Physician: Guero James MD  Admitting Dx: Osteoarthritis of right hip, unspecified osteoarthritis type [M16.11]  Status post total hip replacement, right [Z96.641]  Hyponatremia [E87.1]    Nephrology [facility]  [email or web address] [employer]                                          Assessment / PLan:     Sodium rapidly improved from 115>> 134  Started D5W as the goal sodoium was 122- 124   Urine out put is 6.8 liters   Give DDAVP and hold D5W  DC lisinopril as BP is low       Hyponatremia  · Na 114 (0825) , repeat check still at 115(1125) then jumped to 120 and back down to 115. Continue to follow Na levels q 3 hrs and adjust fluids. · No severe nausea or pain causing ADH release. She has chronic hyponatremia which she has been told related to ACEi now on hold with low BP. · Denies SSRIs, TCAs, NSAIDs which may contribute to low sodium. · Patient did receive IVF bolus of 1/2 NS which may have contributed. · Cortisol low this AM at 1.8 but did receive steroid bolus which appears to have been given prior to labs. · Serum Osm 252  · Ur Osmolality at 193, Na <20  · Continue NS 100ml/hr, monitor for overcorrection 6-8 in 24 hrs  Anemia  · Serial H/H q 8hrs. Transfuse 1 unit PRBCs today. Expect further drop given fluid boluses. HTN  · Hold Lisinopril for hyponatremia and low blood pressure  Post-Op Urinary Retention  · stone placed with UOP of 550. Please call our office at [phone] or [employer] with any questions or contact me directly. Subjective:     CC / Reason for Consult:  Hyponatremia    HPI / PMHx:  This is a consult for Juana Mccallum  requested by Guero James MD for the reason of  Hyponatremia . Juana Mccallum is a 79 y.o. female admitted for OA of Rt Hip with Total Hip Replacement. PMHx of Hyponatremia, Arthritis, HLD, HTN, GERD.   Low sodium noted to be around 130 to 134 in 2020 and 2019. Patient denies anxiety or depression medication. Was aware of low sodium and was told this was related to her Lisinopril. She came in with Sodium of 131 3 weeks prior to admission and again on 5/10/21 the day of surgery. This AM sodium was 114 and patient was noted to be hypotensive so given IVF bolus of NS with sodium coming up to 120 then dropped again to 115. She had IVF bolus of NS. Appears she was given 1/2 NS bolus during surgery though not clear how much fluid she received. I thank Dr. Romana Regulus, MD for consulting [place]. [facility] in the care of your patient. Please call with any questions or concerns. [phone]. Objective:     SocHx: Quit smoking in 1983. Drinks nightly.      FamHx: NC    Current Medications:  Scheduled Medications:  hydrocortisone sodium succinate PF, 50 mg, BID  pantoprazole, 40 mg, QAM AC  calcium elemental, 500 mg, Daily  [Held by provider] lisinopril, 10 mg, Daily  therapeutic multivitamin-minerals, 1 tablet, Daily  simvastatin, 40 mg, Nightly  Vitamin D, 2,000 Units, Daily  sodium chloride flush, 10 mL, 2 times per day  acetaminophen, 650 mg, Q6H  sennosides-docusate sodium, 1 tablet, BID  warfarin, 4 mg, Daily       IV Meds:  dextrose, Last Rate: 100 mL/hr at 05/12/21 0751  sodium chloride  sodium chloride       PRN Medications:  sodium chloride, , PRN  sodium chloride flush, 10 mL, PRN  sodium chloride, 25 mL, PRN  magnesium hydroxide, 30 mL, Daily PRN  oxyCODONE, 5 mg, Q4H PRN    Or  oxyCODONE, 10 mg, Q4H PRN  HYDROmorphone, 0.25 mg, Q3H PRN    Or  HYDROmorphone, 0.5 mg, Q3H PRN  promethazine, 12.5 mg, Q6H PRN    Or  ondansetron, 4 mg, Q6H PRN        Allergies: Flexeril [cyclobenzaprine] and Keflet [cephalexin]    ROS: Positives in BOLD     GEN:   fevers, chills, sweats, fatigue and weight loss     HEENT:    nasal congestion, sore mouth and sore throat     Resp:    cough, hemoptysis, pneumonia or dyspnea on exertion     Card: chest pain, chest pressure/discomfort, dyspnea, palpitations,  lower extremity edema     GI:   nausea, vomiting, diarrhea, constipation and abdominal pain     :  dysuria, nocturia, urinary incontinence, hesitancy and hematuria     Derm:   rash, skin lesion(s), pruritus and dryness     Neuro:   headaches, dizziness, seizures, gait problems, tremor and weakness     MS:  Rt hip pain and arthralgias, denies neck pain and back pain     Endo:    nephropathy and cardiovascular disease    PE:      Gen: alert, well appearing, and in no distress and oriented to person, place, and time     HEENT:pupils equal and reactive, extraocular eye movements intact      Neuro: alert, oriented, normal speech, no focal findings or movement disorder noted     Neck:  supple, no significant adenopathy     Cardio: normal rate, regular rhythm, normal S1, S2, no murmurs, rubs, clicks or gallops      Resp: clear to auscultation, no wheezes, rales or rhonchi, symmetric air entry. GI:  soft, nontender, nondistended, no masses or organomegaly. Ext:  peripheral pulses normal, no pedal edema, no clubbing or cyanosis      MS: no joint tenderness, deformity or swelling      DERM: normal coloration and turgor, no rashesor bruising.        Vitals:   Patient Vitals for the past 8 hrs:   BP Temp Temp src Pulse Resp SpO2   05/12/21 0715 95/60 98.2 °F (36.8 °C) Oral 86 18 98 %   05/12/21 0300 97/61 98.1 °F (36.7 °C) Oral 87 18 98 %          I/Os:     Intake/Output Summary (Last 24 hours) at 5/12/2021 0836  Last data filed at 5/12/2021 0600  Gross per 24 hour   Intake 4580.25 ml   Output 6800 ml   Net -2219.75 ml       LABS:    Lab Results   Component Value Date    CREATININE 1.0 05/12/2021    BUN 12 05/12/2021     05/12/2021    K 4.1 05/12/2021    CL 99 05/12/2021    CO2 23 05/12/2021     No results found for: [id number]   Lab Results   Component Value Date    WBC 10.0 05/12/2021    HGB 8.1 05/12/2021    HCT 23.1 05/12/2021    MCV 92.8 05/12/2021     05/12/2021      No results found for: IRON, TIBC, FERRITIN   No results found for: Josephine Villalobos  Lab Results   Component Value Date    CALCIUM 8.2 05/12/2021    CAION 1.16 01/02/2016    PHOS 2.5 05/11/2021

## 2021-05-12 NOTE — PROGRESS NOTES
Internal Medicine PGY-1 Resident Progress Note        PCP: Butch Nair MD    Date of Admission: 5/10/2021    Chief Complaint: No chief complaint on file. Subjective: Patient seen and examined at bedside. Overnight pts Na overcorrected to 122, pt was asymptomatic, IVF fluids were held and she was started on D5 gtt. This morning pt reports feeling well. Reports she is motivated to go home. Denies CP, Palpitations, CP, abd pain, n/v, HA, dizziness/lightheadedness. Medications:  Reviewed    Infusion Medications    dextrose 100 mL/hr at 05/12/21 0751    sodium chloride      sodium chloride       Scheduled Medications    hydrocortisone sodium succinate PF  50 mg Intravenous BID    pantoprazole  40 mg Oral QAM AC    calcium elemental  500 mg Oral Daily    [Held by provider] lisinopril  10 mg Oral Daily    therapeutic multivitamin-minerals  1 tablet Oral Daily    simvastatin  40 mg Oral Nightly    Vitamin D  2,000 Units Oral Daily    sodium chloride flush  10 mL Intravenous 2 times per day    acetaminophen  650 mg Oral Q6H    sennosides-docusate sodium  1 tablet Oral BID    warfarin  4 mg Oral Daily     PRN Meds: sodium chloride, sodium chloride flush, sodium chloride, magnesium hydroxide, oxyCODONE **OR** oxyCODONE, HYDROmorphone **OR** HYDROmorphone, promethazine **OR** ondansetron      Intake/Output Summary (Last 24 hours) at 5/12/2021 0839  Last data filed at 5/12/2021 0600  Gross per 24 hour   Intake 4580.25 ml   Output 6800 ml   Net -2219.75 ml       Physical Exam Performed:  BP 95/60   Pulse 86   Temp 98.2 °F (36.8 °C) (Oral)   Resp 18   Ht 5' 2\" (1.575 m)   Wt 150 lb (68 kg)   SpO2 98%   BMI 27.44 kg/m²     General appearance: No apparent distress, appears stated age and cooperative. HEENT: Pupils equal, round, and reactive to light. Conjunctivae/corneas clear. Neck: Supple, with full range of motion. No jugular venous distention. Trachea midline.   Respiratory:  Normal respiratory effort. Clear to auscultation, bilaterally without Rales/Wheezes/Rhonchi. Cardiovascular: Regular rate and rhythm with normal S1/S2 without murmurs, rubs or gallops. Abdomen: Soft, non-tender, non-distended with normal bowel sounds. Musculoskeletal: No clubbing, cyanosis or edema bilaterally. Full range of motion without deformity. Skin: Skin color, texture, turgor normal.  No rashes or lesions. Neurologic:  Neurovascularly intact without any focal sensory/motor deficits. Cranial nerves: II-XII intact, grossly non-focal.  Psychiatric: Alert and oriented, thought content appropriate, normal insight    Labs:   Recent Labs     05/11/21  0554 05/11/21  0825 05/11/21  0825 05/11/21  1502 05/11/21  2119 05/12/21  0454   WBC 12.5* 13.9*  --   --   --  10.0   HGB 6.7* 7.3*   < > 7.0* 7.8* 8.1*   HCT 19.6* 21.3*   < > 20.5* 22.6* 23.1*   * 148  --   --   --  119*    < > = values in this interval not displayed. Recent Labs     05/11/21  0825 05/11/21  1122 05/11/21  1122 05/12/21  0121 05/12/21  0454 05/12/21  0700   * 115*   < > 122* 132* 134*   K 3.8 3.6  --   --  4.1  --    CL 79* 79*  --   --  99  --    CO2 22 20*  --   --  23  --    BUN 16 16  --   --  12  --    CREATININE 1.0 1.0  --   --  1.0  --    CALCIUM 8.0* 7.6*  --   --  8.2*  --    PHOS 3.0 2.5  --   --   --   --     < > = values in this interval not displayed. Recent Labs     05/10/21  0647   AST 38*   ALT 43*   BILITOT 1.2*   ALKPHOS 127     Recent Labs     05/10/21  1355 05/11/21  1122 05/12/21  0454   INR 1.08 1.04 1.32*     No results for input(s): CKTOTAL, TROPONINI in the last 72 hours.     Urinalysis:   Lab Results   Component Value Date    NITRU Negative 01/02/2016    WBCUA 3-5 01/02/2016    BACTERIA Rare 01/02/2016    RBCUA None seen 01/02/2016    BLOODU Negative 01/02/2016    SPECGRAV <=1.005 01/02/2016    GLUCOSEU Negative 01/02/2016       Radiology:  XR ANKLE LEFT (MIN 3 VIEWS)   Final Result   Impression:    No acute osseous injury. CT CHEST PULMONARY EMBOLISM W CONTRAST   Final Result   1. No evidence of acute or chronic pulmonary embolus      2. Chronic basilar lung disease with chronic subpleural dependent atelectasis   3. Slight increase in subsegmental atelectasis of the superior segment of the right lower lobe         XR HIP 2-3 VW W PELVIS RIGHT   Final Result      Intraoperative fluoroscopy provided as above. See operative report for details. FLUORO FOR SURGICAL PROCEDURES   Final Result      Intraoperative fluoroscopy provided as above. See operative report for details. VL Extremity Venous Bilateral    (Results Pending)         Assessment/Plan: Froylan Og, 79 y.o. female presents with abnormal labs, admitted for workup of Status post total hip replacement, right. Active Hospital Problems    Diagnosis Date Noted    Hyponatremia [E87.1] 05/11/2021    Status post total hip replacement, right [Z96.641] 05/10/2021    Hyperlipidemia [E78.5]     Hypothyroidism due to Hashimoto's thyroiditis [E03.8, E06.3]     Hyperglycemia [R73.9] 08/18/2020    Blood loss anemia [D50.0] 08/18/2020    Benign essential HTN [I10] 11/20/2018       Hyponatremia 2/2 blood/volume loss s/p R hip arthroplasty   - Na 114, cortisol 1.8  - Serum osm 252, urine Na < 20; urine osm 193   - suspected adrenal insufficiency, less likely, pt received prednisone prior to surgery   - recent surgery and loss of blood (800 cc during surgery) more likely the etiology behind hyponatremia which was rapidly corrected/over-corrected overnight.   - pt over-correct Na overnight, from 114 to 122 in 14-15 hours, pt was started on D5% and IVF were stopped   - DDAVP, per nephrology   - titrate solu-cortef 50 mg IV Q8H    - Serum sodium to monitor levels  - CBC with differential to evaluate for anemia and signs of worsening blood loss  - Hold home lisinopril due to low BP and hyponatremia    Anemia 2/2 Blood loss s/p R hip arthroplasty   - Iron sucrose 200 mg IV once     L ankle pain  - pain management     Chronic medical conditions    HDL  - continue statin    DVT Prophylaxis: warfarin   Diet: DIET GENERAL;  Code Status: Full Code  Dispo - Wards     PT/OT Eval Status: PT 18 OT pending     I will discuss the patient with attending.     Dago Brito MD   Internal Medicine Resident PGY-1  Reach via [address] [place] [address]

## 2021-05-12 NOTE — PLAN OF CARE
Problem: Pain:  Goal: Pain level will decrease  Description: Pain level will decrease  5/12/2021 0846 by Jackeline Sarmiento RN  Outcome: Ongoing     Problem: Falls - Risk of:  Goal: Will remain free from falls  Description: Will remain free from falls  5/12/2021 0846 by Jackeline Sarmiento RN  Outcome: Ongoing

## 2021-05-12 NOTE — PLAN OF CARE
Problem: Pain:  Goal: Pain level will decrease  Description: Pain level will decrease  5/12/2021 0846 by Angel Patel RN  Outcome: Ongoing

## 2021-05-12 NOTE — PROGRESS NOTES
Up in hood with walker and gait belt , gait steady , positive dorsi flex , negative numbness tingling , ortho nurse pa , wanting scheduled dopplers of lower extremities , no present complaints , meeting pain goal with oral med's

## 2021-05-13 LAB
ADRENOCORTICOTROPIC HORMONE: 353 PG/ML (ref 6–58)
ADRENOCORTICOTROPIC HORMONE: <5 PG/ML (ref 6–58)
ANION GAP SERPL CALCULATED.3IONS-SCNC: 9 MMOL/L (ref 3–16)
BASOPHILS ABSOLUTE: 0 K/UL (ref 0–0.2)
BASOPHILS RELATIVE PERCENT: 0 %
BLOOD BANK DISPENSE STATUS: NORMAL
BLOOD BANK PRODUCT CODE: NORMAL
BPU ID: NORMAL
BUN BLDV-MCNC: 7 MG/DL (ref 7–20)
CALCIUM SERPL-MCNC: 7.8 MG/DL (ref 8.3–10.6)
CHLORIDE BLD-SCNC: 90 MMOL/L (ref 99–110)
CO2: 24 MMOL/L (ref 21–32)
CREAT SERPL-MCNC: 0.6 MG/DL (ref 0.6–1.2)
DESCRIPTION BLOOD BANK: NORMAL
EOSINOPHILS ABSOLUTE: 0 K/UL (ref 0–0.6)
EOSINOPHILS RELATIVE PERCENT: 0 %
GFR AFRICAN AMERICAN: >60
GFR NON-AFRICAN AMERICAN: >60
GLUCOSE BLD-MCNC: 109 MG/DL (ref 70–99)
GLUCOSE BLD-MCNC: 97 MG/DL (ref 70–99)
HCT VFR BLD CALC: 19.3 % (ref 36–48)
HCT VFR BLD CALC: 22.1 % (ref 36–48)
HCT VFR BLD CALC: 23.8 % (ref 36–48)
HCT VFR BLD CALC: 24.9 % (ref 36–48)
HEMOGLOBIN: 6.7 G/DL (ref 12–16)
HEMOGLOBIN: 7.6 G/DL (ref 12–16)
HEMOGLOBIN: 8.1 G/DL (ref 12–16)
HEMOGLOBIN: 8.7 G/DL (ref 12–16)
INR BLD: 1.52 (ref 0.86–1.14)
LACTATE DEHYDROGENASE: 243 U/L (ref 100–190)
LYMPHOCYTES ABSOLUTE: 2.1 K/UL (ref 1–5.1)
LYMPHOCYTES RELATIVE PERCENT: 21 %
MCH RBC QN AUTO: 32.5 PG (ref 26–34)
MCHC RBC AUTO-ENTMCNC: 34.7 G/DL (ref 31–36)
MCV RBC AUTO: 93.8 FL (ref 80–100)
MONOCYTES ABSOLUTE: 1.3 K/UL (ref 0–1.3)
MONOCYTES RELATIVE PERCENT: 13 %
NEUTROPHILS ABSOLUTE: 6.6 K/UL (ref 1.7–7.7)
NEUTROPHILS RELATIVE PERCENT: 66 %
PDW BLD-RTO: 14.2 % (ref 12.4–15.4)
PERFORMED ON: ABNORMAL
PLATELET # BLD: 117 K/UL (ref 135–450)
PMV BLD AUTO: 8.4 FL (ref 5–10.5)
POTASSIUM REFLEX MAGNESIUM: 3.8 MMOL/L (ref 3.5–5.1)
PROTHROMBIN TIME: 17.7 SEC (ref 10–13.2)
RBC # BLD: 2.06 M/UL (ref 4–5.2)
SODIUM BLD-SCNC: 115 MMOL/L (ref 136–145)
SODIUM BLD-SCNC: 117 MMOL/L (ref 136–145)
SODIUM BLD-SCNC: 120 MMOL/L (ref 136–145)
SODIUM BLD-SCNC: 123 MMOL/L (ref 136–145)
SODIUM BLD-SCNC: 123 MMOL/L (ref 136–145)
SODIUM BLD-SCNC: 124 MMOL/L (ref 136–145)
SODIUM BLD-SCNC: 124 MMOL/L (ref 136–145)
WBC # BLD: 10 K/UL (ref 4–11)

## 2021-05-13 PROCEDURE — 83615 LACTATE (LD) (LDH) ENZYME: CPT

## 2021-05-13 PROCEDURE — 99232 SBSQ HOSP IP/OBS MODERATE 35: CPT | Performed by: INTERNAL MEDICINE

## 2021-05-13 PROCEDURE — 6370000000 HC RX 637 (ALT 250 FOR IP): Performed by: STUDENT IN AN ORGANIZED HEALTH CARE EDUCATION/TRAINING PROGRAM

## 2021-05-13 PROCEDURE — 85025 COMPLETE CBC W/AUTO DIFF WBC: CPT

## 2021-05-13 PROCEDURE — 84295 ASSAY OF SERUM SODIUM: CPT

## 2021-05-13 PROCEDURE — 85014 HEMATOCRIT: CPT

## 2021-05-13 PROCEDURE — 85610 PROTHROMBIN TIME: CPT

## 2021-05-13 PROCEDURE — 6370000000 HC RX 637 (ALT 250 FOR IP): Performed by: INTERNAL MEDICINE

## 2021-05-13 PROCEDURE — 1200000000 HC SEMI PRIVATE

## 2021-05-13 PROCEDURE — 85018 HEMOGLOBIN: CPT

## 2021-05-13 PROCEDURE — 97530 THERAPEUTIC ACTIVITIES: CPT

## 2021-05-13 PROCEDURE — 2580000003 HC RX 258: Performed by: PHYSICIAN ASSISTANT

## 2021-05-13 PROCEDURE — 80048 BASIC METABOLIC PNL TOTAL CA: CPT

## 2021-05-13 PROCEDURE — 36415 COLL VENOUS BLD VENIPUNCTURE: CPT

## 2021-05-13 PROCEDURE — 6370000000 HC RX 637 (ALT 250 FOR IP): Performed by: PHYSICIAN ASSISTANT

## 2021-05-13 PROCEDURE — 97110 THERAPEUTIC EXERCISES: CPT

## 2021-05-13 PROCEDURE — 36430 TRANSFUSION BLD/BLD COMPNT: CPT

## 2021-05-13 PROCEDURE — 97535 SELF CARE MNGMENT TRAINING: CPT

## 2021-05-13 PROCEDURE — 97116 GAIT TRAINING THERAPY: CPT

## 2021-05-13 RX ORDER — SENNA AND DOCUSATE SODIUM 50; 8.6 MG/1; MG/1
1 TABLET, FILM COATED ORAL 2 TIMES DAILY
COMMUNITY
Start: 2021-05-13 | End: 2021-05-20

## 2021-05-13 RX ORDER — SODIUM CHLORIDE 9 MG/ML
INJECTION, SOLUTION INTRAVENOUS CONTINUOUS
Status: DISCONTINUED | OUTPATIENT
Start: 2021-05-13 | End: 2021-05-13

## 2021-05-13 RX ORDER — PREDNISONE 10 MG/1
10 TABLET ORAL DAILY
Qty: 10 TABLET | Refills: 0 | Status: SHIPPED | OUTPATIENT
Start: 2021-05-14 | End: 2021-05-24

## 2021-05-13 RX ORDER — OXYCODONE HYDROCHLORIDE 5 MG/1
5-10 TABLET ORAL EVERY 4 HOURS PRN
Qty: 28 TABLET | Refills: 0 | Status: SHIPPED | OUTPATIENT
Start: 2021-05-13 | End: 2021-05-16

## 2021-05-13 RX ORDER — WARFARIN SODIUM 4 MG/1
4 TABLET ORAL DAILY
Qty: 30 TABLET | Refills: 3
Start: 2021-05-13 | End: 2021-05-14 | Stop reason: HOSPADM

## 2021-05-13 RX ORDER — FUROSEMIDE 10 MG/ML
10 INJECTION INTRAMUSCULAR; INTRAVENOUS ONCE
Status: DISCONTINUED | OUTPATIENT
Start: 2021-05-13 | End: 2021-05-13

## 2021-05-13 RX ORDER — OXYCODONE HYDROCHLORIDE 5 MG/1
5-10 TABLET ORAL EVERY 4 HOURS PRN
Qty: 28 TABLET | Refills: 0 | Status: SHIPPED | OUTPATIENT
Start: 2021-05-13 | End: 2021-05-13

## 2021-05-13 RX ORDER — PREDNISONE 10 MG/1
10 TABLET ORAL DAILY
Qty: 10 TABLET | Refills: 0 | Status: SHIPPED | OUTPATIENT
Start: 2021-05-14 | End: 2021-05-13

## 2021-05-13 RX ORDER — SODIUM CHLORIDE 9 MG/ML
INJECTION, SOLUTION INTRAVENOUS PRN
Status: DISCONTINUED | OUTPATIENT
Start: 2021-05-13 | End: 2021-05-14 | Stop reason: HOSPADM

## 2021-05-13 RX ORDER — PREDNISONE 10 MG/1
10 TABLET ORAL DAILY
Status: DISCONTINUED | OUTPATIENT
Start: 2021-05-13 | End: 2021-05-14 | Stop reason: HOSPADM

## 2021-05-13 RX ORDER — TOLVAPTAN 15 MG/1
15 TABLET ORAL ONCE
Status: COMPLETED | OUTPATIENT
Start: 2021-05-13 | End: 2021-05-13

## 2021-05-13 RX ADMIN — Medication 10 ML: at 09:51

## 2021-05-13 RX ADMIN — CALCIUM 500 MG: 500 TABLET ORAL at 09:49

## 2021-05-13 RX ADMIN — PANTOPRAZOLE SODIUM 40 MG: 40 TABLET, DELAYED RELEASE ORAL at 06:44

## 2021-05-13 RX ADMIN — ACETAMINOPHEN 650 MG: 325 TABLET ORAL at 06:44

## 2021-05-13 RX ADMIN — Medication 10 ML: at 20:15

## 2021-05-13 RX ADMIN — Medication 2000 UNITS: at 09:48

## 2021-05-13 RX ADMIN — OXYCODONE 5 MG: 5 TABLET ORAL at 17:17

## 2021-05-13 RX ADMIN — PREDNISONE 10 MG: 10 TABLET ORAL at 09:49

## 2021-05-13 RX ADMIN — TOLVAPTAN 15 MG: 15 TABLET ORAL at 17:17

## 2021-05-13 RX ADMIN — OXYCODONE 5 MG: 5 TABLET ORAL at 11:41

## 2021-05-13 RX ADMIN — SIMVASTATIN 40 MG: 40 TABLET, FILM COATED ORAL at 20:38

## 2021-05-13 RX ADMIN — ACETAMINOPHEN 650 MG: 325 TABLET ORAL at 20:14

## 2021-05-13 RX ADMIN — Medication 1 TABLET: at 09:49

## 2021-05-13 ASSESSMENT — PAIN DESCRIPTION - ORIENTATION
ORIENTATION: MID
ORIENTATION: MID

## 2021-05-13 ASSESSMENT — PAIN SCALES - GENERAL
PAINLEVEL_OUTOF10: 6
PAINLEVEL_OUTOF10: 0
PAINLEVEL_OUTOF10: 6
PAINLEVEL_OUTOF10: 3

## 2021-05-13 ASSESSMENT — PAIN DESCRIPTION - ONSET: ONSET: ON-GOING

## 2021-05-13 ASSESSMENT — PAIN - FUNCTIONAL ASSESSMENT: PAIN_FUNCTIONAL_ASSESSMENT: ACTIVITIES ARE NOT PREVENTED

## 2021-05-13 ASSESSMENT — PAIN DESCRIPTION - PAIN TYPE
TYPE: SURGICAL PAIN
TYPE: SURGICAL PAIN

## 2021-05-13 ASSESSMENT — PAIN DESCRIPTION - PROGRESSION: CLINICAL_PROGRESSION: NOT CHANGED

## 2021-05-13 ASSESSMENT — PAIN DESCRIPTION - LOCATION: LOCATION: HIP

## 2021-05-13 NOTE — PROGRESS NOTES
Internal Medicine PGY-1 Resident Progress Note        PCP: Veronica Abrams MD    Date of Admission: 5/10/2021    Chief Complaint: No chief complaint on file. Subjective: Patient seen and examined at bedside. No acute events overnight. This morning reports feeling well. Denies N/V, dizziness, lightheadedness, abd pain, CP, SOB, palpitations. Medications:  Reviewed    Infusion Medications    [Held by provider] dextrose Stopped (05/12/21 0929)    sodium chloride      sodium chloride       Scheduled Medications    hydrocortisone sodium succinate PF  50 mg Intravenous BID    pantoprazole  40 mg Oral QAM AC    calcium elemental  500 mg Oral Daily    therapeutic multivitamin-minerals  1 tablet Oral Daily    simvastatin  40 mg Oral Nightly    Vitamin D  2,000 Units Oral Daily    sodium chloride flush  10 mL Intravenous 2 times per day    acetaminophen  650 mg Oral Q6H    sennosides-docusate sodium  1 tablet Oral BID    warfarin  4 mg Oral Daily     PRN Meds: sodium chloride, sodium chloride flush, sodium chloride, magnesium hydroxide, oxyCODONE **OR** oxyCODONE, promethazine **OR** ondansetron      Intake/Output Summary (Last 24 hours) at 5/13/2021 0631  Last data filed at 5/12/2021 2200  Gross per 24 hour   Intake 360 ml   Output 4125 ml   Net -3765 ml       Physical Exam Performed:  /70   Pulse 77   Temp 98 °F (36.7 °C) (Oral)   Resp 18   Ht 5' 2\" (1.575 m)   Wt 150 lb (68 kg)   SpO2 97%   BMI 27.44 kg/m²     General appearance: No apparent distress, appears stated age and cooperative. HEENT: Pupils equal, round, and reactive to light. Conjunctivae/corneas clear. Neck: Supple, with full range of motion. No jugular venous distention. Trachea midline. Respiratory:  Normal respiratory effort. Clear to auscultation, bilaterally without Rales/Wheezes/Rhonchi. Cardiovascular: Regular rate and rhythm with normal S1/S2 without murmurs, rubs or gallops.   Abdomen: Soft, non-tender, non-distended with normal bowel sounds. Musculoskeletal: No clubbing, cyanosis or edema bilaterally. Full range of motion without deformity. Skin: Skin color, texture, turgor normal.  No rashes or lesions. Neurologic:  Neurovascularly intact without any focal sensory/motor deficits. Cranial nerves: II-XII intact, grossly non-focal.  Psychiatric: Alert and oriented, thought content appropriate, normal insight    Labs:   Recent Labs     05/11/21  0554 05/11/21  0825 05/11/21  0825 05/12/21  0454 05/12/21  1503 05/12/21  1941   WBC 12.5* 13.9*  --  10.0  --   --    HGB 6.7* 7.3*   < > 8.1* 7.7* 7.9*   HCT 19.6* 21.3*   < > 23.1* 22.9* 22.5*   * 148  --  119*  --   --     < > = values in this interval not displayed. Recent Labs     05/11/21  0825 05/11/21  1122 05/11/21  1122 05/12/21  0454 05/12/21  0454 05/12/21  1941 05/12/21  2343 05/13/21  0320   * 115*   < > 132*   < > 129* 124* 124*   K 3.8 3.6  --  4.1  --   --   --   --    CL 79* 79*  --  99  --   --   --   --    CO2 22 20*  --  23  --   --   --   --    BUN 16 16  --  12  --   --   --   --    CREATININE 1.0 1.0  --  1.0  --   --   --   --    CALCIUM 8.0* 7.6*  --  8.2*  --   --   --   --    PHOS 3.0 2.5  --   --   --   --   --   --     < > = values in this interval not displayed. Recent Labs     05/10/21  0647   AST 38*   ALT 43*   BILITOT 1.2*   ALKPHOS 127     Recent Labs     05/10/21  1355 05/11/21  1122 05/12/21  0454   INR 1.08 1.04 1.32*     No results for input(s): CKTOTAL, TROPONINI in the last 72 hours. Urinalysis:   Lab Results   Component Value Date    NITRU Negative 01/02/2016    WBCUA 3-5 01/02/2016    BACTERIA Rare 01/02/2016    RBCUA None seen 01/02/2016    BLOODU Negative 01/02/2016    SPECGRAV <=1.005 01/02/2016    GLUCOSEU Negative 01/02/2016       Radiology:  VL Extremity Venous Bilateral   Final Result      XR ANKLE LEFT (MIN 3 VIEWS)   Final Result   Impression:    No acute osseous injury.       CT CHEST PULMONARY 1 U, check post transfusion H&H      L ankle pain  - pain management      Chronic medical conditions  HDL  - continue statin       DVT Prophylaxis: warfarin   Diet: DIET GENERAL;  Code Status: Full Code  Dispo - Wards     PT/OT Eval Status: 18 and 20    I will discuss the patient with Dr. Edgar Camarena MD.     Junior Murguia MD   Internal Medicine Resident PGY-1  Reach via [facility]

## 2021-05-13 NOTE — PROGRESS NOTES
Patient Name: Katty Mercado                                                    Primary Physician: Jerardo Villanueva MD  Admitting Dx: Osteoarthritis of right hip, unspecified osteoarthritis type [M16.11]  Status post total hip replacement, right [Z96.641]  Hyponatremia [E87.1]    Nephrology [facility]  [email or web address] [place]                                          Assessment / PLan:     Sodium rapidly improved from 115>> 124  Urine out put is 4.5 liters   SP DDAVP   DC lisinopril as BP was low   Continue to monitor sodium and it should autocorrect . Hyponatremia  · Na 114 (0825) , repeat check still at 115(1125) then jumped to 120 and back down to 115. Continue to follow Na levels q 3 hrs and adjust fluids. · No severe nausea or pain causing ADH release. She has chronic hyponatremia which she has been told related to ACEi now on hold with low BP. · Denies SSRIs, TCAs, NSAIDs which may contribute to low sodium. · Patient did receive IVF bolus of 1/2 NS which may have contributed. · Cortisol low this AM at 1.8 but did receive steroid bolus which appears to have been given prior to labs. · Serum Osm 252  · Ur Osmolality at 193, Na <20  · Continue NS 100ml/hr, monitor for overcorrection 6-8 in 24 hrs  Anemia  · Serial H/H q 8hrs. Transfuse 1 unit PRBCs today. Expect further drop given fluid boluses. HTN  · Hold Lisinopril for hyponatremia and low blood pressure  Post-Op Urinary Retention  · stone placed with UOP of 550. Please call our office at [phone] or [employer] with any questions or contact me directly. Subjective:     CC / Reason for Consult:  Hyponatremia    HPI / PMHx:  This is a consult for Katty Dawitks  requested by Jerardo Villanueva MD for the reason of  Hyponatremia . Katty Mercado is a 79 y.o. female admitted for OA of Rt Hip with Total Hip Replacement. PMHx of Hyponatremia, Arthritis, HLD, HTN, GERD.   Low sodium noted to be around 130 to 134 in 2020 and 2019. Patient denies anxiety or depression medication. Was aware of low sodium and was told this was related to her Lisinopril. She came in with Sodium of 131 3 weeks prior to admission and again on 5/10/21 the day of surgery. This AM sodium was 114 and patient was noted to be hypotensive so given IVF bolus of NS with sodium coming up to 120 then dropped again to 115. She had IVF bolus of NS. Appears she was given 1/2 NS bolus during surgery though not clear how much fluid she received. I thank Dr. Elli Pfeiffer MD for consulting [address] [facility]phrology in the care of your patient. Please call with any questions or concerns. [phone]. Objective:     SocHx: Quit smoking in 1983. Drinks nightly.      FamHx: NC    Current Medications:  Scheduled Medications:  predniSONE, 10 mg, Daily  pantoprazole, 40 mg, QAM AC  calcium elemental, 500 mg, Daily  therapeutic multivitamin-minerals, 1 tablet, Daily  simvastatin, 40 mg, Nightly  Vitamin D, 2,000 Units, Daily  sodium chloride flush, 10 mL, 2 times per day  acetaminophen, 650 mg, Q6H  sennosides-docusate sodium, 1 tablet, BID  warfarin, 4 mg, Daily       IV Meds:  sodium chloride  sodium chloride  sodium chloride       PRN Medications:  sodium chloride, , PRN  sodium chloride, , PRN  sodium chloride flush, 10 mL, PRN  sodium chloride, 25 mL, PRN  magnesium hydroxide, 30 mL, Daily PRN  oxyCODONE, 5 mg, Q4H PRN    Or  oxyCODONE, 10 mg, Q4H PRN  promethazine, 12.5 mg, Q6H PRN    Or  ondansetron, 4 mg, Q6H PRN        Allergies: Flexeril [cyclobenzaprine] and Keflet [cephalexin]    ROS: Positives in BOLD     GEN:   fevers, chills, sweats, fatigue and weight loss     HEENT:    nasal congestion, sore mouth and sore throat     Resp:    cough, hemoptysis, pneumonia or dyspnea on exertion     Card:  chest pain, chest pressure/discomfort, dyspnea, palpitations,  lower extremity edema     GI:   nausea, vomiting, diarrhea, constipation and abdominal CALCIUM 7.8 05/13/2021    CAION 1.16 01/02/2016    PHOS 2.5 05/11/2021

## 2021-05-13 NOTE — PROGRESS NOTES
Occupational Therapy  Facility/Department: [facility] 5T ORTHO/NEURO  Daily Treatment Note  NAME: Niya Marr  : 1950  MRN: [id number]    Date of Service: 2021    Discharge Recommendations: Niya Marr scored a 20/24 on the AM-PAC ADL Inpatient form. Current research shows that an AM-PAC score of 18 or greater is typically associated with a discharge to the patient's home setting. Based on the patient's AM-PAC score, and their current ADL deficits, it is recommended that the patient have 2-3 sessions per week of Occupational Therapy at d/c to increase the patient's independence. At this time, this patient demonstrates the endurance and safety to discharge home with 24 hr/assist (home vs OP services) and a follow up treatment frequency of 2-3x/wk. Please see assessment section for further patient specific details. If patient discharges prior to next session this note will serve as a discharge summary. Please see below for the latest assessment towards goals. OT Equipment Recommendations  Equipment Needed: No    Assessment   Performance deficits / Impairments: Decreased functional mobility ; Decreased ADL status  Assessment: Pt with increased endurance with ADLs than previous session. Pt stood at the sink for 12 min. to complete grooming and hygiene tasks with CGA to mod I.  Pt ambulated to/from the bathroom with CGA to SBA with walker. Pt declined LB dressing assessment this date stating she was in too much pain to attempt it. Cont. OT tx per POC. Treatment Diagnosis: impaired ADLs/transfers s/p R THR (anterior approach)  Prognosis: Good  OT Education: OT Role;Plan of Care;Precautions;Transfer Training  Patient Education: Pt verbalized understanding. REQUIRES OT FOLLOW UP: Yes  Activity Tolerance  Activity Tolerance: Patient Tolerated treatment well  Safety Devices  Safety Devices in place: Yes  Type of devices: Nurse notified; Left in chair;Chair alarm in place;Call light within reach Restrictions  Position Activity Restriction  Other position/activity restrictions: 50% weight bearing, anterior approach precautions  Subjective   General  Chart Reviewed: Yes  Patient assessed for rehabilitation services?: Yes  Additional Pertinent Hx: 79 y.o. F s/p R HARRIET - anterior approach 5/10. PMHx includes arthritis, L HARRIET 8/2020  Response to previous treatment: Patient with no complaints from previous session  Family / Caregiver Present: No  Referring Practitioner: Roxana LOPEZ  Diagnosis: OA R Hip  Subjective  Subjective: Pt in chair upon entry. Pt agreeable to work with OT, but tearful. \"I'm disappointed because I thought I would be going home today, but now, I'm not so sure. General Comment  Vital Signs  Patient Currently in Pain: Yes(unrated in back, buttocks, and R hip; positioned to comfort; RN aware)   Orientation  Orientation  Overall Orientation Status: Within Normal Limits  Objective    ADL  Grooming: Stand by assistance(to wash hands, wash face, brush teeth, and comb hair while standing at sink with RW)  UE Bathing: Modified independent (use of wipes to clean arms and armpits)  LE Bathing: Contact guard assistance(to SBA, use of wipes to clean perineal area and butt)  LE Dressing: (declined due to pain in back, buttocks, and hip)  Toileting: Contact guard assistance(to SBA)        Balance  Sitting Balance: Independent  Standing Balance: Contact guard assistance(to SBA)  Standing Balance  Time: ~12 min.   Activity: bathroom mobility/activity  Functional Mobility  Functional - Mobility Device: Rolling Walker  Activity: To/from bathroom  Assist Level: Stand by assistance  Toilet Transfers  Toilet - Technique: Ambulating(with walker)  Equipment Used: Standard toilet(grab bars)  Toilet Transfer: Contact guard assistance     Transfers  Stand Step Transfers: Contact guard assistance(to SBA with walker)  Sit to stand: Contact guard assistance(to SBA, cues for hand placement)  Stand to sit: Contact guard assistance(to SBA, cues for hand placement)                       Cognition  Overall Cognitive Status: WNL                                         Plan   Plan  Times per week: 7  Times per day: Daily  Current Treatment Recommendations: Functional Mobility Training, Safety Education & Training, Equipment Evaluation, Education, & procurement, Self-Care / ADL    AM-PAC Score        AM-PAC Inpatient Daily Activity Raw Score: 20 (05/13/21 1358)  AM-PAC Inpatient ADL T-Scale Score : 42.03 (05/13/21 1358)  ADL Inpatient CMS 0-100% Score: 38.32 (05/13/21 1358)  ADL Inpatient CMS G-Code Modifier : Amairani Bautista (05/13/21 1358)    Goals  Short term goals  Time Frame for Short term goals: Discharge  Short term goal 1: toilet transfer w/ CGA - goal met 5/11  Short term goal 2: verbalize safe tub shower seat transfer technique, independently - ongoing 5/12 & 5/13 (pt will sponge bathe initially due to high tub and unable to fit TTB  Short term goal 3: tolerate LB dressing assessment - pt declined 5/12 &5/13  Patient Goals   Patient goals : to return and be independent; to be able to walk outside again       Therapy Time   Individual Concurrent Group Co-treatment   Time In 1043         Time Out 1123         Minutes 40         Timed Code Treatment Minutes: 40 Minutes      Total Tx Min: 40 minutes    Therapist was present, directed the patient's care, made skilled judgment, and was responsible for assessment and treatment of the patient. If patient is discharged prior to next treatment, this not will serve as the discharge summary.     Britta Block S/OT

## 2021-05-13 NOTE — PROGRESS NOTES
Department of Orthopedic Surgery  Nurse Practitioner   Progress Note    Subjective:     Post-Operative Day: 3 Status Post right Total Hip Arthroplasty  Systemic or Specific Complaints: Patient seen sitting up in the chair. The patient voices feeling frustrated that she is still in the hospital and anticipate being home by now. Pain well controlled. No family at bedside. Objective:     Patient Vitals for the past 24 hrs:   BP Temp Temp src Pulse Resp SpO2   05/13/21 1143 109/66 98.5 °F (36.9 °C) Oral 76 16 97 %   05/13/21 1015 (!) 100/59 98.2 °F (36.8 °C) Oral 72 16 100 %   05/13/21 1005 104/62 98.2 °F (36.8 °C) Oral 89 16 99 %   05/13/21 0951 96/78 98.1 °F (36.7 °C) Oral 87 18 99 %   05/13/21 0715 112/74 98 °F (36.7 °C) Oral 86 16 98 %   05/13/21 0257 111/70 98 °F (36.7 °C) Oral 77 18 97 %   05/12/21 2300 115/76 97.9 °F (36.6 °C) Oral 83 16 97 %   05/12/21 1900 106/70 98.6 °F (37 °C) Oral 84 18 99 %   05/12/21 1549 100/64 98.5 °F (36.9 °C) Oral 87 18 99 %   05/12/21 1200 101/66 98.4 °F (36.9 °C) Oral 97 18 96 %       General: alert, appears stated age, cooperative and no distress   Wound: Wound clean and dry no evidence of infection. KEVIN, not operating at this time. Swelling of the thigh, no hematoma noted. Motion: Painful range of Motion   DVT Exam: No evidence of DVT seen on physical exam.       NVI in lower extremity. Thigh swollen but soft. Moving foot and ankle. Data Review  CBC:   Lab Results   Component Value Date    WBC 10.0 05/13/2021    RBC 2.06 05/13/2021    HGB 7.6 05/13/2021    HCT 22.1 05/13/2021     05/13/2021       Assessment:     Status Post right Total Hip Arthroplasty. Doing well postoperatively. Plan:      1: Continues current post-op course : Post-op labs reviewed. Acute blood loss anemia, 1 unit PRBC transfused per medicine 5/11 and 1 unit PRBC currently transfusing per nephro. Serial H&H Q8H per nephro and medicine. Monitor BMP. Dopplers negative yesterday.  Medicine and nephro following. 2:  Continue Deep venous thrombosis prophylaxis - coumadin, pharmacy dosing and currently holding due to anemia. Monitor PT/INR.  3:  Continue physical therapy, OT  4:  Continue Pain Control  5:  Discharge pending medical stability. Plan for home with Brooke Singleton at discharge. Pt has a walker. Scripts sent to meds to beds. Discharge instructions and TIO completed.     Chelsea Jeffrey, CNP

## 2021-05-13 NOTE — CARE COORDINATION
SW following, Pt from home w/dttr, POD#3 R hip replacement, NA+ 117, monitoring H&H, Pt plans to DC home with R Carlos Schaffer 114 for Gilson Gandhi pending medically stable    Electronically signed by CLIFTON Curtis, LSW on 5/13/2021 at 4:30 [phone]

## 2021-05-13 NOTE — PROGRESS NOTES
Clinical Pharmacy Progress Note    ADMIT DATE: 5/10/2021     Interval update:  Hgb down to 6.7 today - giving 1unit PRBCs today. Hydrocortisone switched to prednisone. 80 yo F with PMH of arthritis, GERD, HTN; admitted 5/10 s/p R hip arthroplasty on 5/10. Pharmacy has been consulted for post-op VTE prophylaxis with warfarin by JESSICA Gasca. Goal INR 2-3  Home Anticoagulation Regimen:  · Patient was not taking warfarin prior to admission. · Spoke with patient via telephone on 5/10 -- patient stated she was previously on warfarin after previous HARRIET in August 2020. At that time, she was eventually started on warfarin 5 mg alternating with 2.5 mg every other day. LABORATORY:  Recent Labs     05/12/21  0454 05/12/21  0454 05/13/21  0320 05/13/21  0747   *   < > 124* 123*   K 4.1  --   --  3.8   CL 99  --   --  90*   CO2 23  --   --  24   BUN 12  --   --  7   CREATININE 1.0  --   --  0.6   GLUCOSE 125*  --   --  97    < > = values in this interval not displayed. Estimated Creatinine Clearance: 79 mL/min (based on SCr of 0.6 mg/dL). Lab Results   Component Value Date    WBC 10.0 05/13/2021    HGB 6.7 (LL) 05/13/2021    HCT 19.3 (LL) 05/13/2021    MCV 93.8 05/13/2021     (L) 05/13/2021       Lab Results   Component Value Date    PROTIME 17.7 (H) 05/13/2021    INR 1.52 (H) 05/13/2021     VITALS:  /74   Pulse 86   Temp 98 °F (36.7 °C) (Oral)   Resp 16   Ht 5' 2\" (1.575 m)   Wt 150 lb (68 kg)   SpO2 98%   BMI 27.44 kg/m²     DIET: DIET GENERAL;    PROPHYLAXIS:  Stress ulcer: pantoprazole  VTE:  Warfarin    ASSESSMENT/PLAN:    1) VTE prophylaxis s/p HARRIET: pharmacy to dose warfarin   · Goal INR 2-3  · INR today = 1.52.   · Hgb down to 6.7 this AM, now 7.6 after 1 unit pRBCs. · Awaiting response from ortho about holding warfarin tonight -- spoke with Lucien Hayes who is waiting for call back from Dr. Nannette Xie.  Will plan to continue warfarin 4 mg daily for now -- Izzy Daniel to d/c order if warfarin to be held tonight (communicated via [facility]). · Starting slightly lower starting dose as patient was previously on warfarin 5 mg alternating with 2.5 mg every other day after prevous HARRIET in 8/2020 (~27.5 mg/week)  · Typically takes 4-5 days to reach therapeutic INR. · Daily INR will be monitored / dose will be adjusted as appropriate. Date INR Warfarin   5/6 0.95         5/10 1.08 4 mg   5/11 1.04 Held    5/12 1.32 4 mg   5/13 1.52        Please call with any questions.   London BriggsD, BCPS  Wireless: [phone]  or [phone]  5/13/2021 9:43 AM

## 2021-05-13 NOTE — PROGRESS NOTES
Physical Therapy  Daily Treatment Note    Discharge Recommendations: Chris Johnson scored a 18/24 on the AM-PAC short mobility form. Current research shows that an AM-PAC score of 18 or greater is typically associated with a discharge to the patient's home setting. Based on the patient's AM-PAC score and their current functional mobility deficits, it is recommended that the patient have 2-3 sessions per week of Physical Therapy at d/c to increase the patient's independence. At this time, this patient demonstrates the endurance and safety to discharge home with home PT and a follow up treatment frequency of 2-3x/wk. Please see assessment section for further patient specific details. Assessment:  Pt moving well. Steady gait with walker. Steady on stairs with cane + rail. Able to perform R hip exercises without assist. Needing ~SBA for mobility overall. Plan is for home with  and home PT at D/C. No DME needs. HOME HEALTH CARE: LEVEL 1 STANDARD  - Initial home health evaluation to occur within 24-48 hours, in patient home   - Therapy to evaluate with goal of regaining prior level of functioning   - Therapy to evaluate if patient has [id number] needs for personal care    Equipment Needs: No new needs    Chart Reviewed: Yes     Other position/activity restrictions: 50% weight bearing, anterior approach precautions   Additional Pertinent Hx: Pt is a 79 y.o. female adm 5/10 with OA R hip. Pt s/p RIGHT HIP ARTHROPLASTY ANTERIOR APPROACH on 5/10. PMH: arthrtis, HTN, hyperlipidemia, GERD, LTHR 8/2020      Diagnosis: OA R hip   Treatment Diagnosis: impaired functional mobility s/p RIGHT HIP ARTHROPLASTY ANTERIOR APPROACH    Subjective: Pt in bed initially. Expressing frustration re: extended hospital stay and medical complications. Wants to go home. Dr. Iraj Escobedo in during session. RN reports HgB down to 6.7 again today. OK for PT as long as she is asymptomatic with activity. Pain: 2-3/10 R hip/thigh.  Also summary.     [place], [place] #[id number]

## 2021-05-13 NOTE — PLAN OF CARE
Problem: Pain:  Goal: Pain level will decrease  5/13/2021 0805 by Johanna Bar RN  Outcome: Ongoing   Medicated pt per orders, please see e-Mar. Encourage pt to call if pain increases. Will continue to monitor. Problem: Falls - Risk of:  Goal: Will remain free from falls  5/13/2021 0805 by Johanna Bar RN  Outcome: Ongoing   Pt remains free from injury during this shift. Pt is up with assist x 1 person, gait belt and walker. Encourage pt to call for all assistance. Call light is in reach, bed alarm is activated for safety, bed locked and in lowest position. Will continue to monitor.

## 2021-05-14 VITALS
DIASTOLIC BLOOD PRESSURE: 64 MMHG | RESPIRATION RATE: 16 BRPM | BODY MASS INDEX: 27.6 KG/M2 | SYSTOLIC BLOOD PRESSURE: 108 MMHG | WEIGHT: 150 LBS | HEIGHT: 62 IN | OXYGEN SATURATION: 98 % | HEART RATE: 75 BPM | TEMPERATURE: 97.9 F

## 2021-05-14 LAB
ANION GAP SERPL CALCULATED.3IONS-SCNC: 9 MMOL/L (ref 3–16)
ANISOCYTOSIS: ABNORMAL
BANDED NEUTROPHILS RELATIVE PERCENT: 2 % (ref 0–7)
BASOPHILS ABSOLUTE: 0.1 K/UL (ref 0–0.2)
BASOPHILS RELATIVE PERCENT: 1 %
BUN BLDV-MCNC: 7 MG/DL (ref 7–20)
CALCIUM SERPL-MCNC: 9.1 MG/DL (ref 8.3–10.6)
CHLORIDE BLD-SCNC: 97 MMOL/L (ref 99–110)
CO2: 26 MMOL/L (ref 21–32)
CREAT SERPL-MCNC: 0.8 MG/DL (ref 0.6–1.2)
EKG ATRIAL RATE: 100 BPM
EKG DIAGNOSIS: NORMAL
EKG P AXIS: 54 DEGREES
EKG P-R INTERVAL: 142 MS
EKG Q-T INTERVAL: 358 MS
EKG QRS DURATION: 96 MS
EKG QTC CALCULATION (BAZETT): 461 MS
EKG R AXIS: 67 DEGREES
EKG T AXIS: 68 DEGREES
EKG VENTRICULAR RATE: 100 BPM
EOSINOPHILS ABSOLUTE: 0.1 K/UL (ref 0–0.6)
EOSINOPHILS RELATIVE PERCENT: 1 %
GFR AFRICAN AMERICAN: >60
GFR NON-AFRICAN AMERICAN: >60
GLUCOSE BLD-MCNC: 94 MG/DL (ref 70–99)
HCT VFR BLD CALC: 26.2 % (ref 36–48)
HCT VFR BLD CALC: 28.5 % (ref 36–48)
HEMOGLOBIN: 9.1 G/DL (ref 12–16)
HEMOGLOBIN: 9.8 G/DL (ref 12–16)
HYPOCHROMIA: ABNORMAL
INR BLD: 1.68 (ref 0.86–1.14)
LYMPHOCYTES ABSOLUTE: 2.1 K/UL (ref 1–5.1)
LYMPHOCYTES RELATIVE PERCENT: 25 %
MCH RBC QN AUTO: 31.2 PG (ref 26–34)
MCHC RBC AUTO-ENTMCNC: 34.6 G/DL (ref 31–36)
MCV RBC AUTO: 90.1 FL (ref 80–100)
METAMYELOCYTES RELATIVE PERCENT: 1 %
MONOCYTES ABSOLUTE: 0.9 K/UL (ref 0–1.3)
MONOCYTES RELATIVE PERCENT: 10 %
MYELOCYTE PERCENT: 3 %
NEUTROPHILS ABSOLUTE: 5.4 K/UL (ref 1.7–7.7)
NEUTROPHILS RELATIVE PERCENT: 57 %
OVALOCYTES: ABNORMAL
PDW BLD-RTO: 16.7 % (ref 12.4–15.4)
PLATELET # BLD: 166 K/UL (ref 135–450)
PMV BLD AUTO: 7.9 FL (ref 5–10.5)
POLYCHROMASIA: ABNORMAL
POTASSIUM REFLEX MAGNESIUM: 4 MMOL/L (ref 3.5–5.1)
PROTHROMBIN TIME: 19.6 SEC (ref 10–13.2)
RBC # BLD: 2.91 M/UL (ref 4–5.2)
SODIUM BLD-SCNC: 132 MMOL/L (ref 136–145)
SODIUM BLD-SCNC: 138 MMOL/L (ref 136–145)
SODIUM BLD-SCNC: 138 MMOL/L (ref 136–145)
STOMATOCYTES: ABNORMAL
WBC # BLD: 8.5 K/UL (ref 4–11)

## 2021-05-14 PROCEDURE — 6370000000 HC RX 637 (ALT 250 FOR IP): Performed by: PHYSICIAN ASSISTANT

## 2021-05-14 PROCEDURE — 99238 HOSP IP/OBS DSCHRG MGMT 30/<: CPT | Performed by: INTERNAL MEDICINE

## 2021-05-14 PROCEDURE — 36415 COLL VENOUS BLD VENIPUNCTURE: CPT

## 2021-05-14 PROCEDURE — 97535 SELF CARE MNGMENT TRAINING: CPT

## 2021-05-14 PROCEDURE — 97530 THERAPEUTIC ACTIVITIES: CPT

## 2021-05-14 PROCEDURE — 93010 ELECTROCARDIOGRAM REPORT: CPT | Performed by: INTERNAL MEDICINE

## 2021-05-14 PROCEDURE — 6370000000 HC RX 637 (ALT 250 FOR IP): Performed by: STUDENT IN AN ORGANIZED HEALTH CARE EDUCATION/TRAINING PROGRAM

## 2021-05-14 PROCEDURE — 97110 THERAPEUTIC EXERCISES: CPT

## 2021-05-14 PROCEDURE — 84295 ASSAY OF SERUM SODIUM: CPT

## 2021-05-14 PROCEDURE — 97116 GAIT TRAINING THERAPY: CPT

## 2021-05-14 PROCEDURE — 2580000003 HC RX 258: Performed by: INTERNAL MEDICINE

## 2021-05-14 PROCEDURE — 85025 COMPLETE CBC W/AUTO DIFF WBC: CPT

## 2021-05-14 PROCEDURE — 85018 HEMOGLOBIN: CPT

## 2021-05-14 PROCEDURE — 80048 BASIC METABOLIC PNL TOTAL CA: CPT

## 2021-05-14 PROCEDURE — 2580000003 HC RX 258: Performed by: PHYSICIAN ASSISTANT

## 2021-05-14 PROCEDURE — 6370000000 HC RX 637 (ALT 250 FOR IP): Performed by: INTERNAL MEDICINE

## 2021-05-14 PROCEDURE — 85014 HEMATOCRIT: CPT

## 2021-05-14 PROCEDURE — 85610 PROTHROMBIN TIME: CPT

## 2021-05-14 PROCEDURE — 93005 ELECTROCARDIOGRAM TRACING: CPT | Performed by: STUDENT IN AN ORGANIZED HEALTH CARE EDUCATION/TRAINING PROGRAM

## 2021-05-14 RX ORDER — WARFARIN SODIUM 3 MG/1
3 TABLET ORAL DAILY
Qty: 30 TABLET | Refills: 1 | Status: SHIPPED | OUTPATIENT
Start: 2021-05-14 | End: 2021-08-12

## 2021-05-14 RX ORDER — DEXTROSE MONOHYDRATE 50 MG/ML
INJECTION, SOLUTION INTRAVENOUS CONTINUOUS
Status: ACTIVE | OUTPATIENT
Start: 2021-05-14 | End: 2021-05-14

## 2021-05-14 RX ADMIN — PREDNISONE 10 MG: 10 TABLET ORAL at 09:15

## 2021-05-14 RX ADMIN — DEXTROSE MONOHYDRATE: 5 INJECTION, SOLUTION INTRAVENOUS at 11:15

## 2021-05-14 RX ADMIN — DOCUSATE SODIUM 50 MG AND SENNOSIDES 8.6 MG 1 TABLET: 8.6; 5 TABLET, FILM COATED ORAL at 09:15

## 2021-05-14 RX ADMIN — PANTOPRAZOLE SODIUM 40 MG: 40 TABLET, DELAYED RELEASE ORAL at 06:27

## 2021-05-14 RX ADMIN — Medication 10 ML: at 09:15

## 2021-05-14 RX ADMIN — Medication 2000 UNITS: at 09:15

## 2021-05-14 RX ADMIN — Medication 1 TABLET: at 09:15

## 2021-05-14 RX ADMIN — ACETAMINOPHEN 650 MG: 325 TABLET ORAL at 02:06

## 2021-05-14 RX ADMIN — CALCIUM 500 MG: 500 TABLET ORAL at 09:15

## 2021-05-14 ASSESSMENT — PAIN DESCRIPTION - PAIN TYPE: TYPE: SURGICAL PAIN

## 2021-05-14 ASSESSMENT — PAIN DESCRIPTION - DESCRIPTORS: DESCRIPTORS: ACHING

## 2021-05-14 ASSESSMENT — PAIN DESCRIPTION - FREQUENCY: FREQUENCY: CONTINUOUS

## 2021-05-14 ASSESSMENT — PAIN DESCRIPTION - PROGRESSION: CLINICAL_PROGRESSION: GRADUALLY WORSENING

## 2021-05-14 ASSESSMENT — PAIN SCALES - GENERAL: PAINLEVEL_OUTOF10: 2

## 2021-05-14 ASSESSMENT — PAIN DESCRIPTION - LOCATION: LOCATION: HIP

## 2021-05-14 NOTE — PLAN OF CARE
Problem: Pain:  Goal: Pain level will decrease  Description: Pain level will decrease  5/14/2021 0830 by Gerry Bolaños RN  Outcome: Ongoing  Pt c/o minimal pain at this time and denies needing scheduled tylenol or pain medicine. Educated pt on calling RN if needing pain meds. Ice in place, pt repositioned. Problem: Falls - Risk of:  Goal: Will remain free from falls  Description: Will remain free from falls  5/14/2021 0830 by Gerry Bolaños RN  Outcome: Ongoing  Fall precautions in place. Bed is in lowest position, wheels locked, bed alarm on, non skid socks on. Call light and bedside table within reach. Pt calls out appropriately. Pt is up x1 with gb and walker. Will continue to assess and monitor.      Problem: Infection - Surgical Site:  Goal: Signs of wound healing will improve  Description: Signs of wound healing will improve  Outcome: Ongoing

## 2021-05-14 NOTE — PROGRESS NOTES
Physical Therapy  Daily Treatment Note    Discharge Recommendations: Katty Mercado scored a 18/24 on the AM-PAC short mobility form. Current research shows that an AM-PAC score of 18 or greater is typically associated with a discharge to the patient's home setting. Based on the patient's AM-PAC score and their current functional mobility deficits, it is recommended that the patient have 2-3 sessions per week of Physical Therapy at d/c to increase the patient's independence. At this time, this patient demonstrates the endurance and safety to discharge home with home PT and a follow up treatment frequency of 2-3x/wk. Please see assessment section for further patient specific details. Assessment:  Pt moving well. Steady gait with walker. Good sequencing with standard walker. Did well on stairs with rail + cane. Demonstrates good understanding of anterior hip precautions and 50% WB R LE. Plan is for home at D/C with home PT. No new DME needs. HOME HEALTH CARE: LEVEL 1 STANDARD  - Initial home health evaluation to occur within 24-48 hours, in patient home   - Therapy to evaluate with goal of regaining prior level of functioning   - Therapy to evaluate if patient has [address] needs for personal care    Equipment Needs: No new needs    Chart Reviewed: Yes     Other position/activity restrictions: 50% weight bearing, anterior approach precautions   Additional Pertinent Hx: Pt is a 79 y.o. female adm 5/10 with OA R hip. Pt s/p RIGHT HIP ARTHROPLASTY ANTERIOR APPROACH on 5/10. PMH: arthrtis, HTN, hyperlipidemia, GERD, LTHR 8/2020      Diagnosis: OA R hip   Treatment Diagnosis: impaired functional mobility s/p RIGHT HIP ARTHROPLASTY ANTERIOR APPROACH    Subjective: Pt in bed initially. Feeling much better today. States she is less swollen today after receiving Lasix yesterday. Hopes to go home. Ready for PT. Pain: \"Maybe 1. \" R thigh. Ice packs to area after session.      Objective:    Exercises  10 reps B ankle pumps, quad sets, glut sets, heel slides, hip abd/add, SLR (mod assist on R), SAQ in supine    Bed mobility  Supine to sit: SBA, HOB flat    Transfers  Sit to stand: SBA from bed  Stand to sit: SBA into chair    Ambulation  Assistance Level: SBA  Assistive device: Standard walker  Distance: ~150 ft  Quality of gait: Step-to pattern; good sequencing; steady; decreased pace; moderate reliance on walker for support; ~50% WB R LE    Stairs  Up/down 2 steps x 2 (4 total) with rail + cane SBA    Patient Education  Calling for assist with needs. Expressed understandign. Safety Devices  Pt left with needs in reach. In chair with chair alarm on. RN updated. AM-PAC score  AM-PAC Inpatient Mobility Raw Score : 18  AM-PAC Inpatient T-Scale Score : 43.63  Mobility Inpatient CMS 0-100% Score: 46.58  Mobility Inpatient CMS G-Code Modifier : CK    Goals: (as determined and assessed by primary PT)  Time Frame for Short term goals: By discharge  Short term goal 1: Sup to sit supervision   Short term goal 2: Pt will transfer sit to stand supervision   Short term goal 3: Pt will amb >100' with LRAD supervision  Short term goal 4: Pt will up/down 4-6 steps with rail and LRAD SBA     Plan:  Times per week: 7; Times per day: Daily  Current Treatment Recommendations: Strengthening, Functional Mobility Training, Gait Training, Stair training, Patient/Caregiver Education & Training, Safety Education & Training    Therapy Time    Individual  Concurrent  Group  Co-treatment    Time In  931            Time Out  1010            Minutes  39              Timed Code Treatment Minutes: 39  Total Treatment Minutes: 39    Tentative D/C home today. Will continue per plan of care if not D/Shahid. If patient is discharged prior to next treatment, this note will serve as the discharge summary.     [place], [place] [id number]    [address] [place], [place] [address]

## 2021-05-14 NOTE — CARE COORDINATION
Case Management Assessment            Discharge Note                    Date / Time of Note: 5/14/2021 11:49 AM                  Discharge Note Completed by: Diane Hill    Patient Name: Lawrence Hargrove   YOB: 1950  Diagnosis: Osteoarthritis of right hip, unspecified osteoarthritis type [M16.11]  Status post total hip replacement, right [Z96.641]  Hyponatremia [E87.1]   Date / Time: 5/10/2021  5:40 AM    Current PCP: Mare Sotomayor MD  Clinic patient: No    Hospitalization in the last 30 days: No    Advance Directives:  Code Status: Full Code  [address] DNR form completed and on chart: No    Financial:  Payor: Rogenia Runner / Plan: MEDICARE PART A AND B / Product Type: *No Product type* /      Pharmacy:    [id number] - [address], [phone]  [address] [place] [address]  Phone: [phone] Fax: [phone]      Assistance purchasing medications?: Potential Assistance Purchasing Medications: No  Assistance provided by Case Management: None at this time    Does patient want to participate in local refill/ meds to beds program?: No    Meds To Beds General Rules:  1. Can ONLY be done Monday- Friday between 8:30am-5pm  2. Prescription(s) must be in pharmacy by 3pm to be filled same day  3. Copy of patient's insurance/ prescription drug card and patient face sheet must be sent along with the prescription(s)  4. Cost of Rx cannot be added to hospital bill. If financial assistance is needed, please contact unit  or ;  or  CANNOT provide pharmacy voucher for patients co-pays  5.  Patients can then  the prescription on their way out of the hospital at discharge, or pharmacy can deliver to the bedside if staff is available. (payment due at time of pick-up or delivery - cash, check, or card accepted)     Able to afford home medications/ co-pay costs: Yes    ADLS:  Current PT AM-PAC Score: 18 /24  Current OT

## 2021-05-14 NOTE — PROGRESS NOTES
Received a perfect serve message from the RN, pts tele showing A.fib, rate controlled, HR in 70s and pt was asymptomatic. Pt was getting blood transfusion. On EKG, it revealed multiple PVCs. Pt already on coumadin for DVT prophylaxis s/p hip arthroplasty. Monitor vital and replace electrolytes if needs replacement.        Noemí Santillan MD   PGY-1

## 2021-05-14 NOTE — PROGRESS NOTES
Occupational Therapy  Facility/Department: [facility] 5T ORTHO/NEURO  Daily Treatment Note  NAME: Kwabena López  : 1950  MRN: [id number]    Date of Service: 2021    Discharge Recommendations: Kwabena López scored a 21/24 on the AM-PAC ADL Inpatient form. Current research shows that an AM-PAC score of 18 or greater is typically associated with a discharge to the patient's home setting. Based on the patient's AM-PAC score, and their current ADL deficits, it is recommended that the patient have 2-3 sessions per week of Occupational Therapy at d/c to increase the patient's independence. At this time, this patient demonstrates the endurance and safety to discharge home with 24 hr/assist (home vs OP services) and a follow up treatment frequency of 2-3x/wk. Please see assessment section for further patient specific details. If patient discharges prior to next session this note will serve as a discharge summary. Please see below for the latest assessment towards goals. OT Equipment Recommendations  Equipment Needed: No    Assessment   Performance deficits / Impairments: Decreased functional mobility ; Decreased ADL status  Assessment: Pt maintaining increased mobility and endurance with ADLs. Pt requires CGA to SBA to ambulate to/from the bathroom with a walker, SBA for toileting, SBA for LB dressing, and SBA to complete hygiene tasks at the sink. Pt able to verbalize safe tub/shower seat transfer technique with home bathroom setup. Anticipate d/c home when appropriate. Cont. OT tx per POC. Treatment Diagnosis: impaired ADLs/transfers s/p R THR (anterior approach)  Prognosis: Good  OT Education: OT Role;Plan of Care;Precautions;Transfer Training  Patient Education: Pt verbalized understanding. REQUIRES OT FOLLOW UP: Yes  Activity Tolerance  Activity Tolerance: Patient Tolerated treatment well  Safety Devices  Safety Devices in place: Yes  Type of devices: Nurse notified; Left in chair;Chair alarm in place;Call light within reach           Restrictions  Position Activity Restriction  Other position/activity restrictions: 50% weight bearing, anterior approach precautions  Subjective   General  Chart Reviewed: Yes  Patient assessed for rehabilitation services?: Yes  Additional Pertinent Hx: 79 y.o. F s/p R HARRIET - anterior approach 5/10. PMHx includes arthritis, L HARRIET 8/2020  Response to previous treatment: Patient with no complaints from previous session  Family / Caregiver Present: No  Referring Practitioner: Roxana LOPEZ  Diagnosis: OA R Hip  Subjective  Subjective: Pt in chair upon entry. Pt agreeable to work with OT. \"I'm feeling much better than I was yesterday. The swelling has gone down and I'm hoping to go home today. \"  General Comment  Comments: [facility] Vital Signs  Patient Currently in Pain: Denies   Orientation  Orientation  Overall Orientation Status: Within Normal Limits  Objective    ADL  Grooming: Stand by assistance(to wash hands while standing at the sink with RW)  LE Dressing: Stand by assistance(don hospital pants with assisted device. Educated Pt on use of reacher. Pt returned demonstration.)  Toileting: Stand by assistance        Balance  Sitting Balance: Independent  Standing Balance: Contact guard assistance(to SBA with walker)  Standing Balance  Time: ~3 min.   Activity: bathroom mobility/activity  Functional Mobility  Functional - Mobility Device: Rolling Walker  Activity: To/from bathroom  Assist Level: Stand by assistance  Toilet Transfers  Toilet - Technique: Ambulating(with walker)  Equipment Used: Standard toilet(grab bars)  Toilet Transfer: Contact guard assistance     Transfers  Stand Step Transfers: Contact guard assistance(to SBA with walker)  Sit to stand: Contact guard assistance(to SBA, cues for hand placement)  Stand to sit: Contact guard assistance(to SBA, cues for hand placement)                       Cognition  Overall Cognitive Status: WNL                                         Plan Plan  Times per week: 7  Times per day: Daily  Current Treatment Recommendations: Functional Mobility Training, Safety Education & Training, Equipment Evaluation, Education, & procurement, Self-Care / ADL    AM-PAC Score        AM-PAC Inpatient Daily Activity Raw Score: 21 (05/14/21 1143)  AM-PAC Inpatient ADL T-Scale Score : 44.27 (05/14/21 1143)  ADL Inpatient CMS 0-100% Score: 32.79 (05/14/21 1143)  ADL Inpatient CMS G-Code Modifier : Naga Sites (05/14/21 1143)    Goals  Short term goals  Time Frame for Short term goals: Discharge  Short term goal 1: toilet transfer w/ CGA - goal met 5/11  Short term goal 2: verbalize safe tub shower seat transfer technique, independently - ongoing 5/12 & 5/13 (pt will sponge bathe initially due to high tub and unable to fit TTB  - MET 5/14  Short term goal 3: tolerate LB dressing assessment - pt declined 5/12 &5/13   -MET 5/14  Patient Goals   Patient goals : to return and be independent; to be able to walk outside again       Therapy Time   Individual Concurrent Group Co-treatment   Time In 1005         Time Out 1035         Minutes 30         Timed Code Treatment Minutes: 30 Minutes      Total Tx Min: 30 minutes    Therapist was present, directed the patient's care, made skilled judgment, and was responsible for assessment and treatment of the patient. If patient is discharged prior to next treatment, this not will serve as the discharge summary.     Kimberly Аннаdinesh Umair

## 2021-05-14 NOTE — PROGRESS NOTES
Dr. Jeff Vitale. Plan for home with [facility] at discharge. Monitor CBC, BMP and PT/INR with [facility]. Pt has a walker. Scripts sent to [facility] per patient request. Discharge instructions and TIO completed. Kain Goodwin CNP    Edited to add: Discussed pt with qiana Ross for discharge after D5 infusion as ordered.

## 2021-05-14 NOTE — PROGRESS NOTES
Patient is AAOx4. VSS. Patient is up x1 with a walker and gaitbelt. Patient is voiding adequately. Patient is resting in bed with all fall precautions in place. Will continue to monitor.

## 2021-05-14 NOTE — DISCHARGE SUMMARY
Department of Orthopedic Surgery  Nurse Practitioner   Discharge Summary      The Niya Marr is a 79 y.o. female underwent total hip replacement procedure without complication. Niya Marr was admitted to the floor following their recovery in the PACU. Discharge Diagnosis  right Hip Replacement    Current Inpatient Medications    Current Facility-Administered Medications: perflutren lipid microspheres (DEFINITY) injection 1.65 mg, 1.5 mL, Intravenous, ONCE PRN  predniSONE (DELTASONE) tablet 10 mg, 10 mg, Oral, Daily  0.9 % sodium chloride infusion, , Intravenous, PRN  pantoprazole (PROTONIX) tablet 40 mg, 40 mg, Oral, QAM AC  0.9 % sodium chloride infusion, , Intravenous, PRN  calcium elemental (OSCAL) tablet 500 mg, 500 mg, Oral, Daily  therapeutic multivitamin-minerals 1 tablet, 1 tablet, Oral, Daily  simvastatin (ZOCOR) tablet 40 mg, 40 mg, Oral, Nightly  Vitamin D (CHOLECALCIFEROL) tablet 2,000 Units, 2,000 Units, Oral, Daily  sodium chloride flush 0.9 % injection 10 mL, 10 mL, Intravenous, 2 times per day  sodium chloride flush 0.9 % injection 10 mL, 10 mL, Intravenous, PRN  0.9 % sodium chloride infusion, 25 mL, Intravenous, PRN  acetaminophen (TYLENOL) tablet 650 mg, 650 mg, Oral, Q6H  sennosides-docusate sodium (SENOKOT-S) 8.6-50 MG tablet 1 tablet, 1 tablet, Oral, BID  magnesium hydroxide (MILK OF MAGNESIA) 400 MG/5ML suspension 30 mL, 30 mL, Oral, Daily PRN  oxyCODONE (ROXICODONE) immediate release tablet 5 mg, 5 mg, Oral, Q4H PRN **OR** oxyCODONE (ROXICODONE) immediate release tablet 10 mg, 10 mg, Oral, Q4H PRN  promethazine (PHENERGAN) tablet 12.5 mg, 12.5 mg, Oral, Q6H PRN **OR** ondansetron (ZOFRAN) injection 4 mg, 4 mg, Intravenous, Q6H PRN  warfarin (COUMADIN) tablet 4 mg, 4 mg, Oral, Daily    Post-operatively the patients diet was advanced as tolerated and their incision was checked on POD #1. The incision is dressing in place, clean, dry and intact with no signs of infection.   The patient remained neurovascularly intact in the lower extremity and had intact pulses distally. Patients calf remained soft and showed no evidence of DVT. The patient was able to move their leg and ankle/foot without any problems post-operatively. Physical therapy and occupational therapy were consulted and began working with the patient post-operatively. The patient progressed with PT/OT as would be expected and continued to improve through their stay. The patients pain was initially controlled with IV medications but we were able to transition to oral pain medications soon after arrival to the floor and their pain remained under good control through their hospital stay. From a medical standpoint the patient remained stable and continued to have the medicine team follow throughout their stay. The patients dressing was changed/incision was checked on day of d/c. The patient will be discharged at this time to Home  with their current diet restrictions and will continue to follow the hip precautions outlined to them by us and PT/OT. Condition on Discharge: Stable    Plan  Return visit in 2 weeks. .  Patient was instructed on the use of pain medications, the signs and symptoms of infection, and was given our number to call should they have any questions or concerns following discharge. For opioid prescriptions given at discharge the following statement is provided for compliance with [employer] rules. Patient being given increased dosage/quantity of opoid pain medication in excess of [employer] guidelines which noted a 30 MED daily of opioids due to the fact that he/she has undergone major orthopaedic surgery as outlined in rule 4731-11-13. Dosages and further duration of the pain medication will be re-evaluated at her post op visit in 2 weeks. Patient was educated on dosing expectations and limits of prescribing as a result of the new [facility] Board rules enacted August 31, 2017.   Please also note that this is not the initial opoid prescription issued to this patient but a continuation of medication utilized during the hospital admission as noted in the medical record. [place]RS report has also been utilized to screen for any abuse history or suspicious activity as outlined in [place]. All efforts have been taken to prevent abuse potential and misuse of opioid medications including education, screening, and close clinical follow up.

## 2021-05-14 NOTE — PROGRESS NOTES
Pt is A&O, VSS. Tolerating diet well. Voiding adequately per bathroom. Up x1 with gb and walker. C/o of minimal pain in surgical hip, denies needing pain medicine at this time. Surgical site is CD&I with drain in place. All fall precautions in place. Call light within reach.

## 2021-05-14 NOTE — PROGRESS NOTES
Clinical Pharmacy Progress Note    ADMIT DATE: 5/10/2021     Interval update:  Hgb stable after unit of pRBC yesterday. 80 yo F with PMH of arthritis, GERD, HTN; admitted 5/10 s/p R hip arthroplasty on 5/10. Pharmacy has been consulted for post-op VTE prophylaxis with warfarin by JESSICA Thompson. Goal INR 2-3  Home Anticoagulation Regimen:  · Patient was not taking warfarin prior to admission. · Spoke with patient via telephone on 5/10 -- patient stated she was previously on warfarin after previous HARRIET in August 2020. At that time, she was eventually started on warfarin 5 mg alternating with 2.5 mg every other day. LABORATORY:  Recent Labs     05/13/21  0747 05/13/21  0747 05/14/21  0359 05/14/21  0734   *   < > 132* 138   K 3.8  --  4.0  --    CL 90*  --  97*  --    CO2 24  --  26  --    BUN 7  --  7  --    CREATININE 0.6  --  0.8  --    GLUCOSE 97  --  94  --     < > = values in this interval not displayed. Estimated Creatinine Clearance: 59 mL/min (based on SCr of 0.8 mg/dL). Lab Results   Component Value Date    WBC 8.5 05/14/2021    HGB 9.1 (L) 05/14/2021    HCT 26.2 (L) 05/14/2021    MCV 90.1 05/14/2021     05/14/2021       Lab Results   Component Value Date    PROTIME 19.6 (H) 05/14/2021    INR 1.68 (H) 05/14/2021     VITALS:  /76   Pulse 77   Temp 98.2 °F (36.8 °C) (Oral)   Resp 16   Ht 5' 2\" (1.575 m)   Wt 150 lb (68 kg)   SpO2 96%   BMI 27.44 kg/m²     DIET: DIET GENERAL;    PROPHYLAXIS:  Stress ulcer: pantoprazole  VTE:  Warfarin    ASSESSMENT/PLAN:    1) VTE prophylaxis s/p HARRIET: pharmacy to dose warfarin   · Goal INR 2-3  · INR today = 1.68.   · Hgb down to 9.1 today, stable after 1 unit pRBCs yesterday. · Warfarin was held last night, likely d/t acute drop in Hgb.   · Starting slightly lower starting dose as patient was previously on warfarin 5 mg alternating with 2.5 mg every other day after prevous HARRIET in 8/2020 (~27.5 mg/week)  · Typically takes 4-5 days to reach therapeutic INR. · Daily INR will be monitored / dose will be adjusted as appropriate. Date INR Warfarin   5/6 0.95         5/10 1.08 4 mg   5/11 1.04 Held    5/12 1.32 4 mg   5/13 1.52 Held   5/14 1.68        Please call with any questions.   London MccarthyD, BCPS  Wireless: [id number]  Main pharmacy: [id number]  5/14/2021 8:41 AM

## 2021-05-14 NOTE — PROGRESS NOTES
Internal Medicine PGY-1 Resident Progress Note        PCP: Nitin Banks MD    Date of Admission: 5/10/2021    Chief Complaint: No chief complaint on file. Subjective: Patient seen and examined at bedside. Yesterday, received a  perfect serve message from the RN, pts tele showing A fib, HR in high 70s and pt was asymptomatic. She was getting blood transfusion at that time. EKG revealed multiple PVCs. No acute events overnight. This morning pt reports feeling well. Medications:  Reviewed    Infusion Medications    sodium chloride      sodium chloride      sodium chloride       Scheduled Medications    predniSONE  10 mg Oral Daily    pantoprazole  40 mg Oral QAM AC    calcium elemental  500 mg Oral Daily    therapeutic multivitamin-minerals  1 tablet Oral Daily    simvastatin  40 mg Oral Nightly    Vitamin D  2,000 Units Oral Daily    sodium chloride flush  10 mL Intravenous 2 times per day    acetaminophen  650 mg Oral Q6H    sennosides-docusate sodium  1 tablet Oral BID    warfarin  4 mg Oral Daily     PRN Meds: sodium chloride, sodium chloride, sodium chloride flush, sodium chloride, magnesium hydroxide, oxyCODONE **OR** oxyCODONE, promethazine **OR** ondansetron      Intake/Output Summary (Last 24 hours) at 5/14/2021 0658  Last data filed at 5/14/2021 2508  Gross per 24 hour   Intake 850 ml   Output 5050 ml   Net -4200 ml       Physical Exam Performed:  /69   Pulse 83   Temp 97.8 °F (36.6 °C) (Oral)   Resp 16   Ht 5' 2\" (1.575 m)   Wt 150 lb (68 kg)   SpO2 97%   BMI 27.44 kg/m²     General appearance: No apparent distress, appears stated age and cooperative. HEENT: Pupils equal, round, and reactive to light. Conjunctivae/corneas clear. Neck: Supple, with full range of motion. No jugular venous distention. Trachea midline. Respiratory:  Normal respiratory effort. Clear to auscultation, bilaterally without Rales/Wheezes/Rhonchi.   Cardiovascular: Regular rate and rhythm with normal S1/S2 without murmurs, rubs or gallops. Abdomen: Soft, non-tender, non-distended with normal bowel sounds. Musculoskeletal: No clubbing, cyanosis or edema bilaterally. Full range of motion without deformity. Skin: Skin color, texture, turgor normal.  No rashes or lesions. Neurologic:  Neurovascularly intact without any focal sensory/motor deficits. Cranial nerves: II-XII intact, grossly non-focal.  Psychiatric: Alert and oriented, thought content appropriate, normal insight    Labs:   Recent Labs     05/12/21  0454 05/12/21  0454 05/13/21  0747 05/13/21  0747 05/13/21  1512 05/13/21  2113 05/14/21  0359   WBC 10.0  --  10.0  --   --   --  8.5   HGB 8.1*   < > 6.7*   < > 8.1* 8.7* 9.1*   HCT 23.1*   < > 19.3*   < > 23.8* 24.9* 26.2*   *  --  117*  --   --   --  166    < > = values in this interval not displayed. Recent Labs     05/11/21  0825 05/11/21  1122 05/11/21  1122 05/12/21  0454 05/12/21 0454 05/13/21  0747 05/13/21  0747 05/13/21  1643 05/13/21 2113 05/14/21  0359   * 115*   < > 132*   < > 123*   < > 115* 120* 132*   K 3.8 3.6  --  4.1  --  3.8  --   --   --  4.0   CL 79* 79*  --  99  --  90*  --   --   --  97*   CO2 22 20*  --  23  --  24  --   --   --  26   BUN 16 16  --  12  --  7  --   --   --  7   CREATININE 1.0 1.0  --  1.0  --  0.6  --   --   --  0.8   CALCIUM 8.0* 7.6*  --  8.2*  --  7.8*  --   --   --  9.1   PHOS 3.0 2.5  --   --   --   --   --   --   --   --     < > = values in this interval not displayed. No results for input(s): AST, ALT, BILIDIR, BILITOT, ALKPHOS in the last 72 hours. Recent Labs     05/12/21  0454 05/13/21  0747 05/14/21  0359   INR 1.32* 1.52* 1.68*     No results for input(s): CKTOTAL, TROPONINI in the last 72 hours.     Urinalysis:   Lab Results   Component Value Date    NITRU Negative 01/02/2016    WBCUA 3-5 01/02/2016    BACTERIA Rare 01/02/2016    RBCUA None seen 01/02/2016    BLOODU Negative 01/02/2016    SPECGRAV <=1.005 01/02/2016 GLUCOSEU Negative 01/02/2016       Radiology:  VL Extremity Venous Bilateral   Final Result      XR ANKLE LEFT (MIN 3 VIEWS)   Final Result   Impression:    No acute osseous injury. CT CHEST PULMONARY EMBOLISM W CONTRAST   Final Result   1. No evidence of acute or chronic pulmonary embolus      2. Chronic basilar lung disease with chronic subpleural dependent atelectasis   3. Slight increase in subsegmental atelectasis of the superior segment of the right lower lobe         XR HIP 2-3 VW W PELVIS RIGHT   Final Result      Intraoperative fluoroscopy provided as above. See operative report for details. FLUORO FOR SURGICAL PROCEDURES   Final Result      Intraoperative fluoroscopy provided as above. See operative report for details. Assessment/Plan: Marcos Qiu, 79 y.o. female presents with No chief complaint on file. , admitted for workup of Status post total hip replacement, right.      Active Hospital Problems    Diagnosis Date Noted    Hyponatremia [E87.1] 05/11/2021    Status post total hip replacement, right [Z96.641] 05/10/2021    Hyperlipidemia [E78.5]     Hypothyroidism due to Hashimoto's thyroiditis [E03.8, E06.3]     Hyperglycemia [R73.9] 08/18/2020    Blood loss anemia [D50.0] 08/18/2020    Benign essential HTN [I10] 11/20/2018       Hyponatremia 2/2 blood/volume loss s/p R hip arthroplasty   - Na 114, cortisol 1.8  - Serum osm 252, urine Na < 20; urine osm 193   - suspected adrenal insufficiency, less likely, pt received prednisone prior to surgery   - recent surgery and loss of blood (800 cc during surgery) more likely the etiology behind hyponatremia   - s/p D5% and DDAVP, per nephrology for Na over correction   - Na 132 this AM from 114 yesterday evening s/p 1 dose of samsca   - oral prednisone 10 mg - discontinue   - Serum sodium to monitor levels, Q4H  - CBC with differential to evaluate for anemia and signs of worsening blood loss  - Hold home lisinopril due to low BP and

## 2021-05-14 NOTE — PROGRESS NOTES
Patient Name: Bart [facility]                                                    Primary Physician: Arlene Mistry MD  Admitting Dx: Osteoarthritis of right hip, unspecified osteoarthritis type [M16.11]  Status post total hip replacement, right [Z96.641]  Hyponatremia [E87.1]    Nephrology [facility]  [facility]                                          Assessment / PLan:     Sodium is 138 with fluctuation from 115> 132 > 117> 138 now  Urine out put is 5.5 liters   SP DDAVP   DC lisinopril as BP was low   D5W bolus        Hyponatremia  · Na 114 (0825) , repeat check still at 115(1125) then jumped to 120 and back down to 115. Continue to follow Na levels q 3 hrs and adjust fluids. · No severe nausea or pain causing ADH release. She has chronic hyponatremia which she has been told related to ACEi now on hold with low BP. · Denies SSRIs, TCAs, NSAIDs which may contribute to low sodium. · Patient did receive IVF bolus of 1/2 NS which may have contributed. · Cortisol low this AM at 1.8 but did receive steroid bolus which appears to have been given prior to labs. · Serum Osm 252  · Ur Osmolality at 193, Na <20  · Continue NS 100ml/hr, monitor for overcorrection 6-8 in 24 hrs  Anemia  · Serial H/H q 8hrs. Transfuse 1 unit PRBCs today. Expect further drop given fluid boluses. HTN  · Hold Lisinopril for hyponatremia and low blood pressure  Post-Op Urinary Retention  · stone placed with UOP of 550. Please call our office at [phone] or Perfect Serve with any questions or contact me directly. Subjective:     CC / Reason for Consult:  Hyponatremia    HPI / PMHx:  This is a consult for Bart [facility]  requested by Arlene Mistry MD for the reason of  Hyponatremia . Bart Piedra is a 79 y.o. female admitted for OA of Rt Hip with Total Hip Replacement. PMHx of Hyponatremia, Arthritis, HLD, HTN, GERD. Low sodium noted to be around 130 to 134 in 2020 and 2019. constipation and abdominal pain     :  dysuria, nocturia, urinary incontinence, hesitancy and hematuria     Derm:   rash, skin lesion(s), pruritus and dryness     Neuro:   headaches, dizziness, seizures, gait problems, tremor and weakness     MS:  Rt hip pain and arthralgias, denies neck pain and back pain     Endo:    nephropathy and cardiovascular disease    PE:      Gen: alert, well appearing, and in no distress and oriented to person, place, and time     HEENT:pupils equal and reactive, extraocular eye movements intact      Neuro: alert, oriented, normal speech, no focal findings or movement disorder noted     Neck:  supple, no significant adenopathy     Cardio: normal rate, regular rhythm, normal S1, S2, no murmurs, rubs, clicks or gallops      Resp: clear to auscultation, no wheezes, rales or rhonchi, symmetric air entry. GI:  soft, nontender, nondistended, no masses or organomegaly. Ext:  peripheral pulses normal, no pedal edema, no clubbing or cyanosis      MS: no joint tenderness, deformity or swelling      DERM: normal coloration and turgor, no rashesor bruising.        Vitals:   Patient Vitals for the past 8 hrs:   BP Temp Temp src Pulse Resp SpO2   05/14/21 0703 113/76 98.2 °F (36.8 °C) Oral 77 16 96 %   05/14/21 0247 118/69 97.8 °F (36.6 °C) Oral 83 16 97 %          I/Os:     Intake/Output Summary (Last 24 hours) at 5/14/2021 0900  Last data filed at 5/14/2021 0804  Gross per 24 hour   Intake 610 ml   Output 5700 ml   Net -5090 ml       LABS:    Lab Results   Component Value Date    CREATININE 0.8 05/14/2021    BUN 7 05/14/2021     05/14/2021    K 4.0 05/14/2021    CL 97 05/14/2021    CO2 26 05/14/2021     No results found for: [id number]   Lab Results   Component Value Date    WBC 8.5 05/14/2021    HGB 9.1 05/14/2021    HCT 26.2 05/14/2021    MCV 90.1 05/14/2021     05/14/2021      No results found for: IRON, TIBC, FERRITIN   No results found for: Prince Rogel Results   Component Value Date    CALCIUM 9.1 05/14/2021    CAION 1.16 01/02/2016    PHOS 2.5 05/11/2021

## 2021-05-15 ENCOUNTER — CARE COORDINATION (OUTPATIENT)
Dept: CASE MANAGEMENT | Age: 71
End: 2021-05-15

## 2021-05-15 NOTE — CARE COORDINATION
Patient contacted regarding COVID-19 risk. Care Transition Nurse contacted the patient by telephone to perform post discharge assessment. Call within 2 business days of discharge: Yes. Verified name and  with patient as identifiers. Provided introduction to self, and explanation of the CTN/ACM role, and reason for call due to risk factors for infection and/or exposure to COVID-19. Symptoms reviewed with patient who verbalized the following symptoms: no new symptoms and no worsening symptoms. Due to no new or worsening symptoms encounter was not routed to provider for escalation. Discussed follow-up appointments. If no appointment was previously scheduled, appointment scheduling offered: Yes  [facility] follow up appointment(s):   Future Appointments   Date Time Provider Gilson Redmond   10/20/2021 10:40 AM Jj Farris MD [facility]       Non-face-to-face services provided:  Obtained and reviewed discharge summary and/or continuity of care documents  Education of patient/family/caregiver/guardian to support self-management-. Assessment and support for treatment adherence and medication management-. Advance Care Planning:   Does patient have an Advance Directive:  not on file. Educated patient about risk for severe COVID-19 due to risk factors according to CDC guidelines. CTN reviewed discharge instructions, medical action plan and red flag symptoms patient who verbalized understanding. Discussed COVID vaccination status No. Education provided on COVID-19 vaccination as appropriate. Discussed exposure protocols and quarantine with CDC Guidelines. Patient was given an opportunity to verbalize any questions and concerns and agrees to contact CTN or health care provider for questions related to their healthcare.     Reviewed and educated patient on any new and changed medications related to discharge diagnosis     CTN confirmed with intake department with [facility], orders were received. CTN provided contact information. Plan for follow up call in 7-14 days based on severity of symptoms and risk factors.     Thank Donna Lopez RN  Care Transition Coordinator  Contact [facility]:[phone]

## 2021-05-17 ENCOUNTER — TELEPHONE (OUTPATIENT)
Dept: ORTHOPEDIC SURGERY | Age: 71
End: 2021-05-17

## 2021-05-17 DIAGNOSIS — Z96.641 STATUS POST TOTAL HIP REPLACEMENT, RIGHT: Primary | ICD-10-CM

## 2021-05-17 NOTE — TELEPHONE ENCOUNTER
Attempted to contact pt, left voicemail with purpose and call back number.     Neena Crooks  Orthopedic Nurse Navigator  Phone number: [phone]    Follow up appointments:    Future Appointments   Date Time Provider Gilson Redmond   5/20/2021 11:45 AM Jovany Mijares MD [facility]   10/20/2021 10:40 AM Mare Sotomayor MD [facility]

## 2021-05-17 NOTE — TELEPHONE ENCOUNTER
Appointment Request     Patient requesting earlier appointment: Yes  Appointment offered to patient: 5/27/21 PATIENT REQUEST [facility].  THIS IS FIRST POST OP   Patient Contact Number: [phone]

## 2021-05-18 ENCOUNTER — TELEPHONE (OUTPATIENT)
Dept: ORTHOPEDIC SURGERY | Age: 71
End: 2021-05-18

## 2021-05-18 ENCOUNTER — HOSPITAL ENCOUNTER (OUTPATIENT)
Age: 71
Discharge: HOME | End: 2021-05-18
Payer: MEDICARE

## 2021-05-18 LAB
ANION GAP SERPL CALCULATED.3IONS-SCNC: 15 MMOL/L (ref 3–16)
ANISOCYTOSIS: ABNORMAL
BANDED NEUTROPHILS RELATIVE PERCENT: 1 % (ref 0–7)
BASOPHILS ABSOLUTE: 0 K/UL (ref 0–0.2)
BASOPHILS RELATIVE PERCENT: 0 %
BUN BLDV-MCNC: 13 MG/DL (ref 7–20)
CALCIUM SERPL-MCNC: 9.4 MG/DL (ref 8.3–10.6)
CHLORIDE BLD-SCNC: 98 MMOL/L (ref 99–110)
CO2: 26 MMOL/L (ref 21–32)
CREAT SERPL-MCNC: 0.8 MG/DL (ref 0.6–1.2)
EOSINOPHILS ABSOLUTE: 0 K/UL (ref 0–0.6)
EOSINOPHILS RELATIVE PERCENT: 0 %
GFR AFRICAN AMERICAN: >60
GFR NON-AFRICAN AMERICAN: >60
GLUCOSE BLD-MCNC: 100 MG/DL (ref 70–99)
HCT VFR BLD CALC: 27 % (ref 36–48)
HEMOGLOBIN: 9.3 G/DL (ref 12–16)
LYMPHOCYTES ABSOLUTE: 1.1 K/UL (ref 1–5.1)
LYMPHOCYTES RELATIVE PERCENT: 14 %
MCH RBC QN AUTO: 31.4 PG (ref 26–34)
MCHC RBC AUTO-ENTMCNC: 34.4 G/DL (ref 31–36)
MCV RBC AUTO: 91.4 FL (ref 80–100)
METAMYELOCYTES RELATIVE PERCENT: 6 %
MONOCYTES ABSOLUTE: 0.7 K/UL (ref 0–1.3)
MONOCYTES RELATIVE PERCENT: 9 %
MYELOCYTE PERCENT: 2 %
NEUTROPHILS ABSOLUTE: 6.1 K/UL (ref 1.7–7.7)
NEUTROPHILS RELATIVE PERCENT: 68 %
NUCLEATED RED BLOOD CELLS: 1 /100 WBC
PDW BLD-RTO: 16.4 % (ref 12.4–15.4)
PLATELET # BLD: 378 K/UL (ref 135–450)
PMV BLD AUTO: 7.5 FL (ref 5–10.5)
POLYCHROMASIA: ABNORMAL
POTASSIUM SERPL-SCNC: 3.8 MMOL/L (ref 3.5–5.1)
RBC # BLD: 2.96 M/UL (ref 4–5.2)
SODIUM BLD-SCNC: 139 MMOL/L (ref 136–145)
WBC # BLD: 7.9 K/UL (ref 4–11)

## 2021-05-18 PROCEDURE — 80048 BASIC METABOLIC PNL TOTAL CA: CPT

## 2021-05-18 PROCEDURE — 85025 COMPLETE CBC W/AUTO DIFF WBC: CPT

## 2021-05-18 PROCEDURE — 36415 COLL VENOUS BLD VENIPUNCTURE: CPT

## 2021-05-18 NOTE — TELEPHONE ENCOUNTER
I called and let the patient know that per Chetna Vides she needs to take 6 mg on coumadin on Monday and Fridays. Then take 3 mg the rest of the days.

## 2021-05-19 ENCOUNTER — TELEPHONE (OUTPATIENT)
Dept: ORTHOPEDIC SURGERY | Age: 71
End: 2021-05-19

## 2021-05-19 NOTE — TELEPHONE ENCOUNTER
Meenu with [facility] called with INR results:    2.0  6 mg on Monday and Friday  3 mg the rest of the days.

## 2021-05-20 ENCOUNTER — OFFICE VISIT (OUTPATIENT)
Dept: ORTHOPEDIC SURGERY | Age: 71
End: 2021-05-20

## 2021-05-20 VITALS
SYSTOLIC BLOOD PRESSURE: 116 MMHG | DIASTOLIC BLOOD PRESSURE: 78 MMHG | HEART RATE: 82 BPM | WEIGHT: 150 LBS | BODY MASS INDEX: 27.6 KG/M2 | HEIGHT: 62 IN

## 2021-05-20 DIAGNOSIS — Z96.641 STATUS POST TOTAL HIP REPLACEMENT, RIGHT: Primary | ICD-10-CM

## 2021-05-20 PROCEDURE — 99024 POSTOP FOLLOW-UP VISIT: CPT | Performed by: PHYSICIAN ASSISTANT

## 2021-05-20 NOTE — PROGRESS NOTES
Patient Name: Dot Mondragon MRN: <[id number]>   Age: 79 y.o. YOB: 1950   Sex: female         [address] Complaint   Patient presents with    Post-Op Check     RT HARRIET 5/10/21       HISTORY OF PRESENT ILLNESS   Patient returns for their first postoperative evaluation status post total hip replacement. The patient is doing very well. They have minimal complaints of pain. Rates pain is a 1 out of 10. She notes overall she is doing very well especially in comparison to the other side where she was struggling at this point. There is moderate swelling about the Hip. The patient has been doing home physical therapy exercises with home therapist and she is ambulating with a cane. They deny any calf swelling or numbness or tingling down the leg     Assessment   PHYSICAL EXAM   Vital Signs:    Vitals:    05/20/21 1257   BP: 116/78   Pulse: 82       Examination of the hip shows a well-healed incision. Edges are well opposed. There is mild swelling. There is no drainage. Range of motion is up to 100. There is no calf tenderness. The patient is neurovascularly intact. No signs of DVT. Calf nontender    Gross distal sensation is decreased in the lateral thigh from the area of the incision as expected. RADIOLOGY   X-Rays reviewed: 2 views of the right hip were performed and reviewed today AP and lateral x-rays of the hip show excellent alignment of the total hip prosthesis. There is no loosening no fractures noted. IMPRESSION   2 weeks status post right total hip replacement    PLAN   The patient is doing well 2 weeks status post right total hip replacement. ICD-10-CM    1. Status post total hip replacement, right  Z96.641 XR HIP 2-3 VW W PELVIS RIGHT     We will continue home physical therapy for aggressive range of motion and strengthening. They will continue ice and elevation for the hip. They will continue aspirin therapy for DVT prophylaxis. Advised patient to continue gentle exercises in conservative mobilization and utilization of cane

## 2021-05-21 ENCOUNTER — OFFICE VISIT (OUTPATIENT)
Dept: INTERNAL MEDICINE CLINIC | Age: 71
End: 2021-05-21
Payer: MEDICARE

## 2021-05-21 VITALS
HEART RATE: 67 BPM | SYSTOLIC BLOOD PRESSURE: 122 MMHG | WEIGHT: 153 LBS | DIASTOLIC BLOOD PRESSURE: 70 MMHG | BODY MASS INDEX: 28.16 KG/M2 | HEIGHT: 62 IN | OXYGEN SATURATION: 98 %

## 2021-05-21 DIAGNOSIS — D50.0 IRON DEFICIENCY ANEMIA DUE TO CHRONIC BLOOD LOSS: ICD-10-CM

## 2021-05-21 DIAGNOSIS — I10 ESSENTIAL HYPERTENSION: Primary | ICD-10-CM

## 2021-05-21 DIAGNOSIS — E87.1 HYPONATREMIA: ICD-10-CM

## 2021-05-21 PROCEDURE — 1036F TOBACCO NON-USER: CPT | Performed by: INTERNAL MEDICINE

## 2021-05-21 PROCEDURE — 1123F ACP DISCUSS/DSCN MKR DOCD: CPT | Performed by: INTERNAL MEDICINE

## 2021-05-21 PROCEDURE — 1111F DSCHRG MED/CURRENT MED MERGE: CPT | Performed by: INTERNAL MEDICINE

## 2021-05-21 PROCEDURE — 1090F PRES/ABSN URINE INCON ASSESS: CPT | Performed by: INTERNAL MEDICINE

## 2021-05-21 PROCEDURE — G8417 CALC BMI ABV UP PARAM F/U: HCPCS | Performed by: INTERNAL MEDICINE

## 2021-05-21 PROCEDURE — G8399 PT W/DXA RESULTS DOCUMENT: HCPCS | Performed by: INTERNAL MEDICINE

## 2021-05-21 PROCEDURE — 3017F COLORECTAL CA SCREEN DOC REV: CPT | Performed by: INTERNAL MEDICINE

## 2021-05-21 PROCEDURE — 99214 OFFICE O/P EST MOD 30 MIN: CPT | Performed by: INTERNAL MEDICINE

## 2021-05-21 PROCEDURE — G8427 DOCREV CUR MEDS BY ELIG CLIN: HCPCS | Performed by: INTERNAL MEDICINE

## 2021-05-21 PROCEDURE — 4040F PNEUMOC VAC/ADMIN/RCVD: CPT | Performed by: INTERNAL MEDICINE

## 2021-05-21 RX ORDER — TRAMADOL HYDROCHLORIDE 50 MG/1
TABLET ORAL
COMMUNITY
End: 2021-08-12

## 2021-05-21 SDOH — ECONOMIC STABILITY: FOOD INSECURITY: WITHIN THE PAST 12 MONTHS, THE FOOD YOU BOUGHT JUST DIDN'T LAST AND YOU DIDN'T HAVE MONEY TO GET MORE.: NEVER TRUE

## 2021-05-21 SDOH — ECONOMIC STABILITY: FOOD INSECURITY: WITHIN THE PAST 12 MONTHS, YOU WORRIED THAT YOUR FOOD WOULD RUN OUT BEFORE YOU GOT MONEY TO BUY MORE.: NEVER TRUE

## 2021-05-21 ASSESSMENT — PATIENT HEALTH QUESTIONNAIRE - PHQ9
SUM OF ALL RESPONSES TO PHQ QUESTIONS 1-9: 0
SUM OF ALL RESPONSES TO PHQ9 QUESTIONS 1 & 2: 0
SUM OF ALL RESPONSES TO PHQ QUESTIONS 1-9: 0
SUM OF ALL RESPONSES TO PHQ QUESTIONS 1-9: 0
1. LITTLE INTEREST OR PLEASURE IN DOING THINGS: 0

## 2021-05-21 ASSESSMENT — SOCIAL DETERMINANTS OF HEALTH (SDOH): HOW HARD IS IT FOR YOU TO PAY FOR THE VERY BASICS LIKE FOOD, HOUSING, MEDICAL CARE, AND HEATING?: NOT HARD AT ALL

## 2021-05-21 NOTE — PROGRESS NOTES
CHIEF COMPLAINT: Niya Marr is a 79 y.o. female who presents for : Follow-up hyponatremia hypertension anemia    HPI: Patient presented with follow-up after having a hip arthroplasty with the above problems she is feeling great now saw orthopedist and had blood test a couple days ago    Review of Systems:   Constitutional:  Denies fever or chills   Eyes:  Denies change in visual acuity   HENT:  Denies nasal congestion or sore throat   Respiratory:  Denies cough or shortness of breath   Cardiovascular:  Denies chest pain or edema   GI:  Denies abdominal pain, nausea, vomiting, bloody stools or diarrhea   :  Denies dysuria   Musculoskeletal:  Denies back pain or joint pain   Integument:  Denies rash   Neurologic:  Denies headache, focal weakness or sensory changes   Endocrine:  Denies polyuria or polydipsia   Lymphatic:  Denies swollen glands   Psychiatric:  Denies depression or anxiety     Past Medical History:        Diagnosis Date    Arthritis     GERD (gastroesophageal reflux disease)     Hyperlipidemia     Hypertension        Past Surgical History:        Procedure Laterality Date     SECTION      COLONOSCOPY      TOTAL HIP ARTHROPLASTY Left 2020    LEFT TOTAL HIP ARTHROPLASTY ANTERIOR APPROACH performed by Mariama Gomez MD at [place] [address] Right 5/10/2021    RIGHT HIP ARTHROPLASTY ANTERIOR APPROACH performed by Mariama Gomez MD at [facility] [address]       Family History:  No family history on file.     Social History:  Social History     Socioeconomic History    Marital status:      Spouse name: None    Number of children: None    Years of education: None    Highest education level: None   Occupational History    None   Tobacco Use    Smoking status: Former Smoker     Packs/day: 1.00     Years: 15.00     Pack years: 15.00     Quit date: 1983     Years since quittin.4    Smokeless tobacco: Never Used   Vaping Use    Vaping Use: Never used Substance and Sexual Activity    Alcohol use: Yes     Alcohol/week: 1.0 - 2.0 standard drinks     Types: 1 - 2 Shots of liquor per week    Drug use: No    Sexual activity: Yes   Other Topics Concern    None   Social History Narrative    None     Social Determinants of Health     Financial Resource Strain: Low Risk     Difficulty of Paying Living Expenses: Not hard at all   Food Insecurity: No Food Insecurity    Worried About Running Out of Food in the Last Year: Never true    Lilian of Food in the Last Year: Never true   Transportation Needs:     Lack of Transportation (Medical):  Lack of Transportation (Non-Medical):    Physical Activity:     Days of Exercise per Week:     Minutes of Exercise per Session:    Stress:     Feeling of Stress :    Social Connections:     Frequency of Communication with Friends and Family:     Frequency of Social Gatherings with Friends and Family:     Attends Congregational Services:     Active Member of Clubs or Organizations:     Attends Club or Organization Meetings:     Marital Status:    Intimate Partner Violence:     Fear of Current or Ex-Partner:     Emotionally Abused:     Physically Abused:     Sexually Abused: Allergies:  Flexeril [cyclobenzaprine] and Keflet [cephalexin]    Current Medications:    Prior to Admission medications    Medication Sig Start Date End Date Taking?  Authorizing Provider   warfarin (COUMADIN) 3 MG tablet Take 1 tablet by mouth daily 5/14/21  Yes Annia Tobin APRN - CNP   magnesium hydroxide (MILK OF MAGNESIA) 400 MG/5ML suspension Take 30 mLs by mouth daily as needed for Constipation 5/13/21  Yes Annia Tobin APRN - CNP   predniSONE (DELTASONE) 10 MG tablet Take 1 tablet by mouth daily for 10 days 5/14/21 5/24/21 Yes Annia Tobin APRN - ANSON   calcium carbonate (OSCAL) 500 MG TABS tablet Take 500 mg by mouth daily   Yes Historical Provider, MD   omeprazole (PRILOSEC OTC) 20 MG tablet omeprazole 20 MG Delayed

## 2021-05-25 ENCOUNTER — TELEPHONE (OUTPATIENT)
Dept: ORTHOPEDIC SURGERY | Age: 71
End: 2021-05-25

## 2021-06-02 ENCOUNTER — CARE COORDINATION (OUTPATIENT)
Dept: CASE MANAGEMENT | Age: 71
End: 2021-06-02

## 2021-06-02 NOTE — CARE COORDINATION
You Patient resolved from the Care Transitions episode on 06/02/2021    Patient/family has been provided the following resources and education related to COVID-19:                         Signs, symptoms and red flags related to COVID-19            CDC exposure and quarantine guidelines            Conduit exposure contact - [phone]            Contact for their local Department of Health                 Patient currently reports that the following symptoms have improved:  no new/worsening symptoms. Patient with no reports of any fever, chills, nausea, vomiting, chest pain, SOB or cough. No other issues or concerns at this time. No further outreach scheduled with this CTN/ACM. Episode of Care resolved. Patient has this CTN/ACM contact information if future needs arise.     Thank Barbara Villeda RN  Care Transition Coordinator  Contact [facility]:[phone]

## 2021-06-04 ENCOUNTER — CARE COORDINATION (OUTPATIENT)
Dept: CASE MANAGEMENT | Age: 71
End: 2021-06-04

## 2021-06-04 NOTE — CARE COORDINATION
Spoke with patient. Incision status: States free from redness or drainage. Edema/Swelling: States swelling has improved. Pain level and status: States pain is tolerable. Use of pain medications: States taking pain meds if needed. Bowels: No complaints. Home Care Agency active: Discharged and doing HEP.     Do you have all of your medications: Yes    Changes in medications: No    Follow up appointments:    Future Appointments   Date Time Provider Gilson Redmond   6/17/2021  2:45 PM Rachel Ferrell MD [facility]   8/19/2021 11:15 AM Marcos Desir MD [facility] [address] [place] [facility]   10/20/2021 10:40 AM [facility] [place] [facility] [address] Enrique BSN  Care Transition Nurse for ortho bundle  [phone]

## 2021-06-07 RX ORDER — SIMVASTATIN 40 MG
TABLET ORAL
Qty: 90 TABLET | Refills: 0 | Status: SHIPPED | OUTPATIENT
Start: 2021-06-07 | End: 2021-07-12 | Stop reason: SDUPTHER

## 2021-06-07 NOTE — TELEPHONE ENCOUNTER
Future Appointments   Date Time Provider Gilson Nyla   6/17/2021  2:45 PM Arlene Mistry MD [address] [facility]   8/19/2021 11:15 AM Valeria Agarwal MD [facility] [address] [place] [facility]   10/20/2021 10:40 AM Jitendra Doran MD [facility] [place] [facility]     5/6/2021

## 2021-06-10 ENCOUNTER — CARE COORDINATION (OUTPATIENT)
Dept: CASE MANAGEMENT | Age: 71
End: 2021-06-10

## 2021-06-10 NOTE — CARE COORDINATION
[address] Follow Up Call    6/10/2021    Patient: Thuan Night    Patient : 1950   MRN: [id number]    Surgery: elective R HARRIET 5/10/2021 (Dr Alyssa Streeter)  Discharge Date: 21   RARS: Readmission Risk Score: 14  Bundle Ends: 2021       Unable to reach patient by phone at this time. Message left requesting return call.      Adriano Arcos RN  Care Transition Nurse  [phone] mobile    Future Appointments   Date Time Provider Gilson Redmond   2021  2:45 PM Julian Vernon MD [address]   2021 11:15 AM Edilma Galdamez MD [facility] [address] [place] - [facility]   10/20/2021 10:40 AM Yas Jenkins MD [facility] [place] [facility]

## 2021-06-17 ENCOUNTER — CARE COORDINATION (OUTPATIENT)
Dept: CASE MANAGEMENT | Age: 71
End: 2021-06-17

## 2021-06-17 ENCOUNTER — OFFICE VISIT (OUTPATIENT)
Dept: ORTHOPEDIC SURGERY | Age: 71
End: 2021-06-17

## 2021-06-17 DIAGNOSIS — M16.10 PRIMARY OSTEOARTHRITIS OF HIP, UNSPECIFIED LATERALITY: Primary | ICD-10-CM

## 2021-06-17 PROCEDURE — 99024 POSTOP FOLLOW-UP VISIT: CPT | Performed by: ORTHOPAEDIC SURGERY

## 2021-06-17 NOTE — CARE COORDINATION
Called patient for updates. Left message for return call.   Alta Mckeon BSN  Care Transition Nurse for ortho bundle  [phone]

## 2021-06-18 NOTE — PROGRESS NOTES
Patient Name: Lawrence Hargrove MRN: <[id number]>   Age: 79 y.o. YOB: 1950   Sex: female         CHIEF COMPLAINT   No chief complaint on file. HISTORY OF PRESENT ILLNESS   Patient returns for their second postoperative evaluation status post total hip replacement. The patient is doing very well. They have minimal complaints of pain. There is moderate swelling about the Hip. The patient has been doing home physical therapy. They deny any calf swelling or numbness or tingling down the leg     Assessment   PHYSICAL EXAM   Vital Signs: There were no vitals filed for this visit. Examination of the hip shows a well-healed incision. There is mild swelling. There is no drainage. Range of motion is 0-90°. There is no calf tenderness. The patient is neurovascularly intact. Gait still shows small limp. RADIOLOGY     Xray   Have reviewed the xrays above from previously  and my impression is:  X-Rays reviewed: AP and lateral x-rays of the hip show excellent alignment of the total hip prosthesis. There is no loosening no fractures noted. IMPRESSION   7 weeks status post total hip replacement    PLAN   The patient is doing well 7 weeks status post total hip replacement. The patient will slowly resume normal activities. No diagnosis found.

## 2021-06-23 ENCOUNTER — CARE COORDINATION (OUTPATIENT)
Dept: CASE MANAGEMENT | Age: 71
End: 2021-06-23

## 2021-06-23 NOTE — CARE COORDINATION
Called patient for updates. Left message for return call.   Agustin Arizmendi BSN  Care Transition Nurse for ortho bundle  [phone]

## 2021-06-29 ENCOUNTER — CARE COORDINATION (OUTPATIENT)
Dept: CASE MANAGEMENT | Age: 71
End: 2021-06-29

## 2021-06-29 NOTE — CARE COORDINATION
Called patient for updates. Left message for return call.   Evelyn Mcgovern BSN  Care Transition Nurse for ortho bundle  [phone]

## 2021-07-12 ENCOUNTER — CARE COORDINATION (OUTPATIENT)
Dept: CASE MANAGEMENT | Age: 71
End: 2021-07-12

## 2021-07-12 RX ORDER — SIMVASTATIN 40 MG
TABLET ORAL
Qty: 90 TABLET | Refills: 3 | Status: SHIPPED | OUTPATIENT
Start: 2021-07-12 | End: 2022-07-12 | Stop reason: SDUPTHER

## 2021-07-12 NOTE — CARE COORDINATION
Called patient for updates. Left message for return call.   Javi Ro BSN  Care Transition Nurse for ortho bundle  [phone]

## 2021-07-26 ENCOUNTER — CARE COORDINATION (OUTPATIENT)
Dept: CASE MANAGEMENT | Age: 71
End: 2021-07-26

## 2021-07-26 NOTE — CARE COORDINATION
Called patient for updates. Left message that bundle program and follow up phone calls are ending. Left message to follow up with Dr Urbano Mar for any complications/concerns. Left message for return call.   Filemon Officer BSN  Care Transition Nurse for ortho bundle  [phone]

## 2021-08-12 ENCOUNTER — OFFICE VISIT (OUTPATIENT)
Dept: ORTHOPEDIC SURGERY | Age: 71
End: 2021-08-12
Payer: MEDICARE

## 2021-08-12 VITALS
HEART RATE: 71 BPM | HEIGHT: 62 IN | BODY MASS INDEX: 28.16 KG/M2 | WEIGHT: 153 LBS | DIASTOLIC BLOOD PRESSURE: 89 MMHG | SYSTOLIC BLOOD PRESSURE: 134 MMHG

## 2021-08-12 DIAGNOSIS — T84.011D FAILURE OF LEFT TOTAL HIP ARTHROPLASTY, SUBSEQUENT ENCOUNTER: ICD-10-CM

## 2021-08-12 DIAGNOSIS — Z96.641 STATUS POST TOTAL HIP REPLACEMENT, RIGHT: Primary | ICD-10-CM

## 2021-08-12 PROCEDURE — 1123F ACP DISCUSS/DSCN MKR DOCD: CPT | Performed by: ORTHOPAEDIC SURGERY

## 2021-08-12 PROCEDURE — 3017F COLORECTAL CA SCREEN DOC REV: CPT | Performed by: ORTHOPAEDIC SURGERY

## 2021-08-12 PROCEDURE — G8427 DOCREV CUR MEDS BY ELIG CLIN: HCPCS | Performed by: ORTHOPAEDIC SURGERY

## 2021-08-12 PROCEDURE — G8417 CALC BMI ABV UP PARAM F/U: HCPCS | Performed by: ORTHOPAEDIC SURGERY

## 2021-08-12 PROCEDURE — 99213 OFFICE O/P EST LOW 20 MIN: CPT | Performed by: ORTHOPAEDIC SURGERY

## 2021-08-12 PROCEDURE — 4040F PNEUMOC VAC/ADMIN/RCVD: CPT | Performed by: ORTHOPAEDIC SURGERY

## 2021-08-12 PROCEDURE — 1036F TOBACCO NON-USER: CPT | Performed by: ORTHOPAEDIC SURGERY

## 2021-08-12 PROCEDURE — G8399 PT W/DXA RESULTS DOCUMENT: HCPCS | Performed by: ORTHOPAEDIC SURGERY

## 2021-08-12 PROCEDURE — 1090F PRES/ABSN URINE INCON ASSESS: CPT | Performed by: ORTHOPAEDIC SURGERY

## 2021-08-12 NOTE — PROGRESS NOTES
Patient Name: Stephen Mcgarry MRN: <[id number]>   Age: 70 y.o. YOB: 1950   Sex: female         [address] Complaint   Patient presents with    Follow-up     RT HARRIET 5/10/21       HISTORY OF PRESENT ILLNESS   Patient returns for their annual evaluation status post total hip replacement. The patient is doing very well. They have mild complaints of pain. Minor complaints of pain on the right side. Increase I back pain       There is no swelling about the Hip. The patient has returned to normal activities. They deny any calf swelling or numbness or tingling down the leg        Review of Systems   Constitutional: Negative for chills and fever. HENT: Negative for nosebleeds. Eyes: Negative for double vision. Cardiovascular: Negative for chest pain. Gastrointestinal: Negative for abdominal pain. Musculoskeletal: Positive for joint pain and myalgias. Skin: Negative for rash. Neurological: Negative for seizures. Psychiatric/Behavioral: Negative for hallucinations. PHYSICAL EXAM     Vital Signs:   Vitals:    08/12/21 1134   BP: 134/89   Pulse: 71       Examination of the hip shows a well-healed incision. There is mild swelling. There is no deformity. Range of motion is without major pain. There is no calf tenderness. The patient is neurovascularly intact    Back with lower facet pain and stiffness more on the right than the left side. Hips movng well bilaterally. Flexor iliopsoas intact bilaterally. RADIOLOGY     Xray   Have reviewed the xrays above from 08/12/21   and my impression is:  X-Rays reviewed: AP and lateral x-rays of the hip show excellent alignment of the total hip prosthesis. There is no loosening or fractures noted. IMPRESSION   Annual evaluation status post total hip replacement    PLAN   The patient is doing well status post total hip replacement.   Today we'll continue with normal activities and exercise program.  Visit to return in 1 year for repeat evaluation and x-rays of the hips    ICD-10-CM    1. Status post total hip replacement, right  Z96.641 XR HIP RIGHT (2-3 VIEWS)   2. Failure of left total hip arthroplasty, subsequent encounter  T84.011D XR HIP LEFT (2-3 VIEWS)

## 2021-08-19 ENCOUNTER — OFFICE VISIT (OUTPATIENT)
Dept: INTERNAL MEDICINE CLINIC | Age: 71
End: 2021-08-19
Payer: MEDICARE

## 2021-08-19 VITALS
WEIGHT: 154.8 LBS | BODY MASS INDEX: 28.49 KG/M2 | HEIGHT: 62 IN | DIASTOLIC BLOOD PRESSURE: 80 MMHG | SYSTOLIC BLOOD PRESSURE: 130 MMHG

## 2021-08-19 DIAGNOSIS — E03.8 HYPOTHYROIDISM DUE TO HASHIMOTO'S THYROIDITIS: ICD-10-CM

## 2021-08-19 DIAGNOSIS — I10 BENIGN ESSENTIAL HTN: ICD-10-CM

## 2021-08-19 DIAGNOSIS — E78.00 PURE HYPERCHOLESTEROLEMIA: ICD-10-CM

## 2021-08-19 DIAGNOSIS — E06.3 HYPOTHYROIDISM DUE TO HASHIMOTO'S THYROIDITIS: ICD-10-CM

## 2021-08-19 DIAGNOSIS — D50.0 BLOOD LOSS ANEMIA: Primary | ICD-10-CM

## 2021-08-19 PROCEDURE — 1036F TOBACCO NON-USER: CPT | Performed by: INTERNAL MEDICINE

## 2021-08-19 PROCEDURE — G8427 DOCREV CUR MEDS BY ELIG CLIN: HCPCS | Performed by: INTERNAL MEDICINE

## 2021-08-19 PROCEDURE — 1090F PRES/ABSN URINE INCON ASSESS: CPT | Performed by: INTERNAL MEDICINE

## 2021-08-19 PROCEDURE — 3017F COLORECTAL CA SCREEN DOC REV: CPT | Performed by: INTERNAL MEDICINE

## 2021-08-19 PROCEDURE — 99213 OFFICE O/P EST LOW 20 MIN: CPT | Performed by: INTERNAL MEDICINE

## 2021-08-19 PROCEDURE — G8399 PT W/DXA RESULTS DOCUMENT: HCPCS | Performed by: INTERNAL MEDICINE

## 2021-08-19 PROCEDURE — G8417 CALC BMI ABV UP PARAM F/U: HCPCS | Performed by: INTERNAL MEDICINE

## 2021-08-19 PROCEDURE — 1123F ACP DISCUSS/DSCN MKR DOCD: CPT | Performed by: INTERNAL MEDICINE

## 2021-08-19 PROCEDURE — 4040F PNEUMOC VAC/ADMIN/RCVD: CPT | Performed by: INTERNAL MEDICINE

## 2021-08-19 ASSESSMENT — PATIENT HEALTH QUESTIONNAIRE - PHQ9
SUM OF ALL RESPONSES TO PHQ QUESTIONS 1-9: 0
1. LITTLE INTEREST OR PLEASURE IN DOING THINGS: 0
SUM OF ALL RESPONSES TO PHQ9 QUESTIONS 1 & 2: 0
SUM OF ALL RESPONSES TO PHQ QUESTIONS 1-9: 0
2. FEELING DOWN, DEPRESSED OR HOPELESS: 0
SUM OF ALL RESPONSES TO PHQ QUESTIONS 1-9: 0

## 2021-08-19 NOTE — PROGRESS NOTES
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 who presents for : Follow-up hypertension hyperlipidemia hypothyroidism    HPI: Patient presented with follow-up of the above he has been getting iron oncology and her blood count is back up to normal she is scheduled for colonoscopy this year in addition she is off her thyroid medicine and feels fine    Review of Systems:   Constitutional:  Denies fever or chills   Eyes:  Denies change in visual acuity   HENT:  Denies nasal congestion or sore throat   Respiratory:  Denies cough or shortness of breath   Cardiovascular:  Denies chest pain or edema   GI:  Denies abdominal pain, nausea, vomiting, bloody stools or diarrhea   :  Denies dysuria   Musculoskeletal:  Denies back pain or joint pain   Integument:  Denies rash   Neurologic:  Denies headache, focal weakness or sensory changes   Endocrine:  Denies polyuria or polydipsia   Lymphatic:  Denies swollen glands   Psychiatric:  Denies depression or anxiety     Past Medical History:        Diagnosis Date    Arthritis     GERD (gastroesophageal reflux disease)     Hyperlipidemia     Hypertension        Past Surgical History:        Procedure Laterality Date     SECTION      COLONOSCOPY      TOTAL HIP ARTHROPLASTY Left 2020    LEFT TOTAL HIP ARTHROPLASTY ANTERIOR APPROACH performed by Moises Alvarez MD at [address] [address] Right 5/10/2021    RIGHT HIP ARTHROPLASTY ANTERIOR APPROACH performed by Moises Alvarez MD at [address] [facility] History:  No family history on file.     Social History:  Social History     Socioeconomic History    Marital status:      Spouse name: None    Number of children: None    Years of education: None    Highest education level: None   Occupational History    None   Tobacco Use    Smoking status: Former Smoker     Packs/day: 1.00     Years: 15.00     Pack years: 15.00     Quit date: 1983     Years since quittin.6    Smokeless tobacco: Never Used   Vaping Use    Vaping Use: Never used   Substance and Sexual Activity    Alcohol use: Yes     Alcohol/week: 1.0 - 2.0 standard drinks     Types: 1 - 2 Shots of liquor per week    Drug use: No    Sexual activity: Yes   Other Topics Concern    None   Social History Narrative    None     Social Determinants of Health     Financial Resource Strain: Low Risk     Difficulty of Paying Living Expenses: Not hard at all   Food Insecurity: No Food Insecurity    Worried About Running Out of Food in the Last Year: Never true    Lilian of Food in the Last Year: Never true   Transportation Needs:     Lack of Transportation (Medical):  Lack of Transportation (Non-Medical):    Physical Activity:     Days of Exercise per Week:     Minutes of Exercise per Session:    Stress:     Feeling of Stress :    Social Connections:     Frequency of Communication with Friends and Family:     Frequency of Social Gatherings with Friends and Family:     Attends [employer] Services:     Active Member of Clubs or Organizations:     Attends Club or Organization Meetings:     Marital Status:    Intimate Partner Violence:     Fear of Current or Ex-Partner:     Emotionally Abused:     Physically Abused:     Sexually Abused: Allergies:  Flexeril [cyclobenzaprine] and Keflet [cephalexin]    Current Medications:    Prior to Admission medications    Medication Sig Start Date End Date Taking?  Authorizing Provider   simvastatin (ZOCOR) 40 MG tablet TAKE 1 TABLET BY MOUTH EVERY NIGHT 7/12/21  Yes Adeola Villanueva MD   calcium carbonate (OSCAL) 500 MG TABS tablet Take 500 mg by mouth daily   Yes Historical Provider, MD   omeprazole (PRILOSEC OTC) 20 MG tablet omeprazole 20 MG Delayed Release Oral Tablet        Active   Yes Historical Provider, MD   Multiple Vitamins-Minerals (THERAPEUTIC MULTIVITAMIN-MINERALS) tablet Take 1 tablet by mouth daily   Yes Historical Provider, MD   Vitamin D (CHOLECALCIFEROL) 1000 UNITS CAPS capsule Take 2,000 Units by mouth daily    Yes Historical Provider, MD   Multiple Vitamins-Minerals (OCUVITE PRESERVISION PO) Take 1 tablet by mouth daily   Yes Historical Provider, MD       Physical Exam:  Vital Signs: /80   Ht 5' 2\" (1.575 m)   Wt 154 lb 12.8 oz (70.2 kg)   BMI 28.31 kg/m²   General: Patient appears  non-toxic  HENT: Atraumatic, normocephalic, oral mucosa moist  Lungs:  Clear bilaterally  Heart: Regular rate and rhythm  Abdomen: Non-distended, soft, non-tender  Extremities: No edema  Neuro: Nonfocal    Medical Decision Making and Plan:  Pertinent Labs & Imaging studies reviewed. (See chart for details)  Blood studies including TSH is pending    1. Blood loss anemia  Problem is improved    2. Benign essential HTN  This problem is stable continue present meds    3. Hypothyroidism due to Hashimoto's thyroiditis  No evidence of hypothyroidism clinically check TSH  - TSH with Reflex; Future    4.  Pure hypercholesterolemia  Problem is stable continue present meds

## 2021-10-05 ENCOUNTER — PATIENT MESSAGE (OUTPATIENT)
Dept: INTERNAL MEDICINE CLINIC | Age: 71
End: 2021-10-05

## 2021-10-12 NOTE — TELEPHONE ENCOUNTER
From: Dimas Wu  To: Lennox Nevins, MD  Sent: 10/5/2021 9:43 PM EDT  Subject: Test Results Question    I had my thyroid blood work done at [facility] on 9/27/21. Have you seen the results? Any action needed?  Thanks

## 2021-10-13 ENCOUNTER — TELEPHONE (OUTPATIENT)
Dept: INTERNAL MEDICINE CLINIC | Age: 71
End: 2021-10-13

## 2021-10-13 NOTE — TELEPHONE ENCOUNTER
Mart Jalloh MD 8 days ago   ML  I had my thyroid blood work done at [facility] on 9/27/21. Have you seen the results? Any action needed?  Thanks

## 2021-12-06 LAB
ALBUMIN SERPL-MCNC: 4.8 G/DL (ref 3.4–5)
ANION GAP SERPL CALCULATED.3IONS-SCNC: 18 MMOL/L (ref 3–16)
BUN BLDV-MCNC: 16 MG/DL (ref 7–20)
CALCIUM SERPL-MCNC: 10.5 MG/DL (ref 8.3–10.6)
CHLORIDE BLD-SCNC: 100 MMOL/L (ref 99–110)
CO2: 22 MMOL/L (ref 21–32)
CREAT SERPL-MCNC: 0.7 MG/DL (ref 0.6–1.2)
GFR AFRICAN AMERICAN: >60
GFR NON-AFRICAN AMERICAN: >60
GLUCOSE BLD-MCNC: 92 MG/DL (ref 70–99)
PHOSPHORUS: 3.8 MG/DL (ref 2.5–4.9)
POTASSIUM SERPL-SCNC: 4 MMOL/L (ref 3.5–5.1)
SODIUM BLD-SCNC: 140 MMOL/L (ref 136–145)

## 2021-12-08 ENCOUNTER — HOSPITAL ENCOUNTER (OUTPATIENT)
Dept: MAMMOGRAPHY | Age: 71
Discharge: HOME | End: 2021-12-08
Payer: MEDICARE

## 2021-12-08 DIAGNOSIS — Z12.31 BREAST CANCER SCREENING BY MAMMOGRAM: ICD-10-CM

## 2021-12-08 PROCEDURE — 77063 BREAST TOMOSYNTHESIS BI: CPT

## 2022-02-22 ENCOUNTER — OFFICE VISIT (OUTPATIENT)
Dept: ORTHOPEDIC SURGERY | Age: 72
End: 2022-02-22
Payer: MEDICARE

## 2022-02-22 ENCOUNTER — HOSPITAL ENCOUNTER (OUTPATIENT)
Dept: CT IMAGING | Age: 72
Discharge: HOME | End: 2022-02-22
Payer: MEDICARE

## 2022-02-22 VITALS — WEIGHT: 154 LBS | BODY MASS INDEX: 28.34 KG/M2 | HEIGHT: 62 IN

## 2022-02-22 DIAGNOSIS — S82.142A CLOSED FRACTURE OF LEFT TIBIAL PLATEAU, INITIAL ENCOUNTER: ICD-10-CM

## 2022-02-22 DIAGNOSIS — S82.142A CLOSED FRACTURE OF LEFT TIBIAL PLATEAU, INITIAL ENCOUNTER: Primary | ICD-10-CM

## 2022-02-22 PROCEDURE — G8484 FLU IMMUNIZE NO ADMIN: HCPCS | Performed by: ORTHOPAEDIC SURGERY

## 2022-02-22 PROCEDURE — 1090F PRES/ABSN URINE INCON ASSESS: CPT | Performed by: ORTHOPAEDIC SURGERY

## 2022-02-22 PROCEDURE — G8399 PT W/DXA RESULTS DOCUMENT: HCPCS | Performed by: ORTHOPAEDIC SURGERY

## 2022-02-22 PROCEDURE — G8417 CALC BMI ABV UP PARAM F/U: HCPCS | Performed by: ORTHOPAEDIC SURGERY

## 2022-02-22 PROCEDURE — 1036F TOBACCO NON-USER: CPT | Performed by: ORTHOPAEDIC SURGERY

## 2022-02-22 PROCEDURE — 4040F PNEUMOC VAC/ADMIN/RCVD: CPT | Performed by: ORTHOPAEDIC SURGERY

## 2022-02-22 PROCEDURE — G8428 CUR MEDS NOT DOCUMENT: HCPCS | Performed by: ORTHOPAEDIC SURGERY

## 2022-02-22 PROCEDURE — 1123F ACP DISCUSS/DSCN MKR DOCD: CPT | Performed by: ORTHOPAEDIC SURGERY

## 2022-02-22 PROCEDURE — 99213 OFFICE O/P EST LOW 20 MIN: CPT | Performed by: ORTHOPAEDIC SURGERY

## 2022-02-22 PROCEDURE — 3017F COLORECTAL CA SCREEN DOC REV: CPT | Performed by: ORTHOPAEDIC SURGERY

## 2022-02-22 PROCEDURE — 73700 CT LOWER EXTREMITY W/O DYE: CPT

## 2022-02-22 NOTE — LETTER
Geovannyg oluctaj 1  Surgery Precert & Billing Form:    DEMOGRAPHICS:                                                                                                       Patient Name:  Oralia Zendejas  Patient :  1950   Patient SS#:      Patient Phone:  [phone] (home)  Alt.  Patient Phone:    [facility] [address]    PCP:  Steven Hargrove MD  Insurance: Medicare A & B    DIAGNOSIS & PROCEDURE:                                                                                      Diagnosis: Left tibial plateau fracture I01.080V  Operation: left    SURGERY  INFORMATION  Date of Surgery:   22  Location:    [facility]  Type:    Outpatient  23 hour hold:  No  Surgeon:     Ortiz Irving MD    22     BILLING INFORMATION:                                                                                                Physician Procedure                                            CPT Codes                      PA, or Fellow Procedure                                      CPT Codes                    Precert information:

## 2022-02-23 ENCOUNTER — TELEPHONE (OUTPATIENT)
Dept: ORTHOPEDIC SURGERY | Age: 72
End: 2022-02-23

## 2022-02-23 RX ORDER — TRAMADOL HYDROCHLORIDE 50 MG/1
50 TABLET ORAL EVERY 6 HOURS PRN
COMMUNITY
End: 2022-05-11 | Stop reason: SDUPTHER

## 2022-02-23 RX ORDER — ACETAMINOPHEN 325 MG/1
650 TABLET ORAL EVERY 6 HOURS PRN
COMMUNITY

## 2022-02-23 RX ORDER — ANTIOX #8/OM3/DHA/EPA/LUT/ZEAX 250-2.5 MG
1 CAPSULE ORAL 2 TIMES DAILY
Status: ON HOLD | COMMUNITY
End: 2022-02-26 | Stop reason: HOSPADM

## 2022-02-23 RX ORDER — METHOCARBAMOL 750 MG/1
750 TABLET, FILM COATED ORAL 4 TIMES DAILY
COMMUNITY
End: 2022-06-16

## 2022-02-23 NOTE — PROGRESS NOTES
Juana Ang    Age 70 y.o.    female    1950    MRN [id number]    2/25/2022  Arrival Time_____________  OR Time____________145 Min     Procedure(s):  OPEN REDUCTION INTERNAL FIXATION LEFT TIBIAL PLATEAU FRACTURE         SUKUMAR  CPT CODE - 75156                      General     Surgeon(s):  Guero James, MD      DAY ADMIT ___  SDS/OP ___  OUTPT IN BED ___         Phone [phone] (home)    PCP _____________________ Phone_________________ Epic ( ) Epic CE ( ) Appt ________    ADDITIONAL INFO __________________________________ Cardio/Consult _____________    NOTES _____________________________________________________________________    ____________________________________________________________________________    PAT APPT DATE:________ TIME: ________  FAXED QAD: _______  (__) H&P w/ hospitalist  ____________________________________________________________________________    COVID TEST: Date/Location______________        NURSING HISTORY COMPLETE: _______  (__) CBC       (__) W/ DIFF ___________  (__)  ECHO    __________  (__) Hgb A1C    ___________  (__) CHEST X RAY   __________  (__) LIPID PROFILE  ___________  (__) EKG   __________  (__) PT/PTT   ___________  (__) PFT's   __________  (__) BMP   ___________  (__) CAROTIDS  __________  (__) CMP   ___________  (__) VEIN MAPPING  __________  (__) U/A   ___________  (__) HISTORY & PHYSICAL __________  (__) URINE C & S  ___________  (__) CARDIAC CLEARANCE __________  (__) U/A W/ FLEX  ___________  (__) PULM.  CLEARANCE __________  (__) SERUM PREGNANCY ___________  (__) Check Epic DOS orders __________  (__) TYPE & SCREEN ________ repeat ( ) (__)  __________________ __________  (__) ALBUMIN   ___________  (__)  __________________ __________  (__) TRANSFERRIN  ___________  (__)  __________________ __________  (__) LIVER PROFILE  ___________  (__)  __________________ __________  (__) CARBOXY HGB  ___________  (__) URINE PREG DOS __________  (__) NICOTINE & MET. ___________  (__) BLOOD SUGAR DOS __________  (__) PREALBUMIN  ___________    (__) MRSA NASAL SWAB ___________  (__) BLOOD THINNERS __________  (__) ACE/ ARBS: _____________________    (__) BETABLOCKERS ___________________

## 2022-02-23 NOTE — PROGRESS NOTES
1. Do not eat or drink anything after 12 midnight prior to surgery. This includes no water, chewing gum mints, or ice chips. You may brush your teeth and gargle the day of surgery but DO NOT SWALLOW THE WATER. 2. Please see your family doctor/pediatrician for a history and physical and/or concerning medications. Bring any test results/reports from your physician's office. If you are under the care of a heart doctor or specialist please be aware that you may be asked to see him or her for clearance. 3. You may be asked to stop blood thinners such as Coumadin, Plavix, Fragmin, and Lovenox or Anti-inflammatories such as Aspirin, Ibuprofen, Advil, and Naproxen prior to your surgery. Please check with your doctor before stopping these or any other medications. 4. Do not smoke, and do not drink any alcoholic beverages 24 hours prior to surgery. 5. You MUST make arrangements for a responsible adult to take you home after your surgery. For your safety, you will not be allowed to leave alone or drive yourself home. Your surgery will be cancelled if you do not have a ride home. Also for your safety, it is strongly suggested someone stay with you the first 24 hrs after your surgery. 6. A parent/legal guardian must accompany a child scheduled for surgery and plan to stay at the hospital until the child is discharged. Please do not bring other children with you. 7. For your comfort,please wear simple, loose fitting clothing to the hospital.  Please do not bring valuables (money, credit cards, checkbooks, etc.) Do not wear any makeup (including no eye makeup) or nail polish on your fingers or toes. 8. For your safety, please DO NOT wear any jewelry or piercings on day of surgery. All body piercing jewelry must be removed. 9. If you have dentures, they will be removed before going to the OR; for your convenience we will provide you with a container.   If you wear contact lenses or glasses, they will be removed, they will be removed, please bring a case for them. 10. If appicable,Please see your family doctor/pediatrician for a history & physical and/or concerning medications. Bring any test results/reports from your physician's office. 11. Remember to bring Blood Bank bracelet to the hospital on the day of surgery. 12. If you have a Living Will and Durable Power of  for Healthcare, please bring in a copy. 15. Notify your Surgeon if you develop any illness between now and surgery  time, cough, cold, fever, sore throat, nausea, vomiting, etc.  Please notify your surgeon if you experience dizziness, shortness of breath or blurred vision between now & the time of your surgery   14. DO NOT shave your operative site 96 hours prior to surgery. For face & neck surgery, men may use an electric razor 48 hours prior to surgery. 15. Shower the night before surgery with ___Antibacterial soap _x__Hibiclens   16. To provide excellent care visitors will be limited to one in the room at any given time. 17.  Please bring picture ID and insurance card. 18.  Visit our web site for additional information:  [email or web address].

## 2022-02-23 NOTE — TELEPHONE ENCOUNTER
Auth: NPR  Date: 02/25/22  Type of SX: OUTPATIENT  Location: [facility]  CPT: 17386, 84219   DX: [phone]  SX area: [place]  Insurance: MEDICARE A&B

## 2022-02-24 ENCOUNTER — ANESTHESIA EVENT (OUTPATIENT)
Dept: OPERATING ROOM | Age: 72
DRG: 494 | End: 2022-02-24
Payer: MEDICARE

## 2022-02-25 ENCOUNTER — HOSPITAL ENCOUNTER (INPATIENT)
Age: 72
LOS: 1 days | Discharge: HOME HEALTH | DRG: 494 | End: 2022-02-26
Attending: ORTHOPAEDIC SURGERY | Admitting: ORTHOPAEDIC SURGERY
Payer: MEDICARE

## 2022-02-25 ENCOUNTER — ANESTHESIA (OUTPATIENT)
Dept: OPERATING ROOM | Age: 72
DRG: 494 | End: 2022-02-25
Payer: MEDICARE

## 2022-02-25 ENCOUNTER — APPOINTMENT (OUTPATIENT)
Dept: GENERAL RADIOLOGY | Age: 72
DRG: 494 | End: 2022-02-25
Attending: ORTHOPAEDIC SURGERY
Payer: MEDICARE

## 2022-02-25 VITALS
OXYGEN SATURATION: 99 % | RESPIRATION RATE: 8 BRPM | SYSTOLIC BLOOD PRESSURE: 128 MMHG | TEMPERATURE: 97.5 F | DIASTOLIC BLOOD PRESSURE: 89 MMHG

## 2022-02-25 DIAGNOSIS — Z87.81 STATUS POST OPEN REDUCTION AND INTERNAL FIXATION (ORIF) OF FRACTURE: Primary | ICD-10-CM

## 2022-02-25 DIAGNOSIS — Z98.890 STATUS POST OPEN REDUCTION AND INTERNAL FIXATION (ORIF) OF FRACTURE: Primary | ICD-10-CM

## 2022-02-25 PROCEDURE — 6360000002 HC RX W HCPCS: Performed by: PHYSICIAN ASSISTANT

## 2022-02-25 PROCEDURE — 2780000010 HC IMPLANT OTHER: Performed by: ORTHOPAEDIC SURGERY

## 2022-02-25 PROCEDURE — 1200000000 HC SEMI PRIVATE

## 2022-02-25 PROCEDURE — 6360000002 HC RX W HCPCS: Performed by: NURSE ANESTHETIST, CERTIFIED REGISTERED

## 2022-02-25 PROCEDURE — 2500000003 HC RX 250 WO HCPCS: Performed by: NURSE ANESTHETIST, CERTIFIED REGISTERED

## 2022-02-25 PROCEDURE — 6360000002 HC RX W HCPCS: Performed by: ANESTHESIOLOGY

## 2022-02-25 PROCEDURE — 73560 X-RAY EXAM OF KNEE 1 OR 2: CPT

## 2022-02-25 PROCEDURE — 0QSH04Z REPOSITION LEFT TIBIA WITH INTERNAL FIXATION DEVICE, OPEN APPROACH: ICD-10-PCS | Performed by: ORTHOPAEDIC SURGERY

## 2022-02-25 PROCEDURE — 2580000003 HC RX 258: Performed by: NURSE ANESTHETIST, CERTIFIED REGISTERED

## 2022-02-25 PROCEDURE — 73590 X-RAY EXAM OF LOWER LEG: CPT

## 2022-02-25 PROCEDURE — 7100000000 HC PACU RECOVERY - FIRST 15 MIN: Performed by: ORTHOPAEDIC SURGERY

## 2022-02-25 PROCEDURE — 2580000003 HC RX 258: Performed by: PHYSICIAN ASSISTANT

## 2022-02-25 PROCEDURE — C1776 JOINT DEVICE (IMPLANTABLE): HCPCS | Performed by: ORTHOPAEDIC SURGERY

## 2022-02-25 PROCEDURE — 7100000001 HC PACU RECOVERY - ADDTL 15 MIN: Performed by: ORTHOPAEDIC SURGERY

## 2022-02-25 PROCEDURE — 6370000000 HC RX 637 (ALT 250 FOR IP): Performed by: ORTHOPAEDIC SURGERY

## 2022-02-25 PROCEDURE — 3700000001 HC ADD 15 MINUTES (ANESTHESIA): Performed by: ORTHOPAEDIC SURGERY

## 2022-02-25 PROCEDURE — 3209999900 FLUORO FOR SURGICAL PROCEDURES

## 2022-02-25 PROCEDURE — 6370000000 HC RX 637 (ALT 250 FOR IP): Performed by: PHYSICIAN ASSISTANT

## 2022-02-25 PROCEDURE — 3700000000 HC ANESTHESIA ATTENDED CARE: Performed by: ORTHOPAEDIC SURGERY

## 2022-02-25 PROCEDURE — 2709999900 HC NON-CHARGEABLE SUPPLY: Performed by: ORTHOPAEDIC SURGERY

## 2022-02-25 PROCEDURE — 3600000005 HC SURGERY LEVEL 5 BASE: Performed by: ORTHOPAEDIC SURGERY

## 2022-02-25 PROCEDURE — 2580000003 HC RX 258: Performed by: ORTHOPAEDIC SURGERY

## 2022-02-25 PROCEDURE — 64445 NJX AA&/STRD SCIATIC NRV IMG: CPT | Performed by: ANESTHESIOLOGY

## 2022-02-25 PROCEDURE — 2720000010 HC SURG SUPPLY STERILE: Performed by: ORTHOPAEDIC SURGERY

## 2022-02-25 PROCEDURE — 3600000015 HC SURGERY LEVEL 5 ADDTL 15MIN: Performed by: ORTHOPAEDIC SURGERY

## 2022-02-25 PROCEDURE — 27535 TREAT KNEE FRACTURE: CPT | Performed by: ORTHOPAEDIC SURGERY

## 2022-02-25 PROCEDURE — A4217 STERILE WATER/SALINE, 500 ML: HCPCS | Performed by: ORTHOPAEDIC SURGERY

## 2022-02-25 PROCEDURE — 64447 NJX AA&/STRD FEMORAL NRV IMG: CPT | Performed by: ANESTHESIOLOGY

## 2022-02-25 PROCEDURE — C1713 ANCHOR/SCREW BN/BN,TIS/BN: HCPCS | Performed by: ORTHOPAEDIC SURGERY

## 2022-02-25 DEVICE — IMPLANTABLE DEVICE: Type: IMPLANTABLE DEVICE | Site: LEG | Status: FUNCTIONAL

## 2022-02-25 DEVICE — CORTEX SCREW
Type: IMPLANTABLE DEVICE | Site: LEG | Status: FUNCTIONAL
Brand: AXSOS

## 2022-02-25 DEVICE — LOCKING SCREW
Type: IMPLANTABLE DEVICE | Site: LEG | Status: FUNCTIONAL
Brand: AXSOS

## 2022-02-25 DEVICE — PROXIMAL LATERAL TIBIA PLATE
Type: IMPLANTABLE DEVICE | Site: LEG | Status: FUNCTIONAL
Brand: AXSOS

## 2022-02-25 RX ORDER — SODIUM CHLORIDE, SODIUM LACTATE, POTASSIUM CHLORIDE, CALCIUM CHLORIDE 600; 310; 30; 20 MG/100ML; MG/100ML; MG/100ML; MG/100ML
INJECTION, SOLUTION INTRAVENOUS CONTINUOUS PRN
Status: DISCONTINUED | OUTPATIENT
Start: 2022-02-25 | End: 2022-02-25 | Stop reason: SDUPTHER

## 2022-02-25 RX ORDER — ONDANSETRON 2 MG/ML
4 INJECTION INTRAMUSCULAR; INTRAVENOUS
Status: DISCONTINUED | OUTPATIENT
Start: 2022-02-25 | End: 2022-02-25 | Stop reason: HOSPADM

## 2022-02-25 RX ORDER — SODIUM CHLORIDE 0.9 % (FLUSH) 0.9 %
10 SYRINGE (ML) INJECTION PRN
Status: DISCONTINUED | OUTPATIENT
Start: 2022-02-25 | End: 2022-02-26 | Stop reason: HOSPADM

## 2022-02-25 RX ORDER — OXYCODONE HYDROCHLORIDE AND ACETAMINOPHEN 5; 325 MG/1; MG/1
1 TABLET ORAL
Status: COMPLETED | OUTPATIENT
Start: 2022-02-25 | End: 2022-02-25

## 2022-02-25 RX ORDER — CALCIUM CARBONATE 500(1250)
500 TABLET ORAL DAILY
Status: DISCONTINUED | OUTPATIENT
Start: 2022-02-25 | End: 2022-02-26 | Stop reason: HOSPADM

## 2022-02-25 RX ORDER — VITAMIN B COMPLEX
2000 TABLET ORAL DAILY
Status: DISCONTINUED | OUTPATIENT
Start: 2022-02-25 | End: 2022-02-26 | Stop reason: HOSPADM

## 2022-02-25 RX ORDER — CELECOXIB 100 MG/1
200 CAPSULE ORAL
Status: COMPLETED | OUTPATIENT
Start: 2022-02-25 | End: 2022-02-25

## 2022-02-25 RX ORDER — CEFAZOLIN SODIUM 1 G/3ML
INJECTION, POWDER, FOR SOLUTION INTRAMUSCULAR; INTRAVENOUS PRN
Status: DISCONTINUED | OUTPATIENT
Start: 2022-02-25 | End: 2022-02-25 | Stop reason: SDUPTHER

## 2022-02-25 RX ORDER — OXYCODONE HYDROCHLORIDE 5 MG/1
5 TABLET ORAL EVERY 4 HOURS PRN
Status: DISCONTINUED | OUTPATIENT
Start: 2022-02-25 | End: 2022-02-26 | Stop reason: HOSPADM

## 2022-02-25 RX ORDER — EPHEDRINE SULFATE 50 MG/ML
INJECTION INTRAVENOUS PRN
Status: DISCONTINUED | OUTPATIENT
Start: 2022-02-25 | End: 2022-02-25 | Stop reason: SDUPTHER

## 2022-02-25 RX ORDER — ACETAMINOPHEN 325 MG/1
650 TABLET ORAL EVERY 6 HOURS
Status: DISCONTINUED | OUTPATIENT
Start: 2022-02-25 | End: 2022-02-26 | Stop reason: HOSPADM

## 2022-02-25 RX ORDER — SODIUM CHLORIDE 0.9 % (FLUSH) 0.9 %
5-40 SYRINGE (ML) INJECTION PRN
Status: DISCONTINUED | OUTPATIENT
Start: 2022-02-25 | End: 2022-02-25 | Stop reason: HOSPADM

## 2022-02-25 RX ORDER — ONDANSETRON 2 MG/ML
4 INJECTION INTRAMUSCULAR; INTRAVENOUS EVERY 6 HOURS PRN
Status: DISCONTINUED | OUTPATIENT
Start: 2022-02-25 | End: 2022-02-26 | Stop reason: HOSPADM

## 2022-02-25 RX ORDER — OXYCODONE HYDROCHLORIDE 5 MG/1
10 TABLET ORAL EVERY 4 HOURS PRN
Status: DISCONTINUED | OUTPATIENT
Start: 2022-02-25 | End: 2022-02-26 | Stop reason: HOSPADM

## 2022-02-25 RX ORDER — PROPOFOL 10 MG/ML
INJECTION, EMULSION INTRAVENOUS PRN
Status: DISCONTINUED | OUTPATIENT
Start: 2022-02-25 | End: 2022-02-25 | Stop reason: SDUPTHER

## 2022-02-25 RX ORDER — SODIUM CHLORIDE 9 MG/ML
25 INJECTION, SOLUTION INTRAVENOUS PRN
Status: DISCONTINUED | OUTPATIENT
Start: 2022-02-25 | End: 2022-02-26 | Stop reason: HOSPADM

## 2022-02-25 RX ORDER — ONDANSETRON 4 MG/1
4 TABLET, ORALLY DISINTEGRATING ORAL EVERY 8 HOURS PRN
Status: DISCONTINUED | OUTPATIENT
Start: 2022-02-25 | End: 2022-02-26 | Stop reason: HOSPADM

## 2022-02-25 RX ORDER — SODIUM CHLORIDE 9 MG/ML
25 INJECTION, SOLUTION INTRAVENOUS PRN
Status: DISCONTINUED | OUTPATIENT
Start: 2022-02-25 | End: 2022-02-25 | Stop reason: HOSPADM

## 2022-02-25 RX ORDER — SODIUM CHLORIDE 0.9 % (FLUSH) 0.9 %
10 SYRINGE (ML) INJECTION EVERY 12 HOURS SCHEDULED
Status: DISCONTINUED | OUTPATIENT
Start: 2022-02-25 | End: 2022-02-26 | Stop reason: HOSPADM

## 2022-02-25 RX ORDER — FENTANYL CITRATE 50 UG/ML
INJECTION, SOLUTION INTRAMUSCULAR; INTRAVENOUS PRN
Status: DISCONTINUED | OUTPATIENT
Start: 2022-02-25 | End: 2022-02-25 | Stop reason: SDUPTHER

## 2022-02-25 RX ORDER — ONDANSETRON 2 MG/ML
INJECTION INTRAMUSCULAR; INTRAVENOUS PRN
Status: DISCONTINUED | OUTPATIENT
Start: 2022-02-25 | End: 2022-02-25 | Stop reason: SDUPTHER

## 2022-02-25 RX ORDER — APREPITANT 40 MG/1
125 CAPSULE ORAL
Status: DISPENSED | OUTPATIENT
Start: 2022-02-25 | End: 2022-02-25

## 2022-02-25 RX ORDER — PANTOPRAZOLE SODIUM 40 MG/1
40 TABLET, DELAYED RELEASE ORAL
Status: DISCONTINUED | OUTPATIENT
Start: 2022-02-26 | End: 2022-02-26 | Stop reason: HOSPADM

## 2022-02-25 RX ORDER — MAGNESIUM HYDROXIDE 1200 MG/15ML
LIQUID ORAL CONTINUOUS PRN
Status: COMPLETED | OUTPATIENT
Start: 2022-02-25 | End: 2022-02-25

## 2022-02-25 RX ORDER — ATORVASTATIN CALCIUM 10 MG/1
20 TABLET, FILM COATED ORAL DAILY
Status: DISCONTINUED | OUTPATIENT
Start: 2022-02-26 | End: 2022-02-26 | Stop reason: HOSPADM

## 2022-02-25 RX ORDER — SODIUM CHLORIDE 450 MG/100ML
INJECTION, SOLUTION INTRAVENOUS CONTINUOUS
Status: DISCONTINUED | OUTPATIENT
Start: 2022-02-25 | End: 2022-02-26 | Stop reason: HOSPADM

## 2022-02-25 RX ORDER — M-VIT,TX,IRON,MINS/CALC/FOLIC 27MG-0.4MG
1 TABLET ORAL DAILY
Status: DISCONTINUED | OUTPATIENT
Start: 2022-02-26 | End: 2022-02-26 | Stop reason: HOSPADM

## 2022-02-25 RX ORDER — LIDOCAINE HYDROCHLORIDE 20 MG/ML
INJECTION, SOLUTION EPIDURAL; INFILTRATION; INTRACAUDAL; PERINEURAL PRN
Status: DISCONTINUED | OUTPATIENT
Start: 2022-02-25 | End: 2022-02-25 | Stop reason: SDUPTHER

## 2022-02-25 RX ORDER — MEPERIDINE HYDROCHLORIDE 50 MG/ML
12.5 INJECTION INTRAMUSCULAR; INTRAVENOUS; SUBCUTANEOUS EVERY 5 MIN PRN
Status: DISCONTINUED | OUTPATIENT
Start: 2022-02-25 | End: 2022-02-25 | Stop reason: HOSPADM

## 2022-02-25 RX ORDER — SODIUM CHLORIDE 0.9 % (FLUSH) 0.9 %
5-40 SYRINGE (ML) INJECTION EVERY 12 HOURS SCHEDULED
Status: DISCONTINUED | OUTPATIENT
Start: 2022-02-25 | End: 2022-02-25 | Stop reason: HOSPADM

## 2022-02-25 RX ORDER — POLYETHYLENE GLYCOL 3350 17 G/17G
17 POWDER, FOR SOLUTION ORAL DAILY PRN
Status: DISCONTINUED | OUTPATIENT
Start: 2022-02-25 | End: 2022-02-26 | Stop reason: HOSPADM

## 2022-02-25 RX ORDER — ACETAMINOPHEN 325 MG/1
650 TABLET ORAL EVERY 6 HOURS PRN
Status: DISCONTINUED | OUTPATIENT
Start: 2022-02-25 | End: 2022-02-26 | Stop reason: HOSPADM

## 2022-02-25 RX ORDER — METHOCARBAMOL 750 MG/1
750 TABLET, FILM COATED ORAL 4 TIMES DAILY
Status: DISCONTINUED | OUTPATIENT
Start: 2022-02-25 | End: 2022-02-26 | Stop reason: HOSPADM

## 2022-02-25 RX ORDER — ROCURONIUM BROMIDE 10 MG/ML
INJECTION, SOLUTION INTRAVENOUS PRN
Status: DISCONTINUED | OUTPATIENT
Start: 2022-02-25 | End: 2022-02-25 | Stop reason: SDUPTHER

## 2022-02-25 RX ORDER — APREPITANT 40 MG/1
40 CAPSULE ORAL ONCE
Status: COMPLETED | OUTPATIENT
Start: 2022-02-25 | End: 2022-02-25

## 2022-02-25 RX ORDER — ANTIOX #8/OM3/DHA/EPA/LUT/ZEAX 250-2.5 MG
1 CAPSULE ORAL 2 TIMES DAILY
Status: DISCONTINUED | OUTPATIENT
Start: 2022-02-25 | End: 2022-02-26 | Stop reason: HOSPADM

## 2022-02-25 RX ORDER — DEXAMETHASONE SODIUM PHOSPHATE 4 MG/ML
INJECTION, SOLUTION INTRA-ARTICULAR; INTRALESIONAL; INTRAMUSCULAR; INTRAVENOUS; SOFT TISSUE PRN
Status: DISCONTINUED | OUTPATIENT
Start: 2022-02-25 | End: 2022-02-25 | Stop reason: SDUPTHER

## 2022-02-25 RX ADMIN — FENTANYL CITRATE 50 MCG: 50 INJECTION INTRAMUSCULAR; INTRAVENOUS at 18:00

## 2022-02-25 RX ADMIN — CELECOXIB 200 MG: 100 CAPSULE ORAL at 11:40

## 2022-02-25 RX ADMIN — APREPITANT 40 MG: 40 CAPSULE ORAL at 11:40

## 2022-02-25 RX ADMIN — FENTANYL CITRATE 50 MCG: 50 INJECTION INTRAMUSCULAR; INTRAVENOUS at 16:08

## 2022-02-25 RX ADMIN — PROPOFOL 150 MG: 10 INJECTION, EMULSION INTRAVENOUS at 16:13

## 2022-02-25 RX ADMIN — OXYCODONE AND ACETAMINOPHEN 1 TABLET: 5; 325 TABLET ORAL at 11:40

## 2022-02-25 RX ADMIN — ASPIRIN 325 MG: 325 TABLET, COATED ORAL at 23:15

## 2022-02-25 RX ADMIN — Medication 10 ML: at 23:14

## 2022-02-25 RX ADMIN — LIDOCAINE HYDROCHLORIDE 40 MG: 20 INJECTION, SOLUTION EPIDURAL; INFILTRATION; INTRACAUDAL; PERINEURAL at 16:13

## 2022-02-25 RX ADMIN — SODIUM CHLORIDE, SODIUM LACTATE, POTASSIUM CHLORIDE, AND CALCIUM CHLORIDE: .6; .31; .03; .02 INJECTION, SOLUTION INTRAVENOUS at 16:08

## 2022-02-25 RX ADMIN — EPHEDRINE SULFATE 10 MG: 50 INJECTION INTRAVENOUS at 16:40

## 2022-02-25 RX ADMIN — ROCURONIUM BROMIDE 50 MG: 10 SOLUTION INTRAVENOUS at 16:13

## 2022-02-25 RX ADMIN — ACETAMINOPHEN 650 MG: 325 TABLET ORAL at 23:14

## 2022-02-25 RX ADMIN — DEXAMETHASONE SODIUM PHOSPHATE 10 MG: 4 INJECTION, SOLUTION INTRAMUSCULAR; INTRAVENOUS at 16:13

## 2022-02-25 RX ADMIN — ONDANSETRON 4 MG: 2 INJECTION INTRAMUSCULAR; INTRAVENOUS at 16:08

## 2022-02-25 RX ADMIN — CEFAZOLIN 2000 MG: 1 INJECTION, POWDER, FOR SOLUTION INTRAMUSCULAR; INTRAVENOUS at 16:08

## 2022-02-25 RX ADMIN — SUGAMMADEX 200 MG: 100 INJECTION, SOLUTION INTRAVENOUS at 19:04

## 2022-02-25 RX ADMIN — VANCOMYCIN HYDROCHLORIDE 1000 MG: 1 INJECTION, POWDER, LYOPHILIZED, FOR SOLUTION INTRAVENOUS at 23:19

## 2022-02-25 RX ADMIN — SODIUM CHLORIDE: 4.5 INJECTION, SOLUTION INTRAVENOUS at 22:50

## 2022-02-25 RX ADMIN — EPHEDRINE SULFATE 10 MG: 50 INJECTION INTRAVENOUS at 16:54

## 2022-02-25 ASSESSMENT — PULMONARY FUNCTION TESTS
PIF_VALUE: 14
PIF_VALUE: 13
PIF_VALUE: 13
PIF_VALUE: 24
PIF_VALUE: 23
PIF_VALUE: 15
PIF_VALUE: 14
PIF_VALUE: 15
PIF_VALUE: 10
PIF_VALUE: 14
PIF_VALUE: 13
PIF_VALUE: 13
PIF_VALUE: 15
PIF_VALUE: 15
PIF_VALUE: 14
PIF_VALUE: 15
PIF_VALUE: 14
PIF_VALUE: 15
PIF_VALUE: 14
PIF_VALUE: 15
PIF_VALUE: 14
PIF_VALUE: 11
PIF_VALUE: 15
PIF_VALUE: 15
PIF_VALUE: 14
PIF_VALUE: 15
PIF_VALUE: 14
PIF_VALUE: 11
PIF_VALUE: 15
PIF_VALUE: 13
PIF_VALUE: 15
PIF_VALUE: 15
PIF_VALUE: 14
PIF_VALUE: 12
PIF_VALUE: 14
PIF_VALUE: 14
PIF_VALUE: 13
PIF_VALUE: 13
PIF_VALUE: 14
PIF_VALUE: 16
PIF_VALUE: 11
PIF_VALUE: 15
PIF_VALUE: 14
PIF_VALUE: 14
PIF_VALUE: 15
PIF_VALUE: 1
PIF_VALUE: 15
PIF_VALUE: 14
PIF_VALUE: 14
PIF_VALUE: 15
PIF_VALUE: 14
PIF_VALUE: 15
PIF_VALUE: 14
PIF_VALUE: 14
PIF_VALUE: 15
PIF_VALUE: 14
PIF_VALUE: 14
PIF_VALUE: 15
PIF_VALUE: 14
PIF_VALUE: 16
PIF_VALUE: 15
PIF_VALUE: 14
PIF_VALUE: 15
PIF_VALUE: 14
PIF_VALUE: 13
PIF_VALUE: 15
PIF_VALUE: 15
PIF_VALUE: 14
PIF_VALUE: 15
PIF_VALUE: 15
PIF_VALUE: 1
PIF_VALUE: 14
PIF_VALUE: 0
PIF_VALUE: 14
PIF_VALUE: 14
PIF_VALUE: 13
PIF_VALUE: 15
PIF_VALUE: 14
PIF_VALUE: 2
PIF_VALUE: 0
PIF_VALUE: 26
PIF_VALUE: 2
PIF_VALUE: 13
PIF_VALUE: 14
PIF_VALUE: 15
PIF_VALUE: 11
PIF_VALUE: 15
PIF_VALUE: 2
PIF_VALUE: 15
PIF_VALUE: 14
PIF_VALUE: 13
PIF_VALUE: 15
PIF_VALUE: 14
PIF_VALUE: 15
PIF_VALUE: 12
PIF_VALUE: 15
PIF_VALUE: 14
PIF_VALUE: 14
PIF_VALUE: 1
PIF_VALUE: 15
PIF_VALUE: 16
PIF_VALUE: 14
PIF_VALUE: 15
PIF_VALUE: 14
PIF_VALUE: 14
PIF_VALUE: 16
PIF_VALUE: 13
PIF_VALUE: 15
PIF_VALUE: 14
PIF_VALUE: 15
PIF_VALUE: 14
PIF_VALUE: 2
PIF_VALUE: 14
PIF_VALUE: 14
PIF_VALUE: 15
PIF_VALUE: 13
PIF_VALUE: 13
PIF_VALUE: 15
PIF_VALUE: 14
PIF_VALUE: 14
PIF_VALUE: 10
PIF_VALUE: 15
PIF_VALUE: 28
PIF_VALUE: 15
PIF_VALUE: 13
PIF_VALUE: 14
PIF_VALUE: 13
PIF_VALUE: 15
PIF_VALUE: 14
PIF_VALUE: 11
PIF_VALUE: 15
PIF_VALUE: 14
PIF_VALUE: 1
PIF_VALUE: 14
PIF_VALUE: 13
PIF_VALUE: 14
PIF_VALUE: 15
PIF_VALUE: 14
PIF_VALUE: 14
PIF_VALUE: 13
PIF_VALUE: 15
PIF_VALUE: 16
PIF_VALUE: 14
PIF_VALUE: 13
PIF_VALUE: 14
PIF_VALUE: 14
PIF_VALUE: 13
PIF_VALUE: 13
PIF_VALUE: 15
PIF_VALUE: 15
PIF_VALUE: 13
PIF_VALUE: 15
PIF_VALUE: 14
PIF_VALUE: 15
PIF_VALUE: 13
PIF_VALUE: 14
PIF_VALUE: 15
PIF_VALUE: 14

## 2022-02-25 ASSESSMENT — PAIN SCALES - GENERAL
PAINLEVEL_OUTOF10: 7
PAINLEVEL_OUTOF10: 2
PAINLEVEL_OUTOF10: 0
PAINLEVEL_OUTOF10: 0

## 2022-02-25 ASSESSMENT — PAIN DESCRIPTION - PROGRESSION: CLINICAL_PROGRESSION: OTHER (COMMENT)

## 2022-02-25 ASSESSMENT — PAIN DESCRIPTION - DESCRIPTORS: DESCRIPTORS: OTHER (COMMENT)

## 2022-02-25 ASSESSMENT — PAIN DESCRIPTION - ONSET: ONSET: OTHER (COMMENT)

## 2022-02-25 ASSESSMENT — PAIN - FUNCTIONAL ASSESSMENT: PAIN_FUNCTIONAL_ASSESSMENT: 0-10

## 2022-02-25 ASSESSMENT — PAIN DESCRIPTION - LOCATION
LOCATION: LEG
LOCATION: OTHER (COMMENT)
LOCATION: LEG

## 2022-02-25 ASSESSMENT — PAIN DESCRIPTION - FREQUENCY: FREQUENCY: OTHER (COMMENT)

## 2022-02-25 ASSESSMENT — PAIN DESCRIPTION - ORIENTATION
ORIENTATION: LEFT
ORIENTATION: LEFT
ORIENTATION: OTHER (COMMENT)

## 2022-02-25 ASSESSMENT — PAIN DESCRIPTION - PAIN TYPE
TYPE: OTHER (COMMENT)
TYPE: SURGICAL PAIN
TYPE: SURGICAL PAIN

## 2022-02-25 ASSESSMENT — LIFESTYLE VARIABLES: SMOKING_STATUS: 0

## 2022-02-25 ASSESSMENT — ENCOUNTER SYMPTOMS: SHORTNESS OF BREATH: 0

## 2022-02-25 NOTE — H&P
Subjective:     Patient is a 70 y.o.  female presented with a history of tibial plateau fracture. Pateint is being seen for chronic issues of pain and disability with failure of conservative care to date.        Patient Active Problem List    Diagnosis Date Noted    Hyponatremia 2021    Status post total hip replacement, right 05/10/2021    Hyperlipidemia     Hypothyroidism due to Hashimoto's thyroiditis     Primary osteoarthritis of right hip 2021    Corneal abrasion of both eyes 2020    Hyperglycemia 2020    Blood loss anemia 2020    Status post total hip replacement, left 2020    Need for prophylactic vaccination against diphtheria-tetanus-pertussis (DTP) 2020    Primary osteoarthritis of left hip 2020    Chronic right-sided low back pain without sciatica 2020    Benign essential HTN 2018    Pure hypercholesterolemia 2018    Gastroesophageal reflux disease without esophagitis 2018    Family history of Alzheimer's disease 2018     Past Medical History:   Diagnosis Date    Arthritis     Chronic hyponatremia     GERD (gastroesophageal reflux disease)     History of blood transfusion     Hyperlipidemia     Hypertension     no meds currently    OA (osteoarthritis)     Prolonged emergence from general anesthesia     Tibial plateau fracture     Wears partial dentures     upper & lower      Past Surgical History:   Procedure Laterality Date     SECTION      COLONOSCOPY      TOTAL HIP ARTHROPLASTY Left 2020    LEFT TOTAL HIP ARTHROPLASTY ANTERIOR APPROACH performed by Leatha Hanson MD at [address] [place] [address] Right 5/10/2021    RIGHT HIP ARTHROPLASTY ANTERIOR APPROACH performed by Leatha Hanson MD at [facility] [address]      Medications Prior to Admission: Multiple Vitamins-Minerals (PRESERVISION AREDS 2) CAPS, Take 1 capsule by mouth 2 times daily  traMADol (ULTRAM) 50 MG tablet, Take 50 mg by mouth every 6 hours as needed for Pain. methocarbamol (ROBAXIN) 750 MG tablet, Take 750 mg by mouth 4 times daily  acetaminophen (TYLENOL) 325 MG tablet, Take 650 mg by mouth every 6 hours as needed for Pain  simvastatin (ZOCOR) 40 MG tablet, TAKE 1 TABLET BY MOUTH EVERY NIGHT  calcium carbonate (OSCAL) 500 MG TABS tablet, Take 500 mg by mouth daily  omeprazole (PRILOSEC OTC) 20 MG tablet, Take 20 mg by mouth daily   Multiple Vitamins-Minerals (THERAPEUTIC MULTIVITAMIN-MINERALS) tablet, Take 1 tablet by mouth daily  Vitamin D (CHOLECALCIFEROL) 1000 UNITS CAPS capsule, Take 2,000 Units by mouth daily   Allergies   Allergen Reactions    Zanaflex [Tizanidine]      bradycardia    Keflet [Cephalexin] Rash      Social History     Tobacco Use    Smoking status: Former Smoker     Packs/day: 1.00     Years: 15.00     Pack years: 15.00     Quit date: 1983     Years since quittin.1    Smokeless tobacco: Never Used   Substance Use Topics    Alcohol use: Not Currently     Alcohol/week: 1.0 - 2.0 standard drink     Types: 1 - 2 Shots of liquor per week      Family History   Problem Relation Age of Onset    Breast Cancer Mother 68          Review of Systems  Review of Systems   Constitutional: Negative for chills and fever. HENT: Negative for nosebleeds. Eyes: Negative for double vision. Cardiovascular: Negative for chest pain. Gastrointestinal: Negative for abdominal pain. Musculoskeletal: Positive for joint pain and myalgias. Skin: Negative for rash. Neurological: Negative for seizures. Psychiatric/Behavioral: Negative for hallucinations.          Objective:     Patient Vitals for the past 8 hrs:   BP Temp Temp src Pulse Resp SpO2 Height Weight   22 1130 125/82 98.3 °F (36.8 °C) Tympanic 75 14 98 % 5' 2\" (1.575 m) 145 lb (65.8 kg)     /82   Pulse 75   Temp 98.3 °F (36.8 °C) (Tympanic)   Resp 14   Ht 5' 2\" (1.575 m)   Wt 145 lb (65.8 kg)   SpO2 98%   BMI 26.52 kg/m²     General Appearance:    Alert, cooperative, no distress, appears stated age     Head:    Normocephalic, without obvious abnormality, atraumatic   Eyes:    PERRL, conjunctiva/corneas clear      Ears:    Normal  both ears   Nose:   Nares normal,   mucosa normal, no drainage     Throat:   Lips, mucosa, and tongue normal;   Neck:   Supple, symmetrical, trachea midline,          Lungs:      respirations unlabored   Chest Wall:    No tenderness or deformity    Heart:    Regular rate          Abdomen:     Soft, non-tender,     no masses              Extremities:   Extremities  no cyanosis or edema     Pulses:   2+ and symmetric all extremities     Skin:   Skin color, texture, turgor normal         Neurologic:   CNII-XII intact, normal strength, sensation            Assessment:     Active Problems:    * No active hospital problems. *  Resolved Problems:    * No resolved hospital problems. *      Plan:     The various methods of treatment have been discussed with the patient and family. After consideration of risks, benefits and other options for treatment, the patient has consented to surgical interventions (tibial plateau fracture orif  ).

## 2022-02-25 NOTE — ANESTHESIA PROCEDURE NOTES
Peripheral Block    Patient location during procedure: pre-op  Start time: 2/25/2022 3:15 PM  End time: 2/25/2022 3:16 PM  Staffing  Performed: anesthesiologist   Anesthesiologist: Nabil Rasheed MD  Preanesthetic Checklist  Completed: patient identified, IV checked, site marked, risks and benefits discussed, surgical consent, monitors and equipment checked, pre-op evaluation, timeout performed, anesthesia consent given, oxygen available and patient being monitored  Peripheral Block  Patient position: supine  Prep: ChloraPrep  Patient monitoring: cardiac monitor, continuous pulse ox, frequent blood pressure checks and IV access  Block type: Femoral  Laterality: left  Injection technique: single-shot  Guidance: ultrasound guided  Local infiltration: lidocaine  Infiltration strength: 1 %  Dose: 3 mL  Femoral crease (Low Femoral)  Provider prep: mask and sterile gloves  Local infiltration: lidocaine  Needle  Needle gauge: 21 G  Needle length: 10 cm  Needle localization: ultrasound guidance  Assessment  Injection assessment: negative aspiration for heme, no paresthesia on injection and local visualized surrounding nerve on ultrasound  Paresthesia pain: none  Slow fractionated injection: yes  Hemodynamics: stable  Additional Notes  Immediately prior to procedure a \"time out\" was called to verify the correct patient, allergies, laterality, procedure and equipment. Time out performed with  RN    Local Anesthetic: 0.5 %  Bupivacaine   Amount: 10 ml  in 5 ml increments after negative aspiration each time. Iliopsoas Muscle and Fascia Iliaca, Femoral artery (Deep artery to the thigh take off), Femoral Vein and Femoral Nerve are identified; the tip of the need and the spread of the local anesthetic around the Femoral nerve are visualized. The Femoral nerve appeared to be anatomically normal and there were no abnormal pathologically findings seen.      Versed 2mg  Reason for block: post-op pain management and at surgeon's request

## 2022-02-25 NOTE — ANESTHESIA PRE PROCEDURE
Department of Anesthesiology  Preprocedure Note       Name:  Oralia Zendejas   Age:  70 y.o.  :  1950                                          MRN:  [id number]         Date:  2022      Surgeon: Eduardo Fried):  Mary Hughes MD    Procedure: Procedure(s):  OPEN REDUCTION INTERNAL FIXATION LEFT TIBIAL PLATEAU FRACTURE         SUKUMAR  CPT CODE - 38347    Medications prior to admission:   Prior to Admission medications    Medication Sig Start Date End Date Taking? Authorizing Provider   Multiple Vitamins-Minerals (PRESERVISION AREDS 2) CAPS Take 1 capsule by mouth 2 times daily   Yes Historical Provider, MD   traMADol (ULTRAM) 50 MG tablet Take 50 mg by mouth every 6 hours as needed for Pain. Yes Historical Provider, MD   methocarbamol (ROBAXIN) 750 MG tablet Take 750 mg by mouth 4 times daily   Yes Historical Provider, MD   acetaminophen (TYLENOL) 325 MG tablet Take 650 mg by mouth every 6 hours as needed for Pain   Yes Historical Provider, MD   simvastatin (ZOCOR) 40 MG tablet TAKE 1 TABLET BY MOUTH EVERY NIGHT 21  Yes Steven Hargrove MD   calcium carbonate (OSCAL) 500 MG TABS tablet Take 500 mg by mouth daily   Yes Historical Provider, MD   omeprazole (PRILOSEC OTC) 20 MG tablet Take 20 mg by mouth daily    Yes Historical Provider, MD   Multiple Vitamins-Minerals (THERAPEUTIC MULTIVITAMIN-MINERALS) tablet Take 1 tablet by mouth daily   Yes Historical Provider, MD   Vitamin D (CHOLECALCIFEROL) 1000 UNITS CAPS capsule Take 2,000 Units by mouth daily    Yes Historical Provider, MD       Current medications:    Current Facility-Administered Medications   Medication Dose Route Frequency Provider Last Rate Last Admin    aprepitant (EMEND) capsule 120 mg  120 mg Oral On Call to OR Mary Hughes MD           Allergies:     Allergies   Allergen Reactions    Zanaflex [Tizanidine]      bradycardia    Keflet [Cephalexin] Rash       Problem List:    Patient Active Problem List   Diagnosis Code    Benign essential HTN I10    Pure hypercholesterolemia E78.00    Gastroesophageal reflux disease without esophagitis K21.9    Family history of Alzheimer's disease Z82.0    Chronic right-sided low back pain without sciatica M54.50, G89.29    Need for prophylactic vaccination against diphtheria-tetanus-pertussis (DTP) Z23    Primary osteoarthritis of left hip M16.12    Status post total hip replacement, left H38.178    Corneal abrasion of both eyes S05. Sheran Shaker. 02XA    Hyperglycemia R73.9    Blood loss anemia D50.0    Primary osteoarthritis of right hip M16.11    Status post total hip replacement, right Z96.641    Hyperlipidemia E78.5    Hypothyroidism due to Hashimoto's thyroiditis E03.8, E06.3    Hyponatremia E87.1       Past Medical History:        Diagnosis Date    Arthritis     Chronic hyponatremia     GERD (gastroesophageal reflux disease)     History of blood transfusion     Hyperlipidemia     Hypertension     no meds currently    OA (osteoarthritis)     Prolonged emergence from general anesthesia     Tibial plateau fracture     Wears partial dentures     upper & lower       Past Surgical History:        Procedure Laterality Date     SECTION      COLONOSCOPY      TOTAL HIP ARTHROPLASTY Left 2020    LEFT TOTAL HIP ARTHROPLASTY ANTERIOR APPROACH performed by Martinez Pinto MD at [address] [address] Right 5/10/2021    RIGHT HIP ARTHROPLASTY ANTERIOR APPROACH performed by Martinez Pinto MD at [address] [facility] History:    Social History     Tobacco Use    Smoking status: Former Smoker     Packs/day: 1.00     Years: 15.00     Pack years: 15.00     Quit date: 1983     Years since quittin.1    Smokeless tobacco: Never Used   Substance Use Topics    Alcohol use: Not Currently     Alcohol/week: 1.0 - 2.0 standard drink     Types: 1 - 2 Shots of liquor per week                                Counseling given: Not Answered      Vital Signs (Current):   Vitals:    02/23/22 0924 02/25/22 1130   BP:  125/82   Pulse:  75   Resp:  14   Temp:  98.3 °F (36.8 °C)   TempSrc:  Tympanic   SpO2:  98%   Weight: 145 lb 8 oz (66 kg) 145 lb (65.8 kg)   Height: 5' 2\" (1.575 m) 5' 2\" (1.575 m)                                              BP Readings from Last 3 Encounters:   02/25/22 125/82   08/19/21 130/80   08/12/21 134/89       NPO Status: Time of last liquid consumption: 1900                        Time of last solid consumption: 1900                        Date of last liquid consumption: 02/24/22                        Date of last solid food consumption: 02/24/22    BMI:   Wt Readings from Last 3 Encounters:   02/25/22 145 lb (65.8 kg)   02/22/22 154 lb (69.9 kg)   08/19/21 154 lb 12.8 oz (70.2 kg)     Body mass index is 26.52 kg/m². CBC:   Lab Results   Component Value Date    WBC 7.9 05/18/2021    RBC 2.96 05/18/2021    HGB 9.3 05/18/2021    HCT 27.0 05/18/2021    MCV 91.4 05/18/2021    RDW 16.4 05/18/2021     05/18/2021       CMP:   Lab Results   Component Value Date     12/06/2021    K 4.0 12/06/2021    K 4.0 05/14/2021     12/06/2021    CO2 22 12/06/2021    BUN 16 12/06/2021    CREATININE 0.7 12/06/2021    GFRAA >60 12/06/2021    AGRATIO 1.6 05/10/2021    LABGLOM >60 12/06/2021    GLUCOSE 92 12/06/2021    PROT 7.9 05/10/2021    CALCIUM 10.5 12/06/2021    BILITOT 1.2 05/10/2021    ALKPHOS 127 05/10/2021    AST 38 05/10/2021    ALT 43 05/10/2021       POC Tests: No results for input(s): POCGLU, POCNA, POCK, POCCL, POCBUN, POCHEMO, POCHCT in the last 72 hours.     Coags:   Lab Results   Component Value Date    PROTIME 19.6 05/14/2021    INR 1.68 05/14/2021    APTT 28.7 05/06/2021       HCG (If Applicable): No results found for: PREGTESTUR, PREGSERUM, HCG, HCGQUANT     ABGs: No results found for: PHART, PO2ART, YET9LWK, JTK1NKC, BEART, Q5KQUCPS     Type & Screen (If Applicable):  No results found for: LABABO, LABRH    Drug/Infectious Status (If Applicable):  No results found for: HIV, HEPCAB    COVID-19 Screening (If Applicable):   Lab Results   Component Value Date    COVID19 Not Detected 05/06/2021           Anesthesia Evaluation  Patient summary reviewed history of anesthetic complications (slow emerge): Airway: Mallampati: III  TM distance: <3 FB   Neck ROM: limited  Mouth opening: > = 3 FB Dental:    (+) upper dentures, lower dentures and partials      Pulmonary: breath sounds clear to auscultation      (-) COPD, asthma, shortness of breath, recent URI, sleep apnea and not a current smoker          Patient did not smoke on day of surgery. Cardiovascular:    (+) hypertension (no meds current):, dysrhythmias (hx pacs/pvs):, hyperlipidemia    (-) pacemaker, past MI, CAD, CABG/stent,  angina and  CHF    ECG reviewed  Rhythm: irregular  Rate: normal           Beta Blocker:  Not on Beta Blocker         Neuro/Psych:   (+) neuromuscular disease (lbp):,    (-) seizures, TIA, CVA and psychiatric history           GI/Hepatic/Renal:   (+) GERD: well controlled,      (-) liver disease, no renal disease, bowel prep and no morbid obesity       Endo/Other:    (+) hypothyroidism, blood dyscrasia (chronic anemia): arthritis: OA., no malignancy/cancer. (-) diabetes mellitus, no malignancy/cancer               Abdominal:       Abdomen: soft. Vascular: negative vascular ROS. Other Findings:             Anesthesia Plan      general     ASA 3     (Preop adduct. blk and ipack blk, discussed r/b, pt has consented)  Induction: intravenous. MIPS: Postoperative opioids intended and Prophylactic antiemetics administered. Anesthetic plan and risks discussed with patient. Plan discussed with CRNA. This pre-anesthesia assessment may be used as a history and physical.    DOS STAFF ADDENDUM:    Pt seen and examined, chart reviewed (including anesthesia, drug and allergy history).   No interval changes to history and physical examination. Anesthetic plan, risks, benefits, alternatives, and personnel involved discussed with patient. Patient verbalized an understanding and agrees to proceed.       Lilliam Schrader MD  February 25, 2022  2:15 PM      Lilliam Schrader MD   2/25/2022

## 2022-02-25 NOTE — ANESTHESIA PROCEDURE NOTES
Peripheral Block    Patient location during procedure: pre-op  Start time: 2/25/2022 3:10 PM  End time: 2/25/2022 3:11 PM  Staffing  Performed: anesthesiologist   Anesthesiologist: Mariela Delarosa MD  Preanesthetic Checklist  Completed: patient identified, IV checked, site marked, risks and benefits discussed, surgical consent, monitors and equipment checked, pre-op evaluation, timeout performed, anesthesia consent given, oxygen available and patient being monitored  Peripheral Block  Prep: ChloraPrep  Patient monitoring: cardiac monitor, continuous pulse ox, frequent blood pressure checks and IV access  Block type: Sciatic  Laterality: left  Injection technique: single-shot  Guidance: ultrasound guided  Local infiltration: lidocaine  Infiltration strength: 1 %  Dose: 3 mL  Popliteal  Provider prep: mask and sterile gloves  Local infiltration: lidocaine  Needle  Needle gauge: 21 G  Needle length: 10 cm  Needle localization: ultrasound guidance  Assessment  Injection assessment: negative aspiration for heme, no paresthesia on injection and local visualized surrounding nerve on ultrasound  Paresthesia pain: none  Slow fractionated injection: yes  Hemodynamics: stable  Additional Notes  Immediately prior to procedure a \"time out\" was called to verify the correct patient, allergies, laterality, procedure and equipment. Time out performed with  RN    Local Anesthetic: 0.5 %  Bupivacaine   Amount: 15 ml  in 5 ml increments after negative aspiration each time. Biceps Femoris muscle (long head), Vastus lateralis muscle, Sciatic nerve (Tibia and Common Peroneal Nerves) and Popliteal artery are identified; the tip of the needle and the spread of the local anesthetic around the Tibial and Common Peroneal Nerve are visualized. The Sciatic Nerve (Tibia and Common Peroneal Nerve) appeared to be anatomically normal and there were no abnormal pathologically findings seen.      Versed 2mg  Reason for block: post-op pain management and at surgeon's request

## 2022-02-25 NOTE — PROGRESS NOTES
Arrived to same day surgery, consent verified, NPO at 2/24/22 @ 1900. All clothing and belongings removed. Pt in surgical gown. Belongings in locker #01. VSS. IV placed. Family at bedside. Will continue to assess.

## 2022-02-26 VITALS
BODY MASS INDEX: 26.68 KG/M2 | RESPIRATION RATE: 12 BRPM | TEMPERATURE: 98 F | SYSTOLIC BLOOD PRESSURE: 109 MMHG | HEART RATE: 93 BPM | WEIGHT: 145 LBS | HEIGHT: 62 IN | OXYGEN SATURATION: 95 % | DIASTOLIC BLOOD PRESSURE: 74 MMHG

## 2022-02-26 LAB
ANION GAP SERPL CALCULATED.3IONS-SCNC: 12 MMOL/L (ref 3–16)
BUN BLDV-MCNC: 15 MG/DL (ref 7–20)
CALCIUM SERPL-MCNC: 9.2 MG/DL (ref 8.3–10.6)
CHLORIDE BLD-SCNC: 102 MMOL/L (ref 99–110)
CO2: 24 MMOL/L (ref 21–32)
CREAT SERPL-MCNC: 0.6 MG/DL (ref 0.6–1.2)
GFR AFRICAN AMERICAN: >60
GFR NON-AFRICAN AMERICAN: >60
GLUCOSE BLD-MCNC: 156 MG/DL (ref 70–99)
HCT VFR BLD CALC: 30.5 % (ref 36–48)
HEMOGLOBIN: 10.6 G/DL (ref 12–16)
MCH RBC QN AUTO: 30.8 PG (ref 26–34)
MCHC RBC AUTO-ENTMCNC: 34.6 G/DL (ref 31–36)
MCV RBC AUTO: 88.8 FL (ref 80–100)
PDW BLD-RTO: 13 % (ref 12.4–15.4)
PLATELET # BLD: 240 K/UL (ref 135–450)
PMV BLD AUTO: 7.4 FL (ref 5–10.5)
POTASSIUM SERPL-SCNC: 3.8 MMOL/L (ref 3.5–5.1)
RBC # BLD: 3.44 M/UL (ref 4–5.2)
SODIUM BLD-SCNC: 138 MMOL/L (ref 136–145)
WBC # BLD: 9.5 K/UL (ref 4–11)

## 2022-02-26 PROCEDURE — 6370000000 HC RX 637 (ALT 250 FOR IP): Performed by: PHYSICIAN ASSISTANT

## 2022-02-26 PROCEDURE — 36415 COLL VENOUS BLD VENIPUNCTURE: CPT

## 2022-02-26 PROCEDURE — 97535 SELF CARE MNGMENT TRAINING: CPT

## 2022-02-26 PROCEDURE — 97530 THERAPEUTIC ACTIVITIES: CPT

## 2022-02-26 PROCEDURE — 80048 BASIC METABOLIC PNL TOTAL CA: CPT

## 2022-02-26 PROCEDURE — 85027 COMPLETE CBC AUTOMATED: CPT

## 2022-02-26 PROCEDURE — 97166 OT EVAL MOD COMPLEX 45 MIN: CPT

## 2022-02-26 PROCEDURE — 97162 PT EVAL MOD COMPLEX 30 MIN: CPT

## 2022-02-26 RX ORDER — OXYCODONE HYDROCHLORIDE 5 MG/1
5 TABLET ORAL EVERY 4 HOURS PRN
Qty: 28 TABLET | Refills: 0 | Status: SHIPPED | OUTPATIENT
Start: 2022-02-26 | End: 2022-03-03

## 2022-02-26 RX ADMIN — CALCIUM 500 MG: 500 TABLET ORAL at 10:30

## 2022-02-26 RX ADMIN — MULTIPLE VITAMINS W/ MINERALS TAB 1 TABLET: TAB at 10:29

## 2022-02-26 RX ADMIN — ASPIRIN 325 MG: 325 TABLET, COATED ORAL at 10:34

## 2022-02-26 RX ADMIN — Medication 2000 UNITS: at 10:29

## 2022-02-26 RX ADMIN — ACETAMINOPHEN 650 MG: 325 TABLET ORAL at 03:26

## 2022-02-26 RX ADMIN — ATORVASTATIN CALCIUM 20 MG: 10 TABLET, FILM COATED ORAL at 10:30

## 2022-02-26 RX ADMIN — PANTOPRAZOLE SODIUM 40 MG: 40 TABLET, DELAYED RELEASE ORAL at 06:50

## 2022-02-26 RX ADMIN — ACETAMINOPHEN 650 MG: 325 TABLET ORAL at 10:29

## 2022-02-26 RX ADMIN — OXYCODONE HYDROCHLORIDE 5 MG: 5 TABLET ORAL at 06:49

## 2022-02-26 ASSESSMENT — PAIN SCALES - GENERAL
PAINLEVEL_OUTOF10: 2
PAINLEVEL_OUTOF10: 4
PAINLEVEL_OUTOF10: 2

## 2022-02-26 NOTE — PROGRESS NOTES
Physical Therapy    Facility/Department: [place] [address] - MED SURG/ORTHO  Initial Assessment    NAME: Dwight Garcia  : 1950  MRN: [id number]    Date of Service: 2022    Discharge Recommendations:  24 hour supervision or assist,Home with Home health PT   PT Equipment Recommendations  Equipment Needed: No    Assessment   Body structures, Functions, Activity limitations: Decreased functional mobility ; Decreased ROM; Decreased strength;Decreased endurance;Decreased sensation;Decreased balance  Assessment: pt is 77yo female admit to [facility] s/p ORIF L LE due to tibial plateau fx; pt was independent prior to admit living with  in house with 3 total steps to enter and 7 steps to access living area in basement; pt presents with decreased functional mobility requiring cga for transfers, pt will benefit from Acute Inpatient Skilled PT to address deficits in functional mobility given L LE precautions for NWB primarily or TTWB for balance during transfers, pt with L foot drop making TTWB ineffective this date and placing L ankle at risk for strain; PT recommends pt return home with 24/7 assist, HOME PT, and pt-owned walker  Treatment Diagnosis: decreased functional mobility  Prognosis: Good  Decision Making: Medium Complexity  PT Education: Goals;PT Role;Plan of Care;Home Exercise Program;Precautions;Transfer Training;Energy Conservation;General Safety;Weight-bearing Education; Adaptive Device Training;Gait Training;Disease Specific Education  Patient Education: pt verbalized understanding  Barriers to Learning: none  REQUIRES PT FOLLOW UP: Yes  Activity Tolerance  Activity Tolerance: Patient Tolerated treatment well  Activity Tolerance: see vitals section, stable throughout, pt without complaint       Patient Diagnosis(es): The encounter diagnosis was Status post open reduction and internal fixation (ORIF) of fracture.      has a past medical history of Arthritis, Chronic hyponatremia, GERD (gastroesophageal reflux disease), History of blood transfusion, Hyperlipidemia, Hypertension, OA (osteoarthritis), Prolonged emergence from general anesthesia, Tibial plateau fracture, and Wears partial dentures. has a past surgical history that includes  section; Total hip arthroplasty (Left, 2020); Colonoscopy; and Total hip arthroplasty (Right, 5/10/2021). Restrictions  Restrictions/Precautions  Restrictions/Precautions: Weight Bearing,Fall Risk  Required Braces or Orthoses?: Yes  Lower Extremity Weight Bearing Restrictions  Left Lower Extremity Weight Bearing: Non Weight Bearing  Partial Weight Bearing Percentage Or Pounds: Dr Alyssa Streeter provided verbal instructions to PT that pt is allowed TTWB on LLE for transfers only.   Required Braces or Orthoses  Left Lower Extremity Brace: Knee Immobilizer  Vision/Hearing  Vision: Impaired  Vision Exceptions: Wears glasses at all times  Hearing: Within functional limits     Subjective  General  Chart Reviewed: Yes  Patient assessed for rehabilitation services?: Yes  Additional Pertinent Hx: h/o B THR  Response To Previous Treatment: Not applicable  Family / Caregiver Present: No  Referring Practitioner: Alyssa Streeter  Referral Date : 22  Diagnosis: L ORIF s/p tib plat fx  Follows Commands: Within Functional Limits  General Comment  Comments: RN cleared pt for PT  Subjective  Subjective: pt motivated to participate  Pain Screening  Patient Currently in Pain: Denies  Vital Signs  Pulse: 85  Heart Rate Source: Monitor  BP: 117/68  BP Location: Left upper arm  Level of Consciousness: Alert (0)  Patient Currently in Pain: Denies  Oxygen Therapy  SpO2: 96 %  Pulse Oximeter Device Mode: Intermittent  Pulse Oximeter Device Location: Right;Finger  O2 Device: None (Room air)       Orientation  Orientation  Overall Orientation Status: Within Normal Limits  Social/Functional History  Social/Functional History  Lives With: Spouse (avail )  Type of Home: House  Home Layout: Bed/Bath upstairs,Laundry in basement  Home Access: Stairs to enter with rails  Entrance Stairs - Number of Steps: 3 steps to landing in house, 7 steps down to basement living level with half bath  Entrance Stairs - Rails: Right (pole)  Bathroom Shower/Tub: Tub/Shower unit,Shower chair without back  Bathroom Toilet: Handicap height (in half bath downstairs)  Bathroom Equipment: Hand-held shower  Bathroom Accessibility: Walker accessible  Home Equipment: Crutches,Standard walker (antique wooden w/c)  Receives Help From: Family (dtr may visit)  ADL Assistance: Independent  Homemaking Assistance: Independent  Homemaking Responsibilities: Yes  Ambulation Assistance: Independent  Transfer Assistance: Independent  Active : Yes  Mode of Transportation: Car  Occupation: Retired  Additional Comments: R THR 05/21, L THR 08/20  Cognition   Cognition  Overall Cognitive Status: WFL    Objective     Observation/Palpation  Posture: Good    AROM RLE (degrees)  RLE AROM: WNL  AROM LLE (degrees)  LLE General AROM: L foot drop, decreased babinski reflex  Strength RLE  Strength RLE: WFL  Strength LLE  Comment: limited s/p L ORIF, ankle DF 0/5  Motor Control  Gross Motor?: WFL  Sensation  Overall Sensation Status: Impaired  Additional Comments: absent babinski reflex L, intact to touch  Bed mobility  Supine to Sit: Stand by assistance  Comment: hob elevated, pt in chair at end of session  Transfers  Sit to Stand: Contact guard assistance  Stand to sit: Contact guard assistance  Bed to Chair: Contact guard assistance  Comment: pt able to maintain NWB L LE during transfer, able to hop  Ambulation  Ambulation?: No  WB Status: NWB L LE, pt able to hop 5 times for transfer bed to chair  Stairs/Curb  Stairs?: No     Balance  Posture: Good  Sitting - Static: Good  Sitting - Dynamic: Fair;+  Standing - Static: Fair  Standing - Dynamic: Fair  Exercises  Gluteal Sets: x10 seated  Hip Flexion: AAROM L LE x5 supine  Ankle Pumps: PROM only L LE  Comments: educated pt to continue to attempt AROM L ankle throughout day     Plan   Plan  Times per week: 5xwk  Times per day: Daily  Current Treatment Recommendations: Strengthening,ROM,Balance Training,Functional Mobility Training,Transfer Training,Endurance Training,Gait Training,Stair training,Home Exercise Program,Safety Education & Training,Patient/Caregiver Education & Training,Equipment Evaluation, Education, & procurement  Safety Devices  Type of devices: All fall risk precautions in place,Call light within reach,Chair alarm in place,Gait belt,Patient at risk for falls,Left in chair,Nurse notified      AM-PAC Score  AM-PAC Inpatient Mobility Raw Score : 18 (02/26/22 1058)  AM-PAC Inpatient T-Scale Score : 43.63 (02/26/22 1058)  Mobility Inpatient CMS 0-100% Score: 46.58 (02/26/22 1058)  Mobility Inpatient CMS G-Code Modifier : CK (02/26/22 1058)          Goals  Short term goals  Time Frame for Short term goals: one week 3/5 unless otherwise indicated  Short term goal 1: by 2/28/22 pt will transfer supine to and from sit with independence  Short term goal 2: pt will transfer sit to and from stand with mod ind  Short term goal 3: pt will transfer bed to and from chair with mod ind  Patient Goals   Patient goals : to get out of bed - goal met       Therapy Time   Individual Concurrent Group Co-treatment   Time In 0831         Time Out 0905         Minutes 34         Timed Code Treatment Minutes: 24 Minutes     If pt is unable to be seen after this session, please let this note serve as discharge summary. Please see case management note for discharge disposition. Thank you.   Narciso Lyle, PT

## 2022-02-26 NOTE — CARE COORDINATION
CASE MANAGEMENT DISCHARGE SUMMARY      Discharge to: [facility] with [facility] SN,PT,OT      [address] ordered/agency: na    Transportation:    Family/car: spouse      Confirmed discharge Anselmo Sorenson     Patient: yes     Family:  yes   Name: Contact number:     Facility/Agency, name:[facility]  TIO/AVS [phone]   Phone number for report to facility:      RN, name: Glen Beatty    Note: Discharging nurse to complete TIO, reconcile AVS, and place final copy with patient's discharge packet. RN to ensure that written prescriptions for  Level II medications are sent with patient to the facility as per protocol.

## 2022-02-26 NOTE — CARE COORDINATION
CASE MANAGEMENT INITIAL ASSESSMENT      Reviewed chart and completed assessment with patient:yes  Explained Case Management role/services. yes    Primary contact information:[facility] Decision Maker :    Yulissa TurnerNYBVJM-[phone]       Can this person be reached and be able to respond quickly, such as within a few minutes or hours? Yes  Who would be your back-up decision maker? Name Sanam Hernández  Phone [phone] date/status:2/25/22  Diagnosis:open tx tibial fracture   Is this a Readmission?:  No      Insurance:Medicare/ BCBS  Precert required for SNF: No       3 night stay required: Yes    Living arrangements, Adls, care needs, prior to admission: lives with spouse in a split level home. [address] and 7 steps to downstairs and 6 steps to upstairs. IPTA    Transportation:spouse     Durable Medical Equipment at home:  Walker_x_Cane__RTS_x_ BSC__Shower Chair__  02__ HHN__ CPAP__  BiPap__  Hospital Bed__ W/C___ Other__crutches________    Services in the home and/or outpatient, prior to admission:none        PT/OT [address] Notification (HEN):na    Barriers to discharge:na    Plan/comments:michellen plans to return to home with [address] [place] [address] home care.  to transport. Patient denies any other needs.       ECOC on chart for MD signature yes

## 2022-02-26 NOTE — PROGRESS NOTES
Patient a/o x4, patient ppx4 and cap refill < 3 seconds. Patient has some swelling on lle, and warm to touch. Patient denies any pain at this time, is able to tolerate ice chips and denies desire for any food at this time. Pure wick in place for urinary needs.  Gave bedside report to floor nurse Margo Paris

## 2022-02-26 NOTE — PROGRESS NOTES
Pt's IV line removed without complications. Discussed d/c instructions with patient, given opportunity to ask questions, and provided new medication education with side effects. Follow up appointment information included in d/c instructions. Pt verbalized understanding of d/c instructions. Patient was discharged to home with all belongings and taken outside via wheelchair.     Crys Freeman RN

## 2022-02-26 NOTE — PROGRESS NOTES
Orthopedic  Knee Progress Note    Patient: Lillian Mejia MRN: [id number]     YOB: 1950  Age: 70 y.o. Sex: female    Unit: [place] [address] MED SURG/ORTHO Room/Bed: 0758/1778-87 Location: [address]     Admitting Physician: Reynaldo Johnson   Primary Care Physician: Denver Mannan, MD     Status Post left   Knee  . Post-Operative Day: 1    Subjective:    Systemic or Specific Complaints:No Complaints. No CP/SOB/Abdominal pain. Objective:   Vital signs in last 24 hours:  [unfilled]    General: NAD, alert and oriented x 3   Wound: Dressing CDI, no significant erythema. Mild local swelling. DVT Exam: No significant calf tenderness. NV Status:  Brisk capillary refill. Dorsalis pedis intact. Dorsiflexion/plantar flexion intact. Normal sensation to light touch. DF still      Data Review  CBC:   Lab Results   Component Value Date    WBC 9.5 02/26/2022    RBC 3.44 02/26/2022    HGB 10.6 02/26/2022    HCT 30.5 02/26/2022     02/26/2022       Assessment:   Principal Problem:    Status post open reduction and internal fixation (ORIF) of fracture  Active Problems:    Tibial plateau fracture, unspecified laterality, closed, initial encounter  Resolved Problems:    * No resolved hospital problems. *       Plan:   Stable status post  knee  . Continue PT/OT/DVT prophylaxis. Increase activity as tolerated. Discharge to home when medically stable and safe with gait and transfers.        Signed By: Karsten Khan MD

## 2022-02-26 NOTE — PLAN OF CARE
Problem: Infection - Surgical Site:  Goal: Will show no infection signs and symptoms  Description: Will show no infection signs and symptoms  Outcome: Ongoing     Problem: Injury - Risk of, Postfracture Complications:  Goal: Absence of fat embolism  Description: Absence of fat embolism  Outcome: Ongoing     Problem: Mobility - Impaired:  Goal: Mobility will improve to maximum level  Description: Mobility will improve to maximum level  Outcome: Ongoing     Problem: Pain - Acute:  Goal: Pain level will decrease  Description: Pain level will decrease  Outcome: Ongoing

## 2022-02-26 NOTE — PROGRESS NOTES
..Patient transferred from , A&O x4, call light within reach, bed in lowest position and locked. Skin assessment completed. ..4 Eyes Skin Assessment     NAME:  Sam Postal  YOB: 1950  MEDICAL RECORD NUMBER:  [id number]    The patient is being assess for  Admission    I agree that 2 RN's have performed a thorough Head to Toe Skin Assessment on the patient. ALL assessment sites listed below have been assessed. Areas assessed by both nurses:    Head, Face, Ears, Shoulders, Back, Chest, Arms, Elbows, Hands, Sacrum. Buttock, Coccyx, Ischium and Legs. Feet and Heels        Does the Patient have a Wound?  No noted wound(s)       Yoshi Prevention initiated:  No   Wound Care Orders initiated:  No    Pressure Injury (Stage 3,4, Unstageable, DTI, NWPT, and Complex wounds) if present place consult order under [de-identified] No    New and Established Ostomies if present place consult order under : No      Nurse 1 eSignature: Electronically signed by Froylan Leal on 2/26/22 at 3:31 AM EST    **SHARE this note so that the co-signing nurse is able to place an eSignature**    Nurse 2 eSignature: Electronically signed by Fredis Cuevas RN on 2/26/22 at 3:40 AM EST             /88   Pulse 88   Temp 97.7 °F (36.5 °C) (Oral)   Resp 16   Ht 5' 2\" (1.575 m)   Wt 145 lb (65.8 kg)   SpO2 95%   BMI 26.52 kg/m²      Froylan Leal

## 2022-02-26 NOTE — PLAN OF CARE
Problem: Pain - Acute:  Goal: Pain level will decrease  Description: Pain level will decrease  2/26/2022 1014 by Wale Mckeon RN  Outcome: Ongoing  2/26/2022 0154 by Zachary Espino  Outcome: Ongoing

## 2022-02-26 NOTE — DISCHARGE INSTR - COC
Continuity of Care Form    Patient Name: Lillian Mejia   :  1950  MRN:  [id number]    Admit date:  2022  Discharge date:  2022    Code Status Order: Prior   Advance Directives:   [facility] Documentation       Date/Time Healthcare Directive Type of Healthcare Directive Copy in [address] Agent's Name Healthcare Agent's Phone Number    [phone] Yes, patient has an advance directive for healthcare treatment Durable power of  for health care No, copy requested from family [address] [facility] [phone]            Admitting Physician:  Pati Carney MD  PCP: Denver Mannan, MD    Discharging Nurse:    [address] Unit/Room#: 4287/5108-73  Discharging Unit Phone Number:      Emergency Contact:   Extended Emergency Contact Information  Primary Emergency Contact: José Mcbride  Address: [address], [address] [place] of [address]  Phone: [phone]  Mobile Phone: [phone]  Relation: Spouse  Secondary Emergency Contact: Bel Dinero  [address] [facility] [address] [facility] [address] [facility] Phone: [phone]  Relation: Child    Past Surgical History:  Past Surgical History:   Procedure Laterality Date     SECTION      COLONOSCOPY      TOTAL HIP ARTHROPLASTY Left 2020    LEFT TOTAL HIP ARTHROPLASTY ANTERIOR APPROACH performed by Pati Carney MD at [facility] Right 5/10/2021    RIGHT HIP ARTHROPLASTY ANTERIOR APPROACH performed by Pati Carney MD at [address] [place] [address] [address]       Immunization History:   Immunization History   Administered Date(s) Administered    COVID-19, Pia Umana, Primary or Immunocompromised, PF, 100mcg/0.5mL 2021, 2021    Influenza Vaccine, unspecified formulation 2016, 2017    Influenza Virus Vaccine 10/25/2006, 10/15/2008, 10/15/2009, 2013, 09/15/2014, 2015, 2018    Influenza, High Dose (Fluzone 65 yrs and older) 09/27/2018, 10/02/2019, 09/29/2020    Influenza, MDCK Quadv, IM, PF (Flucelvax 2 yrs and older) 09/27/2018    Influenza, Quadv, adjuvanted, 65 yrs +, IM, PF (Fluad) 09/29/2020    Influenza, Triv, inactivated, subunit, adjuvanted, IM (Fluad 65 yrs and older) 10/09/2019    PPD Test 05/27/2014, 04/06/2016    Pneumococcal Conjugate 13-valent (Jan [place]) 10/26/2015, 11/21/2016    Pneumococcal Conjugate Vaccine 11/21/2016    Pneumococcal Polysaccharide (Atmpodphr00) 10/26/2015, 11/21/2016    Tdap (Boostrix, Adacel) 11/24/2020    Zoster Live (Zostavax) 09/30/2012    Zoster Recombinant (Shingrix) 09/27/2018, 11/29/2018       Active Problems:  Patient Active Problem List   Diagnosis Code    Benign essential HTN I10    Pure hypercholesterolemia E78.00    Gastroesophageal reflux disease without esophagitis K21.9    Family history of Alzheimer's disease Z82.0    Chronic right-sided low back pain without sciatica M54.50, G89.29    Need for prophylactic vaccination against diphtheria-tetanus-pertussis (DTP) Z23    Primary osteoarthritis of left hip M16.12    Status post total hip replacement, left W20.891    Corneal abrasion of both eyes S05. Regenia Gillis. 02XA    Hyperglycemia R73.9    Blood loss anemia D50.0    Primary osteoarthritis of right hip M16.11    Status post total hip replacement, right Z96.641    Hyperlipidemia E78.5    Hypothyroidism due to Hashimoto's thyroiditis E03.8, E06.3    Hyponatremia E87.1    Tibial plateau fracture, unspecified laterality, closed, initial encounter S82.143A    Status post open reduction and internal fixation (ORIF) of fracture Z98.890, Z87.81       Isolation/Infection:   Isolation            No Isolation          Patient Infection Status       None to display            Nurse Assessment:  Last Vital Signs: /88   Pulse 88   Temp 97.7 °F (36.5 °C) (Oral)   Resp 16   Ht 5' 2\" (1.575 m)   Wt 145 lb (65.8 kg)   SpO2 95%   BMI 26.52 kg/m²     Last documented pain score (0-10 scale): Pain Level: 4  Last Weight:   Wt Readings from Last 1 Encounters:   02/25/22 145 lb (65.8 kg)     Mental Status:  oriented, alert, and coherent    IV Access:  - None    Nursing Mobility/ADLs:  Walking   Assisted  Transfer  Assisted  Bathing  Assisted  Dressing  Assisted  Toileting  Assisted     Med Admin  Assisted  Med Delivery   none    Wound Care Documentation and Therapy:        Elimination:  Continence: Bowel: Yes  Bladder: Yes  Urinary Catheter: None   Colostomy/Ileostomy/Ileal Conduit: No       Date of Last BM:      Intake/Output Summary (Last 24 hours) at 2/26/2022 0816  Last data filed at 2/25/2022 2325  Gross per 24 hour   Intake 1200 ml   Output 1350 ml   Net -150 ml     I/O last 3 completed shifts: In: 1200 [I.V.:1200]  Out: 1350 [Urine:1200; Blood:150]    Safety Concerns: At Risk for Falls    Impairments/Disabilities:      Fracture left leg    Nutrition Therapy:  Current Nutrition Therapy:   - Oral Diet:  General    Routes of Feeding: Oral  Liquids: No Restrictions  Daily Fluid Restriction: no  Last Modified Barium Swallow with Video (Video Swallowing Test): not done    Treatments at the Time of Hospital Discharge:   Respiratory Treatments:    Oxygen Therapy:  is not on home oxygen therapy.   Ventilator:    - No ventilator support    Rehab Therapies: Physical Therapy and Occupational Therapy  Weight Bearing Status/Restrictions: Touchdown weight bearing (10-25 lbs) only on left leg  Other Medical Equipment (for information only, NOT a DME order):  walker  Other Treatments:      Patient's personal belongings (please select all that are sent with patient):  Glasses    RN SIGNATURE:  Electronically signed by Jonas Ellis MD on 2/26/22 at 8:21 AM EST    CASE MANAGEMENT/SOCIAL WORK SECTION    Inpatient Status Date: 2/25/22    Readmission Risk Assessment Score:  Readmission Risk              Risk of Unplanned Readmission:  9           Discharging to Facility/ Agency   Name: [facility] home [facility]  Address:  Phone:[phone]  Fax:[phone]    Dialysis Facility (if applicable)   Name:  Address:  Dialysis Schedule:  Phone:  Fax:    / signature: Electronically signed by Yogesh Granados MD on 2/26/22 at 8:21 AM EST    PHYSICIAN SECTION    Prognosis: Good    Condition at Discharge: Stable    Rehab Potential (if transferring to Rehab): Good    Recommended Labs or Other Treatments After Discharge:      Physician Certification: I certify the above information and transfer of Serafina Curling  is necessary for the continuing treatment of the diagnosis listed and that she requires Home Care for greater 30 days.      Update Admission H&P: Changes in H&P as follows - left tibial plateau fracture    PHYSICIAN SIGNATURE:  Electronically signed by Yogesh Granados MD on 2/26/22 at 8:22 AM EST

## 2022-02-26 NOTE — DISCHARGE SUMMARY
Discharge Summary    Date:  2022    Name:  Sam Postal  Address:  [place] [id number]    :  1950      Age:   70 y.o.    SSN:  [id number]      Medical Record Number:  [id number]  Admit Date: 2022  Date of Discharge:   Discharge condition: Stable. PROCEDURE:   [unfilled] (Left)     REASON FOR ADMISSION:     left tibial plateau fracture    Current Medications:     ceFAZolin  2,000 mg IntraVENous Once    calcium elemental  500 mg Oral Daily    methocarbamol  750 mg Oral 4x Daily    PreserVision AREDS 2  1 capsule Oral BID    therapeutic multivitamin-minerals  1 tablet Oral Daily    pantoprazole  40 mg Oral QAM AC    atorvastatin  20 mg Oral Daily    Vitamin D  2,000 Units Oral Daily    sodium chloride flush  10 mL IntraVENous 2 times per day    acetaminophen  650 mg Oral Q6H    aspirin  325 mg Oral BID       Review of Systems:  Unchanged from preoperative H&P     Past Medical History:  Past Medical History:   Diagnosis Date    Arthritis     Chronic hyponatremia     GERD (gastroesophageal reflux disease)     History of blood transfusion     Hyperlipidemia     Hypertension     no meds currently    OA (osteoarthritis)     Prolonged emergence from general anesthesia     Tibial plateau fracture     Wears partial dentures     upper & lower       Past Surgical History:  Past Surgical History:   Procedure Laterality Date     SECTION      COLONOSCOPY      TOTAL HIP ARTHROPLASTY Left 2020    LEFT TOTAL HIP ARTHROPLASTY ANTERIOR APPROACH performed by Maria Alejandra Avila MD at [address] [place] [address] Right 5/10/2021    RIGHT HIP ARTHROPLASTY ANTERIOR APPROACH performed by Maria Alejandra Avila MD at [facility] OR       Medications:  No current facility-administered medications on file prior to encounter.      Current Outpatient Medications on File Prior to Encounter   Medication Sig Dispense Refill    traMADol (ULTRAM) 50 MG tablet Transportation (Non-Medical): Not on file   Physical Activity: Insufficiently Active    Days of Exercise per Week: 3 days    Minutes of Exercise per Session: 40 min   Stress:     Feeling of Stress : Not on file   Social Connections:     Frequency of Communication with Friends and Family: Not on file    Frequency of Social Gatherings with Friends and Family: Not on file    Attends Sabianist Services: Not on file    Active Member of Clubs or Organizations: Not on file    Attends Club or Organization Meetings: Not on file    Marital Status: Not on file   Intimate Partner Violence:     Fear of Current or Ex-Partner: Not on file    Emotionally Abused: Not on file    Physically Abused: Not on file    Sexually Abused: Not on file   Housing Stability:     Unable to Pay for Housing in the Last Year: Not on file    Number of [place] in the Last Year: Not on file    Unstable Housing in the Last Year: Not on file       Family History:  Family History   Problem Relation Age of Onset    Breast Cancer Mother 68       Physical Exam:  Vitals:    02/25/22 2030   BP: 132/88   Pulse: 88   Resp: 16   Temp: 97.7 °F (36.5 °C)   SpO2: 95%     General: No acute distress. Alert and oriented. Neuro: alert. oriented  Eyes: Extra-ocular muscles intact  Mouth: Oral mucosa moist. No perioral lesions  Pulm: Respirations unlabored and regular. Heart: Regular rate and rhythm   Skin: warm, well perfused  MS:  knee pain, with swelling.     Laboratory:  Admission on 02/25/2022   Component Date Value    Sodium 02/26/2022 138     Potassium 02/26/2022 3.8     Chloride 02/26/2022 102     CO2 02/26/2022 24     Anion Gap 02/26/2022 12     Glucose 02/26/2022 156*    BUN 02/26/2022 15     CREATININE 02/26/2022 0.6     GFR Non- 02/26/2022 >60     GFR  02/26/2022 >60     Calcium 02/26/2022 9.2     WBC 02/26/2022 9.5     RBC 02/26/2022 3.44*    Hemoglobin 02/26/2022 10.6*    Hematocrit 02/26/2022 30.5*    MCV 02/26/2022 88.8     MCH 02/26/2022 30.8     MCHC 02/26/2022 34.6     RDW 02/26/2022 13.0     Platelets 76/41/3342 240     MPV 02/26/2022 7.4       Recent Results (from the past 24 hour(s))   Basic Metabolic Panel    Collection Time: 02/26/22  6:26 AM   Result Value Ref Range    Sodium 138 136 - 145 mmol/L    Potassium 3.8 3.5 - 5.1 mmol/L    Chloride 102 99 - 110 mmol/L    CO2 24 21 - 32 mmol/L    Anion Gap 12 3 - 16    Glucose 156 (H) 70 - 99 mg/dL    BUN 15 7 - 20 mg/dL    CREATININE 0.6 0.6 - 1.2 mg/dL    GFR Non-African American >60 >60    GFR African American >60 >60    Calcium 9.2 8.3 - 10.6 mg/dL   CBC    Collection Time: 02/26/22  6:26 AM   Result Value Ref Range    WBC 9.5 4.0 - 11.0 K/uL    RBC 3.44 (L) 4.00 - 5.20 M/uL    Hemoglobin 10.6 (L) 12.0 - 16.0 g/dL    Hematocrit 30.5 (L) 36.0 - 48.0 %    MCV 88.8 80.0 - 100.0 fL    MCH 30.8 26.0 - 34.0 pg    MCHC 34.6 31.0 - 36.0 g/dL    RDW 13.0 12.4 - 15.4 %    Platelets 847 354 - 415 K/uL    MPV 7.4 5.0 - 10.5 fL     No results found for this or any previous visit. No results found for this or any previous visit. No results found for this or any previous visit. No valid procedures specified. HOSPITAL COURSE:  Joycelyn Saenz is a 70 y.o. patient who was admitted to the hospital on the day of surgery. Surgery went as planned. After surgery, the patient was admitted to the Med/Surg unit for postoperative care. Postoperative course was uneventful. The patient tolerated a regular diet and had good pain control on oral pain medication. Discharged occurred on . DISCHARGE INSTRUCTIONS:  --  Weightbearing as tolerated. --  May take shower. Pat the incision dry with clean towel. Keep incision clean and dry. --  Resume pre-op diet. --  Check temperature twice daily. --  Oxycodone for pain. --  Resume home medications per PCP. --  Follow up with orthopaedic surgeon in two weeks.   --  Follow up with primary care physician within 6-8 weeks. --  For any questions or concerns, call [phone] [facility] or go to the nearest emergency department.       Darek Hernandez MD    Date:  2/26/2022

## 2022-02-26 NOTE — ANESTHESIA POSTPROCEDURE EVALUATION
Department of Anesthesiology  Postprocedure Note    Patient: Chata Price  MRN: [id number]  YOB: 1950  Date of evaluation: 2/26/2022  Time:  12:25 AM     Procedure Summary     Date: 02/25/22 Room / Location: [facility]    Anesthesia Start: 4181 Anesthesia Stop: 1913    Procedure: OPEN REDUCTION INTERNAL FIXATION LEFT TIBIAL PLATEAU FRACTURE         SUKUMAR  CPT CODE - 49131 (Left Leg Lower) Diagnosis:       Fracture, tibial plateau, left, closed, initial encounter      (LEFT TIBIAL PLATEAU FRACTURE)    Surgeons: Ed Kruger MD Responsible Provider: Babs Nieves MD    Anesthesia Type: general ASA Status: 3          Anesthesia Type: general    Dae Phase I: Dae Score: 9    Dae Phase II:      Last vitals: Reviewed and per EMR flowsheets.        Anesthesia Post Evaluation    Comments: Postoperative Anesthesia Note    Name:    Chata Price  MRN:      [id number]    Patient Vitals in the past 12 hrs:  02/25/22 1958, BP:113/76  02/25/22 1948, BP:118/61  02/25/22 1933, BP:128/76  02/25/22 1928, BP:(!) 126/51  02/25/22 1923, BP:116/62  02/25/22 1914, BP:(!) 105/58, Temp:98.4 °F (36.9 °C), Pulse:88, Resp:18, SpO2:93 %     LABS:    CBC  Lab Results       Component                Value               Date/Time                  WBC                      7.9                 05/18/2021 12:55 PM        HGB                      9.3 (L)             05/18/2021 12:55 PM        HCT                      27.0 (L)            05/18/2021 12:55 PM        PLT                      378                 05/18/2021 12:55 PM   RENAL  Lab Results       Component                Value               Date/Time                  NA                       140                 12/06/2021 01:49 PM        K                        4.0                 12/06/2021 01:49 PM        K                        4.0                 05/14/2021 03:59 AM        CL                       100                 12/06/2021 01:49 PM CO2                      22                  12/06/2021 01:49 PM        BUN                      16                  12/06/2021 01:49 PM        CREATININE               0.7                 12/06/2021 01:49 PM        GLUCOSE                  92                  12/06/2021 01:49 PM   COAGS  Lab Results       Component                Value               Date/Time                  PROTIME                  19.6 (H)            05/14/2021 03:59 AM        INR                      1.68 (H)            05/14/2021 03:59 AM        APTT                     28.7                05/06/2021 12:22 PM     Intake & Output:  @04EUNG@    Nausea & Vomiting:  No    Level of Consciousness:  Awake    Pain Assessment:  Adequate analgesia    Anesthesia Complications:  No apparent anesthetic complications    SUMMARY      Vital signs stable  OK to discharge from Stage I post anesthesia care.   Care transferred from Anesthesiology department on discharge from perioperative area

## 2022-02-28 ENCOUNTER — TELEPHONE (OUTPATIENT)
Dept: ORTHOPEDIC SURGERY | Age: 72
End: 2022-02-28

## 2022-02-28 NOTE — TELEPHONE ENCOUNTER
Magnolia Mendoza - OT with [address]    Calling because she did OT eval and will recommend plan of care 2 times per week for 2 weeks. Will fax over for sign off. Also, would like to know if the immobilizer and wraps can come off, if patient can shower and if she can be tow touch weight bearing? Please advise.      Thanks

## 2022-02-28 NOTE — PROGRESS NOTES
Date:  2022    Name:  Kalyani Rodriguez  Address:  [address] [place] [address]    :  1950      Age:   70 y.o.    SSN:  [id number]      Medical Record Number:  <[id number]>    Reason for Visit:    Chief Complaint    Knee Pain (LEFT KNEE)      DOS:      HPI: Kalyani Rodriguez is a 70 y.o. female here today for tibial plateau fracture. Review of Systems:  Review of Systems   Constitutional: Negative for chills and fever. HENT: Negative for nosebleeds. Eyes: Negative for double vision. Cardiovascular: Negative for chest pain. Gastrointestinal: Negative for abdominal pain. Musculoskeletal: Positive for joint pain and myalgias. Skin: Negative for rash. Neurological: Negative for seizures. Psychiatric/Behavioral: Negative for hallucinations.         Past History:  Past Medical History:   Diagnosis Date    Arthritis     Chronic hyponatremia     GERD (gastroesophageal reflux disease)     History of blood transfusion     Hyperlipidemia     Hypertension     no meds currently    OA (osteoarthritis)     Prolonged emergence from general anesthesia     Tibial plateau fracture     Wears partial dentures     upper & lower     Past Surgical History:   Procedure Laterality Date     SECTION      COLONOSCOPY      TOTAL HIP ARTHROPLASTY Left 2020    LEFT TOTAL HIP ARTHROPLASTY ANTERIOR APPROACH performed by Kelsy Harrison MD at [address] [facility] [address] Right 5/10/2021    RIGHT HIP ARTHROPLASTY ANTERIOR APPROACH performed by Kelsy Harrison MD at [address]     Current Outpatient Medications on File Prior to Visit   Medication Sig Dispense Refill    simvastatin (ZOCOR) 40 MG tablet TAKE 1 TABLET BY MOUTH EVERY NIGHT 90 tablet 3    calcium carbonate (OSCAL) 500 MG TABS tablet Take 500 mg by mouth daily      omeprazole (PRILOSEC OTC) 20 MG tablet Take 20 mg by mouth daily       Multiple Vitamins-Minerals (THERAPEUTIC MULTIVITAMIN-MINERALS) tablet Take 1 tablet by mouth daily      Vitamin D (CHOLECALCIFEROL) 1000 UNITS CAPS capsule Take 2,000 Units by mouth daily        No current facility-administered medications on file prior to visit. Social History     Socioeconomic History    Marital status:      Spouse name: Not on file    Number of children: Not on file    Years of education: Not on file    Highest education level: Not on file   Occupational History    Not on file   Tobacco Use    Smoking status: Former Smoker     Packs/day: 1.00     Years: 15.00     Pack years: 15.00     Quit date: 1983     Years since quittin.1    Smokeless tobacco: Never Used   Vaping Use    Vaping Use: Never used   Substance and Sexual Activity    Alcohol use: Not Currently     Alcohol/week: 1.0 - 2.0 standard drink     Types: 1 - 2 Shots of liquor per week    Drug use: No    Sexual activity: Yes   Other Topics Concern    Not on file   Social History Narrative    Not on file     Social Determinants of Health     Financial Resource Strain: Low Risk     Difficulty of Paying Living Expenses: Not hard at all   Food Insecurity: No Food Insecurity    Worried About Running Out of Food in the Last Year: Never true    Lilian of Food in the Last Year: Never true   Transportation Needs:     Lack of Transportation (Medical): Not on file    Lack of Transportation (Non-Medical):  Not on file   Physical Activity: Insufficiently Active    Days of Exercise per Week: 3 days    Minutes of Exercise per Session: 40 min   Stress:     Feeling of Stress : Not on file   Social Connections:     Frequency of Communication with Friends and Family: Not on file    Frequency of Social Gatherings with Friends and Family: Not on file    Attends Tenriism Services: Not on file    Active Member of Clubs or Organizations: Not on file    Attends Club or Organization Meetings: Not on file    Marital Status: Not on file   Intimate Partner Violence:     Fear of Current or Ex-Partner: Not on file    Emotionally Abused: Not on file    Physically Abused: Not on file    Sexually Abused: Not on file   Housing Stability:     Unable to Pay for Housing in the Last Year: Not on file    Number of Places Lived in the Last Year: Not on file    Unstable Housing in the Last Year: Not on file     Family History   Problem Relation Age of Onset    Breast Cancer Mother 68       Current Medications:    Current Outpatient Medications   Medication Sig Dispense Refill    aspirin 325 MG EC tablet Take 1 tablet by mouth 2 times daily 30 tablet 3    oxyCODONE (ROXICODONE) 5 MG immediate release tablet Take 1 tablet by mouth every 4 hours as needed for Pain for up to 5 days. 28 tablet 0    traMADol (ULTRAM) 50 MG tablet Take 50 mg by mouth every 6 hours as needed for Pain.  methocarbamol (ROBAXIN) 750 MG tablet Take 750 mg by mouth 4 times daily      acetaminophen (TYLENOL) 325 MG tablet Take 650 mg by mouth every 6 hours as needed for Pain      simvastatin (ZOCOR) 40 MG tablet TAKE 1 TABLET BY MOUTH EVERY NIGHT 90 tablet 3    calcium carbonate (OSCAL) 500 MG TABS tablet Take 500 mg by mouth daily      omeprazole (PRILOSEC OTC) 20 MG tablet Take 20 mg by mouth daily       Multiple Vitamins-Minerals (THERAPEUTIC MULTIVITAMIN-MINERALS) tablet Take 1 tablet by mouth daily      Vitamin D (CHOLECALCIFEROL) 1000 UNITS CAPS capsule Take 2,000 Units by mouth daily        No current facility-administered medications for this visit. Allergies: Allergies   Allergen Reactions    Zanaflex [Tizanidine]      bradycardia    Keflet [Cephalexin] Rash       Physical Exam:  There were no vitals filed for this visit. Physical Exam   Constitutional: Patient is oriented to person, place, and time and well-developed, well-nourished, and in no distress. HENT:   Head: Normocephalic and atraumatic. Eyes: Pupils are equal, round, and reactive to light. Neck: No tracheal deviation present.  No thyromegaly present. Pulmonary/Chest: Effort normal.   Abdominal: Soft. There is no guarding. Musculoskeletal: Patient exhibits tenderness and pain. Neurological: Patient is alert and oriented to person, place, and time. Skin: Skin is warm. Psychiatric: Affect normal.     General: Serafina Curling is a healthy and well appearing 70y.o. year old female who is sitting comfortably in our office in acute distress. Alert and oriented. Physical Exam:  RUE:    No gross deformities noted. Sensation is intact to light touch throughout the median, ulnar and radial nerve distribution. Able to wiggle fingers, gives thumbs up, A-okay and cross index and middle fingers. Full range of motion of the hand, wrist, elbow and shoulder. LUE:   No gross deformities noted. Sensation is intact to light touch throughout the median, ulnar and radial nerve distribution. Able to wiggle fingers, gives thumbs up, A-okay and cross index and middle fingers. Full range of motion of the hand wrist elbow and shoulder. RLE:   No gross deformities noted. Sensation is intact to light touch throughout the lower extremity. Able to wiggle toes and plantar and dorsiflex foot. Full range of motion at the ankle, knee and hip    LLE:  Dorsal foot decrease in sensation  Tibia swelling gross deformity noted. Sensation is intact to light touch throughout the lower extremity plantar  Able to wiggle toes and plantar and dorsiflex foot. Full range of motion at the  hip    Diagnostics:  Xray   Have reviewed the xrays above from 02/27/22   and my impression is:  1. Closed fracture of left tibial plateau, initial encounter  - CT KNEE LEFT WO CONTRAST; Future      Assessment: tibia plateau fracture      Plan:   Ct scan and ORIF Tibia  Discussed plans for surgical intervention, home therapy and progression of ttwb to fwb over 2-3 months.          [unfilled]     Yogesh Granados MD    Date:    2/27/2022

## 2022-03-01 NOTE — TELEPHONE ENCOUNTER
LM: Per Dr. Souza Delay: Keep the wraps and the immobilizer on until we see her next week. She can be toe touch weightbearing.

## 2022-03-03 ENCOUNTER — TELEPHONE (OUTPATIENT)
Dept: ORTHOPEDIC SURGERY | Age: 72
End: 2022-03-03

## 2022-03-03 NOTE — TELEPHONE ENCOUNTER
General Question     Subject: PATIENT IS HAVING SWELLING IN HER LEFT KNEE. SHE IS CONCERNED AND WOULD LIKE A CALL BACK. SHE HAD SURGERY ON February 25, 2022.     Patient:  Alessandro Earl Number: [phone]

## 2022-03-04 RX ORDER — RIVAROXABAN 10 MG/1
10 TABLET, FILM COATED ORAL
Qty: 5 TABLET | Refills: 0 | Status: SHIPPED | OUTPATIENT
Start: 2022-03-04 | End: 2022-03-24

## 2022-03-04 NOTE — TELEPHONE ENCOUNTER
Route to Loney Duverney:    Patient called again today. Therapist is there today. She has taken off the ace wrap. Lower leg is more painful today with swelling and tenderness.

## 2022-03-04 NOTE — TELEPHONE ENCOUNTER
General Question     Subject: PATIENT IS REQUESTING A CALL CONCERNED WITH SWELLING FROM KNEE DOWN  [facility] Number: [phone]

## 2022-03-04 NOTE — PROGRESS NOTES
Have discussed pain in the calf and generalized swelling of the leg without drainage erythema or fever    Plan for xarelto   Plan for doppler US of the left leg.

## 2022-03-07 ENCOUNTER — TELEPHONE (OUTPATIENT)
Dept: ORTHOPEDIC SURGERY | Age: 72
End: 2022-03-07

## 2022-03-07 DIAGNOSIS — Z98.890 STATUS POST OPEN REDUCTION AND INTERNAL FIXATION (ORIF) OF FRACTURE: Primary | ICD-10-CM

## 2022-03-07 DIAGNOSIS — Z87.81 STATUS POST OPEN REDUCTION AND INTERNAL FIXATION (ORIF) OF FRACTURE: Primary | ICD-10-CM

## 2022-03-07 DIAGNOSIS — M79.89 LEFT LEG SWELLING: ICD-10-CM

## 2022-03-07 NOTE — TELEPHONE ENCOUNTER
Patient states having left leg swelling and wanted to know when patient should come into office.   Patient had surgery 2/25/22

## 2022-03-10 ENCOUNTER — OFFICE VISIT (OUTPATIENT)
Dept: ORTHOPEDIC SURGERY | Age: 72
End: 2022-03-10

## 2022-03-10 ENCOUNTER — HOSPITAL ENCOUNTER (OUTPATIENT)
Dept: VASCULAR LAB | Age: 72
Discharge: HOME | End: 2022-03-10
Payer: MEDICARE

## 2022-03-10 VITALS — BODY MASS INDEX: 26.68 KG/M2 | HEIGHT: 62 IN | WEIGHT: 145 LBS

## 2022-03-10 DIAGNOSIS — Z98.890 STATUS POST OPEN REDUCTION AND INTERNAL FIXATION (ORIF) OF FRACTURE: ICD-10-CM

## 2022-03-10 DIAGNOSIS — Z87.81 STATUS POST OPEN REDUCTION AND INTERNAL FIXATION (ORIF) OF FRACTURE: ICD-10-CM

## 2022-03-10 DIAGNOSIS — M79.89 LEFT LEG SWELLING: ICD-10-CM

## 2022-03-10 DIAGNOSIS — Z87.81 STATUS POST OPEN REDUCTION AND INTERNAL FIXATION (ORIF) OF FRACTURE: Primary | ICD-10-CM

## 2022-03-10 DIAGNOSIS — Z98.890 STATUS POST OPEN REDUCTION AND INTERNAL FIXATION (ORIF) OF FRACTURE: Primary | ICD-10-CM

## 2022-03-10 PROCEDURE — 99024 POSTOP FOLLOW-UP VISIT: CPT | Performed by: ORTHOPAEDIC SURGERY

## 2022-03-10 PROCEDURE — 93970 EXTREMITY STUDY: CPT

## 2022-03-10 RX ORDER — TRAMADOL HYDROCHLORIDE 50 MG/1
50 TABLET ORAL EVERY 4 HOURS PRN
Qty: 42 TABLET | Refills: 0 | Status: SHIPPED | OUTPATIENT
Start: 2022-03-10 | End: 2022-03-17

## 2022-03-10 ASSESSMENT — ENCOUNTER SYMPTOMS
NAUSEA: 0
ABDOMINAL PAIN: 0
SORE THROAT: 0
SHORTNESS OF BREATH: 0
WHEEZING: 0
COUGH: 0
VOMITING: 0
DIARRHEA: 0
CONSTIPATION: 0

## 2022-03-10 NOTE — PROGRESS NOTES
Patient Name: Micheal Neil MRN: [id number]   Age: 70 y.o. YOB: 1950   Sex: female         CHIEF COMPLAINT   Status post left knee tibial plateau fracture ORIF postoperative visit    HISTORY OF PRESENT ILLNESS   Patient returns for their first postoperative evaluation status post left knee status post tibial plateau fracture ORIF. The patient is doing very fair. They have moderate complaints of pain. Which she notes is better controlled with use of tramadol versus the Percocet that was given to her. Notes her biggest issue is relating to the amount of swelling that she has had. She had a deep venous Doppler ultrasound today. There is moderate swelling about the knee down the lower leg and into the foot. .    The patient has been doing physical therapy at home with home therapist they are discussing discharging her as she is not able to presume most activities due to her weightbearing status and motion restrictions. .    They deny any calf swelling or numbness or tingling down the leg       Assessment     Review of Systems   Constitutional: Negative for chills and fever. HENT: Negative for ear discharge, ear pain, hearing loss, nosebleeds and sore throat. Respiratory: Negative for cough, shortness of breath and wheezing. Cardiovascular: Negative for chest pain and palpitations. Gastrointestinal: Negative for abdominal pain, constipation, diarrhea, nausea and vomiting. Genitourinary: Negative for dysuria, frequency and urgency. Skin: Negative for rash. Neurological: Negative for dizziness and headaches. PHYSICAL EXAM     Vital Signs: There were no vitals filed for this visit. Examination of the knee shows a well-healed lateral knee incision. There is moderate swelling. There is no drainage. Range of motion is 5 to 10. Significant contusions noted throughout entire lower leg and into foot and ankle.   Moderate swelling noted across entire lower leg and across knee. Incision no drainage or discharge no evidence of infection staples in place and skin underneath healing very well. The patient is neurovascularly intact. NEUROLOGICALLY: There is no evidence for sensory or motor deficits in the extremity. Coordination appears full with no spacticity or rigidity. Reflexes appear to be symmetric. Distal circulation intact. No signs of RSD. Calf nontender. Negative michael's,  no signs of dvt    Hip exam shows full ROM with no focal pain or Instability. Leg lengths are normal. There is no Trendelenburg Gait. RADIOLOGY   Xray   Have reviewed the xrays above from 03/10/22   2 view x-ray of the left knee AP and lateral views were performed and reviewed today revealing stable ORIF hardware and tibial plateau fracture in stable lateral tibial plateau     IMPRESSION   2 week(s) status post left knee ORIF of tibial plateau fracture    PLAN   The patient is doing well 2 week(s) status post left knee ORIF of tibial plateau fracture. The patient will finish up therapy. They may slowly resume normal activities. DVT plan will increase Xarelto from 10 mg daily to 15 mg twice daily for 21 days then will drop to 20 mg daily after that for approximately 6 months due to positive Doppler ultrasound noting 3 occlusions 1 in the proximal femoral vein on in the popliteal vein and one in the soleus vein. All affecting the left leg. Right leg was negative. Refill tramadol at today's visit 50 mg 1 tablet every 4-6 hours as needed for pain quantity 42 0 refills  Plan follow-up in 2 weeks  Sent in prescription for Xarelto at both the 15 mg dosage and 20 mg dosage  Discussed importance of patient to reach out and to go to the emergency room if any significant changes in her current status including shortness of breath chest pain tightness in the chest headaches dizziness loss of consciousness. The orders below, if any, were placed during this visit:          ICD-10-CM    1. Status post open reduction and internal fixation (ORIF) of fracture  Z98.890 XR KNEE LEFT (1-2 VIEWS)    Z87.81          JESSICA Black

## 2022-03-12 NOTE — OP NOTE
[address]                                OPERATIVE REPORT    PATIENT NAME: Varun Almazan                        :        1950  MED REC NO:   [id number]                          ROOM:       2084  ACCOUNT NO:   [de-identified]                           ADMIT DATE: 2022  PROVIDER:     Mark Kay MD    DATE OF PROCEDURE:  2022    SURGEON:  Mark Kay MD    ASSISTANT SURGEON:  Eloisa Pinto PA-C    ANESTHESIA:  General.    OPERATION PERFORMED:  Open reduction and internal fixation of tibial  plateau fracture, left side. COMPLICATIONS:  None. ESTIMATED BLOOD LOSS:  100 mL. INDICATIONS:  The patient is a 35-year-old with previous total hip  arthroplasty in the past year. She had sustained an injury while she  was visiting her daughter at the [facility]. She was noted to  have a tibial plateau fracture, sent for subsequent treatment and  options in her hometown without intervention. CT scan was obtained. The patient had been prepped with a noted understanding that the lateral  tibial plateau was markedly comminuted and with a 779% certainty become  arthritic over time, open reduction and internal fixation will be  attempted to manage the space and allow for the best healing possible  under the circumstances. She has agreed to proceed with surgical  intervention. DESCRIPTION OF PROCEDURE:  She was brought to the operating room in  stable condition, underwent induction of general anesthesia, insertion  of airways. She was placed in the supine position. Her left leg was  prepped with a ChloraPrep scrub and draped in sterile fashion. Proximal  tourniquet was applied. Likewise perioperative antibiotics was given. Image guidance was utilized during the case.   After sterile prep with  chlorhexidine and draped in a sterile fashion, her knee was placed into  a flexed position. A curvilinear incision was made along the lateral  aspect of the joint at the site of the fracture. Incision was extended  down to the subcutaneous tissues and then areas of the IT band and  Gerdy's tubercle were reflected along with soft tissue sleeves from the  anterior muscles along the tibia which were extended in a subperiosteal  fashion along the lateral aspect of the joint. Submeniscal incision was  done and meniscus was reflected superiorly. Large area that was a  pyramidal shape of joint surface in the anterior aspect of the tibia  actually was book ended laterally with then full exposure of the  articular surface. Articular surface was manipulated. Cancellous chips  were inserted and packed in underneath the articular surface fragment. Several of the pieces had to be rotated completely to allow for good  surface apposition to the femur. Attempt was made to maximize the  superior migration of some of these fracture fragments and x-rays  confirmed ultimately with considerable effort, the final alignment of  the tibia. The open book was closed using reduction clamps, which  pinched skin on the medial side and allowed compression on the lateral  plate. Synthes lateral contour plate was applied and reduction  obtained. With again additional manipulation, the plate was applied  distally. Bicortical screw was applied to secure fixation. Proximally,  pins were applied to secure fixation. Manipulation of the plate was  done to allow for alignment to the tibia. There was some prominence on  the lateral plate as despite the plate sitting securely on the lateral  aspect of the tibia, ultimately, the position seemed slightly inferior  but screws were subsequently placed and showed excellent floating of the  tibial surface and comfortable position overall. Locking screws were  applied along the plate in addition to a kickstand screw.   The actual  position of the hardware and the position of the tibia on AP and lateral  appeared to be intact. Irrigation was done. Sutures placed through the  meniscal base _____ approximated to the holes in the plate. Subsequently, fascial closure was done with interrupted 0 Vicryl suture  followed by 2-0 Vicryl, 3-0 Vicryl, and then staples applied across the  leg. Compression dressing was applied, immobilizer was applied. Gentle  range of motion showed full extension, flexion past 120 degrees. Secondary incision was made distal to the initial incision where two  screws were placed along the distal aspect of the plate. This was also  closed with venu.         Swati Ledbetter MD    D: 03/11/2022 14:37:18       T: 03/11/2022 19:29:35     [email or web address]  Job#: [id number]     Doc#: [id number]    CC:

## 2022-03-24 ENCOUNTER — OFFICE VISIT (OUTPATIENT)
Dept: ORTHOPEDIC SURGERY | Age: 72
End: 2022-03-24

## 2022-03-24 VITALS — WEIGHT: 145 LBS | BODY MASS INDEX: 26.68 KG/M2 | HEIGHT: 62 IN

## 2022-03-24 DIAGNOSIS — M25.562 ACUTE PAIN OF LEFT KNEE: Primary | ICD-10-CM

## 2022-03-24 PROCEDURE — 99024 POSTOP FOLLOW-UP VISIT: CPT | Performed by: PHYSICIAN ASSISTANT

## 2022-03-24 ASSESSMENT — ENCOUNTER SYMPTOMS
NAUSEA: 0
CONSTIPATION: 0
SORE THROAT: 0
COUGH: 0
SHORTNESS OF BREATH: 0
WHEEZING: 0
DIARRHEA: 0
VOMITING: 0
ABDOMINAL PAIN: 0

## 2022-03-24 NOTE — PROGRESS NOTES
Patient Name: Darwin Xavier MRN: [id number]   Age: 70 y.o. YOB: 1950   Sex: female         CHIEF COMPLAINT   Status post left knee tibial plateau fracture ORIF postoperative visit    HISTORY OF PRESENT ILLNESS   Patient returns for their second postoperative evaluation status post left knee status post tibial plateau fracture ORIF. The patient is doing very fair. They have small complaints of pain. Notes the swelling is still persistent but it is better at today's visit notes overall pain levels are better at today's visit but still as point in time would let her know that it still fractured. States is mostly an achiness rated as a 2-3 out of 10 there is small swelling about the knee down the lower leg and into the foot. .    The patient has been doing physical therapy at home on her own, she is still nonweightbearing we need to be able to pursue more weightbearing status to pursue physical therapy again she is doing basic exercises on her own  They deny any calf swelling or numbness or tingling down the leg       Assessment     Review of Systems   Constitutional: Negative for chills and fever. HENT: Negative for ear discharge, ear pain, hearing loss, nosebleeds and sore throat. Respiratory: Negative for cough, shortness of breath and wheezing. Cardiovascular: Negative for chest pain and palpitations. Gastrointestinal: Negative for abdominal pain, constipation, diarrhea, nausea and vomiting. Genitourinary: Negative for dysuria, frequency and urgency. Skin: Negative for rash. Neurological: Negative for dizziness and headaches. PHYSICAL EXAM     Vital Signs: There were no vitals filed for this visit. Examination of the knee shows a well-healed lateral knee incision. There is moderate swelling. There is no drainage. Range of motion is 0 to 15. Mild swelling noted across entire lower leg and across knee.   Incision no drainage or discharge no evidence of infection   Slightly increased valgus angulation noted at today's visit    The patient is neurovascularly intact. NEUROLOGICALLY: There is no evidence for sensory or motor deficits in the extremity. Coordination appears full with no spacticity or rigidity. Reflexes appear to be symmetric. Distal circulation intact. No signs of RSD. Calf nontender. Negative michael's,  no signs of dvt    Hip exam shows full ROM with no focal pain or Instability. Leg lengths are normal. There is no Trendelenburg Gait. RADIOLOGY   Xray   Have reviewed the xrays above from 03/24/22   2 view x-ray of the left knee AP and lateral views were performed and reviewed today revealing stable ORIF hardware and tibial plateau fracture in stable lateral tibial plateau noted increased valgus angulation on x-ray no significant displacement noted of fracture or site/hardware    IMPRESSION   4 week(s) status post left knee ORIF of tibial plateau fracture    PLAN   The patient is doing well 4 week(s) status post left knee ORIF of tibial plateau fracture. The patient will finish up therapy. They may slowly resume normal activities. DVT plan will increase Xarelto from 10 mg daily to 15 mg twice daily for 21 days then will drop to 20 mg daily after that for approximately 6 months due to positive Doppler ultrasound noting 3 occlusions 1 in the proximal femoral vein on in the popliteal vein and one in the soleus vein. All affecting the left leg. Right leg was negative. Continue use of tramadol at today's visit 50 mg 1 tablet every 4-6 hours as needed for pain quantity 42 0 refills  Plan follow-up in 2 weeks for repeat x-rays  Discussed importance of patient to reach out and to go to the emergency room if any significant changes in her current status including shortness of breath chest pain tightness in the chest headaches dizziness loss of consciousness. The orders below, if any, were placed during this visit:          ICD-10-CM    1. Acute pain of left knee  M25.562 XR KNEE LEFT (1-2 VIEWS)         JESSICA Gillette

## 2022-04-07 ENCOUNTER — TELEPHONE (OUTPATIENT)
Dept: ORTHOPEDIC SURGERY | Age: 72
End: 2022-04-07

## 2022-04-07 ENCOUNTER — OFFICE VISIT (OUTPATIENT)
Dept: ORTHOPEDIC SURGERY | Age: 72
End: 2022-04-07
Payer: MEDICARE

## 2022-04-07 VITALS — WEIGHT: 145 LBS | BODY MASS INDEX: 26.68 KG/M2 | HEIGHT: 62 IN

## 2022-04-07 DIAGNOSIS — Z87.81 STATUS POST OPEN REDUCTION AND INTERNAL FIXATION (ORIF) OF FRACTURE: Primary | ICD-10-CM

## 2022-04-07 DIAGNOSIS — Z98.890 STATUS POST OPEN REDUCTION AND INTERNAL FIXATION (ORIF) OF FRACTURE: Primary | ICD-10-CM

## 2022-04-07 DIAGNOSIS — S82.142A CLOSED FRACTURE OF LEFT TIBIAL PLATEAU, INITIAL ENCOUNTER: ICD-10-CM

## 2022-04-07 PROCEDURE — L1810 KO ELASTIC WITH JOINTS: HCPCS | Performed by: ORTHOPAEDIC SURGERY

## 2022-04-07 PROCEDURE — 99024 POSTOP FOLLOW-UP VISIT: CPT | Performed by: ORTHOPAEDIC SURGERY

## 2022-04-07 RX ORDER — PREDNISONE 1 MG/1
5 TABLET ORAL 2 TIMES DAILY
Qty: 30 TABLET | Refills: 0 | Status: SHIPPED | OUTPATIENT
Start: 2022-04-07 | End: 2022-04-22

## 2022-04-07 NOTE — TELEPHONE ENCOUNTER
[facility]. called to say they do not fill the compound cream formula #8    They are cancelling the Rx. Please send to compounding pharmacy.     Thanks

## 2022-04-07 NOTE — PROGRESS NOTES
Date:  2022    Name:  Melinda Lopez  Address:  [place] [id number]    :  1950      Age:   70 y.o.    SSN:  [id number]      Medical Record Number:  [id number]    Reason for Visit:    Chief Complaint    Post-Op Check (ORIF LEFT TIBIAL PLATEAU FX 05)      DOS:      HPI: Emma Helms is a 70 y.o. female here today for tibial plateau fracture. More pain in the thigh bilaterally with walking than the left knee    Left tibia with no pain and limited motions. Review of Systems:  Review of Systems   Constitutional: Negative for chills and fever. HENT: Negative for nosebleeds. Eyes: Negative for double vision. Cardiovascular: Negative for chest pain. Gastrointestinal: Negative for abdominal pain. Musculoskeletal: Positive for joint pain and myalgias. Skin: Negative for rash. Neurological: Negative for seizures. Psychiatric/Behavioral: Negative for hallucinations.         Past History:  Past Medical History:   Diagnosis Date    Arthritis     Chronic hyponatremia     GERD (gastroesophageal reflux disease)     History of blood transfusion     Hyperlipidemia     Hypertension     no meds currently    OA (osteoarthritis)     Prolonged emergence from general anesthesia     Tibial plateau fracture     Wears partial dentures     upper & lower     Past Surgical History:   Procedure Laterality Date     SECTION      COLONOSCOPY      LEG SURGERY Left 2022    OPEN REDUCTION INTERNAL FIXATION LEFT TIBIAL PLATEAU FRACTURE         SUKUMAR  CPT CODE - 81327 performed by Pito Anderson MD at [address] [place] [address] Left 2020    LEFT TOTAL HIP ARTHROPLASTY ANTERIOR APPROACH performed by Pito Anderson MD at [address] [place] [address] Right 5/10/2021    RIGHT HIP ARTHROPLASTY ANTERIOR APPROACH performed by Pito Anderson MD at [address] [place] [address] [address]     Current Outpatient Medications on File Prior to Visit   Medication Sig Dispense Refill    rivaroxaban (XARELTO) 15 MG TABS tablet Take 1 tablet by mouth 2 times daily (with meals) Take for 21 days then drop to 20mg qd 42 tablet 0    rivaroxaban (XARELTO) 20 MG TABS tablet Take 1 tablet by mouth daily (with breakfast) 30 tablet 5    traMADol (ULTRAM) 50 MG tablet Take 50 mg by mouth every 6 hours as needed for Pain.  methocarbamol (ROBAXIN) 750 MG tablet Take 750 mg by mouth 4 times daily      acetaminophen (TYLENOL) 325 MG tablet Take 650 mg by mouth every 6 hours as needed for Pain      simvastatin (ZOCOR) 40 MG tablet TAKE 1 TABLET BY MOUTH EVERY NIGHT 90 tablet 3    calcium carbonate (OSCAL) 500 MG TABS tablet Take 500 mg by mouth daily      omeprazole (PRILOSEC OTC) 20 MG tablet Take 20 mg by mouth daily       Multiple Vitamins-Minerals (THERAPEUTIC MULTIVITAMIN-MINERALS) tablet Take 1 tablet by mouth daily      Vitamin D (CHOLECALCIFEROL) 1000 UNITS CAPS capsule Take 2,000 Units by mouth daily        No current facility-administered medications on file prior to visit.      Social History     Socioeconomic History    Marital status:      Spouse name: Not on file    Number of children: Not on file    Years of education: Not on file    Highest education level: Not on file   Occupational History    Not on file   Tobacco Use    Smoking status: Former Smoker     Packs/day: 1.00     Years: 15.00     Pack years: 15.00     Quit date: 1983     Years since quittin.2    Smokeless tobacco: Never Used   Vaping Use    Vaping Use: Never used   Substance and Sexual Activity    Alcohol use: Not Currently     Alcohol/week: 1.0 - 2.0 standard drink     Types: 1 - 2 Shots of liquor per week    Drug use: No    Sexual activity: Yes   Other Topics Concern    Not on file   Social History Narrative    Not on file     Social Determinants of Health     Financial Resource Strain: Low Risk     Difficulty of Paying Living Expenses: Not hard at all   Food Insecurity: No Food Insecurity    Worried About Running Out of Food in the Last Year: Never true    Lilian of Food in the Last Year: Never true   Transportation Needs:     Lack of Transportation (Medical): Not on file    Lack of Transportation (Non-Medical): Not on file   Physical Activity: Insufficiently Active    Days of Exercise per Week: 3 days    Minutes of Exercise per Session: 40 min   Stress:     Feeling of Stress : Not on file   Social Connections:     Frequency of Communication with Friends and Family: Not on file    Frequency of Social Gatherings with Friends and Family: Not on file    Attends Baptism Services: Not on file    Active Member of Clubs or Organizations: Not on file    Attends Club or Organization Meetings: Not on file    Marital Status: Not on file   Intimate Partner Violence:     Fear of Current or Ex-Partner: Not on file    Emotionally Abused: Not on file    Physically Abused: Not on file    Sexually Abused: Not on file   Housing Stability:     Unable to Pay for Housing in the Last Year: Not on file    Number of [place] in the Last Year: Not on file    Unstable Housing in the Last Year: Not on file     Family History   Problem Relation Age of Onset    Breast Cancer Mother 68       Current Medications:    Current Outpatient Medications   Medication Sig Dispense Refill    rivaroxaban (XARELTO) 15 MG TABS tablet Take 1 tablet by mouth 2 times daily (with meals) Take for 21 days then drop to 20mg qd 42 tablet 0    rivaroxaban (XARELTO) 20 MG TABS tablet Take 1 tablet by mouth daily (with breakfast) 30 tablet 5    traMADol (ULTRAM) 50 MG tablet Take 50 mg by mouth every 6 hours as needed for Pain.       methocarbamol (ROBAXIN) 750 MG tablet Take 750 mg by mouth 4 times daily      acetaminophen (TYLENOL) 325 MG tablet Take 650 mg by mouth every 6 hours as needed for Pain      simvastatin (ZOCOR) 40 MG tablet TAKE 1 TABLET BY MOUTH EVERY NIGHT 90 tablet 3    calcium carbonate (OSCAL) 500 MG TABS tablet Take 500 mg by mouth daily      omeprazole (PRILOSEC OTC) 20 MG tablet Take 20 mg by mouth daily       Multiple Vitamins-Minerals (THERAPEUTIC MULTIVITAMIN-MINERALS) tablet Take 1 tablet by mouth daily      Vitamin D (CHOLECALCIFEROL) 1000 UNITS CAPS capsule Take 2,000 Units by mouth daily        No current facility-administered medications for this visit. Allergies: Allergies   Allergen Reactions    Zanaflex [Tizanidine]      bradycardia    Keflet [Cephalexin] Rash       Physical Exam:  There were no vitals filed for this visit. Physical Exam   Constitutional: Patient is oriented to person, place, and time and well-developed, well-nourished, and in no distress. HENT:   Head: Normocephalic and atraumatic. Eyes: Pupils are equal, round, and reactive to light. Neck: No tracheal deviation present. No thyromegaly present. Pulmonary/Chest: Effort normal.   Abdominal: Soft. There is no guarding. Musculoskeletal: Patient exhibits tenderness and pain. Neurological: Patient is alert and oriented to person, place, and time. Skin: Skin is warm. Psychiatric: Affect normal.     General: Arturo Fernandez is a healthy and well appearing 70y.o. year old female who is sitting comfortably in our office in acute distress. Alert and oriented. Physical Exam:  RUE:    No gross deformities noted. Sensation is intact to light touch throughout the median, ulnar and radial nerve distribution. Able to wiggle fingers, gives thumbs up, A-okay and cross index and middle fingers. Full range of motion of the hand, wrist, elbow and shoulder. LUE:   No gross deformities noted. Sensation is intact to light touch throughout the median, ulnar and radial nerve distribution. Able to wiggle fingers, gives thumbs up, A-okay and cross index and middle fingers. Full range of motion of the hand wrist elbow and shoulder. RLE:   No gross deformities noted.   Sensation is intact to light touch throughout the lower extremity. Able to wiggle toes and plantar and dorsiflex foot. Full range of motion at the ankle, knee and hip    LLE:  Dorsal foot decrease in sensation  Tibia swelling gross deformity noted although improved. Valgus lie of the left leg to 5-7 degrees although pateint feels that this may be a chronic situation   rom 10 to 80. .    Sensation is intact to light touch throughout the lower extremity plantar  Able to wiggle toes and plantar and dorsiflex foot. Full range of motion at the  hip    Diagnostics:  Xray   Have reviewed the xrays above from 04/07/22   and my impression is:  1. Closed fracture of left tibial plateau, initial encounter         Assessment: tibia plateau fracture      Plan:        home therapy  To outpateint therapy . Aggressive rom of the joint with use of prednisone as needed and pain medications to increase potential for rapid increase in rom. 50% weight bearing.      2 week recheck /           Rosey Shaw MD    Date:    4/7/2022

## 2022-04-14 ENCOUNTER — HOSPITAL ENCOUNTER (OUTPATIENT)
Dept: PHYSICAL THERAPY | Age: 72
Setting detail: THERAPIES SERIES
Discharge: HOME | End: 2022-04-14
Payer: MEDICARE

## 2022-04-14 PROCEDURE — 97140 MANUAL THERAPY 1/> REGIONS: CPT

## 2022-04-14 PROCEDURE — 97161 PT EVAL LOW COMPLEX 20 MIN: CPT

## 2022-04-14 NOTE — PLAN OF CARE
[facility] and [address],  [facility], Replaced by [facility] [address]  [address] [facility],[address]   [place], [address]  Phone: [phone]  Fax: [phone]       Physical Therapy Certification    Dear Referring Practitioner: Dr. Surya Kevin MD,    We had the pleasure of evaluating the following patient for physical therapy services at [address] [place] [address]. A summary of our findings can be found in the initial assessment below. This includes our plan of care. If you have any questions or concerns regarding these findings, please do not hesitate to contact me at the office phone number checked above. Thank you for the referral.       Physician Signature:_______________________________Date:__________________  By signing above (or electronic signature), therapists plan is approved by physician      Patient:  Derek Luna   : 1950   MRN: [id number]  Referring Physician: Referring Practitioner: Dr. Surya Kevin MD      Evaluation Date: 2022      Medical Diagnosis Information:  Diagnosis: Z98.890, Z87.81 (ICD-10-CM) - Status post open reduction and internal fixation (ORIF) of [id number] (ICD-10-CM) - Closed fracture of left tibial plateau, initial encounter   Treatment Diagnosis: PT DX: L knee pain M25. 562                                         Insurance information: PT Insurance Information: Medicare/BLBS: no visit limit, insurance covers 80%     Precautions/ Contra-indications: 50% WB with walker, B THR    C-SSRS Triggered by Intake questionnaire (Past 2 wk assessment):   [x] No, Questionnaire did not trigger screening.   [] Yes, Patient intake triggered further evaluation      [] C-SSRS Screening completed  [] PCP notified via Plan of Care  [] Emergency services notified     Latex Allergy:  [x]NO      []YES  Preferred Language for Healthcare:   [x]English       []other:    SUBJECTIVE: Patient stated complaint: Patient reports to PT status post R tibial plateau repair with sx on 2/25/22. On 2/18/22, Patient reports missing a couple steps when going down the steps and injuring her L LE. She spent 2 nights at [facility] clinic then had Sx on 2/25/22. Currently, allowed 50% WB on L LE with use of SW. Dr. Randell Plunkett her to start bending her knee. See  4/21/22. Most times no pain, but pain with knee flexion 1-4/10. Wearing knee brace at this time. Was immobilized up until last week. Relevant Medical History:B THR  Functional Disability Index: 12.5% LEFs    Pain Scale: 1-4/10  Easing factors: not moving the L LE  Provocative factors: L knee flex     Type: []Constant   [x]Intermittent  []Radiating []Localized []other:     Numbness/Tingling: L LE secondary to sx    Occupation/School: retired    Living Status/Prior Level of Function: Independent with ADLs and IADLs, lives with , 6-7 steps up    OBJECTIVE:      Flexibility L R Comment   Hamstring      Gastroc      ITB      Quad              ROM PROM AROM Overpressure Comment    L R L R L R    Flexion 65 128        Extension -19 -3                                Strength L R Comment   Quad  Poor     Hamstring      Gastroc      Hip  flexion      Hip abd                      Special Test Results/Comment   Meniscal Click    Crepitus    Flexion Test    Valgus Laxity    Varus Laxity    Lachmans    Drop Back    Homans            Girth L R   Mid Patella 36.2 cm 36.8 cm   Suprapatellar 37.5 cm 37.5 cm   5cm above 37.5 cm 37.5 cm   15cm above       Reflexes/Sensation:    []Dermatomes/Myotomes intact    []Reflexes equal and normal bilaterally   []Other:    Joint mobility: L patella    []Normal    [x]Hypo   []Hyper    Palpation:     Functional Mobility/Transfers: Presented to PT in W/C, but I with transfers. Posture: WNL    Bandages/Dressings/Incisions: Incision healing well.     Gait: (include devices/WB status) not assessed secondary to patient did not bring walker, 50% WB L LE    Orthopedic Special Tests:                        [x] Patient history, allergies, meds reviewed. Medical chart reviewed. See intake form. Review Of Systems (ROS):  [x]Performed Review of systems (Integumentary, CardioPulmonary, Neurological) by intake and observation. Intake form has been scanned into medical record. Patient has been instructed to contact their primary care physician regarding ROS issues if not already being addressed at this time. Co-morbidities/Complexities (which will affect course of rehabilitation):   []None           Arthritic conditions   []Rheumatoid arthritis (M05.9)  [x]Osteoarthritis (M19.91)   Cardiovascular conditions   [x]Hypertension (I10)  []Hyperlipidemia (E78.5)  []Angina pectoris (I20)  []Atherosclerosis (I70)   Musculoskeletal conditions   []Disc pathology   []Congenital spine pathologies   []Prior surgical intervention  []Osteoporosis (M81.8)  []Osteopenia (M85.8)   Endocrine conditions   []Hypothyroid (E03.9)  []Hyperthyroid Gastrointestinal conditions   []Constipation (E24.36)   Metabolic conditions   []Morbid obesity (E66.01)  []Diabetes type 1(E10.65) or 2 (E11.65)   []Neuropathy (G60.9)     Pulmonary conditions   []Asthma (J45)  []Coughing   []COPD (J44.9)   Psychological Disorders  []Anxiety (F41.9)  []Depression (F32.9)   []Other:   []Other:          Barriers to/and or personal factors that will affect rehab potential:              []Age  []Sex              []Motivation/Lack of Motivation                        []Co-Morbidities              []Cognitive Function, education/learning barriers              []Environmental, home barriers              []profession/work barriers  []past PT/medical experience  []other:  Justification:     Falls Risk Assessment (30 days):   [x] Falls Risk assessed and no intervention required.   [] Falls Risk assessed and Patient requires intervention due to being higher risk   TUG score (>12s at risk):     [] Falls education provided, including       G-Codes:       ASSESSMENT:   Functional Impairments:     [x]Noted lumbar/proximal hip/LE hypomobility   [x]Decreased LE functional ROM   [x]Decreased core/proximal hip strength and neuromuscular control   [x]Decreased LE functional strength   [x]Reduced balance/proprioceptive control   []other:      Functional Activity Limitations (from functional questionnaire and intake)   [x]Reduced ability to tolerate prolonged functional positions   [x]Reduced ability or difficulty with changes of positions or transfers between positions   [x]Reduced ability to maintain good posture and demonstrate good body mechanics with sitting, bending, and lifting   [x]Reduced ability to sleep   [x] Reduced ability or tolerance with driving and/or computer work   [x]Reduced ability to perform lifting, carrying tasks   [x]Reduced ability to squat   [x]Reduced ability to forward bend   [x]Reduced ability to ambulate prolonged functional periods/distances/surfaces   [x]Reduced ability to ascend/descend stairs   []Reduced ability to run, hop or jump   []other:     Participation Restrictions   [x]Reduced participation in self care activities   [x]Reduced participation in home management activities   []Reduced participation in work activities   [x]Reduced participation in social activities. []Reduced participation in sport activities. Classification :    [x]Signs/symptoms consistent with post-surgical status including decreased ROM, strength and function.    []Signs/symptoms consistent with joint sprain/strain   []Signs/symptoms consistent with patella-femoral syndrome   []Signs/symptoms consistent with knee OA/hip OA   []Signs/symptoms consistent with internal derangement of knee/Hip   []Signs/symptoms consistent with functional hip weakness/NMR control      []Signs/symptoms consistent with tendinitis/tendinosis    []signs/symptoms consistent with pathology which may benefit from Dry needling      []other: Prognosis/Rehab Potential:      []Excellent   [x]Good    []Fair   []Poor    Tolerance of evaluation/treatment:    []Excellent   [x]Good    []Fair   []Poor    Physical Therapy Evaluation Complexity Justification  [x] A history of present problem with:  [] no personal factors and/or comorbidities that impact the plan of care;  []1-2 personal factors and/or comorbidities that impact the plan of care  []3 personal factors and/or comorbidities that impact the plan of care  [x] An examination of body systems using standardized tests and measures addressing any of the following: body structures and functions (impairments), activity limitations, and/or participation restrictions;:  [] a total of 1-2 or more elements   [] a total of 3 or more elements   [] a total of 4 or more elements   [x] A clinical presentation with:  [] stable and/or uncomplicated characteristics   [] evolving clinical presentation with changing characteristics  [] unstable and unpredictable characteristics;   [x] Clinical decision making of [] low, [] moderate, [] high complexity using standardized patient assessment instrument and/or measurable assessment of functional outcome. [x] EVAL (LOW) 20703 (typically 20 minutes face-to-face)  [] EVAL (MOD) 02896 (typically 30 minutes face-to-face)  [] EVAL (HIGH) 37524 (typically 45 minutes face-to-face)  [] RE-EVAL       PLAN  Frequency/Duration:  2 days per week for 6-8 Weeks:  Interventions:  [x]  Therapeutic exercise including: strength training, ROM, for Lower extremity and core   [x]  NMR activation and proprioception for LE, Glutes and Core   [x]  Manual therapy as indicated for LE, Hip and spine to include: Dry Needling/IASTM, STM, PROM, Gr I-IV mobilizations, manipulation.    [x] Modalities as needed that may include: thermal agents, E-stim, Biofeedback, US, iontophoresis as indicated  [x] Patient education on joint protection, postural re-education, activity modification, progression of HEP.    HEP instruction:   Access Code: [email or web address]  URL: [email or web address]  Date: 04/14/2022  Prepared by: Dona Callejas    Exercises  Seated Calf Towel Stretch - 3 x daily - 7 x weekly - 1 sets - 30 reps - 3 hold  Long Sitting Quad Set - 3 x daily - 7 x weekly - 1 sets - 10 reps - 10 hold  Sitting Heel Slide with Towel - 3 x daily - 7 x weekly - 1 sets - 10 reps - 10 hold    GOALS:   Patient stated goal: Patient to be able to walk to gym and resume normal routine. [] Progressing: [] Met: [] Not Met: [] Adjusted    Therapist goals for Patient:   Short Term Goals: To be achieved in: 2 weeks  1. Independent in HEP and progression per patient tolerance, in order to prevent re-injury. [] Progressing: [] Met: [] Not Met: [] Adjusted   2. Patient will have a decrease in pain to facilitate improvement in movement, function, and ADLs as indicated by Functional Deficits. [] Progressing: [] Met: [] Not Met: [] Adjusted    Long Term Goals: To be achieved in: 8-12 weeks  1. Disability index score 50% improved for the LEFS to assist with reaching prior level of function. [] Progressing: [] Met: [] Not Met: [] Adjusted  2. Patient will demonstrate increased AROM to equal to R knee to allow for proper joint functioning as indicated by patients Functional Deficits. [] Progressing: [] Met: [] Not Met: [] Adjusted  3. Patient will demonstrate an increase in Strength to good proximal hip strength and control, within 5lb HHD in LE to allow for proper functional mobility as indicated by patients Functional Deficits. [] Progressing: [] Met: [] Not Met: [] Adjusted  4. Patient will return to normal functional activities without increased symptoms or restriction. [] Progressing: [] Met: [] Not Met: [] Adjusted  5. Patient to ambulate with normal gait with out AD.   [] Progressing: [] Met: [] Not Met: [] Adjusted       Electronically signed by:  Dona Callejas, PT    Note: If patient does not return for scheduled/ recommended follow up visits, this note will serve as a discharge from care along with most recent update on progress.

## 2022-04-14 NOTE — FLOWSHEET NOTE
[facility] and [address],  [facility], [place] [id number] [place] [address]  Phone: [phone]  Fax: [phone]      Physical Therapy Daily Treatment Note  Date:  2022    Patient Name:  Dom Villareal    :  1950  MRN: [id number]  Restrictions/Precautions:    Medical/Treatment Diagnosis Information:  Diagnosis: Z98.890, Z87.81 (ICD-10-CM) - Status post open reduction and internal fixation (ORIF) of [id number] (ICD-10-CM) - Closed fracture of left tibial plateau, initial encounter   PT DX: L knee pain M25. 688  Insurance/Certification information:  PT Insurance Information: Medicare/BLBS: no visit limit, insurance covers 80%  Physician Information:  Referring Practitioner: Dr. Maria Mayo MD  Has the plan of care been signed (Y/N):        []  Yes  [x]  No     Date of Patient follow up with Physician: 22      Is this a Progress Report:     []  Yes  [x]  No        If Yes:  Date Range for reporting period:  Beginning  Ending    Progress report will be due (10 Rx or 30 days whichever is less): 50       Recertification will be due (POC Duration  / 90 days whichever is less): 22         Visit # Insurance Allowable Auth Required   1 No visit limit []  Yes []  No        Functional Scale: 12.5 LEFs    Date assessed:  22       Latex Allergy:  [x]NO      []YES    Preferred Language for Healthcare:   [x]English       []other:    Pain level:  1-4/10     SUBJECTIVE:  See eval    OBJECTIVE: See eval   Observation:    Test measurements:      RESTRICTIONS/PRECAUTIONS: 50% WB L LE with SW    Exercises/Interventions:   HEP:  Access Code: [email or web address]  URL: [email or web address]  Date: 2022  Prepared by: Chayito Moore     Exercises  Seated Calf Towel Stretch - 3 x daily - 7 x weekly - 1 sets - 30 reps - 3 hold  Long Sitting Quad Set - 3 x daily - 7 x weekly - 1 sets - 10 reps - 10 hold  Sitting Heel Slide with Towel - 3 x daily - 7 x weekly - 1 sets - 10 reps - 10 hold     Exercise/Equipment Resistance/Repetitions Other comments   Stretching     Hamstring     Towel Pull 3 x 30 sec Calf stretch   Inclined Calf     Hip Flexion     ITB     Groin     Quad                    SLR     Supine     Abduction     Adduction     Prone     SLR+          Isometrics     Quad sets 10 x 10 sec         Patellar Glides X 10 min All directions   Medial     Superior     Inferior          ROM     Sheet Pulls 10 x 10 sec    Hang Weights     Passive     Active     Weight Shift     Ankle Pumps                    CKC     Calf raises     Wall sits     Step ups     1 leg stand     Squatting     CC TKE     Balance     bridges          PRE     Extension  RANGE:   Flexion  RANGE:        Quantum machines     Leg press      Leg extension     Leg curl          Manual interventions                     Therapeutic Exercise and NMR EXR  [x] (76708) Provided verbal/tactile cueing for activities related to strengthening, flexibility, endurance, ROM for improvements in LE, proximal hip, and core control with self care, mobility, lifting, ambulation. [x] (79190) Provided verbal/tactile cueing for activities related to improving balance, coordination, kinesthetic sense, posture, motor skill, proprioception  to assist with LE, proximal hip, and core control in self care, mobility, lifting, ambulation and eccentric single leg control.      NMR and Therapeutic Activities:    [x] (51802 or 87617) Provided verbal/tactile cueing for activities related to improving balance, coordination, kinesthetic sense, posture, motor skill, proprioception and motor activation to allow for proper function of core, proximal hip and LE with self care and ADLs  [x] (79468) Gait Re-education- Provided training and instruction to the patient for proper LE, core and proximal hip recruitment and positioning and eccentric body weight control with ambulation re-education including up and down stairs     Home Exercise Program:    [x] (68900) Reviewed/Progressed HEP activities related to strengthening, flexibility, endurance, ROM of core, proximal hip and LE for functional self-care, mobility, lifting and ambulation/stair navigation   [x] (65902)Reviewed/Progressed HEP activities related to improving balance, coordination, kinesthetic sense, posture, motor skill, proprioception of core, proximal hip and LE for self care, mobility, lifting, and ambulation/stair navigation      Manual Treatments:  PROM / STM / Oscillations-Mobs:  G-I, II, III, IV (PA's, Inf., Post.)  [x] (45958) Provided manual therapy to mobilize LE, proximal hip and/or LS spine soft tissue/joints for the purpose of modulating pain, promoting relaxation,  increasing ROM, reducing/eliminating soft tissue swelling/inflammation/restriction, improving soft tissue extensibility and allowing for proper ROM for normal function with self care, mobility, lifting and ambulation. Modalities:      Charges:  Timed Code Treatment Minutes: 30   Total Treatment Minutes: 30       [x] EVAL (LOW) 22064 (typically 20 minutes face-to-face)  [] EVAL (MOD) 36172 (typically 30 minutes face-to-face)  [] EVAL (HIGH) 59796 (typically 45 minutes face-to-face)  [] RE-EVAL   [] FT(77841) x     [] IONTO  [] NMR (69497) x     [x] VASO (game ready x 15')  [x] Manual (70627) x  1   [] Other:  [] TA x      [] Mech Traction (35336)  [] ES(attended) (01828)      [] ES (un) (85813):     GOALS:   GOALS:   Patient stated goal: Patient to be able to walk to gym and resume normal routine. [x]? Progressing: []? Met: []? Not Met: []? Adjusted     Therapist goals for Patient:   Short Term Goals: To be achieved in: 2 weeks  1. Independent in HEP and progression per patient tolerance, in order to prevent re-injury. [x]? Progressing: []? Met: []? Not Met: []? Adjusted   2.  Patient will have a decrease in pain to facilitate improvement in movement, function, and ADLs as indicated by Functional Deficits. [x]? Progressing: []? Met: []? Not Met: []? Adjusted     Long Term Goals: To be achieved in: 8-12 weeks  1. Disability index score 50% improved for the LEFS to assist with reaching prior level of function. [x]? Progressing: []? Met: []? Not Met: []? Adjusted  2. Patient will demonstrate increased AROM to equal to R knee to allow for proper joint functioning as indicated by patients Functional Deficits. [x]? Progressing: []? Met: []? Not Met: []? Adjusted  3. Patient will demonstrate an increase in Strength to good proximal hip strength and control, within 5lb HHD in LE to allow for proper functional mobility as indicated by patients Functional Deficits. [x]? Progressing: []? Met: []? Not Met: []? Adjusted  4. Patient will return to normal functional activities without increased symptoms or restriction. [x]? Progressing: []? Met: []? Not Met: []? Adjusted  5. Patient to ambulate with normal gait with out AD. [x]? Progressing: []? Met: []? Not Met: []? Adjusted      Overall Progression Towards Functional goals/ Treatment Progress Update:  [] Patient is progressing as expected towards functional goals listed. [] Progression is slowed due to complexities/Impairments listed. [] Progression has been slowed due to co-morbidities.   [x] Plan just implemented, too soon to assess goals progression <30days   [] Goals require adjustment due to lack of progress  [] Patient is not progressing as expected and requires additional follow up with physician  [] Other    Prognosis for POC: [x] Good [] Fair  [] Poor      Patient requires continued skilled intervention: [x] Yes  [] No    Treatment/Activity Tolerance:  [x] Patient able to complete treatment  [] Patient limited by fatigue  [] Patient limited by pain     [] Patient limited by other medical complications  [] Other:         PLAN: See eval  [] Continue per plan of care [] Alter current plan (see comments above)  [x] Plan of care initiated [] Hold pending MD visit [] Discharge  Next visit:  [facility]  TK    Electronically signed by:  Edouard Rene PT    Note: If patient does not return for scheduled/ recommended follow up visits, this note will serve as a discharge from care along with most recent update on progress.

## 2022-04-18 ENCOUNTER — HOSPITAL ENCOUNTER (OUTPATIENT)
Dept: PHYSICAL THERAPY | Age: 72
Setting detail: THERAPIES SERIES
Discharge: HOME | End: 2022-04-18
Payer: MEDICARE

## 2022-04-18 PROCEDURE — 97110 THERAPEUTIC EXERCISES: CPT | Performed by: PHYSICAL THERAPIST

## 2022-04-18 PROCEDURE — 97140 MANUAL THERAPY 1/> REGIONS: CPT | Performed by: PHYSICAL THERAPIST

## 2022-04-18 PROCEDURE — 97112 NEUROMUSCULAR REEDUCATION: CPT | Performed by: PHYSICAL THERAPIST

## 2022-04-21 ENCOUNTER — HOSPITAL ENCOUNTER (OUTPATIENT)
Dept: PHYSICAL THERAPY | Age: 72
Setting detail: THERAPIES SERIES
Discharge: HOME | End: 2022-04-21
Payer: MEDICARE

## 2022-04-21 ENCOUNTER — OFFICE VISIT (OUTPATIENT)
Dept: ORTHOPEDIC SURGERY | Age: 72
End: 2022-04-21
Payer: MEDICARE

## 2022-04-21 VITALS — HEIGHT: 62 IN | BODY MASS INDEX: 26.68 KG/M2 | WEIGHT: 145 LBS

## 2022-04-21 DIAGNOSIS — S82.142G CLOSED FRACTURE OF LEFT TIBIAL PLATEAU WITH DELAYED HEALING, SUBSEQUENT ENCOUNTER: Primary | ICD-10-CM

## 2022-04-21 PROCEDURE — 97140 MANUAL THERAPY 1/> REGIONS: CPT

## 2022-04-21 PROCEDURE — 97110 THERAPEUTIC EXERCISES: CPT

## 2022-04-21 PROCEDURE — 99024 POSTOP FOLLOW-UP VISIT: CPT | Performed by: ORTHOPAEDIC SURGERY

## 2022-04-21 RX ORDER — CLINDAMYCIN HYDROCHLORIDE 300 MG/1
300 CAPSULE ORAL 3 TIMES DAILY
Qty: 21 CAPSULE | Refills: 0 | Status: SHIPPED | OUTPATIENT
Start: 2022-04-21 | End: 2022-04-28

## 2022-04-21 NOTE — FLOWSHEET NOTE
[facility] and [address],  [facility], [facility] [address]  [address] [facility],5[address]   [place] Akbar  Phone: [phone]  Fax: [phone]      Physical Therapy Daily Treatment Note  Date:  2022    Patient Name:  Jacklyn Sahu    :  1950  MRN: [id number]  Restrictions/Precautions:    Medical/Treatment Diagnosis Information:  Diagnosis: Z98.890, Z87.81 (ICD-10-CM) - Status post open reduction and internal fixation (ORIF) of [id number] (ICD-10-CM) - Closed fracture of left tibial plateau, initial encounter   PT DX: L knee pain M25. 611  Insurance/Certification information:  PT Insurance Information: Medicare/BLBS: no visit limit, insurance covers 80%  Physician Information:  Referring Practitioner: Dr. Alton MD  Has the plan of care been signed (Y/N):        []  Yes  [x]  No     Date of Patient follow up with Physician: 22      Is this a Progress Report:     []  Yes  [x]  No        If Yes:  Date Range for reporting period:  Beginning  Ending    Progress report will be due (10 Rx or 30 days whichever is less):        Recertification will be due (POC Duration  / 90 days whichever is less): 22         Visit # Insurance Allowable Auth Required   3 No visit limit []  Yes []  No        Functional Scale: 12.5 LEFs    Date assessed:  22       Latex Allergy:  [x]NO      []YES    Preferred Language for Healthcare:   [x]English       []other:    Pain level: 0/10     SUBJECTIVE:  Patient reports compliance with HEP and her L knee ROM is improving. She reports today she was meera to don socks and compression socks independently. Sees  today    OBJECTIVE: See natividad   Observation:    Test measurements:      RESTRICTIONS/PRECAUTIONS: 50% WB L LE with SW    Exercises/Interventions:   HEP:  Access Code: [email or web address]  URL: [email or web address]  Date: 2022  Prepared by: Matthew Perez proprioception and motor activation to allow for proper function of core, proximal hip and LE with self care and ADLs  [x] (80321) Gait Re-education- Provided training and instruction to the patient for proper LE, core and proximal hip recruitment and positioning and eccentric body weight control with ambulation re-education including up and down stairs     Home Exercise Program:    [x] (92535) Reviewed/Progressed HEP activities related to strengthening, flexibility, endurance, ROM of core, proximal hip and LE for functional self-care, mobility, lifting and ambulation/stair navigation   [x] (58407)Reviewed/Progressed HEP activities related to improving balance, coordination, kinesthetic sense, posture, motor skill, proprioception of core, proximal hip and LE for self care, mobility, lifting, and ambulation/stair navigation      Manual Treatments:  PROM / STM / Oscillations-Mobs:  G-I, II, III, IV (PA's, Inf., Post.)  [x] (58728) Provided manual therapy to mobilize LE, proximal hip and/or LS spine soft tissue/joints for the purpose of modulating pain, promoting relaxation,  increasing ROM, reducing/eliminating soft tissue swelling/inflammation/restriction, improving soft tissue extensibility and allowing for proper ROM for normal function with self care, mobility, lifting and ambulation. Modalities:  Ice x 15 in zero knee    Charges:  Timed Code Treatment Minutes: 30   Total Treatment Minutes: 30       [] EVAL (LOW) 72645 (typically 20 minutes face-to-face)  [] EVAL (MOD) 94589 (typically 30 minutes face-to-face)  [] EVAL (HIGH) 36980 (typically 45 minutes face-to-face)  [] RE-EVAL   [x] VU(23723) x   1  [] IONTO  [] NMR (73533) x     [] VASO (game ready x 15')  [x] Manual (37673) x  1   [] Other:  [] TA x      [] Mech Traction (55656)  [] ES(attended) (25715)      [] ES (un) (70543):     GOALS:   GOALS:   Patient stated goal: Patient to be able to walk to gym and resume normal routine. [x]?  Progressing: []? Met: []? Not Met: []? Adjusted     Therapist goals for Patient:   Short Term Goals: To be achieved in: 2 weeks  1. Independent in HEP and progression per patient tolerance, in order to prevent re-injury. [x]? Progressing: []? Met: []? Not Met: []? Adjusted   2. Patient will have a decrease in pain to facilitate improvement in movement, function, and ADLs as indicated by Functional Deficits. [x]? Progressing: []? Met: []? Not Met: []? Adjusted     Long Term Goals: To be achieved in: 8-12 weeks  1. Disability index score 50% improved for the LEFS to assist with reaching prior level of function. [x]? Progressing: []? Met: []? Not Met: []? Adjusted  2. Patient will demonstrate increased AROM to equal to R knee to allow for proper joint functioning as indicated by patients Functional Deficits. [x]? Progressing: []? Met: []? Not Met: []? Adjusted  3. Patient will demonstrate an increase in Strength to good proximal hip strength and control, within 5lb HHD in LE to allow for proper functional mobility as indicated by patients Functional Deficits. [x]? Progressing: []? Met: []? Not Met: []? Adjusted  4. Patient will return to normal functional activities without increased symptoms or restriction. [x]? Progressing: []? Met: []? Not Met: []? Adjusted  5. Patient to ambulate with normal gait with out AD. [x]? Progressing: []? Met: []? Not Met: []? Adjusted      Overall Progression Towards Functional goals/ Treatment Progress Update:  [] Patient is progressing as expected towards functional goals listed. [] Progression is slowed due to complexities/Impairments listed. [] Progression has been slowed due to co-morbidities.   [x] Plan just implemented, too soon to assess goals progression <30days   [] Goals require adjustment due to lack of progress  [] Patient is not progressing as expected and requires additional follow up with physician  [] Other    Prognosis for POC: [x] Good [] Fair  [] Poor      Patient requires continued skilled intervention: [x] Yes  [] No    Treatment/Activity Tolerance:  [x] Patient able to complete treatment  [] Patient limited by fatigue  [] Patient limited by pain     [] Patient limited by other medical complications  [] Other: L knee ROM improving. Visible quadricep firing at L LE, but still poor. Shows good form with amb with SW. PLAN: See eval  [] Continue per plan of care [] Alter current plan (see comments above)  [x] Plan of care initiated [] Hold pending MD visit [] Discharge  Next visit:    Electronically signed by:  Agustina Cabrera PT    Note: If patient does not return for scheduled/ recommended follow up visits, this note will serve as a discharge from care along with most recent update on progress.

## 2022-04-21 NOTE — PROGRESS NOTES
Date:  2022    Name:  Ivanna Winters  Address:  [place] [id number]    :  1950      Age:   70 y.o.    SSN:  [id number]      Medical Record Number:  [id number]    Reason for Visit:    Chief Complaint    Knee Pain (F/U Left knee, Pain 2-3/10 right after therapy. Typically has been 1/10)      DOS:      HPI: Thiago Adorno is a 70 y.o. female here today for tibial plateau fracture. More pain in the thigh bilaterally with walking than the left knee    Left tibia with no pain and limited motions. Doing physical therapy currently with limited gains on rom of the knee. Review of Systems:  Review of Systems   Constitutional: Negative for chills and fever. HENT: Negative for nosebleeds. Eyes: Negative for double vision. Cardiovascular: Negative for chest pain. Gastrointestinal: Negative for abdominal pain. Musculoskeletal: Positive for joint pain and myalgias. Skin: Negative for rash. Neurological: Negative for seizures. Psychiatric/Behavioral: Negative for hallucinations.         Past History:  Past Medical History:   Diagnosis Date    Arthritis     Chronic hyponatremia     GERD (gastroesophageal reflux disease)     History of blood transfusion     Hyperlipidemia     Hypertension     no meds currently    OA (osteoarthritis)     Prolonged emergence from general anesthesia     Tibial plateau fracture     Wears partial dentures     upper & lower     Past Surgical History:   Procedure Laterality Date     SECTION      COLONOSCOPY      LEG SURGERY Left 2022    OPEN REDUCTION INTERNAL FIXATION LEFT TIBIAL PLATEAU FRACTURE         SUKUMAR  CPT CODE - 47329 performed by Emeterio Romero MD at [address] [place] [address] Left 2020    LEFT TOTAL HIP ARTHROPLASTY ANTERIOR APPROACH performed by Emeterio Romero MD at [address] [place] [address] Right 5/10/2021    RIGHT HIP ARTHROPLASTY ANTERIOR APPROACH performed by Ivan Bermudez MD at [address]     Current Outpatient Medications on File Prior to Visit   Medication Sig Dispense Refill    predniSONE (DELTASONE) 5 MG tablet Take 1 tablet by mouth 2 times daily for 15 days 30 tablet 0    Diclofenac Sodium POWD 2 g by Does not apply route 4 times daily Formula #8E Baclo 2% Diclo 3% DMSO 5% Ifeoma 6% Lido 2% Prilo 2% 240 g 11    rivaroxaban (XARELTO) 15 MG TABS tablet Take 1 tablet by mouth 2 times daily (with meals) Take for 21 days then drop to 20mg qd 42 tablet 0    rivaroxaban (XARELTO) 20 MG TABS tablet Take 1 tablet by mouth daily (with breakfast) 30 tablet 5    traMADol (ULTRAM) 50 MG tablet Take 50 mg by mouth every 6 hours as needed for Pain.  methocarbamol (ROBAXIN) 750 MG tablet Take 750 mg by mouth 4 times daily      acetaminophen (TYLENOL) 325 MG tablet Take 650 mg by mouth every 6 hours as needed for Pain      simvastatin (ZOCOR) 40 MG tablet TAKE 1 TABLET BY MOUTH EVERY NIGHT 90 tablet 3    calcium carbonate (OSCAL) 500 MG TABS tablet Take 500 mg by mouth daily      omeprazole (PRILOSEC OTC) 20 MG tablet Take 20 mg by mouth daily       Multiple Vitamins-Minerals (THERAPEUTIC MULTIVITAMIN-MINERALS) tablet Take 1 tablet by mouth daily      Vitamin D (CHOLECALCIFEROL) 1000 UNITS CAPS capsule Take 2,000 Units by mouth daily        No current facility-administered medications on file prior to visit.      Social History     Socioeconomic History    Marital status:      Spouse name: Not on file    Number of children: Not on file    Years of education: Not on file    Highest education level: Not on file   Occupational History    Not on file   Tobacco Use    Smoking status: Former Smoker     Packs/day: 1.00     Years: 15.00     Pack years: 15.00     Quit date: 1983     Years since quittin.3    Smokeless tobacco: Never Used   Vaping Use    Vaping Use: Never used   Substance and Sexual Activity    Alcohol use: Not Currently Alcohol/week: 1.0 - 2.0 standard drink     Types: 1 - 2 Shots of liquor per week    Drug use: No    Sexual activity: Yes   Other Topics Concern    Not on file   Social History Narrative    Not on file     Social Determinants of Health     Financial Resource Strain: Low Risk     Difficulty of Paying Living Expenses: Not hard at all   Food Insecurity: No Food Insecurity    Worried About Running Out of Food in the Last Year: Never true    [address] in the Last Year: Never true   Transportation Needs:     Lack of Transportation (Medical): Not on file    Lack of Transportation (Non-Medical):  Not on file   Physical Activity: Insufficiently Active    Days of Exercise per Week: 3 days    Minutes of Exercise per Session: 40 min   Stress:     Feeling of Stress : Not on file   Social Connections:     Frequency of Communication with Friends and Family: Not on file    Frequency of Social Gatherings with Friends and Family: Not on file    Attends [employer]ism Services: Not on file    Active Member of Clubs or Organizations: Not on file    Attends Club or Organization Meetings: Not on file    Marital Status: Not on file   Intimate Partner Violence:     Fear of Current or Ex-Partner: Not on file    Emotionally Abused: Not on file    Physically Abused: Not on file    Sexually Abused: Not on file   Housing Stability:     Unable to Pay for Housing in the Last Year: Not on file    Number of [place] in the Last Year: Not on file    Unstable Housing in the Last Year: Not on file     Family History   Problem Relation Age of Onset    Breast Cancer Mother 68       Current Medications:    Current Outpatient Medications   Medication Sig Dispense Refill    predniSONE (DELTASONE) 5 MG tablet Take 1 tablet by mouth 2 times daily for 15 days 30 tablet 0    Diclofenac Sodium POWD 2 g by Does not apply route 4 times daily Formula #8E Baclo 2% Diclo 3% DMSO 5% Ifeoma 6% Lido 2% Prilo 2% 240 g 11    rivaroxaban (XARELTO) 15 MG TABS tablet Take 1 tablet by mouth 2 times daily (with meals) Take for 21 days then drop to 20mg qd 42 tablet 0    rivaroxaban (XARELTO) 20 MG TABS tablet Take 1 tablet by mouth daily (with breakfast) 30 tablet 5    traMADol (ULTRAM) 50 MG tablet Take 50 mg by mouth every 6 hours as needed for Pain.  methocarbamol (ROBAXIN) 750 MG tablet Take 750 mg by mouth 4 times daily      acetaminophen (TYLENOL) 325 MG tablet Take 650 mg by mouth every 6 hours as needed for Pain      simvastatin (ZOCOR) 40 MG tablet TAKE 1 TABLET BY MOUTH EVERY NIGHT 90 tablet 3    calcium carbonate (OSCAL) 500 MG TABS tablet Take 500 mg by mouth daily      omeprazole (PRILOSEC OTC) 20 MG tablet Take 20 mg by mouth daily       Multiple Vitamins-Minerals (THERAPEUTIC MULTIVITAMIN-MINERALS) tablet Take 1 tablet by mouth daily      Vitamin D (CHOLECALCIFEROL) 1000 UNITS CAPS capsule Take 2,000 Units by mouth daily        No current facility-administered medications for this visit. Allergies: Allergies   Allergen Reactions    Zanaflex [Tizanidine]      bradycardia    Keflet [Cephalexin] Rash       Physical Exam:  There were no vitals filed for this visit. Physical Exam   Constitutional: Patient is oriented to person, place, and time and well-developed, well-nourished, and in no distress. HENT:   Head: Normocephalic and atraumatic. Eyes: Pupils are equal, round, and reactive to light. Neck: No tracheal deviation present. No thyromegaly present. Pulmonary/Chest: Effort normal.   Abdominal: Soft. There is no guarding. Musculoskeletal: Patient exhibits tenderness and pain. Neurological: Patient is alert and oriented to person, place, and time. Skin: Skin is warm. Psychiatric: Affect normal.     General: Derek Luna is a healthy and well appearing 70y.o. year old female who is sitting comfortably in our office in acute distress. Alert and oriented.     Physical Exam:  RUE:    No gross deformities noted.   Sensation is intact to light touch throughout the median, ulnar and radial nerve distribution. Able to wiggle fingers, gives thumbs up, A-okay and cross index and middle fingers. Full range of motion of the hand, wrist, elbow and shoulder. LUE:   No gross deformities noted. Sensation is intact to light touch throughout the median, ulnar and radial nerve distribution. Able to wiggle fingers, gives thumbs up, A-okay and cross index and middle fingers. Full range of motion of the hand wrist elbow and shoulder. RLE:   No gross deformities noted. Sensation is intact to light touch throughout the lower extremity. Able to wiggle toes and plantar and dorsiflex foot. Full range of motion at the ankle, knee and hip    LLE:  Dorsal foot decrease in sensation  Tibia swelling gross deformity noted although improved. Valgus lie of the left leg to 5-7 degrees although pateint feels that this may be a chronic situation   rom 10 to 80. .    Sensation is intact to light touch throughout the lower extremity plantar  Able to wiggle toes and plantar and dorsiflex foot. Full range of motion at the  hip    Diagnostics:  Xray   Have reviewed the xrays above from 04/21/22   and my impression is: progressive collapse of the lateral tibial plateau  1. Closed fracture of left tibial plateau, initial encounter         Assessment: tibia plateau fracture      Plan:       o outpateint therapy . Aggressive rom of the joint with use of prednisone as needed and pain medications to increase potential for rapid increase in rom. Increase % weight bearing. 2 week recheck /     Will require total knee arthroplasty for the left knee because of collapse of the tibial plateau. Plan for rom tech bicycle to establish rom improvement preoperatively     Advance to full weight bearing.          [unfilled]     Mita Anders MD    Date:    4/21/2022

## 2022-04-23 NOTE — FLOWSHEET NOTE
[facility] and [address],  [facility], Formerly [facility] [address]  [address] [facility],[address]   Osorio Akbar  Phone: [phone]  Fax: [phone]      Physical Therapy Daily Treatment Note  Date:  2022  Patient Name:  Chandni Valenzuela    :  1950  MRN: [id number]  Restrictions/Precautions:    Medical/Treatment Diagnosis Information:  Diagnosis: Z98.890, Z87.81 (ICD-10-CM) - Status post open reduction and internal fixation (ORIF) of ffgvffesS28.142A (ICD-10-CM) - Closed fracture of left tibial plateau, initial encounter   PT DX: L knee pain M25. 634  Insurance/Certification information:  PT Insurance Information: Medicare/BLBS: no visit limit, insurance covers 80%  Physician Information:  Referring Practitioner: Dr. Saritha Lebron MD  Has the plan of care been signed (Y/N):        []  Yes  [x]  No     Date of Patient follow up with Physician: 22      Is this a Progress Report:     []  Yes  [x]  No        If Yes:  Date Range for reporting period:  Beginning  Ending    Progress report will be due (10 Rx or 30 days whichever is less):        Recertification will be due (POC Duration  / 90 days whichever is less): 22         Visit # Insurance Allowable Auth Required   2 No visit limit []  Yes []  No        Functional Scale: 12.5 LEFs    Date assessed:  22       Latex Allergy:  [x]NO      []YES    Preferred Language for Healthcare:   [x]English       []other:    Pain level:  -4/10     SUBJECTIVE:  \"I am feeling better after that first visit\"    OBJECTIVE:     Observation: 3-/5 knee strength    Test measurements:      RESTRICTIONS/PRECAUTIONS: 50% WB L LE with SW    Exercises/Interventions:   HEP:  Access Code: [email or web address]  URL: [email or web address]  Date: 2022  Prepared by: Claudetta Polite     Exercises  Seated Calf Towel Stretch - 3 x daily - 7 x weekly - 1 sets - 30 reps - 3 hold  Long Sitting Quad Set - 3 x daily - 7 x weekly - 1 sets - 10 reps - 10 hold  Sitting Heel Slide with Towel - 3 x daily - 7 x weekly - 1 sets - 10 reps - 10 hold     Exercise/Equipment Resistance/Repetitions Other comments   Stretching     Hamstring 4 x 20\"    Towel Pull 3 x 30 sec Calf stretch   Inclined Calf     Hip Flexion     ITB     Groin     Quad                    SLR     Supine 15x    Abduction 15x    Adduction     Prone     SLR+     saq/laq 30x each     Isometrics     Quad sets 20 x 10 sec         Patellar Glides X 10 min All directions   Medial     Superior     Inferior          ROM     Sheet Pulls 10 x 10 sec    Hang Weights     Passive     Active     Weight Shift     Ankle Pumps     Prone hang  5'    Seated ext prop  5'         CKC     Calf raises     Wall sits     Step ups     1 leg stand     Squatting     CC TKE     Balance     bridges          PRE     Extension  RANGE:   Flexion  RANGE:        Quantum machines     Leg press      Leg extension     Leg curl          Manual interventions     Prom/stm  12'               Therapeutic Exercise and NMR EXR  [x] (23440) Provided verbal/tactile cueing for activities related to strengthening, flexibility, endurance, ROM for improvements in LE, proximal hip, and core control with self care, mobility, lifting, ambulation. [x] (02924) Provided verbal/tactile cueing for activities related to improving balance, coordination, kinesthetic sense, posture, motor skill, proprioception  to assist with LE, proximal hip, and core control in self care, mobility, lifting, ambulation and eccentric single leg control.      NMR and Therapeutic Activities:    [x] (33858 or 23083) Provided verbal/tactile cueing for activities related to improving balance, coordination, kinesthetic sense, posture, motor skill, proprioception and motor activation to allow for proper function of core, proximal hip and LE with self care and ADLs  [x] (00094) Gait Re-education- Provided training and instruction to the patient for proper LE, core and proximal hip recruitment and positioning and eccentric body weight control with ambulation re-education including up and down stairs     Home Exercise Program:    [x] (44106) Reviewed/Progressed HEP activities related to strengthening, flexibility, endurance, ROM of core, proximal hip and LE for functional self-care, mobility, lifting and ambulation/stair navigation   [x] (05818)Reviewed/Progressed HEP activities related to improving balance, coordination, kinesthetic sense, posture, motor skill, proprioception of core, proximal hip and LE for self care, mobility, lifting, and ambulation/stair navigation      Manual Treatments:  PROM / STM / Oscillations-Mobs:  G-I, II, III, IV (PA's, Inf., Post.)  [x] (18490) Provided manual therapy to mobilize LE, proximal hip and/or LS spine soft tissue/joints for the purpose of modulating pain, promoting relaxation,  increasing ROM, reducing/eliminating soft tissue swelling/inflammation/restriction, improving soft tissue extensibility and allowing for proper ROM for normal function with self care, mobility, lifting and ambulation. Modalities:      Charges:  Timed Code Treatment Minutes: 39'   Total Treatment Minutes: 39'       [] EVAL (LOW) 06941 (typically 20 minutes face-to-face)  [] EVAL (MOD) 99944 (typically 30 minutes face-to-face)  [] EVAL (HIGH) 22450 (typically 45 minutes face-to-face)  [] RE-EVAL   [x] XW(26702) x   1  [] IONTO  [x] NMR (27035) x  1   [] VASO (game ready x 15')  [x] Manual (71952) x  1   [] Other:  [] TA x      [] Mech Traction (57462)  [] ES(attended) (30855)      [] ES (un) (70233):     GOALS:   GOALS:   Patient stated goal: Patient to be able to walk to gym and resume normal routine. [x]? Progressing: []? Met: []? Not Met: []? Adjusted     Therapist goals for Patient:   Short Term Goals: To be achieved in: 2 weeks  1. Independent in HEP and progression per patient tolerance, in order to prevent re-injury. [x]? Progressing: []? Met: []? Not Met: []? Adjusted   2. Patient will have a decrease in pain to facilitate improvement in movement, function, and ADLs as indicated by Functional Deficits. [x]? Progressing: []? Met: []? Not Met: []? Adjusted     Long Term Goals: To be achieved in: 8-12 weeks  1. Disability index score 50% improved for the LEFS to assist with reaching prior level of function. [x]? Progressing: []? Met: []? Not Met: []? Adjusted  2. Patient will demonstrate increased AROM to equal to R knee to allow for proper joint functioning as indicated by patients Functional Deficits. [x]? Progressing: []? Met: []? Not Met: []? Adjusted  3. Patient will demonstrate an increase in Strength to good proximal hip strength and control, within 5lb HHD in LE to allow for proper functional mobility as indicated by patients Functional Deficits. [x]? Progressing: []? Met: []? Not Met: []? Adjusted  4. Patient will return to normal functional activities without increased symptoms or restriction. [x]? Progressing: []? Met: []? Not Met: []? Adjusted  5. Patient to ambulate with normal gait with out AD. [x]? Progressing: []? Met: []? Not Met: []? Adjusted      Overall Progression Towards Functional goals/ Treatment Progress Update:  [] Patient is progressing as expected towards functional goals listed. [] Progression is slowed due to complexities/Impairments listed. [] Progression has been slowed due to co-morbidities.   [x] Plan just implemented, too soon to assess goals progression <30days   [] Goals require adjustment due to lack of progress  [] Patient is not progressing as expected and requires additional follow up with physician  [] Other    Prognosis for POC: [x] Good [] Fair  [] Poor      Patient requires continued skilled intervention: [x] Yes  [] No    Treatment/Activity Tolerance:  [x] Patient able to complete treatment  [] Patient limited by fatigue  [] Patient limited by pain     [] Patient limited by other medical complications  [] Other:         PLAN: See eval  [x] Continue per plan of care [] Alter current plan (see comments above)  [] Plan of care initiated [] Hold pending MD visit [] Discharge  Next visit:  JOYCE VALDEZ  SAEdinson  TKE    Electronically signed by:  Giuliana Marinelli, PT, MPT     Note: If patient does not return for scheduled/ recommended follow up visits, this note will serve as a discharge from care along with most recent update on progress.

## 2022-04-25 ENCOUNTER — HOSPITAL ENCOUNTER (OUTPATIENT)
Dept: PHYSICAL THERAPY | Age: 72
Setting detail: THERAPIES SERIES
Discharge: HOME | End: 2022-04-25
Payer: MEDICARE

## 2022-04-25 PROCEDURE — 97110 THERAPEUTIC EXERCISES: CPT | Performed by: PHYSICAL THERAPIST

## 2022-04-25 PROCEDURE — 97112 NEUROMUSCULAR REEDUCATION: CPT | Performed by: PHYSICAL THERAPIST

## 2022-04-25 PROCEDURE — 97140 MANUAL THERAPY 1/> REGIONS: CPT | Performed by: PHYSICAL THERAPIST

## 2022-04-27 NOTE — FLOWSHEET NOTE
[facility] and [address],  [facility], [place] [id number] [place] [address]  Phone: [phone]  Fax: [phone]      Physical Therapy Daily Treatment Note  Date: 2022  Patient Name:  Travon Tovar    :  1950  MRN: [id number]  Restrictions/Precautions:    Medical/Treatment Diagnosis Information:  Diagnosis: Z98.890, Z87.81 (ICD-10-CM) - Status post open reduction and internal fixation (ORIF) of [id number] (ICD-10-CM) - Closed fracture of left tibial plateau, initial encounter   PT DX: L knee pain M25. 486  Insurance/Certification information:  PT Insurance Information: Medicare/BLBS: no visit limit, insurance covers 80%  Physician Information:  Referring Practitioner: Dr. Evi Miller MD  Has the plan of care been signed (Y/N):        []  Yes  [x]  No     Date of Patient follow up with Physician: 22      Is this a Progress Report:     []  Yes  [x]  No        If Yes:  Date Range for reporting period:  Beginning  Ending    Progress report will be due (10 Rx or 30 days whichever is less):        Recertification will be due (POC Duration  / 90 days whichever is less): 22         Visit # Insurance Allowable Auth Required   4 No visit limit []  Yes []  No        Functional Scale: 12.5 LEFs    Date assessed:  22       Latex Allergy:  [x]NO      []YES    Preferred Language for Healthcare:   [x]English       []other:    Pain level: 0/10     SUBJECTIVE:  Patient reports compliance with HEP and her L knee ROM is improving. She reports today she was meera to don socks and compression socks independently. Sees  today    OBJECTIVE: See penelopeal   Observation:    Test measurements:      RESTRICTIONS/PRECAUTIONS: 50% WB L LE with SW    Exercises/Interventions:   HEP:  Access Code: [email or web address]  URL: [email or web address]  Date: 2022  Prepared by: Diaan Harris  Seated Calf Towel Stretch - 3 x daily - 7 x weekly - 1 sets - 30 reps - 3 hold  Long Sitting Quad Set - 3 x daily - 7 x weekly - 1 sets - 10 reps - 10 hold  Sitting Heel Slide with Towel - 3 x daily - 7 x weekly - 1 sets - 10 reps - 10 hold     Exercise/Equipment Resistance/Repetitions Other comments   Stretching     Hamstring     Towel Pull 3 x 30 sec Calf stretch   Inclined Calf     Hip Flexion     ITB     Groin     Quad                    SLR     Supine     Abduction     Adduction     Prone     LAQs 2 x 10    SAQs 2 X 15 Half bolster with belt assist   Isometrics     Quad sets 20 x 10 sec With belt        Patellar Glides     Medial     Superior     Inferior          ROM     ERMI X 3 min 30 sec hold   Prone hangs X 3 min 1 lb   Passive     Active     Weight Shift     Ankle Pumps     eob flexion hang  8' x 2              CKC     Calf raises     Wall sits     Step ups     1 leg stand     Squatting     CC TKE     Balance     bridges          PRE     Extension  RANGE: -10   Flexion  RANGE: 76        Quantum machines     Leg press      Leg extension     Leg curl          Manual interventions Patellar mobilizations (all directions) x 10 min     Gentle oscillations into knee ext x 5 min               Therapeutic Exercise and NMR EXR  [x] (86770) Provided verbal/tactile cueing for activities related to strengthening, flexibility, endurance, ROM for improvements in LE, proximal hip, and core control with self care, mobility, lifting, ambulation. [x] (92800) Provided verbal/tactile cueing for activities related to improving balance, coordination, kinesthetic sense, posture, motor skill, proprioception  to assist with LE, proximal hip, and core control in self care, mobility, lifting, ambulation and eccentric single leg control.      NMR and Therapeutic Activities:    [x] (19562 or 47948) Provided verbal/tactile cueing for activities related to improving balance, coordination, kinesthetic sense, posture, motor skill, proprioception and motor activation to allow for proper function of core, proximal hip and LE with self care and ADLs  [x] (53354) Gait Re-education- Provided training and instruction to the patient for proper LE, core and proximal hip recruitment and positioning and eccentric body weight control with ambulation re-education including up and down stairs     Home Exercise Program:    [x] (92586) Reviewed/Progressed HEP activities related to strengthening, flexibility, endurance, ROM of core, proximal hip and LE for functional self-care, mobility, lifting and ambulation/stair navigation   [x] (41485)Reviewed/Progressed HEP activities related to improving balance, coordination, kinesthetic sense, posture, motor skill, proprioception of core, proximal hip and LE for self care, mobility, lifting, and ambulation/stair navigation      Manual Treatments:  PROM / STM / Oscillations-Mobs:  G-I, II, III, IV (PA's, Inf., Post.)  [x] (72782) Provided manual therapy to mobilize LE, proximal hip and/or LS spine soft tissue/joints for the purpose of modulating pain, promoting relaxation,  increasing ROM, reducing/eliminating soft tissue swelling/inflammation/restriction, improving soft tissue extensibility and allowing for proper ROM for normal function with self care, mobility, lifting and ambulation. Modalities:  Ice x 15 in zero knee    Charges:  Timed Code Treatment Minutes: 37'   Total Treatment Minutes: 37'       [] EVAL (LOW) 43495 (typically 20 minutes face-to-face)  [] EVAL (MOD) 38349 (typically 30 minutes face-to-face)  [] EVAL (HIGH) 08315 (typically 45 minutes face-to-face)  [] RE-EVAL   [x] HH(25286) x   1  [] IONTO  [x] NMR (05949) x    1 [] VASO (game ready x 15')  [x] Manual (59858) x  1   [] Other:  [] TA x      [] Mech Traction (55058)  [] ES(attended) (33900)      [] ES (un) (52041):     GOALS:   GOALS:   Patient stated goal: Patient to be able to walk to gym and resume normal routine. [x]? Progressing: []? Met: []?  Not Met: []? Adjusted     Therapist goals for Patient:   Short Term Goals: To be achieved in: 2 weeks  1. Independent in HEP and progression per patient tolerance, in order to prevent re-injury. [x]? Progressing: []? Met: []? Not Met: []? Adjusted   2. Patient will have a decrease in pain to facilitate improvement in movement, function, and ADLs as indicated by Functional Deficits. [x]? Progressing: []? Met: []? Not Met: []? Adjusted     Long Term Goals: To be achieved in: 8-12 weeks  1. Disability index score 50% improved for the LEFS to assist with reaching prior level of function. [x]? Progressing: []? Met: []? Not Met: []? Adjusted  2. Patient will demonstrate increased AROM to equal to R knee to allow for proper joint functioning as indicated by patients Functional Deficits. [x]? Progressing: []? Met: []? Not Met: []? Adjusted  3. Patient will demonstrate an increase in Strength to good proximal hip strength and control, within 5lb HHD in LE to allow for proper functional mobility as indicated by patients Functional Deficits. [x]? Progressing: []? Met: []? Not Met: []? Adjusted  4. Patient will return to normal functional activities without increased symptoms or restriction. [x]? Progressing: []? Met: []? Not Met: []? Adjusted  5. Patient to ambulate with normal gait with out AD. [x]? Progressing: []? Met: []? Not Met: []? Adjusted      Overall Progression Towards Functional goals/ Treatment Progress Update:  [] Patient is progressing as expected towards functional goals listed. [] Progression is slowed due to complexities/Impairments listed. [] Progression has been slowed due to co-morbidities.   [x] Plan just implemented, too soon to assess goals progression <30days   [] Goals require adjustment due to lack of progress  [] Patient is not progressing as expected and requires additional follow up with physician  [] Other    Prognosis for POC: [x] Good [] Fair  [] Poor      Patient requires continued skilled intervention: [x] Yes  [] No    Treatment/Activity Tolerance:  [x] Patient able to complete treatment  [] Patient limited by fatigue  [] Patient limited by pain     [] Patient limited by other medical complications  [] Other: L knee ROM improving. Visible quadricep firing at L LE, but still poor. Shows good form with amb with SW. PLAN: See eval  [x] Continue per plan of care [] Alter current plan (see comments above)  [] Plan of care initiated [] Hold pending MD visit [] Discharge  Next visit:    Electronically signed by:  Veronica Goyal, PT, MPT     Note: If patient does not return for scheduled/ recommended follow up visits, this note will serve as a discharge from care along with most recent update on progress.

## 2022-04-28 ENCOUNTER — HOSPITAL ENCOUNTER (OUTPATIENT)
Dept: PHYSICAL THERAPY | Age: 72
Setting detail: THERAPIES SERIES
Discharge: HOME | End: 2022-04-28
Payer: MEDICARE

## 2022-04-28 PROCEDURE — 97140 MANUAL THERAPY 1/> REGIONS: CPT

## 2022-04-28 PROCEDURE — 97112 NEUROMUSCULAR REEDUCATION: CPT

## 2022-04-28 PROCEDURE — 97110 THERAPEUTIC EXERCISES: CPT

## 2022-04-28 NOTE — FLOWSHEET NOTE
[email or web address]  Date: 04/14/2022  Prepared by: Laure Fleming     Exercises  Seated Calf Towel Stretch - 3 x daily - 7 x weekly - 1 sets - 30 reps - 3 hold  Long Sitting Quad Set - 3 x daily - 7 x weekly - 1 sets - 10 reps - 10 hold  Sitting Heel Slide with Towel - 3 x daily - 7 x weekly - 1 sets - 10 reps - 10 hold     Exercise/Equipment Resistance/Repetitions Other comments   Stretching     Hamstring     Towel Pull Inclined Calf     Hip Flexion     ITB     Groin     Quad                    SLR     Supine     Abduction     Adduction     Prone     LAQs 2 x 10    SAQs 2 X 15 Half bolster with belt assist   Isometrics     Quad sets X 15 min [place] 10\"on/10\" off        Patellar Glides     Medial     Superior     Inferior          ROM     ERMI X 3 min 30 sec hold   Prone hangs X 5 min 1 lb   Passive     Active     Weight Shift     Ankle Pumps     eob flexion hang  2 x 10 PT overpressure into flexion   Bike X 5 min Arcs back/forth   Gait training Instruction on proper gait    CKC     Calf raises     Wall sits     Step ups     1 leg stand     Squatting     CC TKE     Balance     bridges          PRE     Extension  RANGE:    Flexion  RANGE:         Quantum machines     Leg press      Leg extension     Leg curl          Manual interventions Patellar mobilizations (all directions) x 10 min     Gentle oscillations into knee ext x 5 min               Therapeutic Exercise and NMR EXR  [x] (91801) Provided verbal/tactile cueing for activities related to strengthening, flexibility, endurance, ROM for improvements in LE, proximal hip, and core control with self care, mobility, lifting, ambulation. [x] (98580) Provided verbal/tactile cueing for activities related to improving balance, coordination, kinesthetic sense, posture, motor skill, proprioception  to assist with LE, proximal hip, and core control in self care, mobility, lifting, ambulation and eccentric single leg control.      NMR and Therapeutic Activities: [x] (44779 or 03999) Provided verbal/tactile cueing for activities related to improving balance, coordination, kinesthetic sense, posture, motor skill, proprioception and motor activation to allow for proper function of core, proximal hip and LE with self care and ADLs  [x] (74419) Gait Re-education- Provided training and instruction to the patient for proper LE, core and proximal hip recruitment and positioning and eccentric body weight control with ambulation re-education including up and down stairs     Home Exercise Program:    [x] (61383) Reviewed/Progressed HEP activities related to strengthening, flexibility, endurance, ROM of core, proximal hip and LE for functional self-care, mobility, lifting and ambulation/stair navigation   [x] (87129)Reviewed/Progressed HEP activities related to improving balance, coordination, kinesthetic sense, posture, motor skill, proprioception of core, proximal hip and LE for self care, mobility, lifting, and ambulation/stair navigation      Manual Treatments:  PROM / STM / Oscillations-Mobs:  G-I, II, III, IV (PA's, Inf., Post.)  [x] (12145) Provided manual therapy to mobilize LE, proximal hip and/or LS spine soft tissue/joints for the purpose of modulating pain, promoting relaxation,  increasing ROM, reducing/eliminating soft tissue swelling/inflammation/restriction, improving soft tissue extensibility and allowing for proper ROM for normal function with self care, mobility, lifting and ambulation.      Modalities:  Ice x 15 in zero knee with [place] 10\" on/10\" off    Charges:  Timed Code Treatment Minutes: 46'   Total Treatment Minutes: 46'       [] EVAL (LOW) 89773 (typically 20 minutes face-to-face)  [] EVAL (MOD) 50915 (typically 30 minutes face-to-face)  [] EVAL (HIGH) 08832 (typically 45 minutes face-to-face)  [] RE-EVAL   [x] DY(30262) x   1  [] IONTO  [x] NMR (41571) x    1 [] VASO (game ready x 15')  [x] Manual (97098) x  1   [] Other:  [] TA x      [] Mech Traction (81191)  [] ES(attended) (25713)      [] ES (un) (31703):     GOALS:   GOALS:   Patient stated goal: Patient to be able to walk to gym and resume normal routine. [x]? Progressing: []? Met: []? Not Met: []? Adjusted     Therapist goals for Patient:   Short Term Goals: To be achieved in: 2 weeks  1. Independent in HEP and progression per patient tolerance, in order to prevent re-injury. [x]? Progressing: []? Met: []? Not Met: []? Adjusted   2. Patient will have a decrease in pain to facilitate improvement in movement, function, and ADLs as indicated by Functional Deficits. [x]? Progressing: []? Met: []? Not Met: []? Adjusted     Long Term Goals: To be achieved in: 8-12 weeks  1. Disability index score 50% improved for the LEFS to assist with reaching prior level of function. [x]? Progressing: []? Met: []? Not Met: []? Adjusted  2. Patient will demonstrate increased AROM to equal to R knee to allow for proper joint functioning as indicated by patients Functional Deficits. [x]? Progressing: []? Met: []? Not Met: []? Adjusted  3. Patient will demonstrate an increase in Strength to good proximal hip strength and control, within 5lb HHD in LE to allow for proper functional mobility as indicated by patients Functional Deficits. [x]? Progressing: []? Met: []? Not Met: []? Adjusted  4. Patient will return to normal functional activities without increased symptoms or restriction. [x]? Progressing: []? Met: []? Not Met: []? Adjusted  5. Patient to ambulate with normal gait with out AD. [x]? Progressing: []? Met: []? Not Met: []? Adjusted      Overall Progression Towards Functional goals/ Treatment Progress Update:  [] Patient is progressing as expected towards functional goals listed. [] Progression is slowed due to complexities/Impairments listed. [] Progression has been slowed due to co-morbidities.   [x] Plan just implemented, too soon to assess goals progression <30days   [] Goals require adjustment due to lack of progress  [] Patient is not progressing as expected and requires additional follow up with physician  [] Other    Prognosis for POC: [x] Good [] Fair  [] Poor      Patient requires continued skilled intervention: [x] Yes  [] No    Treatment/Activity Tolerance:  [x] Patient able to complete treatment  [] Patient limited by fatigue  [] Patient limited by pain     [] Patient limited by other medical complications  [] Other: L knee more stiff with decreased L quadricep firing compared with last visit. Cued patient to heel strike and knee flex with gait. Continue to push ROM. PLAN: See eval  [x] Continue per plan of care [] Alter current plan (see comments above)  [] Plan of care initiated [] Hold pending MD visit [] Discharge  Next visit:    Electronically signed by:  Victorino Oliveros PT, MPT     Note: If patient does not return for scheduled/ recommended follow up visits, this note will serve as a discharge from care along with most recent update on progress.

## 2022-05-02 ENCOUNTER — HOSPITAL ENCOUNTER (OUTPATIENT)
Dept: PHYSICAL THERAPY | Age: 72
Setting detail: THERAPIES SERIES
Discharge: HOME | End: 2022-05-02
Payer: MEDICARE

## 2022-05-02 PROCEDURE — 97016 VASOPNEUMATIC DEVICE THERAPY: CPT | Performed by: SPECIALIST/TECHNOLOGIST

## 2022-05-02 PROCEDURE — 97110 THERAPEUTIC EXERCISES: CPT | Performed by: SPECIALIST/TECHNOLOGIST

## 2022-05-02 PROCEDURE — 97140 MANUAL THERAPY 1/> REGIONS: CPT | Performed by: SPECIALIST/TECHNOLOGIST

## 2022-05-02 NOTE — FLOWSHEET NOTE
The [facility] and [address],  Sports Performance and Rehabilitation, [facility] [address]199 12[address]  [address] [facility],5Th Floor   Osorio Akbar  Phone: [phone]  Fax: [phone]      Physical Therapy Daily Treatment Note  Date: 2022  Patient Name:  Fuentes Gonzalez    :  1950  MRN: [id number]  Restrictions/Precautions:    Medical/Treatment Diagnosis Information:  Diagnosis: Z98.890, Z87.81 (ICD-10-CM) - Status post open reduction and internal fixation (ORIF) of urembgnsD73.142A (ICD-10-CM) - Closed fracture of left tibial plateau, initial encounter   PT DX: L knee pain M25. 281  Insurance/Certification information:  PT Insurance Information: Medicare/BLBS: no visit limit, insurance covers 80%  Physician Information:  Referring Practitioner: Dr. Tashia Parker MD  Has the plan of care been signed (Y/N):        []  Yes  [x]  No     Date of Patient follow up with Physician: 22      Is this a Progress Report:     []  Yes  [x]  No        If Yes:  Date Range for reporting period:  Beginning  Ending    Progress report will be due (10 Rx or 30 days whichever is less):        Recertification will be due (POC Duration  / 90 days whichever is less): 22         Visit # Insurance Allowable Auth Required   6 No visit limit []  Yes []  No        Functional Scale: 12.5 LEFs    Date assessed:  22       Latex Allergy:  [x]NO      []YES    Preferred Language for Healthcare:   [x]English       []other:    Pain level: 0/10     SUBJECTIVE:  Patient reports her knee has been stiff this morning. Pt. States \" I been trying to get more motion in my knee. \"     OBJECTIVE: See eval   Observation: Decreased heel strike and knee flexion with amb with crutches.  Test measurements:      RESTRICTIONS/PRECAUTIONS: 50% WB L LE with SW    Exercises/Interventions:   HEP:  Access Code: [email or web address]  URL: [email or web address]  Date: 2022  Prepared by: Vi Esquivel Moerlein     Exercises  Seated Calf Towel Stretch - 3 x daily - 7 x weekly - 1 sets - 30 reps - 3 hold  Long Sitting Quad Set - 3 x daily - 7 x weekly - 1 sets - 10 reps - 10 hold  Sitting Heel Slide with Towel - 3 x daily - 7 x weekly - 1 sets - 10 reps - 10 hold     DOS 5/2/22    Exercise/Equipment Resistance/Repetitions Other comments   Stretching     Hamstring     Towel Pull Inclined Calf     Hip Flexion     ITB     Groin     Quad                    SLR     Supine flex 2 x 10    Abduction     Adduction     Prone     LAQs 2 x 10  With RLE overpressure    SAQs 2 X 15 Half bolster with belt assist   Isometrics     Quad sets X 15 min NV - Nauruan 10\"on/10\" off        Patellar Glides     Medial     Superior     Inferior          ROM     ERMI 5'  1' minute holds   Prone hangs X 5 min 1 lb - NV   Passive     Active     Weight Shift     Pilate's reformer - knee ROM  RB 5'    eob flexion hang  2 x 10 PT overpressure into flexion   Bike X 5 min Arcs back/forth   Gait training Instruction on proper gait    CKC     Calf raises     Wall sits     Step ups     1 leg stand     Squatting     CC TKE     Balance     bridges          PRE     Extension  RANGE:    Flexion  RANGE:         Quantum machines     Leg press      Leg extension     Leg curl          Manual interventions Patellar mobilizations (all directions) and PROM knee flex x 10 min     Gentle oscillations into knee ext x 5 min               Therapeutic Exercise and NMR EXR  [x] (43770) Provided verbal/tactile cueing for activities related to strengthening, flexibility, endurance, ROM for improvements in LE, proximal hip, and core control with self care, mobility, lifting, ambulation. [x] (60726) Provided verbal/tactile cueing for activities related to improving balance, coordination, kinesthetic sense, posture, motor skill, proprioception  to assist with LE, proximal hip, and core control in self care, mobility, lifting, ambulation and eccentric single leg control. NMR and Therapeutic Activities:    [x] (95562 or 60458) Provided verbal/tactile cueing for activities related to improving balance, coordination, kinesthetic sense, posture, motor skill, proprioception and motor activation to allow for proper function of core, proximal hip and LE with self care and ADLs  [x] (41095) Gait Re-education- Provided training and instruction to the patient for proper LE, core and proximal hip recruitment and positioning and eccentric body weight control with ambulation re-education including up and down stairs     Home Exercise Program:    [x] (87264) Reviewed/Progressed HEP activities related to strengthening, flexibility, endurance, ROM of core, proximal hip and LE for functional self-care, mobility, lifting and ambulation/stair navigation   [x] (91085)Reviewed/Progressed HEP activities related to improving balance, coordination, kinesthetic sense, posture, motor skill, proprioception of core, proximal hip and LE for self care, mobility, lifting, and ambulation/stair navigation      Manual Treatments:  PROM / STM / Oscillations-Mobs:  G-I, II, III, IV (PA's, Inf., Post.)  [x] (25121) Provided manual therapy to mobilize LE, proximal hip and/or LS spine soft tissue/joints for the purpose of modulating pain, promoting relaxation,  increasing ROM, reducing/eliminating soft tissue swelling/inflammation/restriction, improving soft tissue extensibility and allowing for proper ROM for normal function with self care, mobility, lifting and ambulation.      Modalities: Gameready + heel prop 15'    Charges:  Timed Code Treatment Minutes: 48'   Total Treatment Minutes: 72'       [] EVAL (LOW) 455 1011 (typically 20 minutes face-to-face)  [] EVAL (MOD) 36199 (typically 30 minutes face-to-face)  [] EVAL (HIGH) 72091 (typically 45 minutes face-to-face)  [] RE-EVAL   [x] IT(85064) x   2  [] IONTO  [] NMR (43972) x     [x] VASO (game ready x 15')  [x] Manual (83163) x  1   [] Other:  [] TA x      [] [facility] Traction (50101)  [] ES(attended) (16750)      [] ES (un) (84156):     GOALS:   GOALS:   Patient stated goal: Patient to be able to walk to gym and resume normal routine. [x]? Progressing: []? Met: []? Not Met: []? Adjusted     Therapist goals for Patient:   Short Term Goals: To be achieved in: 2 weeks  1. Independent in HEP and progression per patient tolerance, in order to prevent re-injury. [x]? Progressing: []? Met: []? Not Met: []? Adjusted   2. Patient will have a decrease in pain to facilitate improvement in movement, function, and ADLs as indicated by Functional Deficits. [x]? Progressing: []? Met: []? Not Met: []? Adjusted     Long Term Goals: To be achieved in: 8-12 weeks  1. Disability index score 50% improved for the LEFS to assist with reaching prior level of function. [x]? Progressing: []? Met: []? Not Met: []? Adjusted  2. Patient will demonstrate increased AROM to equal to R knee to allow for proper joint functioning as indicated by patients Functional Deficits. [x]? Progressing: []? Met: []? Not Met: []? Adjusted  3. Patient will demonstrate an increase in Strength to good proximal hip strength and control, within 5lb HHD in LE to allow for proper functional mobility as indicated by patients Functional Deficits. [x]? Progressing: []? Met: []? Not Met: []? Adjusted  4. Patient will return to normal functional activities without increased symptoms or restriction. [x]? Progressing: []? Met: []? Not Met: []? Adjusted  5. Patient to ambulate with normal gait with out AD. [x]? Progressing: []? Met: []? Not Met: []? Adjusted      Overall Progression Towards Functional goals/ Treatment Progress Update:  [] Patient is progressing as expected towards functional goals listed. [] Progression is slowed due to complexities/Impairments listed. [] Progression has been slowed due to co-morbidities.   [x] Plan just implemented, too soon to assess goals progression <30days   [] Goals require adjustment due to lack of progress  [] Patient is not progressing as expected and requires additional follow up with physician  [] Other    Prognosis for POC: [x] Good [] Fair  [] Poor      Patient requires continued skilled intervention: [x] Yes  [] No    Treatment/Activity Tolerance:  [x] Patient able to complete treatment  [] Patient limited by fatigue  [] Patient limited by pain     [] Patient limited by other medical complications  [] Other: L knee more stiff with decreased L quadricep firing compared with last visit. No increase in symptoms with exercise progression. Pt. Required multiple cues to heel strike and knee flex with gait. Continue to push ROM. Pt is progressing though strength deficits persist. Continues to require skilled PT services to increase overall ROM, functional mobility, and endurance for greater functional activity tolerance. PLAN: See eval  [x] Continue per plan of care [] Alter current plan (see comments above)  [] Plan of care initiated [] Hold pending MD visit [] Discharge  Next visit:    Electronically signed by:  Francisco Javier Dodson, PTA  [id number], Charles Riley, PT, MPT     Note: If patient does not return for scheduled/ recommended follow up visits, this note will serve as a discharge from care along with most recent update on progress.

## 2022-05-05 ENCOUNTER — HOSPITAL ENCOUNTER (OUTPATIENT)
Dept: PHYSICAL THERAPY | Age: 72
Setting detail: THERAPIES SERIES
Discharge: HOME | End: 2022-05-05
Payer: MEDICARE

## 2022-05-05 PROCEDURE — 97112 NEUROMUSCULAR REEDUCATION: CPT

## 2022-05-05 PROCEDURE — 97110 THERAPEUTIC EXERCISES: CPT

## 2022-05-05 PROCEDURE — 97140 MANUAL THERAPY 1/> REGIONS: CPT

## 2022-05-05 NOTE — FLOWSHEET NOTE
The [facility] and [address],  Sports Performance and Rehabilitation, [facility] [address]199 [address]  793 [facility],5Th Floor   Osorio Akbar  Phone: [phone]  Fax: [phone]      Physical Therapy Daily Treatment Note  Date: 22  Patient Name:  Bart Galicia    :  1950  MRN: [id number]  Restrictions/Precautions:    Medical/Treatment Diagnosis Information:  Diagnosis: Z98.890, Z87.81 (ICD-10-CM) - Status post open reduction and internal fixation (ORIF) of tmplbjdkT18.142A (ICD-10-CM) - Closed fracture of left tibial plateau, initial encounter   PT DX: L knee pain M25. 444  Insurance/Certification information:  PT Insurance Information: Medicare/BLBS: no visit limit, insurance covers 80%  Physician Information:  Referring Practitioner: Dr. Steve Santiago MD  Has the plan of care been signed (Y/N):        []  Yes  [x]  No     Date of Patient follow up with Physician: 22      Is this a Progress Report:     []  Yes  [x]  No        If Yes:  Date Range for reporting period:  Beginning  Ending    Progress report will be due (10 Rx or 30 days whichever is less):        Recertification will be due (POC Duration  / 90 days whichever is less): 22         Visit # Insurance Allowable Auth Required   7 No visit limit []  Yes []  No        Functional Scale: 12.5 LEFs    Date assessed:  22       Latex Allergy:  [x]NO      []YES    Preferred Language for Healthcare:   [x]English       []other:    Pain level: 0/10     SUBJECTIVE:  Patient reports her knee is stiff this morning, but working on HEP. Patient wants to get back to driving and going up/down steps. OBJECTIVE: See eval   Observation: Decreased heel strike and knee flexion with amb with crutches, but improves with cueing.  Test measurements:      RESTRICTIONS/PRECAUTIONS: 50% WB L LE with SW    Exercises/Interventions:   HEP:  Access Code: [email or web address]  URL: [email or web address]  Date: 04/14/2022  Prepared by: Linh Mendiola     Exercises  Seated Calf Towel Stretch - 3 x daily - 7 x weekly - 1 sets - 30 reps - 3 hold  Long Sitting Quad Set - 3 x daily - 7 x weekly - 1 sets - 10 reps - 10 hold  Sitting Heel Slide with Towel - 3 x daily - 7 x weekly - 1 sets - 10 reps - 10 hold     DOS 5/2/22    Exercise/Equipment Resistance/Repetitions Other comments   Stretching     Hamstring     Towel Pull Inclined Calf     Hip Flexion     ITB     Groin     Quad                    SLR     Supine flex 2 x 10    Abduction     Adduction     Prone     LAQs 2 x 10    SAQs 2 X 15 Half bolster with belt assist   Isometrics     Quad sets X 15 min NV - Micronesian 10\"on/10\" off        Patellar Glides     Medial     Superior     Inferior          ROM     ERMI 5'  1' minute holds   Prone hangs X 5 min 1 lb - NV   Passive     Active     Weight Shift     Pilate's reformer - knee ROM  RB 5'    eob flexion hang  2 x 10 PT overpressure into flexion   Bike X 5 min Arcs back/forth   Gait training Instruction on proper gait    CKC     Calf raises     Wall sits     Step ups     1 leg stand     Squatting     CC TKE     Balance     bridges          PRE     Extension  RANGE:    Flexion  RANGE: 87        Quantum machines     Leg press      Leg extension     Leg curl          Manual interventions Patellar mobilizations (all directions) and PROM knee flex x 10 min     Gentle oscillations into knee ext x 5 min               Therapeutic Exercise and NMR EXR  [x] (42185) Provided verbal/tactile cueing for activities related to strengthening, flexibility, endurance, ROM for improvements in LE, proximal hip, and core control with self care, mobility, lifting, ambulation.   [x] (19531) Provided verbal/tactile cueing for activities related to improving balance, coordination, kinesthetic sense, posture, motor skill, proprioception  to assist with LE, proximal hip, and core control in self care, mobility, lifting, ambulation and eccentric single leg control. NMR and Therapeutic Activities:    [x] (38601 or 29476) Provided verbal/tactile cueing for activities related to improving balance, coordination, kinesthetic sense, posture, motor skill, proprioception and motor activation to allow for proper function of core, proximal hip and LE with self care and ADLs  [x] (96847) Gait Re-education- Provided training and instruction to the patient for proper LE, core and proximal hip recruitment and positioning and eccentric body weight control with ambulation re-education including up and down stairs     Home Exercise Program:    [x] (63104) Reviewed/Progressed HEP activities related to strengthening, flexibility, endurance, ROM of core, proximal hip and LE for functional self-care, mobility, lifting and ambulation/stair navigation   [x] (59045)Reviewed/Progressed HEP activities related to improving balance, coordination, kinesthetic sense, posture, motor skill, proprioception of core, proximal hip and LE for self care, mobility, lifting, and ambulation/stair navigation      Manual Treatments:  PROM / STM / Oscillations-Mobs:  G-I, II, III, IV (PA's, Inf., Post.)  [x] (97199) Provided manual therapy to mobilize LE, proximal hip and/or LS spine soft tissue/joints for the purpose of modulating pain, promoting relaxation,  increasing ROM, reducing/eliminating soft tissue swelling/inflammation/restriction, improving soft tissue extensibility and allowing for proper ROM for normal function with self care, mobility, lifting and ambulation. Modalities:  Ice with NMR (Russian 10\"on/10\"off) + heel prop 15'    Charges:  Timed Code Treatment Minutes: 50'   Total Treatment Minutes: 72'       [] EVAL (LOW) 95651 (typically 20 minutes face-to-face)  [] EVAL (MOD) 71861 (typically 30 minutes face-to-face)  [] EVAL (HIGH) 43447 (typically 45 minutes face-to-face)  [] RE-EVAL   [x] AT(50757) x   2  [] IONTO  [x] NMR (77504) x  1   [] VASO (game ready x 15')  [x] Manual (72794) x 1   [] Other:  [] TA x      [] [facility] Traction (11004)  [] ES(attended) (23022)      [] ES (un) (73030):     GOALS:   GOALS:   Patient stated goal: Patient to be able to walk to gym and resume normal routine. [x]? Progressing: []? Met: []? Not Met: []? Adjusted     Therapist goals for Patient:   Short Term Goals: To be achieved in: 2 weeks  1. Independent in HEP and progression per patient tolerance, in order to prevent re-injury. [x]? Progressing: []? Met: []? Not Met: []? Adjusted   2. Patient will have a decrease in pain to facilitate improvement in movement, function, and ADLs as indicated by Functional Deficits. [x]? Progressing: []? Met: []? Not Met: []? Adjusted     Long Term Goals: To be achieved in: 8-12 weeks  1. Disability index score 50% improved for the LEFS to assist with reaching prior level of function. [x]? Progressing: []? Met: []? Not Met: []? Adjusted  2. Patient will demonstrate increased AROM to equal to R knee to allow for proper joint functioning as indicated by patients Functional Deficits. [x]? Progressing: []? Met: []? Not Met: []? Adjusted  3. Patient will demonstrate an increase in Strength to good proximal hip strength and control, within 5lb HHD in LE to allow for proper functional mobility as indicated by patients Functional Deficits. [x]? Progressing: []? Met: []? Not Met: []? Adjusted  4. Patient will return to normal functional activities without increased symptoms or restriction. [x]? Progressing: []? Met: []? Not Met: []? Adjusted  5. Patient to ambulate with normal gait with out AD. [x]? Progressing: []? Met: []? Not Met: []? Adjusted      Overall Progression Towards Functional goals/ Treatment Progress Update:  [] Patient is progressing as expected towards functional goals listed. [] Progression is slowed due to complexities/Impairments listed. [] Progression has been slowed due to co-morbidities.   [x] Plan just implemented, too soon to assess goals progression <30days   [] Goals require adjustment due to lack of progress  [] Patient is not progressing as expected and requires additional follow up with physician  [] Other    Prognosis for POC: [x] Good [] Fair  [] Poor      Patient requires continued skilled intervention: [x] Yes  [] No    Treatment/Activity Tolerance:  [x] Patient able to complete treatment  [] Patient limited by fatigue  [] Patient limited by pain     [] Patient limited by other medical complications  [] Other: Patient L knee still stiff; Continue to push ROM. Pt is progressing with flexion but slowly. Continues to require skilled PT services to increase overall ROM, functional mobility, and endurance for greater functional activity tolerance. PLAN: See eval  [x] Continue per plan of care [] Alter current plan (see comments above)  [] Plan of care initiated [] Hold pending MD visit [] Discharge  Next visit:    Electronically signed by:  Juanito Elizalde, PTA  [id number], Capo Marinelli, PT, MPT     Note: If patient does not return for scheduled/ recommended follow up visits, this note will serve as a discharge from care along with most recent update on progress.

## 2022-05-09 ENCOUNTER — HOSPITAL ENCOUNTER (OUTPATIENT)
Dept: PHYSICAL THERAPY | Age: 72
Setting detail: THERAPIES SERIES
Discharge: HOME | End: 2022-05-09
Payer: MEDICARE

## 2022-05-09 PROCEDURE — 97140 MANUAL THERAPY 1/> REGIONS: CPT | Performed by: PHYSICAL THERAPIST

## 2022-05-09 PROCEDURE — 97110 THERAPEUTIC EXERCISES: CPT | Performed by: PHYSICAL THERAPIST

## 2022-05-09 PROCEDURE — 97112 NEUROMUSCULAR REEDUCATION: CPT | Performed by: PHYSICAL THERAPIST

## 2022-05-11 ENCOUNTER — HOSPITAL ENCOUNTER (OUTPATIENT)
Dept: PHYSICAL THERAPY | Age: 72
Setting detail: THERAPIES SERIES
Discharge: HOME | End: 2022-05-11
Payer: MEDICARE

## 2022-05-11 ENCOUNTER — OFFICE VISIT (OUTPATIENT)
Dept: ORTHOPEDIC SURGERY | Age: 72
End: 2022-05-11

## 2022-05-11 VITALS — HEIGHT: 62 IN | BODY MASS INDEX: 26.68 KG/M2 | WEIGHT: 145 LBS

## 2022-05-11 DIAGNOSIS — Z87.81 STATUS POST OPEN REDUCTION AND INTERNAL FIXATION (ORIF) OF FRACTURE: Primary | ICD-10-CM

## 2022-05-11 DIAGNOSIS — Z98.890 STATUS POST OPEN REDUCTION AND INTERNAL FIXATION (ORIF) OF FRACTURE: Primary | ICD-10-CM

## 2022-05-11 PROCEDURE — 99024 POSTOP FOLLOW-UP VISIT: CPT | Performed by: ORTHOPAEDIC SURGERY

## 2022-05-11 PROCEDURE — 97112 NEUROMUSCULAR REEDUCATION: CPT | Performed by: SPECIALIST/TECHNOLOGIST

## 2022-05-11 PROCEDURE — 97140 MANUAL THERAPY 1/> REGIONS: CPT | Performed by: SPECIALIST/TECHNOLOGIST

## 2022-05-11 PROCEDURE — 97110 THERAPEUTIC EXERCISES: CPT | Performed by: SPECIALIST/TECHNOLOGIST

## 2022-05-11 RX ORDER — GABAPENTIN 100 MG/1
100 CAPSULE ORAL 3 TIMES DAILY
Qty: 90 CAPSULE | Refills: 2 | Status: SHIPPED | OUTPATIENT
Start: 2022-05-11 | End: 2022-06-06 | Stop reason: SDUPTHER

## 2022-05-11 RX ORDER — CYCLOBENZAPRINE HCL 10 MG
10 TABLET ORAL 3 TIMES DAILY PRN
Qty: 30 TABLET | Refills: 2 | Status: SHIPPED | OUTPATIENT
Start: 2022-05-11 | End: 2022-06-10

## 2022-05-11 RX ORDER — TRAMADOL HYDROCHLORIDE 50 MG/1
50 TABLET ORAL EVERY 6 HOURS PRN
Qty: 42 TABLET | Refills: 1 | Status: SHIPPED | OUTPATIENT
Start: 2022-05-11 | End: 2022-05-18

## 2022-05-11 NOTE — PROGRESS NOTES
The [address] [facility] and [address],  Sports Performance and Rehabilitation, [facility] [address]  [address] [facility],5[address] Floor   [place], [address]  Phone: [phone]  Fax: [phone]      Physical Therapy Daily Treatment Note  Date: 22  Patient Name:  Xander Reeder    :  1950  MRN: [id number]  Restrictions/Precautions:    Medical/Treatment Diagnosis Information:  Diagnosis: Z98.890, Z87.81 (ICD-10-CM) - Status post open reduction and internal fixation (ORIF) of [id number] (ICD-10-CM) - Closed fracture of left tibial plateau, initial encounter   PT DX: L knee pain M25. 257  Insurance/Certification information:  PT Insurance Information: Medicare/BLBS: no visit limit, insurance covers 80%  Physician Information:  Referring Practitioner: Dr. Elfego Hall MD  Has the plan of care been signed (Y/N):        []  Yes  [x]  No     Date of Patient follow up with Physician: 22      Is this a Progress Report:     [x]  Yes  []  No        If Yes:  Date Range for reporting period:  Beginning 22  Ending    Progress report will be due (18 Rx or 30 days whichever is less):        Recertification will be due (POC Duration  / 90 days whichever is less): 22         Visit # Insurance Allowable Auth Required   8 No visit limit []  Yes []  No        Functional Scale: 12.5 LEFs    Date assessed:  22     58% LEFS       22       Latex Allergy:  [x]NO      []YES    Preferred Language for Healthcare:   [x]English       []other:    Pain level: 0-1/10     SUBJECTIVE:  Patient reports her knee is stiff this morning, but working on HEP. Patient wants to get back to driving and going up/down steps. Pt. Reports she will wake up during the night D/T increase in left knee. OBJECTIVE: See eval   Observation: Decreased heel strike and knee flexion with amb with crutches, but improves with cueing.    Test measurements:  Taken on 22  PROM Left knee flex 96°, knee ext -10°     RESTRICTIONS/PRECAUTIONS: 50% WB L LE with SW    Exercises/Interventions:   HEP:  Access Code: [email or web address]  URL: [email or web address]  Date: 04/14/2022  Prepared by: Laure Fleming     Exercises  Seated Calf Towel Stretch - 3 x daily - 7 x weekly - 1 sets - 30 reps - 3 hold  Long Sitting Quad Set - 3 x daily - 7 x weekly - 1 sets - 10 reps - 10 hold  Sitting Heel Slide with Towel - 3 x daily - 7 x weekly - 1 sets - 10 reps - 10 hold     DOS 2/25/22    Exercise/Equipment Resistance/Repetitions Other comments   Stretching     Hamstring     Towel Pull Inclined Calf     Hip Flexion     ITB     Groin     Quad                    SLR     Supine flex 2 x 10 NV   Abduction     Adduction     Prone     LAQs 2 x 10    SAQs 2 X 15 Half bolster with belt assist   Isometrics     Quad sets X 15 min NV - Chadian 10\"on/10\" off        Patellar Glides     Medial     Superior     Inferior          ROM     ERMI 5'  1' minute holds   Prone hangs X 5 min 1 lb - NV   Passive     Active     Weight Shift     Pilate's reformer - knee ROM  RB 5' NV   eob flexion hang  2 x 10 PT overpressure into flexion   Bike X 5 min Arcs back/forth   Gait training Instruction on proper gait    CKC     Calf raises     Wall sits     Step ups     1 leg stand     Squatting     CC TKE     Balance     bridges          PRE     Extension  RANGE:    Flexion  RANGE: 87        Quantum machines     Leg press      Leg extension     Leg curl          Manual interventions Patellar mobilizations (all directions) and PROM knee flex x 10 min     Gentle oscillations into knee ext x 5 min      Modify today's tx D/T pt. Had a MD appt. = 5/11/22         Therapeutic Exercise and NMR EXR  [x] (32797) Provided verbal/tactile cueing for activities related to strengthening, flexibility, endurance, ROM for improvements in LE, proximal hip, and core control with self care, mobility, lifting, ambulation.   [x] (99863) Provided verbal/tactile cueing for activities related to improving balance, coordination, kinesthetic sense, posture, motor skill, proprioception  to assist with LE, proximal hip, and core control in self care, mobility, lifting, ambulation and eccentric single leg control. NMR and Therapeutic Activities:    [x] (48299 or 56406) Provided verbal/tactile cueing for activities related to improving balance, coordination, kinesthetic sense, posture, motor skill, proprioception and motor activation to allow for proper function of core, proximal hip and LE with self care and ADLs  [x] (28785) Gait Re-education- Provided training and instruction to the patient for proper LE, core and proximal hip recruitment and positioning and eccentric body weight control with ambulation re-education including up and down stairs     Home Exercise Program:    [x] (11929) Reviewed/Progressed HEP activities related to strengthening, flexibility, endurance, ROM of core, proximal hip and LE for functional self-care, mobility, lifting and ambulation/stair navigation   [x] (68658)Reviewed/Progressed HEP activities related to improving balance, coordination, kinesthetic sense, posture, motor skill, proprioception of core, proximal hip and LE for self care, mobility, lifting, and ambulation/stair navigation      Manual Treatments:  PROM / STM / Oscillations-Mobs:  G-I, II, III, IV (PA's, Inf., Post.)  [x] (70784) Provided manual therapy to mobilize LE, proximal hip and/or LS spine soft tissue/joints for the purpose of modulating pain, promoting relaxation,  increasing ROM, reducing/eliminating soft tissue swelling/inflammation/restriction, improving soft tissue extensibility and allowing for proper ROM for normal function with self care, mobility, lifting and ambulation. Modalities:  Ice with NMR (Russian 10\"on/10\"off) + heel prop 15'    Charges:  Timed Code Treatment Minutes: 50'   Total Treatment Minutes: 48'       [] EVAL (LOW) 92807 (typically 20 minutes face-to-face)  [] EVAL (MOD) 11828 (typically 30 minutes face-to-face)  [] EVAL (HIGH) 40379 (typically 45 minutes face-to-face)  [] RE-EVAL   [x] NW(10504) x   1  [] IONTO  [x] NMR (64019) x  1   [] VASO (game ready x 15')  [x] Manual (01269) x  1   [] Other:  [] TA x      [] Mech Traction (98072)  [] ES(attended) (74371)      [] ES (un) (06876):     GOALS:   GOALS:   Patient stated goal: Patient to be able to walk to gym and resume normal routine. [x]? Progressing: []? Met: []? Not Met: []? Adjusted     Therapist goals for Patient:   Short Term Goals: To be achieved in: 2 weeks  1. Independent in HEP and progression per patient tolerance, in order to prevent re-injury. [x]? Progressing: []? Met: []? Not Met: []? Adjusted   2. Patient will have a decrease in pain to facilitate improvement in movement, function, and ADLs as indicated by Functional Deficits. [x]? Progressing: []? Met: []? Not Met: []? Adjusted     Long Term Goals: To be achieved in: 8-12 weeks  1. Disability index score 50% improved for the LEFS to assist with reaching prior level of function. [x]? Progressing: []? Met: []? Not Met: []? Adjusted  2. Patient will demonstrate increased AROM to equal to R knee to allow for proper joint functioning as indicated by patients Functional Deficits. [x]? Progressing: []? Met: []? Not Met: []? Adjusted  3. Patient will demonstrate an increase in Strength to good proximal hip strength and control, within 5lb HHD in LE to allow for proper functional mobility as indicated by patients Functional Deficits. [x]? Progressing: []? Met: []? Not Met: []? Adjusted  4. Patient will return to normal functional activities without increased symptoms or restriction. [x]? Progressing: []? Met: []? Not Met: []? Adjusted  5. Patient to ambulate with normal gait with out AD. [x]? Progressing: []? Met: []? Not Met: []?  Adjusted      Overall Progression Towards Functional goals/ Treatment Progress Update:  [] Patient is progressing as expected towards functional goals listed. [] Progression is slowed due to complexities/Impairments listed. [] Progression has been slowed due to co-morbidities. [x] Plan just implemented, too soon to assess goals progression <30days   [] Goals require adjustment due to lack of progress  [] Patient is not progressing as expected and requires additional follow up with physician  [] Other    Prognosis for POC: [x] Good [] Fair  [] Poor      Patient requires continued skilled intervention: [x] Yes  [] No    Treatment/Activity Tolerance:  [x] Patient able to complete treatment  [] Patient limited by fatigue  [] Patient limited by pain     [] Patient limited by other medical complications  [] Other: Discuss the importance of LLLD stretching with HEP. Patient L knee still stiff; Continue to push ROM. Pt is progressing with flexion but slowly. Continues to require skilled PT services to increase overall ROM, functional mobility, and endurance for greater functional activity tolerance. PLAN: See eval  [x] Continue per plan of care [] Alter current plan (see comments above)  [] Plan of care initiated [] Hold pending MD visit [] Discharge  Next visit:    Electronically signed by:  Chiara Wolff, PTA  [id number], Huan Ribera, PT, MPT     Note: If patient does not return for scheduled/ recommended follow up visits, this note will serve as a discharge from care along with most recent update on progress.

## 2022-05-11 NOTE — PROGRESS NOTES
Date:  2022    Name:  Dom Villareal  Address:  [place] [id number]    :  1950      Age:   70 y.o.    SSN:  [id number]      Medical Record Number:  [id number]    Reason for Visit:    Chief Complaint    Post-Op Check (ORIF LEFT TIBIAL PLATEAU FX )      DOS:      HPI: Perez Caba is a 70 y.o. female here today for tibial plateau fracture. More pain in the thigh bilaterally with walking than the left knee    Left tibia with no pain and limited motions. Doing physical therapy currently with   gains on rom of the knee. Anterior patella pain. Review of Systems:  Review of Systems   Constitutional: Negative for chills and fever. HENT: Negative for nosebleeds. Eyes: Negative for double vision. Cardiovascular: Negative for chest pain. Gastrointestinal: Negative for abdominal pain. Musculoskeletal: Positive for joint pain and myalgias. Skin: Negative for rash. Neurological: Negative for seizures. Psychiatric/Behavioral: Negative for hallucinations.         Past History:  Past Medical History:   Diagnosis Date    Arthritis     Chronic hyponatremia     GERD (gastroesophageal reflux disease)     History of blood transfusion     Hyperlipidemia     Hypertension     no meds currently    OA (osteoarthritis)     Prolonged emergence from general anesthesia     Tibial plateau fracture     Wears partial dentures     upper & lower     Past Surgical History:   Procedure Laterality Date     SECTION      COLONOSCOPY      LEG SURGERY Left 2022    OPEN REDUCTION INTERNAL FIXATION LEFT TIBIAL PLATEAU FRACTURE         SUKUMAR  CPT CODE - 02436 performed by [facility]    LEFT TOTAL HIP ARTHROPLASTY ANTERIOR APPROACH performed by Jenelle Balderrama MD at [facility] [place] 5/10/2021    RIGHT HIP ARTHROPLASTY ANTERIOR APPROACH performed by Claudia Jerez Christophe Alcala MD at [address]     Current Outpatient Medications on File Prior to Visit   Medication Sig Dispense Refill    Diclofenac Sodium POWD 2 g by Does not apply route 4 times daily Formula #8E Baclo 2% Diclo 3% DMSO 5% Ifeoma 6% Lido 2% Prilo 2% 240 g 11    rivaroxaban (XARELTO) 15 MG TABS tablet Take 1 tablet by mouth 2 times daily (with meals) Take for 21 days then drop to 20mg qd 42 tablet 0    rivaroxaban (XARELTO) 20 MG TABS tablet Take 1 tablet by mouth daily (with breakfast) 30 tablet 5    traMADol (ULTRAM) 50 MG tablet Take 50 mg by mouth every 6 hours as needed for Pain.  methocarbamol (ROBAXIN) 750 MG tablet Take 750 mg by mouth 4 times daily      acetaminophen (TYLENOL) 325 MG tablet Take 650 mg by mouth every 6 hours as needed for Pain      simvastatin (ZOCOR) 40 MG tablet TAKE 1 TABLET BY MOUTH EVERY NIGHT 90 tablet 3    calcium carbonate (OSCAL) 500 MG TABS tablet Take 500 mg by mouth daily      omeprazole (PRILOSEC OTC) 20 MG tablet Take 20 mg by mouth daily       Multiple Vitamins-Minerals (THERAPEUTIC MULTIVITAMIN-MINERALS) tablet Take 1 tablet by mouth daily      Vitamin D (CHOLECALCIFEROL) 1000 UNITS CAPS capsule Take 2,000 Units by mouth daily        No current facility-administered medications on file prior to visit.      Social History     Socioeconomic History    Marital status:      Spouse name: Not on file    Number of children: Not on file    Years of education: Not on file    Highest education level: Not on file   Occupational History    Not on file   Tobacco Use    Smoking status: Former Smoker     Packs/day: 1.00     Years: 15.00     Pack years: 15.00     Quit date: 1983     Years since quittin.3    Smokeless tobacco: Never Used   Vaping Use    Vaping Use: Never used   Substance and Sexual Activity    Alcohol use: Not Currently     Alcohol/week: 1.0 - 2.0 standard drink     Types: 1 - 2 Shots of liquor per week    Drug use: No    Sexual activity: Yes   Other Topics Concern    Not on file   Social History Narrative    Not on file     Social Determinants of Health     Financial Resource Strain: Low Risk     Difficulty of Paying Living Expenses: Not hard at all   Food Insecurity: No Food Insecurity    Worried About Running Out of Food in the Last Year: Never true    [address] in the Last Year: Never true   Transportation Needs:     Lack of Transportation (Medical): Not on file    Lack of Transportation (Non-Medical):  Not on file   Physical Activity: Insufficiently Active    Days of Exercise per Week: 3 days    Minutes of Exercise per Session: 40 min   Stress:     Feeling of Stress : Not on file   Social Connections:     Frequency of Communication with Friends and Family: Not on file    Frequency of Social Gatherings with Friends and Family: Not on file    Attends [employer] Services: Not on file    Active Member of Clubs or Organizations: Not on file    Attends Club or Organization Meetings: Not on file    Marital Status: Not on file   Intimate Partner Violence:     Fear of Current or Ex-Partner: Not on file    Emotionally Abused: Not on file    Physically Abused: Not on file    Sexually Abused: Not on file   Housing Stability:     Unable to Pay for Housing in the Last Year: Not on file    Number of [place] in the Last Year: Not on file    Unstable Housing in the Last Year: Not on file     Family History   Problem Relation Age of Onset    Breast Cancer Mother 68       Current Medications:    Current Outpatient Medications   Medication Sig Dispense Refill    Diclofenac Sodium POWD 2 g by Does not apply route 4 times daily Formula #8E Baclo 2% Diclo 3% DMSO 5% Ifeoma 6% Lido 2% Prilo 2% 240 g 11    rivaroxaban (XARELTO) 15 MG TABS tablet Take 1 tablet by mouth 2 times daily (with meals) Take for 21 days then drop to 20mg qd 42 tablet 0    rivaroxaban (XARELTO) 20 MG TABS tablet Take 1 tablet by mouth daily (with breakfast) 30 tablet 5    traMADol (ULTRAM) 50 MG tablet Take 50 mg by mouth every 6 hours as needed for Pain.  methocarbamol (ROBAXIN) 750 MG tablet Take 750 mg by mouth 4 times daily      acetaminophen (TYLENOL) 325 MG tablet Take 650 mg by mouth every 6 hours as needed for Pain      simvastatin (ZOCOR) 40 MG tablet TAKE 1 TABLET BY MOUTH EVERY NIGHT 90 tablet 3    calcium carbonate (OSCAL) 500 MG TABS tablet Take 500 mg by mouth daily      omeprazole (PRILOSEC OTC) 20 MG tablet Take 20 mg by mouth daily       Multiple Vitamins-Minerals (THERAPEUTIC MULTIVITAMIN-MINERALS) tablet Take 1 tablet by mouth daily      Vitamin D (CHOLECALCIFEROL) 1000 UNITS CAPS capsule Take 2,000 Units by mouth daily        No current facility-administered medications for this visit. Allergies: Allergies   Allergen Reactions    Zanaflex [Tizanidine]      bradycardia    Keflet [Cephalexin] Rash       Physical Exam:  There were no vitals filed for this visit. Physical Exam   Constitutional: Patient is oriented to person, place, and time and well-developed, well-nourished, and in no distress. HENT:   Head: Normocephalic and atraumatic. Eyes: Pupils are equal, round, and reactive to light. Neck: No tracheal deviation present. No thyromegaly present. Pulmonary/Chest: Effort normal.   Abdominal: Soft. There is no guarding. Musculoskeletal: Patient exhibits tenderness and pain. Neurological: Patient is alert and oriented to person, place, and time. Skin: Skin is warm. Psychiatric: Affect normal.     General: Alda Morales is a healthy and well appearing 70y.o. year old female who is sitting comfortably in our office in acute distress. Alert and oriented. Physical Exam:  RUE:    No gross deformities noted. Sensation is intact to light touch throughout the median, ulnar and radial nerve distribution. Able to wiggle fingers, gives thumbs up, A-okay and cross index and middle fingers.   Full range of motion of the hand, wrist, elbow and shoulder. LUE:   No gross deformities noted. Sensation is intact to light touch throughout the median, ulnar and radial nerve distribution. Able to wiggle fingers, gives thumbs up, A-okay and cross index and middle fingers. Full range of motion of the hand wrist elbow and shoulder. RLE:   No gross deformities noted. Sensation is intact to light touch throughout the lower extremity. Able to wiggle toes and plantar and dorsiflex foot. Full range of motion at the ankle, knee and hip    LLE:  Dorsal foot decrease in sensation  Tibia swelling gross deformity noted although improved. Valgus lie of the left leg to 5-7 degrees although pateint feels that this may be a chronic situation   rom 5 to 95. Pain in the patella with improved patella mobility. Surprisingly little pseudolaxity of the knee . Soft tissue swelling controlled and decreased overall  . Sensation is intact to light touch throughout the lower extremity plantar  Able to wiggle toes and plantar and dorsiflex foot. Full range of motion at the  hip    Diagnostics:  Xray   Have reviewed the xrays above from 05/11/22   and my impression is: progressive collapse of the lateral tibial plateau  1. Closed fracture of left tibial plateau, initial encounter         Assessment: tibia plateau fracture      Plan:       o outpateint therapy . Aggressive rom of the joint with use of prednisone as needed and pain medications to increase potential for rapid increase in rom. Increase % weight bearing. 2 week recheck /   Change medications to neurontin for pelvic aron at night and left knee pain and swelling that could have neurologic component    Will require total knee arthroplasty for the left knee because of collapse of the tibial plateau. Plan for rom tech bicycle to establish rom improvement preoperatively     Advance to full weight bearing as tolerated. Still using bilateral cutches.            [unfilled]     [facility] Evi Miller MD    Date:    5/11/2022

## 2022-05-11 NOTE — FLOWSHEET NOTE
[facility] and [address],  [facility], [facility]ly [facility] 6199 12[address]  793 [facility],5[address] Floor   Osorio Akbar  Phone: [phone]  Fax: [phone]      Physical Therapy Daily Treatment Note  Date: 2022  Patient Name:  Melinda Lopez    :  1950  MRN: [id number]  Restrictions/Precautions:    Medical/Treatment Diagnosis Information:  Diagnosis: Z98.890, Z87.81 (ICD-10-CM) - Status post open reduction and internal fixation (ORIF) of mtjacligO86.142A (ICD-10-CM) - Closed fracture of left tibial plateau, initial encounter   PT DX: L knee pain M25. 199  Insurance/Certification information:  PT Insurance Information: Medicare/BLBS: no visit limit, insurance covers 80%  Physician Information:  Referring Practitioner: Dr. Pamela Joseph MD  Has the plan of care been signed (Y/N):        []  Yes  [x]  No     Date of Patient follow up with Physician: 22      Is this a Progress Report:     []  Yes  [x]  No        If Yes:  Date Range for reporting period:  Beginning  Ending    Progress report will be due (10 Rx or 30 days whichever is less): 3/45/60       Recertification will be due (POC Duration  / 90 days whichever is less): 22         Visit # Insurance Allowable Auth Required   8 No visit limit []  Yes []  No        Functional Scale: 12.5 LEFs    Date assessed:  22       Latex Allergy:  [x]NO      []YES    Preferred Language for Healthcare:   [x]English       []other:    Pain level: 0/10     SUBJECTIVE: \"I am doing ok\"      OBJECTIVE: 4-/5 strength    Observation:    Test measurements:      RESTRICTIONS/PRECAUTIONS: 50% WB L LE with SW    Exercises/Interventions:   HEP:  Access Code: [email or web address]  URL: [email or web address]  Date: 2022  Prepared by: Lilliam Kirkland     Exercises  Seated Calf Towel Stretch - 3 x daily - 7 x weekly - 1 sets - 30 reps - 3 hold  Long Sitting Quad Set - 3 x daily - 7 x weekly - 1 sets - 10 reps - 10 hold  Sitting Heel Slide with Towel - 3 x daily - 7 x weekly - 1 sets - 10 reps - 10 hold     DOS 5/2/22    Exercise/Equipment Resistance/Repetitions Other comments   Stretching     Hamstring     Towel Pull Inclined Calf     Hip Flexion     ITB     Groin     Quad                    SLR     Supine flex 2 x 10    Abduction     Adduction     Prone     LAQs 2 x 10    SAQs 2 X 15 Half bolster with belt assist   Isometrics     Quad sets X 15 min NV - O[place]i 10\"on/10\" off        Patellar Glides     Medial     Superior     Inferior          ROM     ERMI 5'  1' minute holds   Prone hangs X 5 min 1 lb - NV   Passive     Active     Weight Shift     Pilate's reformer - knee ROM  RB 5'    eob flexion hang  2 x 10 PT overpressure into flexion   Bike X 5 min Arcs back/forth   Gait training Instruction on proper gait    CKC     Calf raises     Wall sits     Step ups     1 leg stand     Squatting     CC TKE     Balance     bridges          PRE     Extension  RANGE:    Flexion  RANGE: 87        Quantum machines     Leg press      Leg extension     Leg curl          Manual interventions Patellar mobilizations (all directions) and PROM knee flex x 10 min     Gentle oscillations into knee ext x 5 min               Therapeutic Exercise and NMR EXR  [x] (78855) Provided verbal/tactile cueing for activities related to strengthening, flexibility, endurance, ROM for improvements in LE, proximal hip, and core control with self care, mobility, lifting, ambulation. [x] (80127) Provided verbal/tactile cueing for activities related to improving balance, coordination, kinesthetic sense, posture, motor skill, proprioception  to assist with LE, proximal hip, and core control in self care, mobility, lifting, ambulation and eccentric single leg control.      NMR and Therapeutic Activities:    [x] (27583 or 49222) Provided verbal/tactile cueing for activities related to improving balance, coordination, kinesthetic sense, posture, motor skill, proprioception and motor activation to allow for proper function of core, proximal hip and LE with self care and ADLs  [x] (91049) Gait Re-education- Provided training and instruction to the patient for proper LE, core and proximal hip recruitment and positioning and eccentric body weight control with ambulation re-education including up and down stairs     Home Exercise Program:    [x] (96263) Reviewed/Progressed HEP activities related to strengthening, flexibility, endurance, ROM of core, proximal hip and LE for functional self-care, mobility, lifting and ambulation/stair navigation   [x] (01144)Reviewed/Progressed HEP activities related to improving balance, coordination, kinesthetic sense, posture, motor skill, proprioception of core, proximal hip and LE for self care, mobility, lifting, and ambulation/stair navigation      Manual Treatments:  PROM / STM / Oscillations-Mobs:  G-I, II, III, IV (PA's, Inf., Post.)  [x] (01654) Provided manual therapy to mobilize LE, proximal hip and/or LS spine soft tissue/joints for the purpose of modulating pain, promoting relaxation,  increasing ROM, reducing/eliminating soft tissue swelling/inflammation/restriction, improving soft tissue extensibility and allowing for proper ROM for normal function with self care, mobility, lifting and ambulation. Modalities:  Ice with NMR (Russian 10\"on/10\"off) + heel prop 15'    Charges:  Timed Code Treatment Minutes: 50'   Total Treatment Minutes: 48'        [] EVAL (LOW) 96229 (typically 20 minutes face-to-face)  [] EVAL (MOD) 87325 (typically 30 minutes face-to-face)  [] EVAL (HIGH) 69904 (typically 45 minutes face-to-face)  [] RE-EVAL   [x] QS(29172) x   1  [] IONTO  [x] NMR (55361) x  1   [] VASO (game ready x 15')  [x] Manual (60593) x  1   [] Other:  [] TA x      [] Mech Traction (72109)  [] ES(attended) (16074)      [] ES (un) (54735):     GOALS:   GOALS:   Patient stated goal: Patient to be able to walk to gym and resume normal routine. [x]? Progressing: []? Met: []? Not Met: []? Adjusted     Therapist goals for Patient:   Short Term Goals: To be achieved in: 2 weeks  1. Independent in HEP and progression per patient tolerance, in order to prevent re-injury. [x]? Progressing: []? Met: []? Not Met: []? Adjusted   2. Patient will have a decrease in pain to facilitate improvement in movement, function, and ADLs as indicated by Functional Deficits. [x]? Progressing: []? Met: []? Not Met: []? Adjusted     Long Term Goals: To be achieved in: 8-12 weeks  1. Disability index score 50% improved for the LEFS to assist with reaching prior level of function. [x]? Progressing: []? Met: []? Not Met: []? Adjusted  2. Patient will demonstrate increased AROM to equal to R knee to allow for proper joint functioning as indicated by patients Functional Deficits. [x]? Progressing: []? Met: []? Not Met: []? Adjusted  3. Patient will demonstrate an increase in Strength to good proximal hip strength and control, within 5lb HHD in LE to allow for proper functional mobility as indicated by patients Functional Deficits. [x]? Progressing: []? Met: []? Not Met: []? Adjusted  4. Patient will return to normal functional activities without increased symptoms or restriction. [x]? Progressing: []? Met: []? Not Met: []? Adjusted  5. Patient to ambulate with normal gait with out AD. [x]? Progressing: []? Met: []? Not Met: []? Adjusted      Overall Progression Towards Functional goals/ Treatment Progress Update:  [] Patient is progressing as expected towards functional goals listed. [] Progression is slowed due to complexities/Impairments listed. [] Progression has been slowed due to co-morbidities.   [x] Plan just implemented, too soon to assess goals progression <30days   [] Goals require adjustment due to lack of progress  [] Patient is not progressing as expected and requires additional follow up with physician  [] Other    Prognosis for POC: [x] Good [] Fair  [] Poor      Patient requires continued skilled intervention: [x] Yes  [] No    Treatment/Activity Tolerance:  [x] Patient able to complete treatment  [] Patient limited by fatigue  [] Patient limited by pain     [] Patient limited by other medical complications  [] Other: Patient L knee still stiff; Continue to push ROM. Pt is progressing with flexion but slowly. Continues to require skilled PT services to increase overall ROM, functional mobility, and endurance for greater functional activity tolerance. PLAN: See eval  [x] Continue per plan of care [] Alter current plan (see comments above)  [] Plan of care initiated [] Hold pending MD visit [] Discharge  Next visit:    Electronically signed by: Batool Amanda, PT, MPT     Note: If patient does not return for scheduled/ recommended follow up visits, this note will serve as a discharge from care along with most recent update on progress.

## 2022-05-16 ENCOUNTER — HOSPITAL ENCOUNTER (OUTPATIENT)
Dept: PHYSICAL THERAPY | Age: 72
Setting detail: THERAPIES SERIES
Discharge: HOME | End: 2022-05-16
Payer: MEDICARE

## 2022-05-16 PROCEDURE — 97112 NEUROMUSCULAR REEDUCATION: CPT | Performed by: SPECIALIST/TECHNOLOGIST

## 2022-05-16 PROCEDURE — 97110 THERAPEUTIC EXERCISES: CPT | Performed by: SPECIALIST/TECHNOLOGIST

## 2022-05-16 PROCEDURE — 97140 MANUAL THERAPY 1/> REGIONS: CPT | Performed by: SPECIALIST/TECHNOLOGIST

## 2022-05-16 PROCEDURE — 97016 VASOPNEUMATIC DEVICE THERAPY: CPT | Performed by: SPECIALIST/TECHNOLOGIST

## 2022-05-16 NOTE — FLOWSHEET NOTE
[facility] and [address],  [facility], [facility] [address]199 [address]  793 [facility],5Th Floor   Osorio Akbar  Phone: [phone]  Fax: [phone]      Physical Therapy Daily Treatment Note  Date: 22  Patient Name:  Magali Lechuga    :  1950  MRN: [id number]  Restrictions/Precautions:    Medical/Treatment Diagnosis Information:  Diagnosis: Z98.890, Z87.81 (ICD-10-CM) - Status post open reduction and internal fixation (ORIF) of [id number] (ICD-10-CM) - Closed fracture of left tibial plateau, initial encounter   PT DX: L knee pain M25. 296  Insurance/Certification information:  PT Insurance Information: Medicare/BLBS: no visit limit, insurance covers 80%  Physician Information:  Referring Practitioner: Dr. Lorna Roblero MD  Has the plan of care been signed (Y/N):        []  Yes  [x]  No     Date of Patient follow up with Physician: 22      Is this a Progress Report:     [x]  Yes  []  No        If Yes:  Date Range for reporting period:  Beginning 22  Ending    Progress report will be due (18 Rx or 30 days whichever is less): 3/63/32       Recertification will be due (POC Duration  / 90 days whichever is less): 22         Visit # Insurance Allowable Auth Required   9 No visit limit []  Yes []  No        Functional Scale: 12.5 LEFs    Date assessed:  22     58% LEFS       22       Latex Allergy:  [x]NO      []YES    Preferred Language for Healthcare:   [x]English       []other:    Pain level: 0-1/10     SUBJECTIVE:  Patient reports the wants her to push ROM in PT.         OBJECTIVE: See eval   Observation: Decreased heel strike and knee flexion with amb with crutches, but improves with cueing.  Test measurements:  Taken on 22  PROM Left knee flex 96°, knee ext -10°     RESTRICTIONS/PRECAUTIONS: Full wt.  Bearing with crutches and push ROM per MD note on 22    Exercises/Interventions:   HEP:  Access Code: [email or web address]  URL: [email or web address]  Date: 04/14/2022  Prepared by: Victorino Oliveros     Exercises  Seated Calf Towel Stretch - 3 x daily - 7 x weekly - 1 sets - 30 reps - 3 hold  Long Sitting Quad Set - 3 x daily - 7 x weekly - 1 sets - 10 reps - 10 hold  Sitting Heel Slide with Towel - 3 x daily - 7 x weekly - 1 sets - 10 reps - 10 hold     DOS 2/25/22    Exercise/Equipment Resistance/Repetitions Other comments   Stretching     Hamstring - EOT 3 x 30\"     Towel Pull Inclined Calf 3 x 30\"     Hip Flexion     ITB     Groin     Quad                    SLR     Supine flex 2 x 10 NV   Abduction     Adduction     Prone     LAQs 2 x 10    SAQs 2 X 15    Isometrics     Quad sets X 10 min  -  - Slovenian 10\"on/10\" off        Patellar Glides     Medial     Superior     Inferior          ROM     ERMI 5'  1' minute holds   Prone hangs X 5 min 1#    Passive     Active     Weight Shift        eob flexion hang  2 x 10 PT overpressure into flexion with LLE   Bike X 5 min - ROM Arcs back/forth   Gait training Instruction on proper gait    CKC     Calf raises     Wall sits     Step ups     1 leg stand     Squatting     CC TKE     Balance     bridges          PRE     Extension  RANGE:    Flexion  RANGE: 87        Quantum machines     Leg press      Leg extension     Leg curl          Manual interventions Patellar mobilizations (all directions) and PROM knee flex x 10 min     Gentle oscillations into knee ext x 5 min               Therapeutic Exercise and NMR EXR  [x] (83247) Provided verbal/tactile cueing for activities related to strengthening, flexibility, endurance, ROM for improvements in LE, proximal hip, and core control with self care, mobility, lifting, ambulation.   [x] (70399) Provided verbal/tactile cueing for activities related to improving balance, coordination, kinesthetic sense, posture, motor skill, proprioception  to assist with LE, proximal hip, and core control in self care, mobility, lifting, ambulation and eccentric single leg control. NMR and Therapeutic Activities:    [x] (57842 or 54717) Provided verbal/tactile cueing for activities related to improving balance, coordination, kinesthetic sense, posture, motor skill, proprioception and motor activation to allow for proper function of core, proximal hip and LE with self care and ADLs  [x] (28001) Gait Re-education- Provided training and instruction to the patient for proper LE, core and proximal hip recruitment and positioning and eccentric body weight control with ambulation re-education including up and down stairs     Home Exercise Program:    [x] (59377) Reviewed/Progressed HEP activities related to strengthening, flexibility, endurance, ROM of core, proximal hip and LE for functional self-care, mobility, lifting and ambulation/stair navigation   [x] (22267)Reviewed/Progressed HEP activities related to improving balance, coordination, kinesthetic sense, posture, motor skill, proprioception of core, proximal hip and LE for self care, mobility, lifting, and ambulation/stair navigation      Manual Treatments:  PROM / STM / Oscillations-Mobs:  G-I, II, III, IV (PA's, Inf., Post.)  [x] (12912) Provided manual therapy to mobilize LE, proximal hip and/or LS spine soft tissue/joints for the purpose of modulating pain, promoting relaxation,  increasing ROM, reducing/eliminating soft tissue swelling/inflammation/restriction, improving soft tissue extensibility and allowing for proper ROM for normal function with self care, mobility, lifting and ambulation.      Modalities: Gameready 15    Charges:  Timed Code Treatment Minutes: 50'   Total Treatment Minutes: 72'       [] EVAL (LOW) 455 1011 (typically 20 minutes face-to-face)  [] EVAL (MOD) 39639 (typically 30 minutes face-to-face)  [] EVAL (HIGH) 84522 (typically 45 minutes face-to-face)  [] RE-EVAL   [x] AT(17508) x   1  [] IONTO  [x] NMR (81382) x  1   [x] VASO (game ready x 15')  [x] Manual (44959) x  1   [] Other:  [] TA x      [] [facility] Traction (65957)  [] ES(attended) (13785)      [] ES (un) (35946):     GOALS:   GOALS:   Patient stated goal: Patient to be able to walk to gym and resume normal routine. [x]? Progressing: []? Met: []? Not Met: []? Adjusted     Therapist goals for Patient:   Short Term Goals: To be achieved in: 2 weeks  1. Independent in HEP and progression per patient tolerance, in order to prevent re-injury. [x]? Progressing: []? Met: []? Not Met: []? Adjusted   2. Patient will have a decrease in pain to facilitate improvement in movement, function, and ADLs as indicated by Functional Deficits. [x]? Progressing: []? Met: []? Not Met: []? Adjusted     Long Term Goals: To be achieved in: 8-12 weeks  1. Disability index score 50% improved for the LEFS to assist with reaching prior level of function. [x]? Progressing: []? Met: []? Not Met: []? Adjusted  2. Patient will demonstrate increased AROM to equal to R knee to allow for proper joint functioning as indicated by patients Functional Deficits. [x]? Progressing: []? Met: []? Not Met: []? Adjusted  3. Patient will demonstrate an increase in Strength to good proximal hip strength and control, within 5lb HHD in LE to allow for proper functional mobility as indicated by patients Functional Deficits. [x]? Progressing: []? Met: []? Not Met: []? Adjusted  4. Patient will return to normal functional activities without increased symptoms or restriction. [x]? Progressing: []? Met: []? Not Met: []? Adjusted  5. Patient to ambulate with normal gait with out AD. [x]? Progressing: []? Met: []? Not Met: []? Adjusted      Overall Progression Towards Functional goals/ Treatment Progress Update:  [] Patient is progressing as expected towards functional goals listed. [] Progression is slowed due to complexities/Impairments listed. [] Progression has been slowed due to co-morbidities.   [x] Plan just implemented, too soon to assess goals progression

## 2022-05-18 ENCOUNTER — HOSPITAL ENCOUNTER (OUTPATIENT)
Dept: PHYSICAL THERAPY | Age: 72
Setting detail: THERAPIES SERIES
Discharge: HOME | End: 2022-05-18
Payer: MEDICARE

## 2022-05-18 PROCEDURE — 97112 NEUROMUSCULAR REEDUCATION: CPT

## 2022-05-18 PROCEDURE — 97110 THERAPEUTIC EXERCISES: CPT

## 2022-05-18 PROCEDURE — 97140 MANUAL THERAPY 1/> REGIONS: CPT

## 2022-05-18 NOTE — FLOWSHEET NOTE
The [facility] and [address],  Sports Performance and Rehabilitation, [facility] [address]  [address] [facility],5[address] Floor   Osorio Akbar  Phone: [phone]  Fax: [phone]      Physical Therapy Daily Treatment Note  Date: 22  Patient Name:  Estrellita Rendon    :  1950  MRN: [id number]  Restrictions/Precautions:    Medical/Treatment Diagnosis Information:  Diagnosis: Z98.890, Z87.81 (ICD-10-CM) - Status post open reduction and internal fixation (ORIF) of yndkgwtiG63.142A (ICD-10-CM) - Closed fracture of left tibial plateau, initial encounter   PT DX: L knee pain M25. 692  Insurance/Certification information:  PT Insurance Information: Medicare/BLBS: no visit limit, insurance covers 80%  Physician Information:  Referring Practitioner: Dr. Ana Graves MD  Has the plan of care been signed (Y/N):        []  Yes  [x]  No     Date of Patient follow up with Physician: 22      Is this a Progress Report:     [x]  Yes  []  No        If Yes:  Date Range for reporting period:  Beginning 22  Ending    Progress report will be due (18 Rx or 30 days whichever is less):        Recertification will be due (POC Duration  / 90 days whichever is less): 22         Visit # Insurance Allowable Auth Required   10 No visit limit []  Yes []  No        Functional Scale: 12.5 LEFs    Date assessed:  22     58% LEFS       22       Latex Allergy:  [x]NO      []YES    Preferred Language for Healthcare:   [x]English       []other:    Pain level: 0/10     SUBJECTIVE:  Patient reports some pain at times in her L calf. Reports pushing herself with HEP. OBJECTIVE: See eval   Observation: Decreased heel strike and knee flexion with amb with crutches, but improves with cueing.  Test measurements:  Taken on 22  PROM Left knee flex 96°, knee ext -10°     RESTRICTIONS/PRECAUTIONS: Full wt.  Bearing with crutches and push ROM per MD note on 5/11/22    Exercises/Interventions:   HEP:  Access Code: [email or web address]  URL: [email or web address]  Date: 04/14/2022  Prepared by: Victorino Oliveros     Exercises  Seated Calf Towel Stretch - 3 x daily - 7 x weekly - 1 sets - 30 reps - 3 hold  Long Sitting Quad Set - 3 x daily - 7 x weekly - 1 sets - 10 reps - 10 hold  Sitting Heel Slide with Towel - 3 x daily - 7 x weekly - 1 sets - 10 reps - 10 hold     DOS 2/25/22    Exercise/Equipment Resistance/Repetitions Other comments   Stretching     Hamstring - EOT 3 x 30\"     Towel Pull Inclined Calf 3 x 30\"     Hip Flexion     ITB     Groin     Quad                    SLR     Supine flex 2 x 10 NV   Abduction     Adduction     Prone     LAQs    SAQs     Isometrics     Quad sets X 15 min  -  - Russian 10\"on/10\" off/heel prop        Patellar Glides     Medial     Superior     Inferior          ROM     ERMI 5'  1' minute holds   Prone hangs X 5 min 2#    Passive     Active     Weight Shift        eob flexion hang  Bike X 5 min - ROM Arcs back/forth   Gait training Instruction on proper gait x 3 min Attempted with 1 crutch but not ready yet   CKC     Calf raises     Leg Press 3 x 10  30lb/seat #5, plate #4   Step ups     1 leg stand     Squatting     CC TKE     Balance     bridges          PRE     Extension  RANGE:    Flexion  RANGE: 93        Quantum machines     Leg press      Leg extension     Leg curl          Manual interventions Patellar mobilizations (all directions) and PROM knee flex x 15 min                    Therapeutic Exercise and NMR EXR  [x] (29216) Provided verbal/tactile cueing for activities related to strengthening, flexibility, endurance, ROM for improvements in LE, proximal hip, and core control with self care, mobility, lifting, ambulation.   [x] (92139) Provided verbal/tactile cueing for activities related to improving balance, coordination, kinesthetic sense, posture, motor skill, proprioception  to assist with LE, proximal hip, and core control in self care, mobility, lifting, ambulation and eccentric single leg control. NMR and Therapeutic Activities:    [x] (43851 or 52069) Provided verbal/tactile cueing for activities related to improving balance, coordination, kinesthetic sense, posture, motor skill, proprioception and motor activation to allow for proper function of core, proximal hip and LE with self care and ADLs  [x] (28705) Gait Re-education- Provided training and instruction to the patient for proper LE, core and proximal hip recruitment and positioning and eccentric body weight control with ambulation re-education including up and down stairs     Home Exercise Program:    [x] (01622) Reviewed/Progressed HEP activities related to strengthening, flexibility, endurance, ROM of core, proximal hip and LE for functional self-care, mobility, lifting and ambulation/stair navigation   [x] (91077)Reviewed/Progressed HEP activities related to improving balance, coordination, kinesthetic sense, posture, motor skill, proprioception of core, proximal hip and LE for self care, mobility, lifting, and ambulation/stair navigation      Manual Treatments:  PROM / STM / Oscillations-Mobs:  G-I, II, III, IV (PA's, Inf., Post.)  [x] (71242) Provided manual therapy to mobilize LE, proximal hip and/or LS spine soft tissue/joints for the purpose of modulating pain, promoting relaxation,  increasing ROM, reducing/eliminating soft tissue swelling/inflammation/restriction, improving soft tissue extensibility and allowing for proper ROM for normal function with self care, mobility, lifting and ambulation.      Modalities:     Charges:  Timed Code Treatment Minutes: 48'   Total Treatment Minutes: 72'       [] EVAL (LOW) 455 1011 (typically 20 minutes face-to-face)  [] EVAL (MOD) 20312 (typically 30 minutes face-to-face)  [] EVAL (HIGH) 02649 (typically 45 minutes face-to-face)  [] RE-EVAL   [x] UH(28149) x   2  [] IONTO  [x] NMR (90240) x  1   [x] VASO (game ready x 15')  [x] Manual ([address] [facility]) x  1   [] Other:  [] TA x      [] [facility] Traction (13134)  [] ES(attended) (91034)      [] ES (un) (86092):     GOALS:   GOALS:   Patient stated goal: Patient to be able to walk to gym and resume normal routine. [x]? Progressing: []? Met: []? Not Met: []? Adjusted     Therapist goals for Patient:   Short Term Goals: To be achieved in: 2 weeks  1. Independent in HEP and progression per patient tolerance, in order to prevent re-injury. [x]? Progressing: []? Met: []? Not Met: []? Adjusted   2. Patient will have a decrease in pain to facilitate improvement in movement, function, and ADLs as indicated by Functional Deficits. [x]? Progressing: []? Met: []? Not Met: []? Adjusted     Long Term Goals: To be achieved in: 8-12 weeks  1. Disability index score 50% improved for the LEFS to assist with reaching prior level of function. [x]? Progressing: []? Met: []? Not Met: []? Adjusted  2. Patient will demonstrate increased AROM to equal to R knee to allow for proper joint functioning as indicated by patients Functional Deficits. [x]? Progressing: []? Met: []? Not Met: []? Adjusted  3. Patient will demonstrate an increase in Strength to good proximal hip strength and control, within 5lb HHD in LE to allow for proper functional mobility as indicated by patients Functional Deficits. [x]? Progressing: []? Met: []? Not Met: []? Adjusted  4. Patient will return to normal functional activities without increased symptoms or restriction. [x]? Progressing: []? Met: []? Not Met: []? Adjusted  5. Patient to ambulate with normal gait with out AD. [x]? Progressing: []? Met: []? Not Met: []? Adjusted      Overall Progression Towards Functional goals/ Treatment Progress Update:  [] Patient is progressing as expected towards functional goals listed. [] Progression is slowed due to complexities/Impairments listed. [] Progression has been slowed due to co-morbidities.   [x] Plan just implemented, too soon to assess goals progression <30days   [] Goals require adjustment due to lack of progress  [] Patient is not progressing as expected and requires additional follow up with physician  [] Other    Prognosis for POC: [x] Good [] Fair  [] Poor      Patient requires continued skilled intervention: [x] Yes  [] No    Treatment/Activity Tolerance:  [x] Patient able to complete treatment  [] Patient limited by fatigue  [] Patient limited by pain     [] Patient limited by other medical complications  [] Other: Discussed progressing to 1 crutch eventually, but to practice at home still using 2 crutches, but focusing on the crutch under the R UE. Patient L knee still stiff but patellar mobility close to WNL; Continue to push ROM. Pt is progressing with flexion but slowly. Continues to require skilled PT services to increase overall ROM, functional mobility, and endurance for greater functional activity tolerance. PLAN: See eval  [x] Continue per plan of care [] Alter current plan (see comments above)  [] Plan of care initiated [] Hold pending MD visit [] Discharge  Next visit:    Electronically signed by:  Truong Eng, PTA  [id number], Tk Drake, PT, MPT     Note: If patient does not return for scheduled/ recommended follow up visits, this note will serve as a discharge from care along with most recent update on progress.

## 2022-05-23 ENCOUNTER — HOSPITAL ENCOUNTER (OUTPATIENT)
Dept: PHYSICAL THERAPY | Age: 72
Setting detail: THERAPIES SERIES
Discharge: HOME | End: 2022-05-23
Payer: MEDICARE

## 2022-05-23 PROCEDURE — 97112 NEUROMUSCULAR REEDUCATION: CPT | Performed by: PHYSICAL THERAPIST

## 2022-05-23 PROCEDURE — 97140 MANUAL THERAPY 1/> REGIONS: CPT | Performed by: PHYSICAL THERAPIST

## 2022-05-23 PROCEDURE — 97110 THERAPEUTIC EXERCISES: CPT | Performed by: PHYSICAL THERAPIST

## 2022-05-24 NOTE — FLOWSHEET NOTE
[facility] and [address],  Sports Performance and Rehabilitation, [place] [id number] [place] [address] Shayna  Phone: [phone]  Fax: [phone]      Physical Therapy Daily Treatment Note  Date: 2022    Patient Name:  Promise Cardenas    :  1950  MRN: [id number]  Restrictions/Precautions:    Medical/Treatment Diagnosis Information:  Diagnosis: Z98.890, Z87.81 (ICD-10-CM) - Status post open reduction and internal fixation (ORIF) of ejgmekgcI50.142A (ICD-10-CM) - Closed fracture of left tibial plateau, initial encounter   PT DX: L knee pain M25. 644  Insurance/Certification information:  PT Insurance Information: Medicare/BLBS: no visit limit, insurance covers 80%  Physician Information:  Referring Practitioner: Dr. Marcia Wolff MD  Has the plan of care been signed (Y/N):        []  Yes  [x]  No     Date of Patient follow up with Physician: 22      Is this a Progress Report:     [x]  Yes  []  No        If Yes:  Date Range for reporting period:  Beginning 22  Ending    Progress report will be due (18 Rx or 30 days whichever is less):        Recertification will be due (POC Duration  / 90 days whichever is less): 22         Visit # Insurance Allowable Auth Required   11 No visit limit []  Yes []  No        Functional Scale: 12.5 LEFs    Date assessed:  22     58% LEFS       22       Latex Allergy:  [x]NO      []YES    Preferred Language for Healthcare:   [x]English       []other:    Pain level: 0/10     SUBJECTIVE:  \"I feel ok. Sánchez Confer Sánchez Confer Sánchez Confer \"       OBJECTIVE:    Observation: -5 ext   Test measurements:      RESTRICTIONS/PRECAUTIONS: Full wt. Bearing with crutches and push ROM per MD note on 22    Exercises/Interventions:   HEP:  Access Code: [email or web address]  URL: [email or web address]  Date: 2022  Prepared by: Tez Waltno     Exercises  Seated Calf Towel Stretch - 3 x daily - 7 x weekly - 1 sets - 30 reps - 3 hold  Long Sitting Quad Set - 3 x daily - 7 x weekly - 1 sets - 10 reps - 10 hold  Sitting Heel Slide with Towel - 3 x daily - 7 x weekly - 1 sets - 10 reps - 10 hold     DOS 2/25/22    Exercise/Equipment Resistance/Repetitions Other comments   Stretching     Hamstring - EOT 3 x 30\"     Towel Pull Inclined Calf 3 x 30\"     Hip Flexion     ITB     Groin     Quad                    SLR     Supine flex 2 x 10 NV   Abduction     Adduction     Prone     LAQs    SAQs     Isometrics     Quad sets X 15 min  -  - Russian 10\"on/10\" off/heel prop        Patellar Glides     Medial     Superior     Inferior          ROM     ERMI 5'  1' minute holds   Prone hangs X 5 min 2#    Passive     Active     Weight Shift        eob flexion hang  Bike X 5 min - ROM Arcs back/forth   Gait training Instruction on proper gait x 3 min Attempted with 1 crutch but not ready yet   CKC     Calf raises     Leg Press 3 x 10  30lb/seat #5, plate #4   Step ups     1 leg stand     Squatting     CC TKE     Balance     bridges          PRE     Extension  RANGE:    Flexion  RANGE: 93        Quantum machines     Leg press      Leg extension     Leg curl          Manual interventions Patellar mobilizations (all directions) and PROM knee flex x 15 min                    Therapeutic Exercise and NMR EXR  [x] (85817) Provided verbal/tactile cueing for activities related to strengthening, flexibility, endurance, ROM for improvements in LE, proximal hip, and core control with self care, mobility, lifting, ambulation. [x] (28498) Provided verbal/tactile cueing for activities related to improving balance, coordination, kinesthetic sense, posture, motor skill, proprioception  to assist with LE, proximal hip, and core control in self care, mobility, lifting, ambulation and eccentric single leg control.      NMR and Therapeutic Activities:    [x] (14915 or 86291) Provided verbal/tactile cueing for activities related to improving balance, coordination, kinesthetic sense, posture, motor skill, proprioception and motor activation to allow for proper function of core, proximal hip and LE with self care and ADLs  [x] (09821) Gait Re-education- Provided training and instruction to the patient for proper LE, core and proximal hip recruitment and positioning and eccentric body weight control with ambulation re-education including up and down stairs     Home Exercise Program:    [x] (52498) Reviewed/Progressed HEP activities related to strengthening, flexibility, endurance, ROM of core, proximal hip and LE for functional self-care, mobility, lifting and ambulation/stair navigation   [x] (63192)Reviewed/Progressed HEP activities related to improving balance, coordination, kinesthetic sense, posture, motor skill, proprioception of core, proximal hip and LE for self care, mobility, lifting, and ambulation/stair navigation      Manual Treatments:  PROM / STM / Oscillations-Mobs:  G-I, II, III, IV (PA's, Inf., Post.)  [x] (20390) Provided manual therapy to mobilize LE, proximal hip and/or LS spine soft tissue/joints for the purpose of modulating pain, promoting relaxation,  increasing ROM, reducing/eliminating soft tissue swelling/inflammation/restriction, improving soft tissue extensibility and allowing for proper ROM for normal function with self care, mobility, lifting and ambulation. Modalities:     Charges:  Timed Code Treatment Minutes: 35'     Total Treatment Minutes: 28'       [] EVAL (LOW) 455 1011 (typically 20 minutes face-to-face)  [] EVAL (MOD) 65105 (typically 30 minutes face-to-face)  [] EVAL (HIGH) 03479 (typically 45 minutes face-to-face)  [] RE-EVAL   [x] ZK(46689) x   1  [] IONTO  [] NMR (53108)  [] VASO (game ready x 15')  [x] Manual (91959) x  1   [] Other:  [] TA x      [] Mech Traction (21664)  [] ES(attended) (61931)      [] ES (un) (46020):     GOALS:   GOALS:   Patient stated goal: Patient to be able to walk to gym and resume normal routine. [x]?  Progressing: []? Met: []? Not Met: []? Adjusted     Therapist goals for Patient:   Short Term Goals: To be achieved in: 2 weeks  1. Independent in HEP and progression per patient tolerance, in order to prevent re-injury. [x]? Progressing: []? Met: []? Not Met: []? Adjusted   2. Patient will have a decrease in pain to facilitate improvement in movement, function, and ADLs as indicated by Functional Deficits. [x]? Progressing: []? Met: []? Not Met: []? Adjusted     Long Term Goals: To be achieved in: 8-12 weeks  1. Disability index score 50% improved for the LEFS to assist with reaching prior level of function. [x]? Progressing: []? Met: []? Not Met: []? Adjusted  2. Patient will demonstrate increased AROM to equal to R knee to allow for proper joint functioning as indicated by patients Functional Deficits. [x]? Progressing: []? Met: []? Not Met: []? Adjusted  3. Patient will demonstrate an increase in Strength to good proximal hip strength and control, within 5lb HHD in LE to allow for proper functional mobility as indicated by patients Functional Deficits. [x]? Progressing: []? Met: []? Not Met: []? Adjusted  4. Patient will return to normal functional activities without increased symptoms or restriction. [x]? Progressing: []? Met: []? Not Met: []? Adjusted  5. Patient to ambulate with normal gait with out AD. [x]? Progressing: []? Met: []? Not Met: []? Adjusted      Overall Progression Towards Functional goals/ Treatment Progress Update:  [] Patient is progressing as expected towards functional goals listed. [] Progression is slowed due to complexities/Impairments listed. [] Progression has been slowed due to co-morbidities.   [x] Plan just implemented, too soon to assess goals progression <30days   [] Goals require adjustment due to lack of progress  [] Patient is not progressing as expected and requires additional follow up with physician  [] Other    Prognosis for POC: [x] Good [] Fair  [] Poor      Patient requires continued skilled intervention: [x] Yes  [] No    Treatment/Activity Tolerance:  [x] Patient able to complete treatment  [] Patient limited by fatigue  [] Patient limited by pain     [] Patient limited by other medical complications  [] Other: Discussed progressing to 1 crutch eventually, but to practice at home still using 2 crutches, but focusing on the crutch under the R UE. Patient L knee still stiff but patellar mobility close to WNL; Continue to push ROM. Pt is progressing with flexion but slowly. Continues to require skilled PT services to increase overall ROM, functional mobility, and endurance for greater functional activity tolerance. PLAN: See eval  [x] Continue per plan of care [] Alter current plan (see comments above)  [] Plan of care initiated [] Hold pending MD visit [] Discharge  Next visit:    Electronically signed by:  Tushar Mcgovern, PT, MPT     Note: If patient does not return for scheduled/ recommended follow up visits, this note will serve as a discharge from care along with most recent update on progress.

## 2022-05-26 ENCOUNTER — HOSPITAL ENCOUNTER (OUTPATIENT)
Dept: PHYSICAL THERAPY | Age: 72
Setting detail: THERAPIES SERIES
Discharge: HOME | End: 2022-05-26
Payer: MEDICARE

## 2022-05-26 ENCOUNTER — OFFICE VISIT (OUTPATIENT)
Dept: ORTHOPEDIC SURGERY | Age: 72
End: 2022-05-26

## 2022-05-26 VITALS — HEIGHT: 62 IN | BODY MASS INDEX: 26.68 KG/M2 | WEIGHT: 145 LBS

## 2022-05-26 DIAGNOSIS — Z87.81 STATUS POST OPEN REDUCTION AND INTERNAL FIXATION (ORIF) OF FRACTURE: Primary | ICD-10-CM

## 2022-05-26 DIAGNOSIS — Z98.890 STATUS POST OPEN REDUCTION AND INTERNAL FIXATION (ORIF) OF FRACTURE: Primary | ICD-10-CM

## 2022-05-26 PROCEDURE — 97112 NEUROMUSCULAR REEDUCATION: CPT | Performed by: PHYSICAL THERAPIST

## 2022-05-26 PROCEDURE — 97140 MANUAL THERAPY 1/> REGIONS: CPT | Performed by: PHYSICAL THERAPIST

## 2022-05-26 PROCEDURE — 99024 POSTOP FOLLOW-UP VISIT: CPT | Performed by: ORTHOPAEDIC SURGERY

## 2022-05-26 PROCEDURE — 97110 THERAPEUTIC EXERCISES: CPT | Performed by: PHYSICAL THERAPIST

## 2022-05-26 NOTE — PROGRESS NOTES
Date:  2022    Name:  Chandni Valenzuela  Address:  [address] [place] [id number]    :  1950      Age:   70 y.o.    SSN:  [id number]      Medical Record Number:  [id number]    Reason for Visit:    Chief Complaint    Knee Pain (ORIF LEFT TIBIAL PLATEAU FX )      DOS:      HPI: Katerina Ortiz is a 70 y.o. female here today for tibial plateau fracture. More pain in the thigh bilaterally with walking than the left knee    Left tibia with no pain and limited motions. Doing physical therapy currently with   gains on rom of the knee. Anterior patella pain improved. Back and lateral hip pain each morning. Review of Systems:  Review of Systems   Constitutional: Negative for chills and fever. HENT: Negative for nosebleeds. Eyes: Negative for double vision. Cardiovascular: Negative for chest pain. Gastrointestinal: Negative for abdominal pain. Musculoskeletal: Positive for joint pain and myalgias. Skin: Negative for rash. Neurological: Negative for seizures. Psychiatric/Behavioral: Negative for hallucinations.         Past History:  Past Medical History:   Diagnosis Date    Arthritis     Chronic hyponatremia     GERD (gastroesophageal reflux disease)     History of blood transfusion     Hyperlipidemia     Hypertension     no meds currently    OA (osteoarthritis)     Prolonged emergence from general anesthesia     Tibial plateau fracture     Wears partial dentures     upper & lower     Past Surgical History:   Procedure Laterality Date     SECTION      COLONOSCOPY      LEG SURGERY Left 2022    OPEN REDUCTION INTERNAL FIXATION LEFT TIBIAL PLATEAU FRACTURE         [employer]  CPT CODE - 13869 performed by Konstantin Baires MD at [address] [place] [address] Road [facility] Left 2020    LEFT TOTAL HIP ARTHROPLASTY ANTERIOR APPROACH performed by Konstantin Baires MD at [address] [place] [address] [address] Right 5/10/2021    RIGHT HIP ARTHROPLASTY ANTERIOR APPROACH performed by Jessie Rangel MD at [address]     Current Outpatient Medications on File Prior to Visit   Medication Sig Dispense Refill    gabapentin (NEURONTIN) 100 MG capsule Take 1 capsule by mouth 3 times daily for 90 days. 90 capsule 2    diclofenac sodium (VOLTAREN) 1 % GEL Apply 4 g topically 4 times daily 150 g 1    cyclobenzaprine (FLEXERIL) 10 MG tablet Take 1 tablet by mouth 3 times daily as needed for Muscle spasms 30 tablet 2    rivaroxaban (XARELTO) 20 MG TABS tablet Take 1 tablet by mouth daily (with breakfast) 30 tablet 5    methocarbamol (ROBAXIN) 750 MG tablet Take 750 mg by mouth 4 times daily      acetaminophen (TYLENOL) 325 MG tablet Take 650 mg by mouth every 6 hours as needed for Pain      simvastatin (ZOCOR) 40 MG tablet TAKE 1 TABLET BY MOUTH EVERY NIGHT 90 tablet 3    calcium carbonate (OSCAL) 500 MG TABS tablet Take 500 mg by mouth daily      omeprazole (PRILOSEC OTC) 20 MG tablet Take 20 mg by mouth daily       Multiple Vitamins-Minerals (THERAPEUTIC MULTIVITAMIN-MINERALS) tablet Take 1 tablet by mouth daily      Vitamin D (CHOLECALCIFEROL) 1000 UNITS CAPS capsule Take 2,000 Units by mouth daily        No current facility-administered medications on file prior to visit.      Social History     Socioeconomic History    Marital status:      Spouse name: Not on file    Number of children: Not on file    Years of education: Not on file    Highest education level: Not on file   Occupational History    Not on file   Tobacco Use    Smoking status: Former Smoker     Packs/day: 1.00     Years: 15.00     Pack years: 15.00     Quit date: 1983     Years since quittin.4    Smokeless tobacco: Never Used   Vaping Use    Vaping Use: Never used   Substance and Sexual Activity    Alcohol use: Not Currently     Alcohol/week: 1.0 - 2.0 standard drink     Types: 1 - 2 Shots of liquor per week    Drug use: No    Sexual activity: Yes   Other Topics Concern    Not on file   Social History Narrative    Not on file     Social Determinants of Health     Financial Resource Strain:     Difficulty of Paying Living Expenses: Not on file   Food Insecurity:     Worried About Running Out of Food in the Last Year: Not on file    Lilian of Food in the Last Year: Not on file   Transportation Needs:     Lack of Transportation (Medical): Not on file    Lack of Transportation (Non-Medical): Not on file   Physical Activity: Insufficiently Active    Days of Exercise per Week: 3 days    Minutes of Exercise per Session: 40 min   Stress:     Feeling of Stress : Not on file   Social Connections:     Frequency of Communication with Friends and Family: Not on file    Frequency of Social Gatherings with Friends and Family: Not on file    Attends [employer] Services: Not on file    Active Member of Clubs or Organizations: Not on file    Attends Club or Organization Meetings: Not on file    Marital Status: Not on file   Intimate Partner Violence:     Fear of Current or Ex-Partner: Not on file    Emotionally Abused: Not on file    Physically Abused: Not on file    Sexually Abused: Not on file   Housing Stability:     Unable to Pay for Housing in the Last Year: Not on file    Number of [place] in the Last Year: Not on file    Unstable Housing in the Last Year: Not on file     Family History   Problem Relation Age of Onset    Breast Cancer Mother 68       Current Medications:    Current Outpatient Medications   Medication Sig Dispense Refill    gabapentin (NEURONTIN) 100 MG capsule Take 1 capsule by mouth 3 times daily for 90 days.  90 capsule 2    diclofenac sodium (VOLTAREN) 1 % GEL Apply 4 g topically 4 times daily 150 g 1    cyclobenzaprine (FLEXERIL) 10 MG tablet Take 1 tablet by mouth 3 times daily as needed for Muscle spasms 30 tablet 2    rivaroxaban (XARELTO) 20 MG TABS tablet Take 1 tablet by mouth daily (with breakfast) 30 tablet 5    methocarbamol (ROBAXIN) 750 MG tablet Take 750 mg by mouth 4 times daily      acetaminophen (TYLENOL) 325 MG tablet Take 650 mg by mouth every 6 hours as needed for Pain      simvastatin (ZOCOR) 40 MG tablet TAKE 1 TABLET BY MOUTH EVERY NIGHT 90 tablet 3    calcium carbonate (OSCAL) 500 MG TABS tablet Take 500 mg by mouth daily      omeprazole (PRILOSEC OTC) 20 MG tablet Take 20 mg by mouth daily       Multiple Vitamins-Minerals (THERAPEUTIC MULTIVITAMIN-MINERALS) tablet Take 1 tablet by mouth daily      Vitamin D (CHOLECALCIFEROL) 1000 UNITS CAPS capsule Take 2,000 Units by mouth daily        No current facility-administered medications for this visit. Allergies: Allergies   Allergen Reactions    Zanaflex [Tizanidine]      bradycardia    Keflet [Cephalexin] Rash       Physical Exam:  There were no vitals filed for this visit. Physical Exam   Constitutional: Patient is oriented to person, place, and time and well-developed, well-nourished, and in no distress. HENT:   Head: Normocephalic and atraumatic. Eyes: Pupils are equal, round, and reactive to light. Neck: No tracheal deviation present. No thyromegaly present. Pulmonary/Chest: Effort normal.   Abdominal: Soft. There is no guarding. Musculoskeletal: Patient exhibits tenderness and pain. Neurological: Patient is alert and oriented to person, place, and time. Skin: Skin is warm. Psychiatric: Affect normal.     General: Derek Luna is a healthy and well appearing 70y.o. year old female who is sitting comfortably in our office in acute distress. Alert and oriented. Physical Exam:  RUE:    No gross deformities noted. Sensation is intact to light touch throughout the median, ulnar and radial nerve distribution. Able to wiggle fingers, gives thumbs up, A-okay and cross index and middle fingers. Full range of motion of the hand, wrist, elbow and shoulder. LUE:   No gross deformities noted.    Sensation is intact to light touch throughout the median, ulnar and radial nerve distribution. Able to wiggle fingers, gives thumbs up, A-okay and cross index and middle fingers. Full range of motion of the hand wrist elbow and shoulder. RLE:   No gross deformities noted. Sensation is intact to light touch throughout the lower extremity. Able to wiggle toes and plantar and dorsiflex foot. Full range of motion at the ankle, knee and hip    LLE:  Dorsal foot decrease in sensation  Tibia swelling gross deformity noted although improved. Valgus lie of the left leg to 5-7 degrees although pateint feels that this may be a chronic situation   rom 5 to 95. Pain in the patella with improved patella mobility. Surprisingly little pseudolaxity of the knee . Soft tissue swelling controlled and decreased overall  . Sensation is intact to light touch throughout the lower extremity plantar  Able to wiggle toes and plantar and dorsiflex foot. Full range of motion at the  hip    Diagnostics:  Xray   Have reviewed the xrays above from 05/26/22   and my impression is: progressive collapse of the lateral tibial plateau  1. Closed fracture of left tibial plateau, initial encounter         Assessment: tibia plateau fracture      Plan:       o outpateint therapy . Aggressive rom of the joint with use of prednisone as needed and pain medications to increase potential for rapid increase in rom. Increase % weight bearing. 2 week recheck /   Change medications to neurontin for pelvic aron at night and left knee pain and swelling that could have neurologic component    Will require total knee arthroplasty for the left knee because of collapse of the tibial plateau. Plan for rom tech bicycle to establish rom improvement preoperatively     Advance to full weight bearing as tolerated. Still using bilateral cutches. Still taking neurontin. Some IT band pain in the am       Patient would benefit from Tens, Nems unit for poor quad tone. @JOSE ALBERTO@     Keke Carpenter MD    Date:    5/26/2022

## 2022-05-27 NOTE — FLOWSHEET NOTE
[facility] [address],  [facility], Formerly [facility], [facility] [address]  [address] [facility],[address] Floor   Osorio Akbar  Phone: [phone]  Fax: [phone]      Physical Therapy Daily Treatment Note  Date: 2022    Patient Name:  Loren Tay    :  1950  MRN: [id number]  Restrictions/Precautions:    Medical/Treatment Diagnosis Information:  Diagnosis: Z98.890, Z87.81 (ICD-10-CM) - Status post open reduction and internal fixation (ORIF) of [id number] (ICD-10-CM) - Closed fracture of left tibial plateau, initial encounter   PT DX: L knee pain M25. 645  Insurance/Certification information:  PT Insurance Information: Medicare/BLBS: no visit limit, insurance covers 80%  Physician Information:  Referring Practitioner: Dr. Yary Loera MD  Has the plan of care been signed (Y/N):        []  Yes  [x]  No     Date of Patient follow up with Physician: 22      Is this a Progress Report:     []  Yes  [x]  No        If Yes:  Date Range for reporting period:  Beginning 22  Ending    Progress report will be due (18 Rx or 30 days whichever is less):        Recertification will be due (POC Duration  / 90 days whichever is less): 22         Visit # Insurance Allowable Auth Required   12 No visit limit []  Yes []  No        Functional Scale: 12.5 LEFs    Date assessed:  22     58% LEFS       22       Latex Allergy:  [x]NO      []YES    Preferred Language for Healthcare:   [x]English       []other:    Pain level: 0/10     SUBJECTIVE:  \"doing better\"       OBJECTIVE:    Observation: 0 ext   Test measurements:      RESTRICTIONS/PRECAUTIONS: Full wt. Bearing with crutches and push ROM per MD note on 22    Exercises/Interventions:   HEP:  Access Code: [email or web address]  URL: [email or web address]  Date: 2022  Prepared by: Amandeep Arellano     Exercises  Seated Calf Towel Stretch - 3 x daily - 7 x weekly - 1 sets - 30 reps - 3 hold  Long Sitting Quad Set - 3 x daily - 7 x weekly - 1 sets - 10 reps - 10 hold  Sitting Heel Slide with Towel - 3 x daily - 7 x weekly - 1 sets - 10 reps - 10 hold     DOS 2/25/22    Exercise/Equipment Resistance/Repetitions Other comments   Stretching     Hamstring - EOT 3 x 30\"     Towel Pull Inclined Calf 3 x 30\"     Hip Flexion     ITB     Groin     Quad                    SLR     Supine flex 2 x 10 NV   Abduction     Adduction     Prone     LAQs    SAQs     Isometrics     Quad sets X 15 min  -  - Russian 10\"on/10\" off/heel prop        Patellar Glides     Medial     Superior     Inferior          ROM     ERMI 5'  1' minute holds   Prone hangs X 5 min 2#    Passive     Active     Weight Shift        eob flexion hang  Bike X 5 min - ROM Arcs back/forth   Gait training Instruction on proper gait x 3 min Attempted with 1 crutch but not ready yet   CKC     Calf raises     Leg Press 3 x 10  30lb/seat #5, plate #4   Step ups     1 leg stand     Squatting     CC TKE     Balance     bridges          PRE     Extension  RANGE:    Flexion  RANGE: 93        Quantum machines     Leg press      Leg extension     Leg curl          Manual interventions Patellar mobilizations (all directions) and PROM knee flex x 15 min                    Therapeutic Exercise and NMR EXR  [x] (26617) Provided verbal/tactile cueing for activities related to strengthening, flexibility, endurance, ROM for improvements in LE, proximal hip, and core control with self care, mobility, lifting, ambulation. [x] (24735) Provided verbal/tactile cueing for activities related to improving balance, coordination, kinesthetic sense, posture, motor skill, proprioception  to assist with LE, proximal hip, and core control in self care, mobility, lifting, ambulation and eccentric single leg control.      NMR and Therapeutic Activities:    [x] (16155 or 39706) Provided verbal/tactile cueing for activities related to improving balance, coordination, kinesthetic sense, posture, motor skill, proprioception and motor activation to allow for proper function of core, proximal hip and LE with self care and ADLs  [x] (22400) Gait Re-education- Provided training and instruction to the patient for proper LE, core and proximal hip recruitment and positioning and eccentric body weight control with ambulation re-education including up and down stairs     Home Exercise Program:    [x] (83815) Reviewed/Progressed HEP activities related to strengthening, flexibility, endurance, ROM of core, proximal hip and LE for functional self-care, mobility, lifting and ambulation/stair navigation   [x] (90253)Reviewed/Progressed HEP activities related to improving balance, coordination, kinesthetic sense, posture, motor skill, proprioception of core, proximal hip and LE for self care, mobility, lifting, and ambulation/stair navigation      Manual Treatments:  PROM / STM / Oscillations-Mobs:  G-I, II, III, IV (PA's, Inf., Post.)  [x] (87818) Provided manual therapy to mobilize LE, proximal hip and/or LS spine soft tissue/joints for the purpose of modulating pain, promoting relaxation,  increasing ROM, reducing/eliminating soft tissue swelling/inflammation/restriction, improving soft tissue extensibility and allowing for proper ROM for normal function with self care, mobility, lifting and ambulation. Modalities:     Charges:  Timed Code Treatment Minutes: 43'     Total Treatment Minutes: 43'        [] EVAL (LOW) 22777 (typically 20 minutes face-to-face)  [] EVAL (MOD) 13299 (typically 30 minutes face-to-face)  [] EVAL (HIGH) 29840 (typically 45 minutes face-to-face)  [] RE-EVAL   [x] BL(09062) x   1  [] IONTO  [x] NMR (08542) x 1 [] VASO (game ready x 15')  [x] Manual (65029) x  1   [] Other:  [] TA x      [] Mech Traction (61365)  [] ES(attended) (46351)      [] ES (un) (97589):     GOALS:   GOALS:   Patient stated goal: Patient to be able to walk to gym and resume normal routine. [x]? Progressing: []? Met: []? Not Met: []? Adjusted     Therapist goals for Patient:   Short Term Goals: To be achieved in: 2 weeks  1. Independent in HEP and progression per patient tolerance, in order to prevent re-injury. [x]? Progressing: []? Met: []? Not Met: []? Adjusted   2. Patient will have a decrease in pain to facilitate improvement in movement, function, and ADLs as indicated by Functional Deficits. [x]? Progressing: []? Met: []? Not Met: []? Adjusted     Long Term Goals: To be achieved in: 8-12 weeks  1. Disability index score 50% improved for the LEFS to assist with reaching prior level of function. [x]? Progressing: []? Met: []? Not Met: []? Adjusted  2. Patient will demonstrate increased AROM to equal to R knee to allow for proper joint functioning as indicated by patients Functional Deficits. [x]? Progressing: []? Met: []? Not Met: []? Adjusted  3. Patient will demonstrate an increase in Strength to good proximal hip strength and control, within 5lb HHD in LE to allow for proper functional mobility as indicated by patients Functional Deficits. [x]? Progressing: []? Met: []? Not Met: []? Adjusted  4. Patient will return to normal functional activities without increased symptoms or restriction. [x]? Progressing: []? Met: []? Not Met: []? Adjusted  5. Patient to ambulate with normal gait with out AD. [x]? Progressing: []? Met: []? Not Met: []? Adjusted      Overall Progression Towards Functional goals/ Treatment Progress Update:  [] Patient is progressing as expected towards functional goals listed. [] Progression is slowed due to complexities/Impairments listed. [] Progression has been slowed due to co-morbidities.   [x] Plan just implemented, too soon to assess goals progression <30days   [] Goals require adjustment due to lack of progress  [] Patient is not progressing as expected and requires additional follow up with physician  [] Other    Prognosis for POC: [x] Good [] Fair  [] Poor      Patient requires continued skilled intervention: [x] Yes  [] No    Treatment/Activity Tolerance:  [x] Patient able to complete treatment  [] Patient limited by fatigue  [] Patient limited by pain     [] Patient limited by other medical complications  [] Other: Discussed progressing to 1 crutch eventually, but to practice at home still using 2 crutches, but focusing on the crutch under the R UE. Patient L knee still stiff but patellar mobility close to WNL; Continue to push ROM. Pt is progressing with flexion but slowly. Continues to require skilled PT services to increase overall ROM, functional mobility, and endurance for greater functional activity tolerance. PLAN: See eval  [x] Continue per plan of care [] Alter current plan (see comments above)  [] Plan of care initiated [] Hold pending MD visit [] Discharge  Next visit:    Electronically signed by:  Dipti Schwab, PT, MPT     Note: If patient does not return for scheduled/ recommended follow up visits, this note will serve as a discharge from care along with most recent update on progress.

## 2022-06-01 ENCOUNTER — HOSPITAL ENCOUNTER (OUTPATIENT)
Dept: PHYSICAL THERAPY | Age: 72
Setting detail: THERAPIES SERIES
Discharge: HOME | End: 2022-06-01
Payer: MEDICARE

## 2022-06-01 PROCEDURE — 97110 THERAPEUTIC EXERCISES: CPT | Performed by: SPECIALIST/TECHNOLOGIST

## 2022-06-01 PROCEDURE — 97140 MANUAL THERAPY 1/> REGIONS: CPT | Performed by: SPECIALIST/TECHNOLOGIST

## 2022-06-01 NOTE — FLOWSHEET NOTE
The [facility] and [address],  Sports Performance and Rehabilitation, [facility] 6199 1246 [address]  [address] [facility],5[address]   Osorio Akbar  Phone: [phone]  Fax: [phone]      Physical Therapy Daily Treatment Note  Date: 2022    Patient Name:  Loren aTy    :  1950  MRN: [id number]  Restrictions/Precautions:    Medical/Treatment Diagnosis Information:  Diagnosis: Z98.890, Z87.81 (ICD-10-CM) - Status post open reduction and internal fixation (ORIF) of [id number] (ICD-10-CM) - Closed fracture of left tibial plateau, initial encounter   PT DX: L knee pain M25. 125  Insurance/Certification information:  PT Insurance Information: Medicare/BLBS: no visit limit, insurance covers 80%  Physician Information:  Referring Practitioner: Dr. Yary Loera MD  Has the plan of care been signed (Y/N):        []  Yes  [x]  No     Date of Patient follow up with Physician: 22      Is this a Progress Report:     []  Yes  [x]  No        If Yes:  Date Range for reporting period:  Beginning 22  Ending    Progress report will be due (18 Rx or 30 days whichever is less):        Recertification will be due (POC Duration  / 90 days whichever is less): 22         Visit # Insurance Allowable Auth Required   13 No visit limit []  Yes []  No        Functional Scale: 12.5 LEFs    Date assessed:  22     58% LEFS       22       Latex Allergy:  [x]NO      []YES    Preferred Language for Healthcare:   [x]English       []other:    Pain level: 0/10     SUBJECTIVE:  \"I am slowly getting better. \"       OBJECTIVE:    Observation: 0 ext   Test measurements:      RESTRICTIONS/PRECAUTIONS: Full wt. Bearing with crutches and push ROM per MD note on 22    Exercises/Interventions:   HEP:  Access Code: [email or web address]  URL: [email or web address]  Date: 2022  Prepared by: Amandeep Arellano     Exercises  Seated Calf Towel Stretch - 3 x daily - 7 x weekly - 1 sets - 30 reps - 3 hold  Long Sitting Quad Set - 3 x daily - 7 x weekly - 1 sets - 10 reps - 10 hold  Sitting Heel Slide with Towel - 3 x daily - 7 x weekly - 1 sets - 10 reps - 10 hold     DOS 2/25/22    Exercise/Equipment Resistance/Repetitions Other comments   Stretching     Hamstring - EOT 3 x 30\"  Manual    Towel Pull Inclined Calf 3 x 30\"     Hip Flexion     ITB     Groin     Quad                    SLR     Supine flex 3 x 10    Abduction     Adduction     Prone     LAQs    SAQs     Isometrics     Quad sets X 15 min  -  NV - Russian 10\"on/10\" off/heel prop        Patellar Glides     Medial     Superior     Inferior          ROM     ERMI 5'  1' minute holds   Prone hangs X 5 min 2#    Passive     Active     Weight Shift        eob flexion hang  Bike X 8 min - ROM Arcs back/forth - full rev at end   CKC     Calf raises     Leg Press 3 x 10  45 lb/seat #5, plate #4   Step ups     1 leg stand     Squatting     KEYON TKE 40# 30 x 3\"    Balance     bridges          PRE     Extension  RANGE:    Flexion  RANGE: 93        Quantum machines     Leg press      Leg extension     Leg curl          Manual interventions Patellar mobilizations (all directions) and PROM knee flex x 15 min                    Therapeutic Exercise and NMR EXR  [x] (48504) Provided verbal/tactile cueing for activities related to strengthening, flexibility, endurance, ROM for improvements in LE, proximal hip, and core control with self care, mobility, lifting, ambulation. [x] (04381) Provided verbal/tactile cueing for activities related to improving balance, coordination, kinesthetic sense, posture, motor skill, proprioception  to assist with LE, proximal hip, and core control in self care, mobility, lifting, ambulation and eccentric single leg control.      NMR and Therapeutic Activities:    [x] (42070 or 69650) Provided verbal/tactile cueing for activities related to improving balance, coordination, kinesthetic sense, posture, motor skill, proprioception and motor activation to allow for proper function of core, proximal hip and LE with self care and ADLs  [x] (58261) Gait Re-education- Provided training and instruction to the patient for proper LE, core and proximal hip recruitment and positioning and eccentric body weight control with ambulation re-education including up and down stairs     Home Exercise Program:    [x] (12433) Reviewed/Progressed HEP activities related to strengthening, flexibility, endurance, ROM of core, proximal hip and LE for functional self-care, mobility, lifting and ambulation/stair navigation   [x] (24162)Reviewed/Progressed HEP activities related to improving balance, coordination, kinesthetic sense, posture, motor skill, proprioception of core, proximal hip and LE for self care, mobility, lifting, and ambulation/stair navigation      Manual Treatments:  PROM / STM / Oscillations-Mobs:  G-I, II, III, IV (PA's, Inf., Post.)  [x] (32690) Provided manual therapy to mobilize LE, proximal hip and/or LS spine soft tissue/joints for the purpose of modulating pain, promoting relaxation,  increasing ROM, reducing/eliminating soft tissue swelling/inflammation/restriction, improving soft tissue extensibility and allowing for proper ROM for normal function with self care, mobility, lifting and ambulation. Modalities: CP 10' + heel prop    Charges:  Timed Code Treatment Minutes: 48'   Total Treatment Minutes: 48'        [] EVAL (LOW) 42920 (typically 20 minutes face-to-face)  [] EVAL (MOD) 39453 (typically 30 minutes face-to-face)  [] EVAL (HIGH) 41893 (typically 45 minutes face-to-face)  [] RE-EVAL   [x] SK(46944) x   2  [] IONTO  [] NMR (11572) x  [] VASO (game ready x 15')  [x] Manual (09275) x  1   [] Other:  [] TA x      [] Mech Traction (42820)  [] ES(attended) (47916)      [] ES (un) (69504):     GOALS:   GOALS:   Patient stated goal: Patient to be able to walk to gym and resume normal routine. [x]? Progressing: []? Met: []?  Not Met: []? Adjusted     Therapist goals for Patient:   Short Term Goals: To be achieved in: 2 weeks  1. Independent in HEP and progression per patient tolerance, in order to prevent re-injury. [x]? Progressing: []? Met: []? Not Met: []? Adjusted   2. Patient will have a decrease in pain to facilitate improvement in movement, function, and ADLs as indicated by Functional Deficits. [x]? Progressing: []? Met: []? Not Met: []? Adjusted     Long Term Goals: To be achieved in: 8-12 weeks  1. Disability index score 50% improved for the LEFS to assist with reaching prior level of function. [x]? Progressing: []? Met: []? Not Met: []? Adjusted  2. Patient will demonstrate increased AROM to equal to R knee to allow for proper joint functioning as indicated by patients Functional Deficits. [x]? Progressing: []? Met: []? Not Met: []? Adjusted  3. Patient will demonstrate an increase in Strength to good proximal hip strength and control, within 5lb HHD in LE to allow for proper functional mobility as indicated by patients Functional Deficits. [x]? Progressing: []? Met: []? Not Met: []? Adjusted  4. Patient will return to normal functional activities without increased symptoms or restriction. [x]? Progressing: []? Met: []? Not Met: []? Adjusted  5. Patient to ambulate with normal gait with out AD. [x]? Progressing: []? Met: []? Not Met: []? Adjusted      Overall Progression Towards Functional goals/ Treatment Progress Update:  [] Patient is progressing as expected towards functional goals listed. [] Progression is slowed due to complexities/Impairments listed. [] Progression has been slowed due to co-morbidities.   [x] Plan just implemented, too soon to assess goals progression <30days   [] Goals require adjustment due to lack of progress  [] Patient is not progressing as expected and requires additional follow up with physician  [] Other    Prognosis for POC: [x] Good [] Fair  [] Poor      Patient requires continued skilled intervention: [x] Yes  [] No    Treatment/Activity Tolerance:  [x] Patient able to complete treatment  [] Patient limited by fatigue  [] Patient limited by pain     [] Patient limited by other medical complications  [] Other: Discussed progressing to 1 crutch eventually, but to practice at home still using 2 crutches, but focusing on the crutch under the R UE. Patient L knee still stiff but patellar mobility close to WNL; Continue to push ROM. Pt is progressing with flexion but slowly. Continues to require skilled PT services to increase overall ROM, functional mobility, and endurance for greater functional activity tolerance. PLAN: See eval  [x] Continue per plan of care [] Alter current plan (see comments above)  [] Plan of care initiated [] Hold pending MD visit [] Discharge  Next visit:    Electronically signed by: Denis Arauz, PTA  [id number],  Alf Granado, PT, MPT     Note: If patient does not return for scheduled/ recommended follow up visits, this note will serve as a discharge from care along with most recent update on progress.

## 2022-06-03 ENCOUNTER — HOSPITAL ENCOUNTER (OUTPATIENT)
Dept: PHYSICAL THERAPY | Age: 72
Setting detail: THERAPIES SERIES
Discharge: HOME | End: 2022-06-03
Payer: MEDICARE

## 2022-06-03 PROCEDURE — 97140 MANUAL THERAPY 1/> REGIONS: CPT | Performed by: SPECIALIST/TECHNOLOGIST

## 2022-06-03 PROCEDURE — 97110 THERAPEUTIC EXERCISES: CPT | Performed by: SPECIALIST/TECHNOLOGIST

## 2022-06-03 NOTE — FLOWSHEET NOTE
The [facility] and [address],  Sports Performance and Rehabilitation, [facility] 6199 1246 [address]  [address] [facility],5[address]   Osorio Akbar  Phone: [phone]  Fax: [phone]      Physical Therapy Daily Treatment Note  Date: 6/3/2022    Patient Name:  Meenu Reyez    :  1950  MRN: [id number]  Restrictions/Precautions:    Medical/Treatment Diagnosis Information:  Diagnosis: Z98.890, Z87.81 (ICD-10-CM) - Status post open reduction and internal fixation (ORIF) of [id number] (ICD-10-CM) - Closed fracture of left tibial plateau, initial encounter   PT DX: L knee pain M25. 051  Insurance/Certification information:  PT Insurance Information: Medicare/BLBS: no visit limit, insurance covers 80%  Physician Information:  Referring Practitioner: Dr. Alirio Fernandez MD  Has the plan of care been signed (Y/N):        []  Yes  [x]  No     Date of Patient follow up with Physician: 22      Is this a Progress Report:     []  Yes  [x]  No        If Yes:  Date Range for reporting period:  Beginning 22  Ending    Progress report will be due (18 Rx or 30 days whichever is less):        Recertification will be due (POC Duration  / 90 days whichever is less): 22         Visit # Insurance Allowable Auth Required   14 No visit limit []  Yes []  No        Functional Scale: 12.5 LEFs    Date assessed:  22     58% LEFS       22       Latex Allergy:  [x]NO      []YES    Preferred Language for Healthcare:   [x]English       []other:    Pain level: 0/10     SUBJECTIVE:  Pt. Reports her knee continues to make good progress with PT.         OBJECTIVE:    Observation: 0 ext   Test measurements:      RESTRICTIONS/PRECAUTIONS: Full wt. Bearing with crutches and push ROM per MD note on 22    Exercises/Interventions:   HEP:  Access Code: [email or web address]  URL: [email or web address]  Date: 2022  Prepared by: Brittani Brock     Exercises  Seated Calf Towel Stretch - 3 x daily - 7 x weekly - 1 sets - 30 reps - 3 hold  Long Sitting Quad Set - 3 x daily - 7 x weekly - 1 sets - 10 reps - 10 hold  Sitting Heel Slide with Towel - 3 x daily - 7 x weekly - 1 sets - 10 reps - 10 hold     DOS 2/25/22    Exercise/Equipment Resistance/Repetitions Other comments   Stretching     Hamstring - EOT 3 x 30\"  Manual    Towel Pull Inclined Calf 3 x 30\"     Hip Flexion     ITB     Groin     Quad                    SLR     Supine flex 3 x 10    Abduction     Adduction     Prone     LAQs    SAQs     Isometrics     Quad sets X 15 min  -  NV - Russian 10\"on/10\" off/heel prop        Patellar Glides     Medial     Superior     Inferior          ROM     ERMI 5'  1' minute holds   Prone hangs X 6 min 2#    Passive     Active     Weight Shift        eob flexion hang  Bike X 8 min - ROM Arcs back/forth - full rev at end   CKC     Calf raises     Leg Press 3 x 10  55 lb/seat #5, plate #4   Step ups     1 leg stand     Squatting     KEYON TKE 40# 30 x 3\"    Balance     bridges          FSU 4' x 15          PRE     Extension  RANGE:    Flexion  RANGE: 93        Quantum machines     Leg press      Leg extension     Leg curl          Manual interventions Patellar mobilizations (all directions) and PROM knee flex x 15 min                    Therapeutic Exercise and NMR EXR  [x] (03365) Provided verbal/tactile cueing for activities related to strengthening, flexibility, endurance, ROM for improvements in LE, proximal hip, and core control with self care, mobility, lifting, ambulation. [x] (12023) Provided verbal/tactile cueing for activities related to improving balance, coordination, kinesthetic sense, posture, motor skill, proprioception  to assist with LE, proximal hip, and core control in self care, mobility, lifting, ambulation and eccentric single leg control.      NMR and Therapeutic Activities:    [x] (42946 or 29947) Provided verbal/tactile cueing for activities related to improving balance, coordination, kinesthetic sense, posture, motor skill, proprioception and motor activation to allow for proper function of core, proximal hip and LE with self care and ADLs  [x] (33110) Gait Re-education- Provided training and instruction to the patient for proper LE, core and proximal hip recruitment and positioning and eccentric body weight control with ambulation re-education including up and down stairs     Home Exercise Program:    [x] (91888) Reviewed/Progressed HEP activities related to strengthening, flexibility, endurance, ROM of core, proximal hip and LE for functional self-care, mobility, lifting and ambulation/stair navigation   [x] (07893)Reviewed/Progressed HEP activities related to improving balance, coordination, kinesthetic sense, posture, motor skill, proprioception of core, proximal hip and LE for self care, mobility, lifting, and ambulation/stair navigation      Manual Treatments:  PROM / STM / Oscillations-Mobs:  G-I, II, III, IV (PA's, Inf., Post.)  [x] (12883) Provided manual therapy to mobilize LE, proximal hip and/or LS spine soft tissue/joints for the purpose of modulating pain, promoting relaxation,  increasing ROM, reducing/eliminating soft tissue swelling/inflammation/restriction, improving soft tissue extensibility and allowing for proper ROM for normal function with self care, mobility, lifting and ambulation.      Modalities: CP 10' + heel prop    Charges:  Timed Code Treatment Minutes: 48'   Total Treatment Minutes: 61'        [] EVAL (LOW) 71231 (typically 20 minutes face-to-face)  [] EVAL (MOD) 19483 (typically 30 minutes face-to-face)  [] EVAL (HIGH) 35930 (typically 45 minutes face-to-face)  [] RE-EVAL   [x] LR(45115) x   2  [] IONTO  [] NMR (42727) x  [] VASO (game ready x 15')  [x] Manual (40027) x  1   [] Other:  [] TA x      [] Mech Traction (36309)  [] ES(attended) (56458)      [] ES (un) (45758):     GOALS:   GOALS:   Patient stated goal: Patient to be able to walk to gym and resume normal routine. [x]? Progressing: []? Met: []? Not Met: []? Adjusted     Therapist goals for Patient:   Short Term Goals: To be achieved in: 2 weeks  1. Independent in HEP and progression per patient tolerance, in order to prevent re-injury. [x]? Progressing: []? Met: []? Not Met: []? Adjusted   2. Patient will have a decrease in pain to facilitate improvement in movement, function, and ADLs as indicated by Functional Deficits. [x]? Progressing: []? Met: []? Not Met: []? Adjusted     Long Term Goals: To be achieved in: 8-12 weeks  1. Disability index score 50% improved for the LEFS to assist with reaching prior level of function. [x]? Progressing: []? Met: []? Not Met: []? Adjusted  2. Patient will demonstrate increased AROM to equal to R knee to allow for proper joint functioning as indicated by patients Functional Deficits. [x]? Progressing: []? Met: []? Not Met: []? Adjusted  3. Patient will demonstrate an increase in Strength to good proximal hip strength and control, within 5lb HHD in LE to allow for proper functional mobility as indicated by patients Functional Deficits. [x]? Progressing: []? Met: []? Not Met: []? Adjusted  4. Patient will return to normal functional activities without increased symptoms or restriction. [x]? Progressing: []? Met: []? Not Met: []? Adjusted  5. Patient to ambulate with normal gait with out AD. [x]? Progressing: []? Met: []? Not Met: []? Adjusted      Overall Progression Towards Functional goals/ Treatment Progress Update:  [] Patient is progressing as expected towards functional goals listed. [] Progression is slowed due to complexities/Impairments listed. [] Progression has been slowed due to co-morbidities.   [x] Plan just implemented, too soon to assess goals progression <30days   [] Goals require adjustment due to lack of progress  [] Patient is not progressing as expected and requires additional follow up with physician  [] Other    Prognosis for POC: [x] Good [] Fair  [] Poor      Patient requires continued skilled intervention: [x] Yes  [] No    Treatment/Activity Tolerance:  [x] Patient able to complete treatment  [] Patient limited by fatigue  [] Patient limited by pain     [] Patient limited by other medical complications  [] Other: Discussed progressing to 1 crutch eventually, but to practice at home still using 2 crutches, but focusing on the crutch under the R UE. Patient L knee still stiff but patellar mobility close to WNL; Continue to push ROM. Pt is progressing with flexion but slowly. Continues to require skilled PT services to increase overall ROM, functional mobility, and endurance for greater functional activity tolerance. PLAN: See eval  [x] Continue per plan of care [] Alter current plan (see comments above)  [] Plan of care initiated [] Hold pending MD visit [] Discharge  Next visit:    Electronically signed by: Ruy Gallardo, PTA  [id number],  Arely Young, PT, MPT     Note: If patient does not return for scheduled/ recommended follow up visits, this note will serve as a discharge from care along with most recent update on progress.

## 2022-06-06 ENCOUNTER — APPOINTMENT (OUTPATIENT)
Dept: PHYSICAL THERAPY | Age: 72
End: 2022-06-06
Payer: MEDICARE

## 2022-06-08 ENCOUNTER — HOSPITAL ENCOUNTER (OUTPATIENT)
Dept: PHYSICAL THERAPY | Age: 72
Setting detail: THERAPIES SERIES
Discharge: HOME | End: 2022-06-08
Payer: MEDICARE

## 2022-06-08 PROCEDURE — 97140 MANUAL THERAPY 1/> REGIONS: CPT | Performed by: SPECIALIST/TECHNOLOGIST

## 2022-06-08 PROCEDURE — 97110 THERAPEUTIC EXERCISES: CPT | Performed by: SPECIALIST/TECHNOLOGIST

## 2022-06-08 NOTE — FLOWSHEET NOTE
[facility] and [address],  Sports Performance and Rehabilitation, [facility] [address]  [address] [facility],5Th Floor   Osorio Akbar  Phone: [phone]  Fax: [phone]      Physical Therapy Daily Treatment Note  Date: 2022    Patient Name:  Jacklyn Sahu    :  1950  MRN: [id number]  Restrictions/Precautions:    Medical/Treatment Diagnosis Information:  Diagnosis: Z98.890, Z87.81 (ICD-10-CM) - Status post open reduction and internal fixation (ORIF) of [id number] (ICD-10-CM) - Closed fracture of left tibial plateau, initial encounter   PT DX: L knee pain M25. 037  Insurance/Certification information:  PT Insurance Information: Medicare/BLBS: no visit limit, insurance covers 80%  Physician Information:  Referring Practitioner: Dr. Alton MD  Has the plan of care been signed (Y/N):        []  Yes  [x]  No     Date of Patient follow up with Physician: 22      Is this a Progress Report:     []  Yes  [x]  No        If Yes:  Date Range for reporting period:  Beginning 22  Ending    Progress report will be due (18 Rx or 30 days whichever is less):        Recertification will be due (POC Duration  / 90 days whichever is less): 22         Visit # Insurance Allowable Auth Required   15 No visit limit []  Yes []  No        Functional Scale: 12.5 LEFs    Date assessed:  22     58% LEFS       22       Latex Allergy:  [x]NO      []YES    Preferred Language for Healthcare:   [x]English       []other:    Pain level: 1-2/10     SUBJECTIVE:  Pt. Reports her knee continues to make good progress with PT.  Pt. Reports her knee has felt stiff the last 2 days bc of the rainy weather. OBJECTIVE:    Observation: 0 ext   Test measurements:      RESTRICTIONS/PRECAUTIONS: Full wt.  Bearing with crutches and push ROM per MD note on 22          MEDICARE CAP EXCEPTION DOCUMENTATION      I certify that this patient meets one of the below criteria necessary for becoming an exception to the Medicare cap on therapy services:    [x]  The patient has a condition identified by an ICD-10 code that has a direct and significant impact on the need for therapy. (Significantly impacts the rate of recovery.)                   []  The patient has a complexity identified by an ICD-9 code that has a direct and significant impact on the need for therapy. (Significantly impacts the rate of recovery and is associated with a primary condition.)         []  The patient has associated variables that influence the amount of treatment to include:  Social support, self-efficacy/motivation, prognosis, time since onset/acuity. []  The patient has generalized musculoskeletal conditions or a condition affecting multiple sites that will have a direct impact on the rate of recovery. []  The patient had a prior episode of outpatient therapy during this calendar year for a different condition. []  The patient has a mental or cognitive disorder in addition to the condition being treated that will have a direct and significant impact on the rate of recovery. Exercises/Interventions:   HEP:  Access Code: [email or web address]  URL: [email or web address]  Date: 04/14/2022  Prepared by: Mehul Sharma     Exercises  Seated Calf Towel Stretch - 3 x daily - 7 x weekly - 1 sets - 30 reps - 3 hold  Long Sitting Quad Set - 3 x daily - 7 x weekly - 1 sets - 10 reps - 10 hold  Sitting Heel Slide with Towel - 3 x daily - 7 x weekly - 1 sets - 10 reps - 10 hold     DOS 2/25/22    Exercise/Equipment Resistance/Repetitions Other comments   Stretching     Hamstring - EOT 3 x 30\"  Manual    Towel Pull Inclined Calf 3 x 30\"     Hip Flexion     ITB     Groin     Quad                    SLR     Supine flex 3 x 10    Abduction     Adduction     Prone     LAQs    SAQs     Isometrics     Quad sets X 15 min  -  NV - Russian 10\"on/10\" off/heel prop        Patellar Glides Medial     Superior     Inferior          ROM     ERMI 5'  1' minute holds   Prone hangs X 6 min 2#    Passive     Active     Weight Shift        eob flexion hang  Bike X 8 min - ROM Arcs back/forth - full rev at end   CKC     Calf raises     Leg Press 3 x 10  55 lb/seat #5, plate #4   Step ups     1 leg stand     Squatting     KEYON TKE 40# 30 x 3\"    Balance     bridges     LSD 2' x 15 TC   FSU 4' x 20         PRE     Extension  RANGE:    Flexion  RANGE: 93        Quantum machines     Leg press      Leg extension     Leg curl          Manual interventions Patellar mobilizations (all directions) and PROM knee flex x 15 min                    Therapeutic Exercise and NMR EXR  [x] (04553) Provided verbal/tactile cueing for activities related to strengthening, flexibility, endurance, ROM for improvements in LE, proximal hip, and core control with self care, mobility, lifting, ambulation. [x] (10669) Provided verbal/tactile cueing for activities related to improving balance, coordination, kinesthetic sense, posture, motor skill, proprioception  to assist with LE, proximal hip, and core control in self care, mobility, lifting, ambulation and eccentric single leg control.      NMR and Therapeutic Activities:    [x] (14335 or 63842) Provided verbal/tactile cueing for activities related to improving balance, coordination, kinesthetic sense, posture, motor skill, proprioception and motor activation to allow for proper function of core, proximal hip and LE with self care and ADLs  [x] (77320) Gait Re-education- Provided training and instruction to the patient for proper LE, core and proximal hip recruitment and positioning and eccentric body weight control with ambulation re-education including up and down stairs     Home Exercise Program:    [x] (49019) Reviewed/Progressed HEP activities related to strengthening, flexibility, endurance, ROM of core, proximal hip and LE for functional self-care, mobility, lifting and ambulation/stair navigation   [x] (25684)Reviewed/Progressed HEP activities related to improving balance, coordination, kinesthetic sense, posture, motor skill, proprioception of core, proximal hip and LE for self care, mobility, lifting, and ambulation/stair navigation      Manual Treatments:  PROM / STM / Oscillations-Mobs:  G-I, II, III, IV (PA's, Inf., Post.)  [x] (41423) Provided manual therapy to mobilize LE, proximal hip and/or LS spine soft tissue/joints for the purpose of modulating pain, promoting relaxation,  increasing ROM, reducing/eliminating soft tissue swelling/inflammation/restriction, improving soft tissue extensibility and allowing for proper ROM for normal function with self care, mobility, lifting and ambulation. Modalities: CP 10' + heel prop    Charges:  Timed Code Treatment Minutes: 48'   Total Treatment Minutes: 61'     All charges require KX modifier     [] EVAL (LOW) 16835 (typically 20 minutes face-to-face)  [] EVAL (MOD) 94139 (typically 30 minutes face-to-face)  [] EVAL (HIGH) 37953 (typically 45 minutes face-to-face)  [] RE-EVAL   [x] BK(78762) x   2  [] IONTO  [] NMR (95969) x  [] VASO (game ready x 15')  [x] Manual (52072) x  1   [] Other:  [] TA x      [] Mech Traction (07417)  [] ES(attended) (50657)      [] ES (un) (52273):     GOALS:   GOALS:   Patient stated goal: Patient to be able to walk to gym and resume normal routine. [x]? Progressing: []? Met: []? Not Met: []? Adjusted     Therapist goals for Patient:   Short Term Goals: To be achieved in: 2 weeks  1. Independent in HEP and progression per patient tolerance, in order to prevent re-injury. [x]? Progressing: []? Met: []? Not Met: []? Adjusted   2. Patient will have a decrease in pain to facilitate improvement in movement, function, and ADLs as indicated by Functional Deficits. [x]? Progressing: []? Met: []? Not Met: []? Adjusted     Long Term Goals: To be achieved in: 8-12 weeks  1.  Disability index score 50% improved for the LEFS to assist with reaching prior level of function. [x]? Progressing: []? Met: []? Not Met: []? Adjusted  2. Patient will demonstrate increased AROM to equal to R knee to allow for proper joint functioning as indicated by patients Functional Deficits. [x]? Progressing: []? Met: []? Not Met: []? Adjusted  3. Patient will demonstrate an increase in Strength to good proximal hip strength and control, within 5lb HHD in LE to allow for proper functional mobility as indicated by patients Functional Deficits. [x]? Progressing: []? Met: []? Not Met: []? Adjusted  4. Patient will return to normal functional activities without increased symptoms or restriction. [x]? Progressing: []? Met: []? Not Met: []? Adjusted  5. Patient to ambulate with normal gait with out AD. [x]? Progressing: []? Met: []? Not Met: []? Adjusted      Overall Progression Towards Functional goals/ Treatment Progress Update:  [] Patient is progressing as expected towards functional goals listed. [] Progression is slowed due to complexities/Impairments listed. [] Progression has been slowed due to co-morbidities. [x] Plan just implemented, too soon to assess goals progression <30days   [] Goals require adjustment due to lack of progress  [] Patient is not progressing as expected and requires additional follow up with physician  [] Other    Prognosis for POC: [x] Good [] Fair  [] Poor      Patient requires continued skilled intervention: [x] Yes  [] No    Treatment/Activity Tolerance:  [x] Patient able to complete treatment  [] Patient limited by fatigue  [] Patient limited by pain     [] Patient limited by other medical complications  [] Other: Discussed progressing to 1 crutch eventually, but to practice at home using 1 crutch or cane. Patient L knee still stiff but patellar mobility close to WNL; Continue to push ROM. Pt is progressing with flexion but slowly.  Continues to require skilled PT services to increase overall ROM, functional mobility, and endurance for greater functional activity tolerance. PLAN: See eval  [x] Continue per plan of care [] Alter current plan (see comments above)  [] Plan of care initiated [] Hold pending MD visit [] Discharge  Next visit:    Electronically signed by: Scarlet Richardson, PTA  [id number],  Herve Rodrigues, PT, MPT     Note: If patient does not return for scheduled/ recommended follow up visits, this note will serve as a discharge from care along with most recent update on progress.

## 2022-06-09 ENCOUNTER — APPOINTMENT (OUTPATIENT)
Dept: PHYSICAL THERAPY | Age: 72
End: 2022-06-09
Payer: MEDICARE

## 2022-06-10 ENCOUNTER — HOSPITAL ENCOUNTER (OUTPATIENT)
Dept: PHYSICAL THERAPY | Age: 72
Setting detail: THERAPIES SERIES
Discharge: HOME | End: 2022-06-10
Payer: MEDICARE

## 2022-06-10 PROCEDURE — 97110 THERAPEUTIC EXERCISES: CPT | Performed by: PHYSICAL THERAPIST

## 2022-06-10 PROCEDURE — 97140 MANUAL THERAPY 1/> REGIONS: CPT | Performed by: PHYSICAL THERAPIST

## 2022-06-10 PROCEDURE — 97112 NEUROMUSCULAR REEDUCATION: CPT | Performed by: PHYSICAL THERAPIST

## 2022-06-12 NOTE — FLOWSHEET NOTE
[facility] and [address],  Sports Performance and Rehabilitation, [facility] [address]  [address] [facility],5[address] Floor   Osorio Akbar  Phone: [phone]  Fax: [phone]      Physical Therapy Daily Treatment Note  Date: 6/10/2022    Patient Name:  Sohail Lima    :  1950  MRN: [id number]  Restrictions/Precautions:    Medical/Treatment Diagnosis Information:  Diagnosis: Z98.890, Z87.81 (ICD-10-CM) - Status post open reduction and internal fixation (ORIF) of [id number] (ICD-10-CM) - Closed fracture of left tibial plateau, initial encounter   PT DX: L knee pain M25. 460  Insurance/Certification information:  PT Insurance Information: Medicare/BLBS: no visit limit, insurance covers 80%  Physician Information:  Referring Practitioner: Dr. Helen Thakur MD  Has the plan of care been signed (Y/N):        []  Yes  [x]  No     Date of Patient follow up with Physician: 22      Is this a Progress Report:     []  Yes  [x]  No        If Yes:  Date Range for reporting period:  Beginning 22  Ending    Progress report will be due (18 Rx or 30 days whichever is less):        Recertification will be due (POC Duration  / 90 days whichever is less): 22         Visit # Insurance Allowable Auth Required   16 No visit limit []  Yes []  No        Functional Scale: 12.5 LEFs    Date assessed:  22     58% LEFS       22       Latex Allergy:  [x]NO      []YES    Preferred Language for Healthcare:   [x]English       []other:    Pain level: 1-2/10     SUBJECTIVE:  \"I am        OBJECTIVE:    Observation: 0 ext   Test measurements:      RESTRICTIONS/PRECAUTIONS: Full wt.  Bearing with crutches and push ROM per MD note on 22          MEDICARE CAP EXCEPTION DOCUMENTATION      I certify that this patient meets one of the below criteria necessary for becoming an exception to the Medicare cap on therapy services:    [x]  The patient has a condition identified by an ICD-10 code that has a direct and significant impact on the need for therapy. (Significantly impacts the rate of recovery.)                   []  The patient has a complexity identified by an ICD-9 code that has a direct and significant impact on the need for therapy. (Significantly impacts the rate of recovery and is associated with a primary condition.)         []  The patient has associated variables that influence the amount of treatment to include:  Social support, self-efficacy/motivation, prognosis, time since onset/acuity. []  The patient has generalized musculoskeletal conditions or a condition affecting multiple sites that will have a direct impact on the rate of recovery. []  The patient had a prior episode of outpatient therapy during this calendar year for a different condition. []  The patient has a mental or cognitive disorder in addition to the condition being treated that will have a direct and significant impact on the rate of recovery. Exercises/Interventions:   HEP:  Access Code: [email or web address]  URL: [email or web address]  Date: 04/14/2022  Prepared by: Manfred Lam     Exercises  Seated Calf Towel Stretch - 3 x daily - 7 x weekly - 1 sets - 30 reps - 3 hold  Long Sitting Quad Set - 3 x daily - 7 x weekly - 1 sets - 10 reps - 10 hold  Sitting Heel Slide with Towel - 3 x daily - 7 x weekly - 1 sets - 10 reps - 10 hold     DOS 2/25/22    Exercise/Equipment Resistance/Repetitions Other comments   Stretching     Hamstring - EOT 3 x 30\"  Manual    Towel Pull Inclined Calf 3 x 30\"     Hip Flexion     ITB     Groin     Quad                    SLR     Supine flex 3 x 10    Abduction     Adduction     Prone     LAQs    SAQs     Isometrics     Quad sets X 15 min  -  NV - Russian 10\"on/10\" off/heel prop        Patellar Glides     Medial     Superior     Inferior          ROM     ERMI 5'  1' minute holds   Prone hangs X 6 min 2#    Passive     Active Weight Shift        eob flexion hang  Bike X 8 min - ROM Arcs back/forth - full rev at end   CKC     Calf raises     Leg Press 3 x 10  55 lb/seat #5, plate #4   Step ups     1 leg stand     Squatting     KEYON TKE 40# 30 x 3\"    Balance     bridges     LSD 2' x 15 TC   FSU 4' x 20         PRE     Extension  RANGE:    Flexion  RANGE: 93        Quantum machines     Leg press      Leg extension     Leg curl          Manual interventions Patellar mobilizations (all directions) and PROM knee flex x 15 min                    Therapeutic Exercise and NMR EXR  [x] (11254) Provided verbal/tactile cueing for activities related to strengthening, flexibility, endurance, ROM for improvements in LE, proximal hip, and core control with self care, mobility, lifting, ambulation. [x] (37812) Provided verbal/tactile cueing for activities related to improving balance, coordination, kinesthetic sense, posture, motor skill, proprioception  to assist with LE, proximal hip, and core control in self care, mobility, lifting, ambulation and eccentric single leg control.      NMR and Therapeutic Activities:    [x] (10677 or 32369) Provided verbal/tactile cueing for activities related to improving balance, coordination, kinesthetic sense, posture, motor skill, proprioception and motor activation to allow for proper function of core, proximal hip and LE with self care and ADLs  [x] (77559) Gait Re-education- Provided training and instruction to the patient for proper LE, core and proximal hip recruitment and positioning and eccentric body weight control with ambulation re-education including up and down stairs     Home Exercise Program:    [x] (13162) Reviewed/Progressed HEP activities related to strengthening, flexibility, endurance, ROM of core, proximal hip and LE for functional self-care, mobility, lifting and ambulation/stair navigation   [x] (20398)Reviewed/Progressed HEP activities related to improving balance, coordination, kinesthetic sense, posture, motor skill, proprioception of core, proximal hip and LE for self care, mobility, lifting, and ambulation/stair navigation      Manual Treatments:  PROM / STM / Oscillations-Mobs:  G-I, II, III, IV (PA's, Inf., Post.)  [x] (62640) Provided manual therapy to mobilize LE, proximal hip and/or LS spine soft tissue/joints for the purpose of modulating pain, promoting relaxation,  increasing ROM, reducing/eliminating soft tissue swelling/inflammation/restriction, improving soft tissue extensibility and allowing for proper ROM for normal function with self care, mobility, lifting and ambulation. Modalities: CP 10' + heel prop    Charges:  Timed Code Treatment Minutes: 48'   Total Treatment Minutes: 48'     All charges require KX modifier     [] EVAL (LOW) 71505 (typically 20 minutes face-to-face)  [] EVAL (MOD) 76203 (typically 30 minutes face-to-face)  [] EVAL (HIGH) 94022 (typically 45 minutes face-to-face)  [] RE-EVAL   [x] PH(34423) x   1  [] IONTO  [x] NMR (21848) x 1 [] VASO (game ready x 15')  [x] Manual (64910) x  1   [] Other:  [] TA x      [] Mech Traction (84269)  [] ES(attended) (67414)      [] ES (un) (62421):     GOALS:   GOALS:   Patient stated goal: Patient to be able to walk to gym and resume normal routine. [x]? Progressing: []? Met: []? Not Met: []? Adjusted     Therapist goals for Patient:   Short Term Goals: To be achieved in: 2 weeks  1. Independent in HEP and progression per patient tolerance, in order to prevent re-injury. [x]? Progressing: []? Met: []? Not Met: []? Adjusted   2. Patient will have a decrease in pain to facilitate improvement in movement, function, and ADLs as indicated by Functional Deficits. [x]? Progressing: []? Met: []? Not Met: []? Adjusted     Long Term Goals: To be achieved in: 8-12 weeks  1. Disability index score 50% improved for the LEFS to assist with reaching prior level of function. [x]? Progressing: []? Met: []? Not Met: []? Adjusted  2. Patient will demonstrate increased AROM to equal to R knee to allow for proper joint functioning as indicated by patients Functional Deficits. [x]? Progressing: []? Met: []? Not Met: []? Adjusted  3. Patient will demonstrate an increase in Strength to good proximal hip strength and control, within 5lb HHD in LE to allow for proper functional mobility as indicated by patients Functional Deficits. [x]? Progressing: []? Met: []? Not Met: []? Adjusted  4. Patient will return to normal functional activities without increased symptoms or restriction. [x]? Progressing: []? Met: []? Not Met: []? Adjusted  5. Patient to ambulate with normal gait with out AD. [x]? Progressing: []? Met: []? Not Met: []? Adjusted      Overall Progression Towards Functional goals/ Treatment Progress Update:  [] Patient is progressing as expected towards functional goals listed. [] Progression is slowed due to complexities/Impairments listed. [] Progression has been slowed due to co-morbidities. [x] Plan just implemented, too soon to assess goals progression <30days   [] Goals require adjustment due to lack of progress  [] Patient is not progressing as expected and requires additional follow up with physician  [] Other    Prognosis for POC: [x] Good [] Fair  [] Poor      Patient requires continued skilled intervention: [x] Yes  [] No    Treatment/Activity Tolerance:  [x] Patient able to complete treatment  [] Patient limited by fatigue  [] Patient limited by pain     [] Patient limited by other medical complications  [] Other: Discussed progressing to 1 crutch eventually, but to practice at home using 1 crutch or cane. Patient L knee still stiff but patellar mobility close to WNL; Continue to push ROM. Pt is progressing with flexion but slowly. Continues to require skilled PT services to increase overall ROM, functional mobility, and endurance for greater functional activity tolerance.         PLAN: See natividad  [x] Continue per plan of care [] Alter current plan (see comments above)  [] Plan of care initiated [] Hold pending MD visit [] Discharge  Next visit:    Electronically signed by: Arely Young PT, MPT     Note: If patient does not return for scheduled/ recommended follow up visits, this note will serve as a discharge from care along with most recent update on progress.

## 2022-06-13 ENCOUNTER — HOSPITAL ENCOUNTER (OUTPATIENT)
Dept: PHYSICAL THERAPY | Age: 72
Setting detail: THERAPIES SERIES
Discharge: HOME | End: 2022-06-13
Payer: MEDICARE

## 2022-06-13 PROCEDURE — 97110 THERAPEUTIC EXERCISES: CPT | Performed by: PHYSICAL THERAPIST

## 2022-06-13 PROCEDURE — 97112 NEUROMUSCULAR REEDUCATION: CPT | Performed by: PHYSICAL THERAPIST

## 2022-06-13 PROCEDURE — 97140 MANUAL THERAPY 1/> REGIONS: CPT | Performed by: PHYSICAL THERAPIST

## 2022-06-15 NOTE — FLOWSHEET NOTE
The [facility] and [address],  [facility], Formerly [facility], later [facility] [address]  [facility],5[address]   Osorio Akbar  Phone: [phone]  Fax: [phone]      Physical Therapy Daily Treatment Note  Date: 2022    Patient Name:  Jacklyn Sahu    :  1950  MRN: [id number]  Restrictions/Precautions:    Medical/Treatment Diagnosis Information:  Diagnosis: Z98.890, Z87.81 (ICD-10-CM) - Status post open reduction and internal fixation (ORIF) of ptymxaulE34.142A (ICD-10-CM) - Closed fracture of left tibial plateau, initial encounter   PT DX: L knee pain M25. 033  Insurance/Certification information:  PT Insurance Information: Medicare/BLBS: no visit limit, insurance covers 80%  Physician Information:  Referring Practitioner: Dr. Alton MD  Has the plan of care been signed (Y/N):        []  Yes  [x]  No     Date of Patient follow up with Physician: 22      Is this a Progress Report:     []  Yes  [x]  No        If Yes:  Date Range for reporting period:  Beginning 22  Ending    Progress report will be due (18 Rx or 30 days whichever is less):        Recertification will be due (POC Duration  / 90 days whichever is less): 22         Visit # Insurance Allowable Auth Required   17 No visit limit []  Yes []  No        Functional Scale: 12.5 LEFs    Date assessed:  22     58% LEFS       22       Latex Allergy:  [x]NO      []YES    Preferred Language for Healthcare:   [x]English       []other:    Pain level: 1-2/10     SUBJECTIVE:  \"I am improving weekly\"       OBJECTIVE:    Observation:  110 degrees flexion    Test measurements:      RESTRICTIONS/PRECAUTIONS: Full wt.  Bearing with crutches and push ROM per MD note on 22          MEDICARE CAP EXCEPTION DOCUMENTATION      I certify that this patient meets one of the below criteria necessary for becoming an exception to the Medicare cap on therapy services:    [x]  The patient has a condition identified by an ICD-10 code that has a direct and significant impact on the need for therapy. (Significantly impacts the rate of recovery.)                   []  The patient has a complexity identified by an ICD-9 code that has a direct and significant impact on the need for therapy. (Significantly impacts the rate of recovery and is associated with a primary condition.)         []  The patient has associated variables that influence the amount of treatment to include:  Social support, self-efficacy/motivation, prognosis, time since onset/acuity. []  The patient has generalized musculoskeletal conditions or a condition affecting multiple sites that will have a direct impact on the rate of recovery. []  The patient had a prior episode of outpatient therapy during this calendar year for a different condition. []  The patient has a mental or cognitive disorder in addition to the condition being treated that will have a direct and significant impact on the rate of recovery. Exercises/Interventions:   HEP:  Access Code: [email or web address]  URL: [email or web address]  Date: 04/14/2022  Prepared by: Stephanie Low     Exercises  Seated Calf Towel Stretch - 3 x daily - 7 x weekly - 1 sets - 30 reps - 3 hold  Long Sitting Quad Set - 3 x daily - 7 x weekly - 1 sets - 10 reps - 10 hold  Sitting Heel Slide with Towel - 3 x daily - 7 x weekly - 1 sets - 10 reps - 10 hold     DOS 2/25/22    Exercise/Equipment Resistance/Repetitions Other comments   Stretching     Hamstring - EOT 3 x 30\"  Manual    Towel Pull Inclined Calf 3 x 30\"     Hip Flexion     ITB     Groin     Quad                    SLR     Supine flex 3 x 10    Abduction     Adduction     Prone     LAQs    SAQs     Isometrics     Quad sets X 15 min  -  NV - Russian 10\"on/10\" off/heel prop        Patellar Glides     Medial     Superior     Inferior          ROM     ERMI 5'  1' minute holds   Prone hangs X 6 min 2# Passive     Active     Weight Shift        eob flexion hang  Bike X 8 min - ROM Arcs back/forth - full rev at end   CKC     Calf raises     Leg Press 3 x 10  55 lb/seat #5, plate #4   Step ups     1 leg stand     Squatting     KEYON TKE 40# 30 x 3\"    Balance     bridges     LSD 2' x 15 TC   FSU 4' x 20         PRE     Extension  RANGE:    Flexion  RANGE: 93        Quantum machines     Leg press      Leg extension     Leg curl          Manual interventions Patellar mobilizations (all directions) and PROM knee flex x 15 min                    Therapeutic Exercise and NMR EXR  [x] (50000) Provided verbal/tactile cueing for activities related to strengthening, flexibility, endurance, ROM for improvements in LE, proximal hip, and core control with self care, mobility, lifting, ambulation. [x] (94425) Provided verbal/tactile cueing for activities related to improving balance, coordination, kinesthetic sense, posture, motor skill, proprioception  to assist with LE, proximal hip, and core control in self care, mobility, lifting, ambulation and eccentric single leg control.      NMR and Therapeutic Activities:    [x] (01196 or 28748) Provided verbal/tactile cueing for activities related to improving balance, coordination, kinesthetic sense, posture, motor skill, proprioception and motor activation to allow for proper function of core, proximal hip and LE with self care and ADLs  [x] (55509) Gait Re-education- Provided training and instruction to the patient for proper LE, core and proximal hip recruitment and positioning and eccentric body weight control with ambulation re-education including up and down stairs     Home Exercise Program:    [x] (02499) Reviewed/Progressed HEP activities related to strengthening, flexibility, endurance, ROM of core, proximal hip and LE for functional self-care, mobility, lifting and ambulation/stair navigation   [x] (51762)Reviewed/Progressed HEP activities related to improving balance, coordination, kinesthetic sense, posture, motor skill, proprioception of core, proximal hip and LE for self care, mobility, lifting, and ambulation/stair navigation      Manual Treatments:  PROM / STM / Oscillations-Mobs:  G-I, II, III, IV (PA's, Inf., Post.)  [x] (13788) Provided manual therapy to mobilize LE, proximal hip and/or LS spine soft tissue/joints for the purpose of modulating pain, promoting relaxation,  increasing ROM, reducing/eliminating soft tissue swelling/inflammation/restriction, improving soft tissue extensibility and allowing for proper ROM for normal function with self care, mobility, lifting and ambulation. Modalities: CP 10' + heel prop    Charges:  Timed Code Treatment Minutes: 48'   Total Treatment Minutes: 48'     All charges require KX modifier     [] EVAL (LOW) 65987 (typically 20 minutes face-to-face)  [] EVAL (MOD) 59950 (typically 30 minutes face-to-face)  [] EVAL (HIGH) 86828 (typically 45 minutes face-to-face)  [] RE-EVAL   [x] CA(32104) x   1  [] IONTO  [x] NMR (01779) x 1 [] VASO (game ready x 15')  [x] Manual (47183) x  1   [] Other:  [] TA x      [] Mech Traction (18391)  [] ES(attended) (69053)      [] ES (un) (19884):     GOALS:   GOALS:   Patient stated goal: Patient to be able to walk to gym and resume normal routine. [x]? Progressing: []? Met: []? Not Met: []? Adjusted     Therapist goals for Patient:   Short Term Goals: To be achieved in: 2 weeks  1. Independent in HEP and progression per patient tolerance, in order to prevent re-injury. [x]? Progressing: []? Met: []? Not Met: []? Adjusted   2. Patient will have a decrease in pain to facilitate improvement in movement, function, and ADLs as indicated by Functional Deficits. [x]? Progressing: []? Met: []? Not Met: []? Adjusted     Long Term Goals: To be achieved in: 8-12 weeks  1. Disability index score 50% improved for the LEFS to assist with reaching prior level of function. [x]? Progressing: []?  Met: []? Not Met: []? Adjusted  2. Patient will demonstrate increased AROM to equal to R knee to allow for proper joint functioning as indicated by patients Functional Deficits. [x]? Progressing: []? Met: []? Not Met: []? Adjusted  3. Patient will demonstrate an increase in Strength to good proximal hip strength and control, within 5lb HHD in LE to allow for proper functional mobility as indicated by patients Functional Deficits. [x]? Progressing: []? Met: []? Not Met: []? Adjusted  4. Patient will return to normal functional activities without increased symptoms or restriction. [x]? Progressing: []? Met: []? Not Met: []? Adjusted  5. Patient to ambulate with normal gait with out AD. [x]? Progressing: []? Met: []? Not Met: []? Adjusted      Overall Progression Towards Functional goals/ Treatment Progress Update:  [] Patient is progressing as expected towards functional goals listed. [] Progression is slowed due to complexities/Impairments listed. [] Progression has been slowed due to co-morbidities. [x] Plan just implemented, too soon to assess goals progression <30days   [] Goals require adjustment due to lack of progress  [] Patient is not progressing as expected and requires additional follow up with physician  [] Other    Prognosis for POC: [x] Good [] Fair  [] Poor      Patient requires continued skilled intervention: [x] Yes  [] No    Treatment/Activity Tolerance:  [x] Patient able to complete treatment  [] Patient limited by fatigue  [] Patient limited by pain     [] Patient limited by other medical complications  [] Other: Discussed progressing to 1 crutch eventually, but to practice at home using 1 crutch or cane. Patient L knee still stiff but patellar mobility close to WNL; Continue to push ROM. Pt is progressing with flexion but slowly. Continues to require skilled PT services to increase overall ROM, functional mobility, and endurance for greater functional activity tolerance.         PLAN: See eval  [x] Continue per plan of care [] Alter current plan (see comments above)  [] Plan of care initiated [] Hold pending MD visit [] Discharge  Next visit:    Electronically signed by: Batool Amanda PT, MPT     Note: If patient does not return for scheduled/ recommended follow up visits, this note will serve as a discharge from care along with most recent update on progress.

## 2022-06-16 ENCOUNTER — HOSPITAL ENCOUNTER (OUTPATIENT)
Dept: PHYSICAL THERAPY | Age: 72
Setting detail: THERAPIES SERIES
Discharge: HOME | End: 2022-06-16
Payer: MEDICARE

## 2022-06-16 ENCOUNTER — OFFICE VISIT (OUTPATIENT)
Dept: ORTHOPEDIC SURGERY | Age: 72
End: 2022-06-16
Payer: MEDICARE

## 2022-06-16 VITALS — HEIGHT: 62 IN | WEIGHT: 145 LBS | BODY MASS INDEX: 26.68 KG/M2

## 2022-06-16 DIAGNOSIS — Z87.81 STATUS POST OPEN REDUCTION AND INTERNAL FIXATION (ORIF) OF FRACTURE: Primary | ICD-10-CM

## 2022-06-16 DIAGNOSIS — Z98.890 STATUS POST OPEN REDUCTION AND INTERNAL FIXATION (ORIF) OF FRACTURE: Primary | ICD-10-CM

## 2022-06-16 PROCEDURE — 97140 MANUAL THERAPY 1/> REGIONS: CPT | Performed by: PHYSICAL THERAPIST

## 2022-06-16 PROCEDURE — 3017F COLORECTAL CA SCREEN DOC REV: CPT | Performed by: PHYSICIAN ASSISTANT

## 2022-06-16 PROCEDURE — 99213 OFFICE O/P EST LOW 20 MIN: CPT | Performed by: PHYSICIAN ASSISTANT

## 2022-06-16 PROCEDURE — 1090F PRES/ABSN URINE INCON ASSESS: CPT | Performed by: PHYSICIAN ASSISTANT

## 2022-06-16 PROCEDURE — 1036F TOBACCO NON-USER: CPT | Performed by: PHYSICIAN ASSISTANT

## 2022-06-16 PROCEDURE — G8417 CALC BMI ABV UP PARAM F/U: HCPCS | Performed by: PHYSICIAN ASSISTANT

## 2022-06-16 PROCEDURE — G8399 PT W/DXA RESULTS DOCUMENT: HCPCS | Performed by: PHYSICIAN ASSISTANT

## 2022-06-16 PROCEDURE — 1123F ACP DISCUSS/DSCN MKR DOCD: CPT | Performed by: PHYSICIAN ASSISTANT

## 2022-06-16 PROCEDURE — G8428 CUR MEDS NOT DOCUMENT: HCPCS | Performed by: PHYSICIAN ASSISTANT

## 2022-06-16 PROCEDURE — 97112 NEUROMUSCULAR REEDUCATION: CPT | Performed by: PHYSICAL THERAPIST

## 2022-06-16 PROCEDURE — 97110 THERAPEUTIC EXERCISES: CPT | Performed by: PHYSICAL THERAPIST

## 2022-06-16 NOTE — PROGRESS NOTES
Date:  2022    Name:  Bree Rodriguez  Address:  [address] [place] [id number]    :  1950      Age:   70 y.o.    SSN:  [id number]      Medical Record Number:  [id number]    Reason for Visit:    Chief Complaint    Knee Pain (F/U left knee pain, no pain today)      DOS:        Currently: 2022  Patient is here for follow-up regarding left knee tibial plateau fracture. Patient notes overall she is doing fairly well she is continuing with physical therapy and exercises on a regular basis. She still notes she is still a few degrees short of extension but getting flexion between 108 to 112 degrees. She is utilizing cane and crutch to help with ambulation intermittently depending need is noting some increased pain across the lateral aspect of the foot denies any fall trauma or injury and notes improvement in the numbness and tingling with use of the gabapentin and 400 mg twice daily. Previously: 2022  HPI: Frances Bhatti is a 70 y.o. female here today for tibial plateau fracture. More pain in the thigh bilaterally with walking than the left knee    Left tibia with no pain and limited motions. Doing physical therapy currently with   gains on rom of the knee. Anterior patella pain improved. Back and lateral hip pain each morning. Review of Systems:  Review of Systems   Constitutional: Negative for chills and fever. HENT: Negative for nosebleeds. Eyes: Negative for double vision. Cardiovascular: Negative for chest pain. Gastrointestinal: Negative for abdominal pain. Musculoskeletal: Positive for joint pain and myalgias. Skin: Negative for rash. Neurological: Negative for seizures. Psychiatric/Behavioral: Negative for hallucinations.         Past History:  Past Medical History:   Diagnosis Date    Arthritis     Chronic hyponatremia     GERD (gastroesophageal reflux disease)     History of blood transfusion     Hyperlipidemia     Hypertension     no meds currently    OA (osteoarthritis)     Prolonged emergence from general anesthesia     Tibial plateau fracture     Wears partial dentures     upper & lower     Past Surgical History:   Procedure Laterality Date     SECTION      COLONOSCOPY      LEG SURGERY Left 2022    OPEN REDUCTION INTERNAL FIXATION LEFT TIBIAL PLATEAU FRACTURE         SUKUMAR  CPT CODE - 23210 [place] [facility] [place]    LEFT TOTAL HIP ARTHROPLASTY ANTERIOR APPROACH [place] [facility] [place]    RIGHT HIP ARTHROPLASTY ANTERIOR APPROACH performed by Ivette Nicholas MD at [address] [place] [address] [address]     Current Outpatient Medications on File Prior to Visit   Medication Sig Dispense Refill    gabapentin (NEURONTIN) 400 MG capsule Take 1 capsule by mouth 3 times daily for 90 days. 90 capsule 2    diclofenac sodium (VOLTAREN) 1 % GEL Apply 4 g topically 4 times daily 150 g 1    rivaroxaban (XARELTO) 20 MG TABS tablet Take 1 tablet by mouth daily (with breakfast) 30 tablet 5    methocarbamol (ROBAXIN) 750 MG tablet Take 750 mg by mouth 4 times daily      acetaminophen (TYLENOL) 325 MG tablet Take 650 mg by mouth every 6 hours as needed for Pain      simvastatin (ZOCOR) 40 MG tablet TAKE 1 TABLET BY MOUTH EVERY NIGHT 90 tablet 3    calcium carbonate (OSCAL) 500 MG TABS tablet Take 500 mg by mouth daily      omeprazole (PRILOSEC OTC) 20 MG tablet Take 20 mg by mouth daily       Multiple Vitamins-Minerals (THERAPEUTIC MULTIVITAMIN-MINERALS) tablet Take 1 tablet by mouth daily      Vitamin D (CHOLECALCIFEROL) 1000 UNITS CAPS capsule Take 2,000 Units by mouth daily        No current facility-administered medications on file prior to visit.      Social History     Socioeconomic History    Marital status:      Spouse name: Not on file    Number of children: Not on file    Years of education: Not on file    Highest education level: Not on file   Occupational History    Not on file   Tobacco Use    Smoking status: Former Smoker     Packs/day: 1.00     Years: 15.00     Pack years: 15.00     Quit date: 1983     Years since quittin.4    Smokeless tobacco: Never Used   Vaping Use    Vaping Use: Never used   Substance and Sexual Activity    Alcohol use: Not Currently     Alcohol/week: 1.0 - 2.0 standard drink     Types: 1 - 2 Shots of liquor per week    Drug use: No    Sexual activity: Yes   Other Topics Concern    Not on file   Social History Narrative    Not on file     Social Determinants of Health     Financial Resource Strain:     Difficulty of Paying Living Expenses: Not on file   Food Insecurity:     Worried About Running Out of Food in the Last Year: Not on file    Lilian of Food in the Last Year: Not on file   Transportation Needs:     Lack of Transportation (Medical): Not on file    Lack of Transportation (Non-Medical):  Not on file   Physical Activity: Insufficiently Active    Days of Exercise per Week: 3 days    Minutes of Exercise per Session: 40 min   Stress:     Feeling of Stress : Not on file   Social Connections:     Frequency of Communication with Friends and Family: Not on file    Frequency of Social Gatherings with Friends and Family: Not on file    Attends Latter-day Services: Not on file    Active Member of Clubs or Organizations: Not on file    Attends Club or Organization Meetings: Not on file    Marital Status: Not on file   Intimate Partner Violence:     Fear of Current or Ex-Partner: Not on file    Emotionally Abused: Not on file    Physically Abused: Not on file    Sexually Abused: Not on file   Housing Stability:     Unable to Pay for Housing in the Last Year: Not on file    Number of [place] in the Last Year: Not on file    Unstable Housing in the Last Year: Not on file     Family History   Problem Relation Age of Onset    Breast Cancer Mother 76       Current Medications:    Current Outpatient Medications   Medication Sig Dispense Refill    gabapentin (NEURONTIN) 400 MG capsule Take 1 capsule by mouth 3 times daily for 90 days. 90 capsule 2    diclofenac sodium (VOLTAREN) 1 % GEL Apply 4 g topically 4 times daily 150 g 1    rivaroxaban (XARELTO) 20 MG TABS tablet Take 1 tablet by mouth daily (with breakfast) 30 tablet 5    methocarbamol (ROBAXIN) 750 MG tablet Take 750 mg by mouth 4 times daily      acetaminophen (TYLENOL) 325 MG tablet Take 650 mg by mouth every 6 hours as needed for Pain      simvastatin (ZOCOR) 40 MG tablet TAKE 1 TABLET BY MOUTH EVERY NIGHT 90 tablet 3    calcium carbonate (OSCAL) 500 MG TABS tablet Take 500 mg by mouth daily      omeprazole (PRILOSEC OTC) 20 MG tablet Take 20 mg by mouth daily       Multiple Vitamins-Minerals (THERAPEUTIC MULTIVITAMIN-MINERALS) tablet Take 1 tablet by mouth daily      Vitamin D (CHOLECALCIFEROL) 1000 UNITS CAPS capsule Take 2,000 Units by mouth daily        No current facility-administered medications for this visit. Allergies: Allergies   Allergen Reactions    Zanaflex [Tizanidine]      bradycardia    Keflet [Cephalexin] Rash       Physical Exam:  There were no vitals filed for this visit. Physical Exam   Constitutional: Patient is oriented to person, place, and time and well-developed, well-nourished, and in no distress. HENT:   Head: Normocephalic and atraumatic. Eyes: Pupils are equal, round, and reactive to light. Neck: No tracheal deviation present. No thyromegaly present. Pulmonary/Chest: Effort normal.   Abdominal: Soft. There is no guarding. Musculoskeletal: Patient exhibits tenderness and pain. Neurological: Patient is alert and oriented to person, place, and time. Skin: Skin is warm. Psychiatric: Affect normal.     General: Patrick Noe is a healthy and well appearing 70y.o. year old female who is sitting comfortably in our office in acute distress. Alert and oriented. Physical Exam:  RUE:    No gross deformities noted. Sensation is intact to light touch throughout the median, ulnar and radial nerve distribution. Able to wiggle fingers, gives thumbs up, A-okay and cross index and middle fingers. Full range of motion of the hand, wrist, elbow and shoulder. LUE:   No gross deformities noted. Sensation is intact to light touch throughout the median, ulnar and radial nerve distribution. Able to wiggle fingers, gives thumbs up, A-okay and cross index and middle fingers. Full range of motion of the hand wrist elbow and shoulder. RLE:   No gross deformities noted. Sensation is intact to light touch throughout the lower extremity. Able to wiggle toes and plantar and dorsiflex foot. Full range of motion at the ankle, knee and hip    LLE:  Dorsal foot decrease in sensation  Tibia swelling gross deformity noted although improved. Valgus lie of the left leg to 5-7 degrees although pateint feels that this may be a chronic situation   rom 2-3 to 110. Pain in the patella with improved patella mobility. Surprisingly little pseudolaxity of the knee . Soft tissue swelling controlled and decreased overall  Mild pain along the fifth metatarsal with pressure  . Sensation is intact to light touch throughout the lower extremity plantar  Able to wiggle toes and plantar and dorsiflex foot. Full range of motion at the  hip    Diagnostics:  Xray   Have reviewed the xrays above from 06/16/22   Reviewed x-rays from 4/21/2022 and my impression is: progressive collapse of the lateral tibial plateau  1. Closed fracture of left tibial plateau, initial encounter         Assessment: tibia plateau fracture      Plan:      Continue with outpateint therapy . Aggressive rom of the joint with use of prednisone as needed and pain medications to increase potential for rapid increase in rom. Increase % weight bearing.    Begin transition to home-based therapy/gym exercises to maintain on her own can reorder physical therapy if backslide begins to occur. 1 month recheck /   Continue with Neurontin 400 mg 2-3 times a day as needed to help with neurologic irritation and adjust the dose as needed. Will require total knee arthroplasty for the left knee because of collapse of the tibial plateau. Patient is planning to return to the gym so recommended utilization of NuStep exercise bike to help with range of motion maintenance and strength maintenance. Advance to full weight bearing as tolerated. Still using bilateral cutches. Occasionally single crutch or single cane or crutch and cane versus 2 crutches. Still taking neurontin. Some IT band pain in the am       Patient would benefit from Tens, Nems unit for poor quad tone.        [unfilled]     JESSICA Gaitan    Date:    6/16/2022

## 2022-06-19 NOTE — FLOWSHEET NOTE
[facility] and [address],  [facility], [place] [id number] [place] [address], [address] [place] [address] Shayna  Phone: [phone]  Fax: [phone]      Physical Therapy Daily Treatment Note  Date: 2022    Patient Name:  Mushtaq Preston    :  1950  MRN: [id number]  Restrictions/Precautions:    Medical/Treatment Diagnosis Information:  Diagnosis: Z98.890, Z87.81 (ICD-10-CM) - Status post open reduction and internal fixation (ORIF) of [id number] (ICD-10-CM) - Closed fracture of left tibial plateau, initial encounter   PT DX: L knee pain M25. 336  Insurance/Certification information:  PT Insurance Information: Medicare/BLBS: no visit limit, insurance covers 80%  Physician Information:  Referring Practitioner: Dr. Herb Mccormack MD  Has the plan of care been signed (Y/N):        []  Yes  [x]  No     Date of Patient follow up with Physician: 22      Is this a Progress Report:     []  Yes  [x]  No        If Yes:  Date Range for reporting period:  Beginning 22  Ending    Progress report will be due (18 Rx or 30 days whichever is less):        Recertification will be due (POC Duration  / 90 days whichever is less): 22         Visit # Insurance Allowable Auth Required   17 No visit limit []  Yes []  No        Functional Scale: 12.5 LEFs    Date assessed:  22     58% LEFS       22       Latex Allergy:  [x]NO      []YES    Preferred Language for Healthcare:   [x]English       []other:    Pain level: 1-2/10     SUBJECTIVE:  \"I am improving weekly\"       OBJECTIVE:    Observation:  110 degrees flexion    Test measurements:      RESTRICTIONS/PRECAUTIONS: Full wt.  Bearing with crutches and push ROM per MD note on 22          MEDICARE CAP EXCEPTION DOCUMENTATION      I certify that this patient meets one of the below criteria necessary for becoming an exception to the Medicare cap on therapy services:    [x]  The patient has a condition identified by an ICD-10 code that has a direct and significant impact on the need for therapy. (Significantly impacts the rate of recovery.)                   []  The patient has a complexity identified by an ICD-9 code that has a direct and significant impact on the need for therapy. (Significantly impacts the rate of recovery and is associated with a primary condition.)         []  The patient has associated variables that influence the amount of treatment to include:  Social support, self-efficacy/motivation, prognosis, time since onset/acuity. []  The patient has generalized musculoskeletal conditions or a condition affecting multiple sites that will have a direct impact on the rate of recovery. []  The patient had a prior episode of outpatient therapy during this calendar year for a different condition. []  The patient has a mental or cognitive disorder in addition to the condition being treated that will have a direct and significant impact on the rate of recovery. Exercises/Interventions:   HEP:  Access Code: [email or web address]  URL: [email or web address]  Date: 04/14/2022  Prepared by: Mihaela Jenkins     Exercises  Seated Calf Towel Stretch - 3 x daily - 7 x weekly - 1 sets - 30 reps - 3 hold  Long Sitting Quad Set - 3 x daily - 7 x weekly - 1 sets - 10 reps - 10 hold  Sitting Heel Slide with Towel - 3 x daily - 7 x weekly - 1 sets - 10 reps - 10 hold     DOS 2/25/22    Exercise/Equipment Resistance/Repetitions Other comments   Stretching     Hamstring - EOT 3 x 30\"  Manual    Towel Pull Inclined Calf 3 x 30\"     Hip Flexion     ITB     Groin     Quad                    SLR     Supine flex 3 x 10    Abduction     Adduction     Prone     LAQs    SAQs     Isometrics     Quad sets X 15 min  -  NV - Russian 10\"on/10\" off/heel prop        Patellar Glides     Medial     Superior     Inferior          ROM     ERMI 5'  1' minute holds   Prone hangs X 6 min 2# Passive     Active     Weight Shift        eob flexion hang  Bike X 8 min - ROM Arcs back/forth - full rev at end   CKC     Calf raises     Leg Press 3 x 10  55 lb/seat #5, plate #4   Step ups     1 leg stand     Squatting     KEYON TKE    Balance    bridges    LSD TC   FSU         PRE     Extension  RANGE:    Flexion  RANGE: 93        Quantum machines     Leg press      Leg extension     Leg curl          Manual interventions Patellar mobilizations (all directions) and PROM knee flex x 15 min                    Therapeutic Exercise and NMR EXR  [x] (82540) Provided verbal/tactile cueing for activities related to strengthening, flexibility, endurance, ROM for improvements in LE, proximal hip, and core control with self care, mobility, lifting, ambulation. [x] (65657) Provided verbal/tactile cueing for activities related to improving balance, coordination, kinesthetic sense, posture, motor skill, proprioception  to assist with LE, proximal hip, and core control in self care, mobility, lifting, ambulation and eccentric single leg control.      NMR and Therapeutic Activities:    [x] (11152 or 18946) Provided verbal/tactile cueing for activities related to improving balance, coordination, kinesthetic sense, posture, motor skill, proprioception and motor activation to allow for proper function of core, proximal hip and LE with self care and ADLs  [x] (41768) Gait Re-education- Provided training and instruction to the patient for proper LE, core and proximal hip recruitment and positioning and eccentric body weight control with ambulation re-education including up and down stairs     Home Exercise Program:    [x] (77990) Reviewed/Progressed HEP activities related to strengthening, flexibility, endurance, ROM of core, proximal hip and LE for functional self-care, mobility, lifting and ambulation/stair navigation   [x] (78412)Reviewed/Progressed HEP activities related to improving balance, coordination, kinesthetic sense, demonstrate increased AROM to equal to R knee to allow for proper joint functioning as indicated by patients Functional Deficits. [x]? Progressing: []? Met: []? Not Met: []? Adjusted  3. Patient will demonstrate an increase in Strength to good proximal hip strength and control, within 5lb HHD in LE to allow for proper functional mobility as indicated by patients Functional Deficits. [x]? Progressing: []? Met: []? Not Met: []? Adjusted  4. Patient will return to normal functional activities without increased symptoms or restriction. [x]? Progressing: []? Met: []? Not Met: []? Adjusted  5. Patient to ambulate with normal gait with out AD. [x]? Progressing: []? Met: []? Not Met: []? Adjusted      Overall Progression Towards Functional goals/ Treatment Progress Update:  [] Patient is progressing as expected towards functional goals listed. [] Progression is slowed due to complexities/Impairments listed. [] Progression has been slowed due to co-morbidities. [x] Plan just implemented, too soon to assess goals progression <30days   [] Goals require adjustment due to lack of progress  [] Patient is not progressing as expected and requires additional follow up with physician  [] Other    Prognosis for POC: [x] Good [] Fair  [] Poor      Patient requires continued skilled intervention: [x] Yes  [] No    Treatment/Activity Tolerance:  [x] Patient able to complete treatment  [] Patient limited by fatigue  [] Patient limited by pain     [] Patient limited by other medical complications  [] Other: Discussed progressing to 1 crutch eventually, but to practice at home using 1 crutch or cane. Patient L knee still stiff but patellar mobility close to WNL; Continue to push ROM. Pt is progressing with flexion but slowly. Continues to require skilled PT services to increase overall ROM, functional mobility, and endurance for greater functional activity tolerance.         PLAN: See natividad  [x] Continue per plan of care [] Zak Isaac current plan (see comments above)  [] Plan of care initiated [] Hold pending MD visit [] Discharge  Next visit:    Electronically signed by: Heaven Epps PT, MPT     Note: If patient does not return for scheduled/ recommended follow up visits, this note will serve as a discharge from care along with most recent update on progress.

## 2022-06-20 ENCOUNTER — HOSPITAL ENCOUNTER (OUTPATIENT)
Dept: PHYSICAL THERAPY | Age: 72
Setting detail: THERAPIES SERIES
Discharge: HOME | End: 2022-06-20
Payer: MEDICARE

## 2022-06-20 PROCEDURE — 97110 THERAPEUTIC EXERCISES: CPT | Performed by: SPECIALIST/TECHNOLOGIST

## 2022-06-20 PROCEDURE — 97140 MANUAL THERAPY 1/> REGIONS: CPT | Performed by: SPECIALIST/TECHNOLOGIST

## 2022-06-20 NOTE — FLOWSHEET NOTE
[facility] and [address],  [facility], [place] [id number] [place] [address] Shayna  Phone: [phone]  Fax: [phone]      Physical Therapy Daily Treatment Note  Date: 2022    Patient Name:  Mushtaq Preston    :  1950  MRN: [id number]  Restrictions/Precautions:    Medical/Treatment Diagnosis Information:  Diagnosis: Z98.890, Z87.81 (ICD-10-CM) - Status post open reduction and internal fixation (ORIF) of [id number] (ICD-10-CM) - Closed fracture of left tibial plateau, initial encounter   PT DX: L knee pain M25. 874  Insurance/Certification information:  PT Insurance Information: Medicare/BLBS: no visit limit, insurance covers 80%  Physician Information:  Referring Practitioner: Dr. Herb Mccormack MD  Has the plan of care been signed (Y/N):        []  Yes  [x]  No     Date of Patient follow up with Physician: 22      Is this a Progress Report:     []  Yes  [x]  No        If Yes:  Date Range for reporting period:  Beginning 22  Ending    Progress report will be due (18 Rx or 30 days whichever is less):        Recertification will be due (POC Duration  / 90 days whichever is less): 22         Visit # Insurance Allowable Auth Required   18 No visit limit []  Yes []  No        Functional Scale: 12.5 LEFs    Date assessed:  22     58% LEFS       22       Latex Allergy:  [x]NO      []YES    Preferred Language for Healthcare:   [x]English       []other:    Pain level: 1-2/10     SUBJECTIVE:  \"I am improving weekly\"       OBJECTIVE:    Observation:  110 degrees flexion    Test measurements:      RESTRICTIONS/PRECAUTIONS: Full wt.  Bearing with crutches and push ROM per MD note on 22          MEDICARE CAP EXCEPTION DOCUMENTATION      I certify that this patient meets one of the below criteria necessary for becoming an exception to the Medicare cap on therapy services:    [x]  The patient has a condition identified by an ICD-10 code that has a direct and significant impact on the need for therapy. (Significantly impacts the rate of recovery.)                   []  The patient has a complexity identified by an ICD-9 code that has a direct and significant impact on the need for therapy. (Significantly impacts the rate of recovery and is associated with a primary condition.)         []  The patient has associated variables that influence the amount of treatment to include:  Social support, self-efficacy/motivation, prognosis, time since onset/acuity. []  The patient has generalized musculoskeletal conditions or a condition affecting multiple sites that will have a direct impact on the rate of recovery. []  The patient had a prior episode of outpatient therapy during this calendar year for a different condition. []  The patient has a mental or cognitive disorder in addition to the condition being treated that will have a direct and significant impact on the rate of recovery. Exercises/Interventions:   HEP:  Access Code: [email or web address]  URL: [email or web address]  Date: 04/14/2022  Prepared by: Will Speaker     Exercises  Seated Calf Towel Stretch - 3 x daily - 7 x weekly - 1 sets - 30 reps - 3 hold  Long Sitting Quad Set - 3 x daily - 7 x weekly - 1 sets - 10 reps - 10 hold  Sitting Heel Slide with Towel - 3 x daily - 7 x weekly - 1 sets - 10 reps - 10 hold     DOS 2/25/22    Exercise/Equipment Resistance/Repetitions Other comments   Stretching     Hamstring - EOT 3 x 30\"  Manual    Towel Pull Inclined Calf 3 x 30\"     Hip Flexion     ITB     Groin     Quad                    SLR     Supine flex 3 x 10    Abduction     Adduction     Prone     LAQs    SAQs     Isometrics     Quad sets   NV - Russian 10\"on/10\" off/heel prop        Patellar Glides     Medial     Superior     Inferior          ROM     ERMI 5'  1' minute holds   Prone hangs X 6 min 2#    Passive Active     Weight Shift        eob flexion hang  Bike X 6 min - ROM    CKC     Calf raises     Leg Press 3 x 10  55 lb/seat #5, plate #4   Step ups     1 leg stand     Squatting     [address] TKE    Balance    Step up and over 4' x 15    LSD TC   FSU 6' x 15        PRE     Extension  RANGE:    Flexion  RANGE: 93        Quantum machines     Leg press      Leg extension     Leg curl          Manual interventions Patellar mobilizations (all directions) and PROM knee flex x 15 min                    Therapeutic Exercise and NMR EXR  [x] (42181) Provided verbal/tactile cueing for activities related to strengthening, flexibility, endurance, ROM for improvements in LE, proximal hip, and core control with self care, mobility, lifting, ambulation. [x] (66628) Provided verbal/tactile cueing for activities related to improving balance, coordination, kinesthetic sense, posture, motor skill, proprioception  to assist with LE, proximal hip, and core control in self care, mobility, lifting, ambulation and eccentric single leg control.      NMR and Therapeutic Activities:    [x] (30627 or 23051) Provided verbal/tactile cueing for activities related to improving balance, coordination, kinesthetic sense, posture, motor skill, proprioception and motor activation to allow for proper function of core, proximal hip and LE with self care and ADLs  [x] (70485) Gait Re-education- Provided training and instruction to the patient for proper LE, core and proximal hip recruitment and positioning and eccentric body weight control with ambulation re-education including up and down stairs     Home Exercise Program:    [x] (72295) Reviewed/Progressed HEP activities related to strengthening, flexibility, endurance, ROM of core, proximal hip and LE for functional self-care, mobility, lifting and ambulation/stair navigation   [x] (14701)Reviewed/Progressed HEP activities related to improving balance, coordination, kinesthetic sense, posture, motor skill, proprioception of core, proximal hip and LE for self care, mobility, lifting, and ambulation/stair navigation      Manual Treatments:  PROM / STM / Oscillations-Mobs:  G-I, II, III, IV (PA's, Inf., Post.)  [x] (39776) Provided manual therapy to mobilize LE, proximal hip and/or LS spine soft tissue/joints for the purpose of modulating pain, promoting relaxation,  increasing ROM, reducing/eliminating soft tissue swelling/inflammation/restriction, improving soft tissue extensibility and allowing for proper ROM for normal function with self care, mobility, lifting and ambulation. Modalities: CP 10' + heel prop    Charges:  Timed Code Treatment Minutes: 39'   Total Treatment Minutes: 54'    All charges require KX modifier     [] EVAL (LOW) 74689 (typically 20 minutes face-to-face)  [] EVAL (MOD) 33628 (typically 30 minutes face-to-face)  [] EVAL (HIGH) 22827 (typically 45 minutes face-to-face)  [] RE-EVAL   [x] CU(03508) x   2  [] IONTO  [] NMR (82964) [] VASO (game ready x 15')  [x] Manual (89748) x  1   [] Other:  [] TA x      [] Mech Traction (58971)  [] ES(attended) (60600)      [] ES (un) (34583):     GOALS:   GOALS:   Patient stated goal: Patient to be able to walk to gym and resume normal routine. [x]? Progressing: []? Met: []? Not Met: []? Adjusted     Therapist goals for Patient:   Short Term Goals: To be achieved in: 2 weeks  1. Independent in HEP and progression per patient tolerance, in order to prevent re-injury. [x]? Progressing: []? Met: []? Not Met: []? Adjusted   2. Patient will have a decrease in pain to facilitate improvement in movement, function, and ADLs as indicated by Functional Deficits. [x]? Progressing: []? Met: []? Not Met: []? Adjusted     Long Term Goals: To be achieved in: 8-12 weeks  1. Disability index score 50% improved for the LEFS to assist with reaching prior level of function. [x]? Progressing: []? Met: []? Not Met: []? Adjusted  2.  Patient will demonstrate increased AROM to equal to R knee to allow for proper joint functioning as indicated by patients Functional Deficits. [x]? Progressing: []? Met: []? Not Met: []? Adjusted  3. Patient will demonstrate an increase in Strength to good proximal hip strength and control, within 5lb HHD in LE to allow for proper functional mobility as indicated by patients Functional Deficits. [x]? Progressing: []? Met: []? Not Met: []? Adjusted  4. Patient will return to normal functional activities without increased symptoms or restriction. [x]? Progressing: []? Met: []? Not Met: []? Adjusted  5. Patient to ambulate with normal gait with out AD. [x]? Progressing: []? Met: []? Not Met: []? Adjusted      Overall Progression Towards Functional goals/ Treatment Progress Update:  [] Patient is progressing as expected towards functional goals listed. [] Progression is slowed due to complexities/Impairments listed. [] Progression has been slowed due to co-morbidities. [x] Plan just implemented, too soon to assess goals progression <30days   [] Goals require adjustment due to lack of progress  [] Patient is not progressing as expected and requires additional follow up with physician  [] Other    Prognosis for POC: [x] Good [] Fair  [] Poor      Patient requires continued skilled intervention: [x] Yes  [] No    Treatment/Activity Tolerance:  [x] Patient able to complete treatment  [] Patient limited by fatigue  [] Patient limited by pain     [] Patient limited by other medical complications  [] Other: Discussed progressing to 1 crutch eventually, but to practice at home using 1 crutch or cane. Patient L knee still stiff but patellar mobility close to WNL; Continue to push ROM. Pt is progressing with flexion but slowly. Continues to require skilled PT services to increase overall ROM, functional mobility, and endurance for greater functional activity tolerance.         PLAN: See natividad  [x] Continue per plan of care [] Alter current plan (see comments above)  [] Plan of care initiated [] Hold pending MD visit [] Discharge  Next visit:    Electronically signed by: Madai Caceres, PTA  [id number],DASHAWN Reed, PT, MPT     Note: If patient does not return for scheduled/ recommended follow up visits, this note will serve as a discharge from care along with most recent update on progress.

## 2022-06-21 DIAGNOSIS — E03.8 HYPOTHYROIDISM DUE TO HASHIMOTO'S THYROIDITIS: ICD-10-CM

## 2022-06-21 DIAGNOSIS — E06.3 HYPOTHYROIDISM DUE TO HASHIMOTO'S THYROIDITIS: ICD-10-CM

## 2022-06-21 DIAGNOSIS — E78.00 PURE HYPERCHOLESTEROLEMIA: ICD-10-CM

## 2022-06-21 LAB
A/G RATIO: 2.1 (ref 1.1–2.2)
ALBUMIN SERPL-MCNC: 5.1 G/DL (ref 3.4–5)
ALP BLD-CCNC: 102 U/L (ref 40–129)
ALT SERPL-CCNC: 14 U/L (ref 10–40)
ANION GAP SERPL CALCULATED.3IONS-SCNC: 16 MMOL/L (ref 3–16)
AST SERPL-CCNC: 18 U/L (ref 15–37)
BASOPHILS ABSOLUTE: 0 K/UL (ref 0–0.2)
BASOPHILS RELATIVE PERCENT: 0.5 %
BILIRUB SERPL-MCNC: 0.4 MG/DL (ref 0–1)
BUN BLDV-MCNC: 15 MG/DL (ref 7–20)
CALCIUM SERPL-MCNC: 10.6 MG/DL (ref 8.3–10.6)
CHLORIDE BLD-SCNC: 99 MMOL/L (ref 99–110)
CHOLESTEROL, TOTAL: 192 MG/DL (ref 0–199)
CO2: 24 MMOL/L (ref 21–32)
CREAT SERPL-MCNC: 0.7 MG/DL (ref 0.6–1.2)
EOSINOPHILS ABSOLUTE: 0.1 K/UL (ref 0–0.6)
EOSINOPHILS RELATIVE PERCENT: 1.6 %
GFR AFRICAN AMERICAN: >60
GFR NON-AFRICAN AMERICAN: >60
GLUCOSE BLD-MCNC: 87 MG/DL (ref 70–99)
HCT VFR BLD CALC: 37.4 % (ref 36–48)
HDLC SERPL-MCNC: 66 MG/DL (ref 40–60)
HEMOGLOBIN: 12.6 G/DL (ref 12–16)
LDL CHOLESTEROL CALCULATED: 108 MG/DL
LYMPHOCYTES ABSOLUTE: 2.2 K/UL (ref 1–5.1)
LYMPHOCYTES RELATIVE PERCENT: 39.2 %
MCH RBC QN AUTO: 29.3 PG (ref 26–34)
MCHC RBC AUTO-ENTMCNC: 33.7 G/DL (ref 31–36)
MCV RBC AUTO: 86.9 FL (ref 80–100)
MONOCYTES ABSOLUTE: 0.4 K/UL (ref 0–1.3)
MONOCYTES RELATIVE PERCENT: 7.8 %
NEUTROPHILS ABSOLUTE: 2.8 K/UL (ref 1.7–7.7)
NEUTROPHILS RELATIVE PERCENT: 50.9 %
PDW BLD-RTO: 14.1 % (ref 12.4–15.4)
PLATELET # BLD: 239 K/UL (ref 135–450)
PMV BLD AUTO: 7.8 FL (ref 5–10.5)
POTASSIUM SERPL-SCNC: 4.4 MMOL/L (ref 3.5–5.1)
RBC # BLD: 4.3 M/UL (ref 4–5.2)
SODIUM BLD-SCNC: 139 MMOL/L (ref 136–145)
TOTAL PROTEIN: 7.5 G/DL (ref 6.4–8.2)
TRIGL SERPL-MCNC: 92 MG/DL (ref 0–150)
TSH SERPL DL<=0.05 MIU/L-ACNC: 3.94 UIU/ML (ref 0.27–4.2)
VLDLC SERPL CALC-MCNC: 18 MG/DL
WBC # BLD: 5.5 K/UL (ref 4–11)

## 2022-06-22 DIAGNOSIS — Z98.890 STATUS POST OPEN REDUCTION AND INTERNAL FIXATION (ORIF) OF FRACTURE: Primary | ICD-10-CM

## 2022-06-22 DIAGNOSIS — Z87.81 STATUS POST OPEN REDUCTION AND INTERNAL FIXATION (ORIF) OF FRACTURE: Primary | ICD-10-CM

## 2022-06-23 ENCOUNTER — HOSPITAL ENCOUNTER (OUTPATIENT)
Dept: PHYSICAL THERAPY | Age: 72
Setting detail: THERAPIES SERIES
Discharge: HOME | End: 2022-06-23
Payer: MEDICARE

## 2022-06-23 PROCEDURE — 97110 THERAPEUTIC EXERCISES: CPT | Performed by: SPECIALIST/TECHNOLOGIST

## 2022-06-23 PROCEDURE — 97140 MANUAL THERAPY 1/> REGIONS: CPT | Performed by: SPECIALIST/TECHNOLOGIST

## 2022-06-23 NOTE — PROGRESS NOTES
[facility] and [address],  Sports Performance and Rehabilitation, [facility] [address] [place] [address], [facility], [facility] [address] Ay[address]  Phone: [phone]  Fax: [phone]      Physical Therapy Daily Treatment Note  Date: 2022    Patient Name:  Alden Dang    :  1950  MRN: [id number]  Restrictions/Precautions:    Medical/Treatment Diagnosis Information:  Diagnosis: Z98.890, Z87.81 (ICD-10-CM) - Status post open reduction and internal fixation (ORIF) of [id number] (ICD-10-CM) - Closed fracture of left tibial plateau, initial encounter   PT DX: L knee pain M25. 106  Insurance/Certification information:  PT Insurance Information: Medicare/BLBS: no visit limit, insurance covers 80%  Physician Information:  Referring Practitioner: Dr. Dean Loving MD  Has the plan of care been signed (Y/N):        []  Yes  [x]  No     Date of Patient follow up with Physician: 22      Is this a Progress Report:     []  Yes  [x]  No        If Yes:  Date Range for reporting period:  Beginning 22  Ending    Progress report will be due (18 Rx or 30 days whichever is less):        Recertification will be due (POC Duration  / 90 days whichever is less): 22         Visit # Insurance Allowable Auth Required   19 No visit limit []  Yes []  No        Functional Scale: 12.5 LEFs / Salena Panchito 25    Date assessed:  22     58% LEFS       22      60% LEFS / FOTO 47     22                 Latex Allergy:  [x]NO      []YES    Preferred Language for Healthcare:   [x]English       []other:    Pain level: 1-2/10     SUBJECTIVE:  \"I went to the gym yesterday and rode the bike and had no problems. My foot is still really bothering me. \"       OBJECTIVE:   Observation:  Taken on 22 AROM knee flex 107°, ext -7°, PROM ext -3°    Test measurements:      RESTRICTIONS/PRECAUTIONS: Full wt.  Bearing with crutches and push ROM per MD note on 22          MEDICARE CAP EXCEPTION DOCUMENTATION      I certify that this patient meets one of the below criteria necessary for becoming an exception to the Medicare cap on therapy services:    [x]  The patient has a condition identified by an ICD-10 code that has a direct and significant impact on the need for therapy. (Significantly impacts the rate of recovery.)                   []  The patient has a complexity identified by an ICD-9 code that has a direct and significant impact on the need for therapy. (Significantly impacts the rate of recovery and is associated with a primary condition.)         []  The patient has associated variables that influence the amount of treatment to include:  Social support, self-efficacy/motivation, prognosis, time since onset/acuity. []  The patient has generalized musculoskeletal conditions or a condition affecting multiple sites that will have a direct impact on the rate of recovery. []  The patient had a prior episode of outpatient therapy during this calendar year for a different condition. []  The patient has a mental or cognitive disorder in addition to the condition being treated that will have a direct and significant impact on the rate of recovery. Exercises/Interventions:   HEP:  Access Code: [email or web address]  URL: [email or web address]/  Date: 04/14/2022  Prepared by: Camelia Walker     Exercises  Seated Calf Towel Stretch - 3 x daily - 7 x weekly - 1 sets - 30 reps - 3 hold  Long Sitting Quad Set - 3 x daily - 7 x weekly - 1 sets - 10 reps - 10 hold  Sitting Heel Slide with Towel - 3 x daily - 7 x weekly - 1 sets - 10 reps - 10 hold     DOS 2/25/22    Exercise/Equipment Resistance/Repetitions Other comments   Stretching     Hamstring - EOT 3 x 30\"  Manual    Towel Pull Inclined Calf 3 x 30\"     Hip Flexion     ITB     Groin     Quad                    SLR     Supine flex 3 x 10    Abduction     Adduction     Prone     LAQs SAQs    Isometrics     Quad sets  [place] - [place] 10\"on/10\" off/heel prop        Patellar Glides     Medial     Superior     Inferior          ROM     ERMI 5'  1' minute holds   Prone hangs X 6 min 2#    Passive     Active     Weight Shift        eob flexion hang  Bike X 6 min - ROM    CKC     Calf raises     Leg Press 3 x 10  55 lb/seat #5, plate #4   Step ups     1 leg stand     Squatting     [address] TKE    Balance    Step up and over 4' x 15     LSD TC   FSU 6' x 20         PRE     Extension  RANGE:    Flexion  RANGE: 93        Quantum machines     Leg press      Leg extension     Leg curl          Manual interventions Patellar mobilizations (all directions) and PROM knee flex x 15 min                    Therapeutic Exercise and NMR EXR  [x] (49692) Provided verbal/tactile cueing for activities related to strengthening, flexibility, endurance, ROM for improvements in LE, proximal hip, and core control with self care, mobility, lifting, ambulation. [x] (47667) Provided verbal/tactile cueing for activities related to improving balance, coordination, kinesthetic sense, posture, motor skill, proprioception  to assist with LE, proximal hip, and core control in self care, mobility, lifting, ambulation and eccentric single leg control.      NMR and Therapeutic Activities:    [x] (18947 or 50596) Provided verbal/tactile cueing for activities related to improving balance, coordination, kinesthetic sense, posture, motor skill, proprioception and motor activation to allow for proper function of core, proximal hip and LE with self care and ADLs  [x] (64515) Gait Re-education- Provided training and instruction to the patient for proper LE, core and proximal hip recruitment and positioning and eccentric body weight control with ambulation re-education including up and down stairs     Home Exercise Program:    [x] (37787) Reviewed/Progressed HEP activities related to strengthening, flexibility, endurance, ROM of core, proximal hip and LE for functional self-care, mobility, lifting and ambulation/stair navigation   [x] (25462)Reviewed/Progressed HEP activities related to improving balance, coordination, kinesthetic sense, posture, motor skill, proprioception of core, proximal hip and LE for self care, mobility, lifting, and ambulation/stair navigation      Manual Treatments:  PROM / STM / Oscillations-Mobs:  G-I, II, III, IV (PA's, Inf., Post.)  [x] (89998) Provided manual therapy to mobilize LE, proximal hip and/or LS spine soft tissue/joints for the purpose of modulating pain, promoting relaxation,  increasing ROM, reducing/eliminating soft tissue swelling/inflammation/restriction, improving soft tissue extensibility and allowing for proper ROM for normal function with self care, mobility, lifting and ambulation. Modalities: CP 10' + heel prop    Charges:  Timed Code Treatment Minutes: 39'   Total Treatment Minutes: 54'    All charges require KX modifier     [] EVAL (LOW) 45709 (typically 20 minutes face-to-face)  [] EVAL (MOD) 70783 (typically 30 minutes face-to-face)  [] EVAL (HIGH) 05684 (typically 45 minutes face-to-face)  [] RE-EVAL   [x] (52076) x   2  [] IONTO  [] NMR (62685) [] VASO (game ready x 15')  [x] Manual (15061) x  1   [] Other:  [] TA x      [] Mech Traction (26131)  [] ES(attended) (66569)      [] ES (un) (27414):     GOALS:   GOALS:   Patient stated goal: Patient to be able to walk to gym and resume normal routine. [x] Progressing: [] Met: [] Not Met: [] Adjusted     Therapist goals for Patient:   Short Term Goals: To be achieved in: 2 weeks  1. Independent in HEP and progression per patient tolerance, in order to prevent re-injury. [x] Progressing: [] Met: [] Not Met: [] Adjusted   2. Patient will have a decrease in pain to facilitate improvement in movement, function, and ADLs as indicated by Functional Deficits. [x] Progressing: [] Met: [] Not Met: [] Adjusted     Long Term Goals: To be achieved in: 8-12 weeks  1.  Disability index score 50% improved for the LEFS to assist with reaching prior level of function. [x] Progressing: [] Met: [] Not Met: [] Adjusted  2. Patient will demonstrate increased AROM to equal to R knee to allow for proper joint functioning as indicated by patients Functional Deficits. [x] Progressing: [] Met: [] Not Met: [] Adjusted  3. Patient will demonstrate an increase in Strength to good proximal hip strength and control, within 5lb HHD in LE to allow for proper functional mobility as indicated by patients Functional Deficits. [x] Progressing: [] Met: [] Not Met: [] Adjusted  4. Patient will return to normal functional activities without increased symptoms or restriction. [x] Progressing: [] Met: [] Not Met: [] Adjusted  5. Patient to ambulate with normal gait with out AD. [x] Progressing: [] Met: [] Not Met: [] Adjusted      Overall Progression Towards Functional goals/ Treatment Progress Update:  [] Patient is progressing as expected towards functional goals listed. [] Progression is slowed due to complexities/Impairments listed. [] Progression has been slowed due to co-morbidities. [x] Plan just implemented, too soon to assess goals progression <30days   [] Goals require adjustment due to lack of progress  [] Patient is not progressing as expected and requires additional follow up with physician  [] Other    Prognosis for POC: [x] Good [] Fair  [] Poor      Patient requires continued skilled intervention: [x] Yes  [] No    Treatment/Activity Tolerance:  [x] Patient able to complete treatment  [] Patient limited by fatigue  [] Patient limited by pain     [] Patient limited by other medical complications  [] Other: Discussed progressing to 1 crutch eventually, but to practice at home using 1 crutch or cane. Patient L knee still stiff but patellar mobility close to WNL; Continue to push ROM. Pt is progressing with flexion but slowly.  Continues to require skilled PT services to increase overall ROM, functional mobility, and endurance for greater functional activity tolerance. PLAN: See eval  [x] Continue per plan of care [] Alter current plan (see comments above)  [] Plan of care initiated [] Hold pending MD visit [] Discharge  Next visit:    Electronically signed by: Minna Gibbs, PTA  [id number],GERARDO Reed, PT, MPT     Note: If patient does not return for scheduled/ recommended follow up visits, this note will serve as a discharge from care along with most recent update on progress.

## 2022-06-27 ENCOUNTER — HOSPITAL ENCOUNTER (OUTPATIENT)
Dept: PHYSICAL THERAPY | Age: 72
Setting detail: THERAPIES SERIES
Discharge: HOME | End: 2022-06-27
Payer: MEDICARE

## 2022-06-27 PROCEDURE — 97110 THERAPEUTIC EXERCISES: CPT | Performed by: SPECIALIST/TECHNOLOGIST

## 2022-06-27 PROCEDURE — 97140 MANUAL THERAPY 1/> REGIONS: CPT | Performed by: SPECIALIST/TECHNOLOGIST

## 2022-06-27 SDOH — HEALTH STABILITY: PHYSICAL HEALTH: ON AVERAGE, HOW MANY DAYS PER WEEK DO YOU ENGAGE IN MODERATE TO STRENUOUS EXERCISE (LIKE A BRISK WALK)?: 3 DAYS

## 2022-06-27 SDOH — HEALTH STABILITY: PHYSICAL HEALTH: ON AVERAGE, HOW MANY MINUTES DO YOU ENGAGE IN EXERCISE AT THIS LEVEL?: 20 MIN

## 2022-06-27 ASSESSMENT — PATIENT HEALTH QUESTIONNAIRE - PHQ9
1. LITTLE INTEREST OR PLEASURE IN DOING THINGS: 0
SUM OF ALL RESPONSES TO PHQ QUESTIONS 1-9: 0
SUM OF ALL RESPONSES TO PHQ9 QUESTIONS 1 & 2: 0
2. FEELING DOWN, DEPRESSED OR HOPELESS: 0
SUM OF ALL RESPONSES TO PHQ QUESTIONS 1-9: 0

## 2022-06-27 ASSESSMENT — LIFESTYLE VARIABLES
HOW MANY STANDARD DRINKS CONTAINING ALCOHOL DO YOU HAVE ON A TYPICAL DAY: PATIENT DECLINED
HOW OFTEN DO YOU HAVE A DRINK CONTAINING ALCOHOL: NEVER
HOW OFTEN DO YOU HAVE A DRINK CONTAINING ALCOHOL: 1
HOW MANY STANDARD DRINKS CONTAINING ALCOHOL DO YOU HAVE ON A TYPICAL DAY: 98
HOW OFTEN DO YOU HAVE SIX OR MORE DRINKS ON ONE OCCASION: 98

## 2022-06-27 NOTE — FLOWSHEET NOTE
The [address] [facility] and [address],  Sports Performance and Rehabilitation, [facility] [address]  [address] [facility],[address]   Osorio Akbar  Phone: [phone]  Fax: [phone]      Physical Therapy Daily Treatment Note  Date: 2022    Patient Name:  Xander Reeder    :  1950  MRN: [id number]  Restrictions/Precautions:    Medical/Treatment Diagnosis Information:  Diagnosis: Z98.890, Z87.81 (ICD-10-CM) - Status post open reduction and internal fixation (ORIF) of [id number] (ICD-10-CM) - Closed fracture of left tibial plateau, initial encounter   PT DX: L knee pain M25. 454  Insurance/Certification information:  PT Insurance Information: Medicare/BLBS: no visit limit, insurance covers 80%  Physician Information:  Referring Practitioner: Dr. Elfego Hall MD  Has the plan of care been signed (Y/N):        []  Yes  [x]  No     Date of Patient follow up with Physician: 22      Is this a Progress Report:     []  Yes  [x]  No        If Yes:  Date Range for reporting period:  Beginning 22  Ending    Progress report will be due (28 Rx or 30 days whichever is less): 3/22/27       Recertification will be due (POC Duration  / 90 days whichever is less): 22         Visit # Insurance Allowable Auth Required   20 No visit limit []  Yes []  No        Functional Scale: 12.5 LEFs / Halma Lesser 25    Date assessed:  22     58% LEFS       22      60% LEFS / FOTO 47     22                 Latex Allergy:  [x]NO      []YES    Preferred Language for Healthcare:   [x]English       []other:    Pain level: 1-2/10     SUBJECTIVE:  \"       OBJECTIVE:    Observation:  Taken on 22 AROM knee flex 107°, ext -7°, PROM ext -3°     Test measurements:      RESTRICTIONS/PRECAUTIONS: Full wt.  Bearing with crutches and push ROM per MD note on 22          MEDICARE CAP EXCEPTION DOCUMENTATION      I certify that this patient meets one of the below criteria necessary for becoming an exception to the Medicare cap on therapy services:    [x]  The patient has a condition identified by an ICD-10 code that has a direct and significant impact on the need for therapy. (Significantly impacts the rate of recovery.)                   []  The patient has a complexity identified by an ICD-9 code that has a direct and significant impact on the need for therapy. (Significantly impacts the rate of recovery and is associated with a primary condition.)         []  The patient has associated variables that influence the amount of treatment to include:  Social support, self-efficacy/motivation, prognosis, time since onset/acuity. []  The patient has generalized musculoskeletal conditions or a condition affecting multiple sites that will have a direct impact on the rate of recovery. []  The patient had a prior episode of outpatient therapy during this calendar year for a different condition. []  The patient has a mental or cognitive disorder in addition to the condition being treated that will have a direct and significant impact on the rate of recovery. Exercises/Interventions:   HEP:  Access Code: [id number]  URL: [email or web address]. [email or web address]/  Date: 04/14/2022  Prepared by: Victorino Oliveros     Exercises  Seated Calf Towel Stretch - 3 x daily - 7 x weekly - 1 sets - 30 reps - 3 hold  Long Sitting Quad Set - 3 x daily - 7 x weekly - 1 sets - 10 reps - 10 hold  Sitting Heel Slide with Towel - 3 x daily - 7 x weekly - 1 sets - 10 reps - 10 hold     DOS 2/25/22    Exercise/Equipment Resistance/Repetitions Other comments   Stretching     Hamstring - EOT 3 x 30\"  Manual    Towel Pull Inclined Calf 3 x 30\"     Hip Flexion     ITB     Groin     Quad                    SLR     Supine flex 3 x 10    Abduction     Adduction     Prone     LAQs    SAQs     Isometrics     Quad sets   [place] - [place] 10\"on/10\" off/heel prop        Patellar Glides     Medial     Superior     Inferior ROM     ERMI 5'  1' minute holds   Prone hangs X 6 min 4#    Passive     Active     Weight Shift        eob flexion hang  Bike X 6 min - ROM    CKC     Calf raises     Leg Press  ecc 3 x 10   X 15  55 lb/seat #5, plate #4  35 #   Step ups     1 leg stand     Squatting     [facility] TKE    Balance    Step up and over 4' x 15    LSD TC   FSU 6' x 20        PRE     Extension  RANGE:    Flexion  RANGE: 93        Quantum machines     Leg press      Leg extension     Leg curl          Manual interventions Patellar mobilizations (all directions) and PROM knee flex x 15 min                    Therapeutic Exercise and NMR EXR  [x] (79731) Provided verbal/tactile cueing for activities related to strengthening, flexibility, endurance, ROM for improvements in LE, proximal hip, and core control with self care, mobility, lifting, ambulation. [x] (52338) Provided verbal/tactile cueing for activities related to improving balance, coordination, kinesthetic sense, posture, motor skill, proprioception  to assist with LE, proximal hip, and core control in self care, mobility, lifting, ambulation and eccentric single leg control.      NMR and Therapeutic Activities:    [x] (19617 or 99160) Provided verbal/tactile cueing for activities related to improving balance, coordination, kinesthetic sense, posture, motor skill, proprioception and motor activation to allow for proper function of core, proximal hip and LE with self care and ADLs  [x] (13049) Gait Re-education- Provided training and instruction to the patient for proper LE, core and proximal hip recruitment and positioning and eccentric body weight control with ambulation re-education including up and down stairs     Home Exercise Program:    [x] (43818) Reviewed/Progressed HEP activities related to strengthening, flexibility, endurance, ROM of core, proximal hip and LE for functional self-care, mobility, lifting and ambulation/stair navigation   [x] (36926)Reviewed/Progressed HEP activities related to improving balance, coordination, kinesthetic sense, posture, motor skill, proprioception of core, proximal hip and LE for self care, mobility, lifting, and ambulation/stair navigation      Manual Treatments:  PROM / STM / Oscillations-Mobs:  G-I, II, III, IV (PA's, Inf., Post.)  [x] (69135) Provided manual therapy to mobilize LE, proximal hip and/or LS spine soft tissue/joints for the purpose of modulating pain, promoting relaxation,  increasing ROM, reducing/eliminating soft tissue swelling/inflammation/restriction, improving soft tissue extensibility and allowing for proper ROM for normal function with self care, mobility, lifting and ambulation. Modalities: CP 10' knee and foot+ heel prop    Charges:  Timed Code Treatment Minutes: 39'   Total Treatment Minutes: 54'    All charges require KX modifier     [] EVAL (LOW) 42007 (typically 20 minutes face-to-face)  [] EVAL (MOD) 59250 (typically 30 minutes face-to-face)  [] EVAL (HIGH) 16917 (typically 45 minutes face-to-face)  [] RE-EVAL   [x] RZ(20288) x   2  [] IONTO  [] NMR (37199) [] VASO (game ready x 15')  [x] Manual (87499) x  1   [] Other:  [] TA x      [] Mech Traction (24609)  [] ES(attended) (08269)      [] ES (un) (60556):     GOALS:   GOALS:   Patient stated goal: Patient to be able to walk to gym and resume normal routine. [x]? Progressing: []? Met: []? Not Met: []? Adjusted     Therapist goals for Patient:   Short Term Goals: To be achieved in: 2 weeks  1. Independent in HEP and progression per patient tolerance, in order to prevent re-injury. [x]? Progressing: []? Met: []? Not Met: []? Adjusted   2. Patient will have a decrease in pain to facilitate improvement in movement, function, and ADLs as indicated by Functional Deficits. [x]? Progressing: []? Met: []? Not Met: []? Adjusted     Long Term Goals: To be achieved in: 8-12 weeks  1.  Disability index score 50% improved for the LEFS to assist with reaching prior level of function. [x]? Progressing: []? Met: []? Not Met: []? Adjusted  2. Patient will demonstrate increased AROM to equal to R knee to allow for proper joint functioning as indicated by patients Functional Deficits. [x]? Progressing: []? Met: []? Not Met: []? Adjusted  3. Patient will demonstrate an increase in Strength to good proximal hip strength and control, within 5lb HHD in LE to allow for proper functional mobility as indicated by patients Functional Deficits. [x]? Progressing: []? Met: []? Not Met: []? Adjusted  4. Patient will return to normal functional activities without increased symptoms or restriction. [x]? Progressing: []? Met: []? Not Met: []? Adjusted  5. Patient to ambulate with normal gait with out AD. [x]? Progressing: []? Met: []? Not Met: []? Adjusted      Overall Progression Towards Functional goals/ Treatment Progress Update:  [] Patient is progressing as expected towards functional goals listed. [] Progression is slowed due to complexities/Impairments listed. [] Progression has been slowed due to co-morbidities. [x] Plan just implemented, too soon to assess goals progression <30days   [] Goals require adjustment due to lack of progress  [] Patient is not progressing as expected and requires additional follow up with physician  [] Other    Prognosis for POC: [x] Good [] Fair  [] Poor      Patient requires continued skilled intervention: [x] Yes  [] No    Treatment/Activity Tolerance:  [x] Patient able to complete treatment  [] Patient limited by fatigue  [] Patient limited by pain     [] Patient limited by other medical complications  [] Other:Pt. Tolerated tx well with exercise progression. Patient L knee still stiff but patellar mobility close to WNL; Continue to push ROM. Pt is progressing with flexion but slowly. Continues to require skilled PT services to increase overall ROM, functional mobility, and endurance for greater functional activity tolerance. PLAN: See eval  [x] Continue per plan of care [] Alter current plan (see comments above)  [] Plan of care initiated [] Hold pending MD visit [] Discharge  Next visit:    Electronically signed by: Deb Hurley, PTA  [id number],CARL Reed, PT, MPT     Note: If patient does not return for scheduled/ recommended follow up visits, this note will serve as a discharge from care along with most recent update on progress.

## 2022-06-30 ENCOUNTER — APPOINTMENT (OUTPATIENT)
Dept: PHYSICAL THERAPY | Age: 72
End: 2022-06-30
Payer: MEDICARE

## 2022-07-01 ENCOUNTER — OFFICE VISIT (OUTPATIENT)
Dept: INTERNAL MEDICINE CLINIC | Age: 72
End: 2022-07-01
Payer: MEDICARE

## 2022-07-01 ENCOUNTER — HOSPITAL ENCOUNTER (OUTPATIENT)
Dept: PHYSICAL THERAPY | Age: 72
Setting detail: THERAPIES SERIES
Discharge: HOME | End: 2022-07-01
Payer: MEDICARE

## 2022-07-01 VITALS — BODY MASS INDEX: 24.87 KG/M2 | SYSTOLIC BLOOD PRESSURE: 112 MMHG | DIASTOLIC BLOOD PRESSURE: 70 MMHG | WEIGHT: 136 LBS

## 2022-07-01 DIAGNOSIS — E78.00 PURE HYPERCHOLESTEROLEMIA: ICD-10-CM

## 2022-07-01 DIAGNOSIS — E06.3 HYPOTHYROIDISM DUE TO HASHIMOTO'S THYROIDITIS: ICD-10-CM

## 2022-07-01 DIAGNOSIS — E03.8 HYPOTHYROIDISM DUE TO HASHIMOTO'S THYROIDITIS: ICD-10-CM

## 2022-07-01 DIAGNOSIS — I10 BENIGN ESSENTIAL HTN: ICD-10-CM

## 2022-07-01 DIAGNOSIS — Z00.00 PE (PHYSICAL EXAM), ANNUAL: Primary | ICD-10-CM

## 2022-07-01 DIAGNOSIS — I82.412 ACUTE DEEP VEIN THROMBOSIS (DVT) OF FEMORAL VEIN OF LEFT LOWER EXTREMITY (HCC): ICD-10-CM

## 2022-07-01 PROBLEM — E87.1 HYPONATREMIA: Status: RESOLVED | Noted: 2021-05-11 | Resolved: 2022-07-01

## 2022-07-01 PROBLEM — D50.0 BLOOD LOSS ANEMIA: Status: RESOLVED | Noted: 2020-08-18 | Resolved: 2022-07-01

## 2022-07-01 PROBLEM — M79.89 LEFT LEG SWELLING: Status: RESOLVED | Noted: 2022-03-07 | Resolved: 2022-07-01

## 2022-07-01 PROCEDURE — G0439 PPPS, SUBSEQ VISIT: HCPCS | Performed by: INTERNAL MEDICINE

## 2022-07-01 PROCEDURE — 97140 MANUAL THERAPY 1/> REGIONS: CPT | Performed by: SPECIALIST/TECHNOLOGIST

## 2022-07-01 PROCEDURE — 3017F COLORECTAL CA SCREEN DOC REV: CPT | Performed by: INTERNAL MEDICINE

## 2022-07-01 PROCEDURE — 1123F ACP DISCUSS/DSCN MKR DOCD: CPT | Performed by: INTERNAL MEDICINE

## 2022-07-01 PROCEDURE — 97110 THERAPEUTIC EXERCISES: CPT | Performed by: SPECIALIST/TECHNOLOGIST

## 2022-07-01 SDOH — ECONOMIC STABILITY: FOOD INSECURITY: WITHIN THE PAST 12 MONTHS, YOU WORRIED THAT YOUR FOOD WOULD RUN OUT BEFORE YOU GOT MONEY TO BUY MORE.: NEVER TRUE

## 2022-07-01 SDOH — ECONOMIC STABILITY: FOOD INSECURITY: WITHIN THE PAST 12 MONTHS, THE FOOD YOU BOUGHT JUST DIDN'T LAST AND YOU DIDN'T HAVE MONEY TO GET MORE.: NEVER TRUE

## 2022-07-01 ASSESSMENT — SOCIAL DETERMINANTS OF HEALTH (SDOH): HOW HARD IS IT FOR YOU TO PAY FOR THE VERY BASICS LIKE FOOD, HOUSING, MEDICAL CARE, AND HEATING?: NOT HARD AT ALL

## 2022-07-01 NOTE — PROGRESS NOTES
24.87 kg/m². General Appearance: alert and oriented to person, place and time, well developed and well- nourished, in no acute distress  Skin: warm and dry, no rash or erythema  Head: normocephalic and atraumatic  Eyes: pupils equal, round, and reactive to light, extraocular eye movements intact, conjunctivae normal  ENT: tympanic membrane, external ear and ear canal normal bilaterally, nose without deformity, nasal mucosa and turbinates normal without polyps  Neck: supple and non-tender without mass, no thyromegaly or thyroid nodules, no cervical lymphadenopathy  Pulmonary/Chest: clear to auscultation bilaterally- no wheezes, rales or rhonchi, normal air movement, no respiratory distress  Cardiovascular: normal rate, regular rhythm, normal S1 and S2, no murmurs, rubs, clicks, or gallops, distal pulses intact, no carotid bruits  Abdomen: soft, non-tender, non-distended, normal bowel sounds, no masses or organomegaly  Extremities: no cyanosis, clubbing or edema  Musculoskeletal: normal range of motion, no joint swelling, deformity or tenderness  Neurologic: reflexes normal and symmetric, no cranial nerve deficit, gait, coordination and speech normal       Allergies   Allergen Reactions    Zanaflex [Tizanidine]      bradycardia    Keflet [Cephalexin] Rash     Prior to Visit Medications    Medication Sig Taking? Authorizing Provider   gabapentin (NEURONTIN) 400 MG capsule Take 1 capsule by mouth 3 times daily for 90 days.  Yes JESSICA Zepeda   diclofenac sodium (VOLTAREN) 1 % GEL Apply 4 g topically 4 times daily Yes Alma Rosa Davis MD   rivaroxaban (XARELTO) 20 MG TABS tablet Take 1 tablet by mouth daily (with breakfast) Yes JESSICA Mishra   acetaminophen (TYLENOL) 325 MG tablet Take 650 mg by mouth every 6 hours as needed for Pain Yes Historical Provider, MD   simvastatin (ZOCOR) 40 MG tablet TAKE 1 TABLET BY MOUTH EVERY NIGHT Yes Terence Mendiola MD   calcium carbonate (OSCAL) 500 MG TABS tablet Take 500 mg by mouth daily Yes Historical Provider, MD   omeprazole (PRILOSEC OTC) 20 MG tablet Take 20 mg by mouth daily  Yes Historical Provider, MD   Multiple Vitamins-Minerals (THERAPEUTIC MULTIVITAMIN-MINERALS) tablet Take 1 tablet by mouth daily Yes Historical Provider, MD   Vitamin D (CHOLECALCIFEROL) 1000 UNITS CAPS capsule Take 2,000 Units by mouth daily  Yes Historical Provider, MD       CareTeam (Including outside providers/suppliers regularly involved in providing care):   Patient Care Team:  Kenzie Veras MD as PCP - General (Internal Medicine)  Kenzie Veras MD as PCP - [facility] Empaneled Provider     Reviewed and updated this visit:  Tobacco  Allergies  Meds  Med Hx  Surg Hx  Soc Hx  Fam Hx

## 2022-07-01 NOTE — FLOWSHEET NOTE
[facility] and [address],  Sports Performance and Rehabilitation, [facility] [address] 12[address]  [address] [facility],5[address] Floor   Osorio Akbar  Phone: [phone]  Fax: [phone]      Physical Therapy Daily Treatment Note  Date: 2022    Patient Name:  Eileen Murrell    :  1950  MRN: [id number]  Restrictions/Precautions:    Medical/Treatment Diagnosis Information:  Diagnosis: Z98.890, Z87.81 (ICD-10-CM) - Status post open reduction and internal fixation (ORIF) of [id number] (ICD-10-CM) - Closed fracture of left tibial plateau, initial encounter   PT DX: L knee pain M25. 855  Insurance/Certification information:  PT Insurance Information: Medicare/BLBS: no visit limit, insurance covers 80%  Physician Information:  Referring Practitioner: Dr. Anai Zabala MD  Has the plan of care been signed (Y/N):        []  Yes  [x]  No     Date of Patient follow up with Physician: 22      Is this a Progress Report:     []  Yes  [x]  No        If Yes:  Date Range for reporting period:  Beginning 22  Ending    Progress report will be due (28 Rx or 30 days whichever is less):        Recertification will be due (POC Duration  / 90 days whichever is less): 22         Visit # Insurance Allowable Auth Required   21 No visit limit []  Yes []  No        Functional Scale: 12.5 LEFs / Jonathon Service 25    Date assessed:  22     58% LEFS       22      60% LEFS / FOTO 47     22                 Latex Allergy:  [x]NO      []YES    Preferred Language for Healthcare:   [x]English       []other:    Pain level: 1-2/10     SUBJECTIVE:  Pt. Reports her knee will be really stiff in the morning but it will loosen up as the day goes on. OBJECTIVE:    Observation:  Taken on 22 AROM knee flex 107°, ext -7°, PROM ext -3°     Test measurements:      RESTRICTIONS/PRECAUTIONS: Full wt.  Bearing with crutches and push ROM per MD note on 22          MEDICARE CAP EXCEPTION DOCUMENTATION      I certify that this patient meets one of the below criteria necessary for becoming an exception to the Medicare cap on therapy services:    [x]  The patient has a condition identified by an ICD-10 code that has a direct and significant impact on the need for therapy. (Significantly impacts the rate of recovery.)                   []  The patient has a complexity identified by an ICD-9 code that has a direct and significant impact on the need for therapy. (Significantly impacts the rate of recovery and is associated with a primary condition.)         []  The patient has associated variables that influence the amount of treatment to include:  Social support, self-efficacy/motivation, prognosis, time since onset/acuity. []  The patient has generalized musculoskeletal conditions or a condition affecting multiple sites that will have a direct impact on the rate of recovery. []  The patient had a prior episode of outpatient therapy during this calendar year for a different condition. []  The patient has a mental or cognitive disorder in addition to the condition being treated that will have a direct and significant impact on the rate of recovery. Exercises/Interventions:   HEP:  Access Code: [email or web address]  URL: [email or web address]  Date: 04/14/2022  Prepared by: Kamila Minor     Exercises  Seated Calf Towel Stretch - 3 x daily - 7 x weekly - 1 sets - 30 reps - 3 hold  Long Sitting Quad Set - 3 x daily - 7 x weekly - 1 sets - 10 reps - 10 hold  Sitting Heel Slide with Towel - 3 x daily - 7 x weekly - 1 sets - 10 reps - 10 hold     DOS 2/25/22    Exercise/Equipment Resistance/Repetitions Other comments   Stretching     Hamstring - EOT 3 x 30\"  Manual    Towel Pull Inclined Calf 3 x 30\"     Hip Flexion     ITB     Groin     Quad                    SLR     Supine flex 3 x 10    Abduction     Adduction     Prone     LAQs    SAQs     Isometrics     Quad sets [place] - [place] 10\"on/10\" off/heel prop        Patellar Glides     Medial     Superior     Inferior          ROM     ERMI 5'  1' minute holds   Prone hangs X 6 min 4#    Passive     Active     Weight Shift        eob flexion hang  Bike X 6 min - ROM    CKC     Calf raises     Leg Press  ecc 3 x 10   X 20  65 #/seat #5, plate #4  35 #   Step ups     1 leg stand     Squatting     [facility] TKE    Balance    Step up and over 4' x 15    LSD TC   FSU 6' x 20        PRE     Extension  RANGE:    Flexion  RANGE: 93        Quantum machines     Leg press      Leg extension     Leg curl          Manual interventions Patellar mobilizations (all directions) and PROM knee flex x 15 min                    Therapeutic Exercise and NMR EXR  [x] (70463) Provided verbal/tactile cueing for activities related to strengthening, flexibility, endurance, ROM for improvements in LE, proximal hip, and core control with self care, mobility, lifting, ambulation. [x] (23014) Provided verbal/tactile cueing for activities related to improving balance, coordination, kinesthetic sense, posture, motor skill, proprioception  to assist with LE, proximal hip, and core control in self care, mobility, lifting, ambulation and eccentric single leg control.      NMR and Therapeutic Activities:    [x] (41201 or 83028) Provided verbal/tactile cueing for activities related to improving balance, coordination, kinesthetic sense, posture, motor skill, proprioception and motor activation to allow for proper function of core, proximal hip and LE with self care and ADLs  [x] (24777) Gait Re-education- Provided training and instruction to the patient for proper LE, core and proximal hip recruitment and positioning and eccentric body weight control with ambulation re-education including up and down stairs     Home Exercise Program:    [x] (22852) Reviewed/Progressed HEP activities related to strengthening, flexibility, endurance, ROM of core, proximal hip and LE for functional self-care, mobility, lifting and ambulation/stair navigation   [x] (76317)Reviewed/Progressed HEP activities related to improving balance, coordination, kinesthetic sense, posture, motor skill, proprioception of core, proximal hip and LE for self care, mobility, lifting, and ambulation/stair navigation      Manual Treatments:  PROM / STM / Oscillations-Mobs:  G-I, II, III, IV (PA's, Inf., Post.)  [x] (79760) Provided manual therapy to mobilize LE, proximal hip and/or LS spine soft tissue/joints for the purpose of modulating pain, promoting relaxation,  increasing ROM, reducing/eliminating soft tissue swelling/inflammation/restriction, improving soft tissue extensibility and allowing for proper ROM for normal function with self care, mobility, lifting and ambulation. Modalities:  Pt. Decline today D/T pt. Had another appt after PT. Charges:  Timed Code Treatment Minutes: 39'   Total Treatment Minutes: 54'    All charges require KX modifier     [] EVAL (LOW) 99439 (typically 20 minutes face-to-face)  [] EVAL (MOD) 93704 (typically 30 minutes face-to-face)  [] EVAL (HIGH) 71881 (typically 45 minutes face-to-face)  [] RE-EVAL   [x] YA(10851) x   2  [] IONTO  [] NMR (26115) [] VASO (game ready x 15')  [x] Manual (94714) x  1   [] Other:  [] TA x      [] Mech Traction (92084)  [] ES(attended) (38403)      [] ES (un) (40047):     GOALS:   GOALS:   Patient stated goal: Patient to be able to walk to gym and resume normal routine. [x]? Progressing: []? Met: []? Not Met: []? Adjusted     Therapist goals for Patient:   Short Term Goals: To be achieved in: 2 weeks  1. Independent in HEP and progression per patient tolerance, in order to prevent re-injury. [x]? Progressing: []? Met: []? Not Met: []? Adjusted   2. Patient will have a decrease in pain to facilitate improvement in movement, function, and ADLs as indicated by Functional Deficits. [x]? Progressing: []? Met: []?  Not Met: []? require skilled PT services to increase overall ROM, functional mobility, and endurance for greater functional activity tolerance. PLAN: See eval  [x] Continue per plan of care [] Alter current plan (see comments above)  [] Plan of care initiated [] Hold pending MD visit [] Discharge  Next visit:    Electronically signed by: Esau Sanchez, PTA  [id number],MICHI Reed, PT, MPT     Note: If patient does not return for scheduled/ recommended follow up visits, this note will serve as a discharge from care along with most recent update on progress.

## 2022-07-01 NOTE — PROGRESS NOTES
Annual Wellness Visit     Patient: Berto Sosa                                               : 1950  Age: 67 y.o. MRN: [id number]  Date : 2022      CHIEF COMPLAINT: Berto Sosa is a 67 y.o. female who presents for : Physical exam    1. Acute deep vein thrombosis (DVT) of femoral vein of left lower extremity (HCC)  Found to have an acute femoral cath vein thrombosis after surgery for a tibial plateau fracture and is now on Xarelto plan for 6 months of Xarelto    2. Benign essential HTN  This problem is stable    3.  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    Hyperlipidemia     Hypothyroidism due to Hashimoto's thyroiditis     Primary osteoarthritis of right hip 2021    Corneal abrasion of both eyes 2020    Hyperglycemia 2020    Status post total hip replacement, left 2020    Need for prophylactic vaccination against diphtheria-tetanus-pertussis (DTP) 2020    Primary osteoarthritis of left hip 2020    Chronic right-sided low back pain without sciatica 2020    Benign essential HTN 2018    Pure hypercholesterolemia 2018    Gastroesophageal reflux disease without esophagitis 2018    Family history of Alzheimer's disease 2018       Constitutional:  Denies fever or chills   Eyes:  Denies change in visual acuity   HENT:  Denies nasal congestion or sore throat   Respiratory:  Denies cough or shortness of breath   Cardiovascular:  Denies chest pain or edema   GI:  Denies abdominal pain, nausea, vomiting, bloody stools or diarrhea   :  Denies dysuria   Musculoskeletal:  Denies back pain or joint pain   Integument:  Denies rash   Neurologic:  Denies headache, focal weakness or sensory changes   Endocrine:  Denies polyuria or polydipsia   Lymphatic:  Denies swollen glands   Psychiatric:  Denies depression or anxiety     Past Medical History:        Diagnosis Date    Acute deep vein thrombosis (DVT) of femoral vein of left lower extremity (HCC) 2022    Arthritis     Chronic hyponatremia     GERD (gastroesophageal reflux disease)     History of blood transfusion     Hyperlipidemia     Hypertension     no meds currently    OA (osteoarthritis)     Prolonged emergence from general anesthesia     Tibial plateau fracture     Wears partial dentures     upper & lower       Past Surgical History:        Procedure Laterality Date     SECTION      COLONOSCOPY      LEG SURGERY Left 2022    OPEN REDUCTION INTERNAL FIXATION LEFT TIBIAL PLATEAU FRACTURE         SUKUMAR  CPT CODE - 76470 performed by Nick Locke MD at [place] [id number] Left 2020    LEFT TOTAL HIP ARTHROPLASTY ANTERIOR APPROACH performed by Nick Locke MD at [address] Right 5/10/2021    RIGHT HIP ARTHROPLASTY ANTERIOR APPROACH performed by Nick Locke MD at [address] OR       Family History:  Family History   Problem Relation Age of Onset    Breast Cancer Mother 68       Social History:  Social History     Socioeconomic History    Marital status:      Spouse name: None    Number of children: None    Years of education: None    Highest education level: None   Occupational History    None   Tobacco Use    Smoking status: Former Smoker     Packs/day: 1.00     Years: 15.00     Pack years: 15.00     Quit date: 1983     Years since quittin.5    Smokeless tobacco: Never Used   Vaping Use  Vaping Use: Never used   Substance and Sexual Activity    Alcohol use: Not Currently     Alcohol/week: 1.0 - 2.0 standard drink     Types: 1 - 2 Shots of liquor per week    Drug use: No    Sexual activity: Yes   Other Topics Concern    None   Social History Narrative    None     Social Determinants of Health     Financial Resource Strain: Low Risk     Difficulty of Paying Living Expenses: Not hard at all   Food Insecurity: No Food Insecurity    Worried About Running Out of Food in the Last Year: Never true    Lilian of Food in the Last Year: Never true   Transportation Needs:     Lack of Transportation (Medical): Not on file    Lack of Transportation (Non-Medical): Not on file   Physical Activity: Insufficiently Active    Days of Exercise per Week: 3 days    Minutes of Exercise per Session: 20 min   Stress:     Feeling of Stress : Not on file   Social Connections:     Frequency of Communication with Friends and Family: Not on file    Frequency of Social Gatherings with Friends and Family: Not on file    Attends Voodoo Services: Not on file    Active Member of Clubs or Organizations: Not on file    Attends Club or Organization Meetings: Not on file    Marital Status: Not on file   Intimate Partner Violence:     Fear of Current or Ex-Partner: Not on file    Emotionally Abused: Not on file    Physically Abused: Not on file    Sexually Abused: Not on file   Housing Stability:     Unable to Pay for Housing in the Last Year: Not on file    Number of [place] in the Last Year: Not on file    Unstable Housing in the Last Year: Not on file         Allergies:  Zanaflex [tizanidine] and Michelle Raid [cephalexin]    Current Medications:    Prior to Admission medications    Medication Sig Start Date End Date Taking? Authorizing Provider   gabapentin (NEURONTIN) 400 MG capsule Take 1 capsule by mouth 3 times daily for 90 days.  6/6/22 9/4/22 Yes Yosvany Check JESSICA Schmidt   diclofenac sodium (VOLTAREN) 1 % GEL Apply 4 g topically 4 times daily 5/11/22  Yes Francisco Espana MD   rivaroxaban (XARELTO) 20 MG TABS tablet Take 1 tablet by mouth daily (with breakfast) 3/10/22  Yes JESSICA Mullen   acetaminophen (TYLENOL) 325 MG tablet Take 650 mg by mouth every 6 hours as needed for Pain   Yes Historical Provider, MD   simvastatin (ZOCOR) 40 MG tablet TAKE 1 TABLET BY MOUTH EVERY NIGHT 7/12/21  Yes José Deshpande MD   calcium carbonate (OSCAL) 500 MG TABS tablet Take 500 mg by mouth daily   Yes Historical Provider, MD   omeprazole (PRILOSEC OTC) 20 MG tablet Take 20 mg by mouth daily    Yes Historical Provider, MD   Multiple Vitamins-Minerals (THERAPEUTIC MULTIVITAMIN-MINERALS) tablet Take 1 tablet by mouth daily   Yes Historical Provider, MD   Vitamin D (CHOLECALCIFEROL) 1000 UNITS CAPS capsule Take 2,000 Units by mouth daily    Yes Historical Provider, MD           Physical Exam:      Constitutional:  Well developed, well nourished, no acute distress, non-toxic appearance   Eyes:  PERRL, conjunctiva normal   HENT:  Atraumatic, external ears normal, nose normal, oropharynx moist, no pharyngeal exudates. Neck- normal range of motion, no tenderness, supple   Respiratory:  No respiratory distress, normal breath sounds, no rales, no wheezing   Cardiovascular:  Normal rate, normal rhythm, no murmurs, no gallops, no rubs   GI:  Soft, nondistended, normal bowel sounds, nontender, no organomegaly, no mass, no rebound, no guarding   :  No costovertebral angle tenderness   Musculoskeletal:  No edema, no tenderness, no deformities. Back- no tenderness  Integument:  Well hydrated, no rash   Lymphatic:  No lymphadenopathy noted   Neurologic:  Alert & oriented x 3, CN 2-12 normal, normal motor function, normal sensory function, no focal deficits noted   Psychiatric:  Speech and behavior appropriate       Vitals: /70   Wt 136 lb (61.7 kg)   BMI 24.87 kg/m²     Body mass index is 24.87 kg/m².   Wt Readings from Last 3 Encounters:   07/01/22 136 lb (61.7 kg)   06/16/22 145 lb (65.8 kg)   05/26/22 145 lb (65.8 kg)         LABS:    CBC:   Lab Results   Component Value Date    WBC 5.5 06/21/2022    HGB 12.6 06/21/2022    HCT 37.4 06/21/2022    MCV 86.9 06/21/2022     06/21/2022         No results found for: IRON, TIBC, FERRITIN, FOLATE, [id number], PTH                                                          BMP:    Lab Results   Component Value Date     06/21/2022    K 4.4 06/21/2022    CL 99 06/21/2022    CO2 24 06/21/2022       LFT's:   Lab Results   Component Value Date    ALT 14 06/21/2022    AST 18 06/21/2022    ALKPHOS 102 06/21/2022    BILITOT 0.4 06/21/2022       Lipids:   Lab Results   Component Value Date    CHOL 192 06/21/2022    HDL 66 (H) 06/21/2022    LDLCALC 108 (H) 06/21/2022    TRIG 92 06/21/2022       INR:   Lab Results   Component Value Date    INR 1.68 (H) 05/14/2021    INR 1.52 (H) 05/13/2021    INR 1.32 (H) 05/12/2021    PROTIME 19.6 (H) 05/14/2021    PROTIME 17.7 (H) 05/13/2021    PROTIME 15.3 (H) 05/12/2021       U/A:  Lab Results   Component Value Date    LABMICR Not Indicated 01/02/2016          Lab Results   Component Value Date    LABA1C 6.0 05/06/2021        Lab Results   Component Value Date    CREATININE 0.7 06/21/2022       -----------------------------------------------------------------     Assessment/Plan:   1. Acute deep vein thrombosis (DVT) of femoral vein of left lower extremity ([address]  This problem is stable continue Xarelto with planning of stopping Xarelto end of September    2. Benign essential HTN  Problem is stable continue present meds    3.  PE (physical exam), annual  Problem is stable check above labs continue present meds for now we will taper off Neurontin over the next 3 weeks as this does not seem to be helping her and will refer to Dr. Radha watson for follow-up of her colonoscopy  - [facility] - Rupali John MD, Gastroenterology, [facility]  Hyperlipidemia check above labs continue present meds for now

## 2022-07-06 ENCOUNTER — HOSPITAL ENCOUNTER (OUTPATIENT)
Dept: PHYSICAL THERAPY | Age: 72
Setting detail: THERAPIES SERIES
Discharge: HOME | End: 2022-07-06
Payer: MEDICARE

## 2022-07-06 PROCEDURE — 97110 THERAPEUTIC EXERCISES: CPT | Performed by: SPECIALIST/TECHNOLOGIST

## 2022-07-06 PROCEDURE — 97140 MANUAL THERAPY 1/> REGIONS: CPT | Performed by: SPECIALIST/TECHNOLOGIST

## 2022-07-06 NOTE — FLOWSHEET NOTE
[facility] and [address],  [facility], [place] [id number] [place] [address], [facility][note 3] Ayah Corral  Phone: [phone]  Fax: [phone]      Physical Therapy Daily Treatment Note  Date: 2022    Patient Name:  Galilea Groves    :  1950  MRN: [id number]  Restrictions/Precautions:    Medical/Treatment Diagnosis Information:  Diagnosis: Z98.890, Z87.81 (ICD-10-CM) - Status post open reduction and internal fixation (ORIF) of uiscqthmF65.142A (ICD-10-CM) - Closed fracture of left tibial plateau, initial encounter   PT DX: L knee pain M25. 931  Insurance/Certification information:  PT Insurance Information: Medicare/BLBS: no visit limit, insurance covers 80%  Physician Information:  Referring Practitioner: Dr. Nigel Segal MD  Has the plan of care been signed (Y/N):        []  Yes  [x]  No     Date of Patient follow up with Physician: 22      Is this a Progress Report:     []  Yes  [x]  No        If Yes:  Date Range for reporting period:  Beginning 22  Ending    Progress report will be due (28 Rx or 30 days whichever is less):        Recertification will be due (POC Duration  / 90 days whichever is less): 22         Visit # Insurance Allowable Auth Required   22 No visit limit []  Yes []  No        Functional Scale: 12.5 LEFs / Ryan Ruths 25    Date assessed:  22     58% LEFS       22      60% LEFS / FOTO 47     22                 Latex Allergy:  [x]NO      []YES    Preferred Language for Healthcare:   [x]English       []other:    Pain level: 1-2/10     SUBJECTIVE:  Pt. Reports no significant changes since last visit. OBJECTIVE:    Observation:  Taken on 22 AROM knee flex 107°, ext -7°, PROM ext -3°     Test measurements:      RESTRICTIONS/PRECAUTIONS: Full wt.  Bearing with crutches and push ROM per MD note on 22          MEDICARE CAP EXCEPTION DOCUMENTATION      I certify that this patient meets one of the below criteria necessary for becoming an exception to the Medicare cap on therapy services:    [x]  The patient has a condition identified by an ICD-10 code that has a direct and significant impact on the need for therapy. (Significantly impacts the rate of recovery.)                   []  The patient has a complexity identified by an ICD-9 code that has a direct and significant impact on the need for therapy. (Significantly impacts the rate of recovery and is associated with a primary condition.)         []  The patient has associated variables that influence the amount of treatment to include:  Social support, self-efficacy/motivation, prognosis, time since onset/acuity. []  The patient has generalized musculoskeletal conditions or a condition affecting multiple sites that will have a direct impact on the rate of recovery. []  The patient had a prior episode of outpatient therapy during this calendar year for a different condition. []  The patient has a mental or cognitive disorder in addition to the condition being treated that will have a direct and significant impact on the rate of recovery. Exercises/Interventions:   HEP:  Access Code: [email or web address]  URL: [email or web address]  Date: 04/14/2022  Prepared by: Kevin López     Exercises  Seated Calf Towel Stretch - 3 x daily - 7 x weekly - 1 sets - 30 reps - 3 hold  Long Sitting Quad Set - 3 x daily - 7 x weekly - 1 sets - 10 reps - 10 hold  Sitting Heel Slide with Towel - 3 x daily - 7 x weekly - 1 sets - 10 reps - 10 hold     DOS 2/25/22    Exercise/Equipment Resistance/Repetitions Other comments   Stretching     Hamstring - EOT 3 x 30\"  Manual    Towel Pull Inclined Calf 3 x 30\"     Hip Flexion     ITB     Groin     Quad                    SLR     Supine flex 3 x 10    Abduction     Adduction     Prone     LAQs    SAQs     Isometrics     Quad sets   NV - Russian 10\"on/10\" off/heel prop Patellar Glides     Medial     Superior     Inferior          ROM     ERMI 5'  1' minute holds   Prone hangs X 6 min 5#    Passive     Active     Weight Shift        eob flexion hang  Bike X 6 min - ROM    CKC     Calf raises     Leg Press  ecc 3 x 10   X 20  65 #/seat #5, plate #4  35 #   Step ups     1 leg stand     Mini Squatting X 20 - airex    [facility] TKE    Balance    Step up and over 4' x 15    LSD TC   LSU    FSU 6' x 20        Lateral walk 2 laps              PRE     Extension  RANGE:    Flexion  RANGE: 93        Quantum machines     Leg press      Leg extension     Leg curl          Manual interventions Patellar mobilizations (all directions) and PROM knee flex x 15 min                    Therapeutic Exercise and NMR EXR  [x] (76761) Provided verbal/tactile cueing for activities related to strengthening, flexibility, endurance, ROM for improvements in LE, proximal hip, and core control with self care, mobility, lifting, ambulation. [x] (67669) Provided verbal/tactile cueing for activities related to improving balance, coordination, kinesthetic sense, posture, motor skill, proprioception  to assist with LE, proximal hip, and core control in self care, mobility, lifting, ambulation and eccentric single leg control.      NMR and Therapeutic Activities:    [x] (07376 or 41148) Provided verbal/tactile cueing for activities related to improving balance, coordination, kinesthetic sense, posture, motor skill, proprioception and motor activation to allow for proper function of core, proximal hip and LE with self care and ADLs  [x] (14055) Gait Re-education- Provided training and instruction to the patient for proper LE, core and proximal hip recruitment and positioning and eccentric body weight control with ambulation re-education including up and down stairs     Home Exercise Program:    [x] (54027) Reviewed/Progressed HEP activities related to strengthening, flexibility, endurance, ROM of core, proximal hip and LE for functional self-care, mobility, lifting and ambulation/stair navigation   [x] (14259)Reviewed/Progressed HEP activities related to improving balance, coordination, kinesthetic sense, posture, motor skill, proprioception of core, proximal hip and LE for self care, mobility, lifting, and ambulation/stair navigation      Manual Treatments:  PROM / STM / Oscillations-Mobs:  G-I, II, III, IV (PA's, Inf., Post.)  [x] (58399) Provided manual therapy to mobilize LE, proximal hip and/or LS spine soft tissue/joints for the purpose of modulating pain, promoting relaxation,  increasing ROM, reducing/eliminating soft tissue swelling/inflammation/restriction, improving soft tissue extensibility and allowing for proper ROM for normal function with self care, mobility, lifting and ambulation. Modalities: CP 10' knee and foot+ heel prop       Charges:  Timed Code Treatment Minutes: 48'   Total Treatment Minutes: 61'    All charges require KX modifier     [] EVAL (LOW) 31275 (typically 20 minutes face-to-face)  [] EVAL (MOD) 64917 (typically 30 minutes face-to-face)  [] EVAL (HIGH) 85625 (typically 45 minutes face-to-face)  [] RE-EVAL   [x] FK(01062) x   2  [] IONTO  [] NMR (69087) [] VASO (game ready x 15')  [x] Manual (12434) x  1   [] Other:  [] TA x      [] Mech Traction (88642)  [] ES(attended) (80461)      [] ES (un) (96753):     GOALS:   GOALS:   Patient stated goal: Patient to be able to walk to gym and resume normal routine. [x]? Progressing: []? Met: []? Not Met: []? Adjusted     Therapist goals for Patient:   Short Term Goals: To be achieved in: 2 weeks  1. Independent in HEP and progression per patient tolerance, in order to prevent re-injury. [x]? Progressing: []? Met: []? Not Met: []? Adjusted   2. Patient will have a decrease in pain to facilitate improvement in movement, function, and ADLs as indicated by Functional Deficits. [x]? Progressing: []? Met: []? Not Met: []?  Adjusted     Long Term Goals: To be achieved in: 8-12 weeks  1. Disability index score 50% improved for the LEFS to assist with reaching prior level of function. [x]? Progressing: []? Met: []? Not Met: []? Adjusted  2. Patient will demonstrate increased AROM to equal to R knee to allow for proper joint functioning as indicated by patients Functional Deficits. [x]? Progressing: []? Met: []? Not Met: []? Adjusted  3. Patient will demonstrate an increase in Strength to good proximal hip strength and control, within 5lb HHD in LE to allow for proper functional mobility as indicated by patients Functional Deficits. [x]? Progressing: []? Met: []? Not Met: []? Adjusted  4. Patient will return to normal functional activities without increased symptoms or restriction. [x]? Progressing: []? Met: []? Not Met: []? Adjusted  5. Patient to ambulate with normal gait with out AD. [x]? Progressing: []? Met: []? Not Met: []? Adjusted      Overall Progression Towards Functional goals/ Treatment Progress Update:  [] Patient is progressing as expected towards functional goals listed. [] Progression is slowed due to complexities/Impairments listed. [] Progression has been slowed due to co-morbidities. [x] Plan just implemented, too soon to assess goals progression <30days   [] Goals require adjustment due to lack of progress  [] Patient is not progressing as expected and requires additional follow up with physician  [] Other    Prognosis for POC: [x] Good [] Fair  [] Poor      Patient requires continued skilled intervention: [x] Yes  [] No    Treatment/Activity Tolerance:  [x] Patient able to complete treatment  [] Patient limited by fatigue  [] Patient limited by pain     [] Patient limited by other medical complications  [] Other:Pt. Tolerated tx well with exercise progression. Patient L knee still stiff but patellar mobility close to WNL; Continue to push ROM. Pt is progressing with flexion but slowly.  Continues to require skilled PT services to increase overall ROM, functional mobility, and endurance for greater functional activity tolerance. PLAN: See eval  [x] Continue per plan of care [] Alter current plan (see comments above)  [] Plan of care initiated [] Hold pending MD visit [] Discharge  Next visit:    Electronically signed by: Fouzia Roberts, PTA  [id number],[facility] Derek PT, MPT     Note: If patient does not return for scheduled/ recommended follow up visits, this note will serve as a discharge from care along with most recent update on progress.

## 2022-07-07 ENCOUNTER — PATIENT MESSAGE (OUTPATIENT)
Dept: INTERNAL MEDICINE CLINIC | Age: 72
End: 2022-07-07

## 2022-07-12 ENCOUNTER — HOSPITAL ENCOUNTER (OUTPATIENT)
Dept: PHYSICAL THERAPY | Age: 72
Setting detail: THERAPIES SERIES
Discharge: HOME | End: 2022-07-12
Payer: MEDICARE

## 2022-07-12 PROCEDURE — 97140 MANUAL THERAPY 1/> REGIONS: CPT | Performed by: PHYSICAL THERAPIST

## 2022-07-12 PROCEDURE — 97110 THERAPEUTIC EXERCISES: CPT | Performed by: PHYSICAL THERAPIST

## 2022-07-12 RX ORDER — SIMVASTATIN 40 MG
TABLET ORAL
Qty: 90 TABLET | Refills: 3 | Status: SHIPPED | OUTPATIENT
Start: 2022-07-12

## 2022-07-12 NOTE — TELEPHONE ENCOUNTER
From: Prasanth Villalta  To: Dr. Rhoades Loop: 7/7/2022 2:02 PM EDT  Subject: Prescription refill     Dr Pennie Braswell, I need a refill for my Simvastatin 40 mg sent to the . [address] [place] [address]. Thank you.  Johnye Meckel

## 2022-07-17 NOTE — FLOWSHEET NOTE
[facility] and [address],  [facility], [facility] [address]  [address] [facility],5[address]   Osorio Akbar  Phone: [phone]  Fax: [phone]      Physical Therapy Daily Treatment Note  Date: 2022    Patient Name:  Catracho Pritchard    :  1950  MRN: [id number]  Restrictions/Precautions:    Medical/Treatment Diagnosis Information:  Diagnosis: Z98.890, Z87.81 (ICD-10-CM) - Status post open reduction and internal fixation (ORIF) of txdjeoudW52.142A (ICD-10-CM) - Closed fracture of left tibial plateau, initial encounter   PT DX: L knee pain M25. 727  Insurance/Certification information:  PT Insurance Information: Medicare/BLBS: no visit limit, insurance covers 80%  Physician Information:  Referring Practitioner: Dr. Gato Fairbanks MD  Has the plan of care been signed (Y/N):        []  Yes  [x]  No     Date of Patient follow up with Physician: 22      Is this a Progress Report:     []  Yes  [x]  No        If Yes:  Date Range for reporting period:  Beginning 22  Ending    Progress report will be due (28 Rx or 30 days whichever is less):        Recertification will be due (POC Duration  / 90 days whichever is less): 22         Visit # Insurance Allowable Auth Required   23 No visit limit []  Yes []  No        Functional Scale: 12.5 LEFs / Raenelle Aas 25    Date assessed:  22     58% LEFS       22      60% LEFS / FOTO 47     22                 Latex Allergy:  [x]NO      []YES    Preferred Language for Healthcare:   [x]English       []other:    Pain level: 1-2/10     SUBJECTIVE:  \"I am feeling ok overall. ...  just feels like it's taking a long time\"       OBJECTIVE:   Observation:  113 degrees   Test measurements:      RESTRICTIONS/PRECAUTIONS: Full wt.  Bearing with crutches and push ROM per MD note on 22          MEDICARE CAP EXCEPTION DOCUMENTATION      I certify that this patient meets one of the below criteria necessary for becoming an exception to the Medicare cap on therapy services:    [x]  The patient has a condition identified by an ICD-10 code that has a direct and significant impact on the need for therapy. (Significantly impacts the rate of recovery.)                   []  The patient has a complexity identified by an ICD-9 code that has a direct and significant impact on the need for therapy. (Significantly impacts the rate of recovery and is associated with a primary condition.)         []  The patient has associated variables that influence the amount of treatment to include:  Social support, self-efficacy/motivation, prognosis, time since onset/acuity. []  The patient has generalized musculoskeletal conditions or a condition affecting multiple sites that will have a direct impact on the rate of recovery. []  The patient had a prior episode of outpatient therapy during this calendar year for a different condition. []  The patient has a mental or cognitive disorder in addition to the condition being treated that will have a direct and significant impact on the rate of recovery. Exercises/Interventions:   HEP:  Access Code: [id number]  URL: [email or web address]  Date: 04/14/2022  Prepared by: Sunil Garcia     Exercises  Seated Calf Towel Stretch - 3 x daily - 7 x weekly - 1 sets - 30 reps - 3 hold  Long Sitting Quad Set - 3 x daily - 7 x weekly - 1 sets - 10 reps - 10 hold  Sitting Heel Slide with Towel - 3 x daily - 7 x weekly - 1 sets - 10 reps - 10 hold     DOS 2/25/22    Exercise/Equipment Resistance/Repetitions Other comments   Stretching     Hamstring - EOT 3 x 30\"  Manual    Towel Pull Inclined Calf 3 x 30\"     Hip Flexion     ITB     Groin     Quad                    SLR     Supine flex 3 x 10    Abduction     Adduction     Prone     LAQs SAQs    Isometrics     Quad sets  NV - Russian 10\"on/10\" off/heel prop        Patellar Glides     Medial Superior     Inferior          ROM     ERMI 5'  1' minute holds   Prone hangs X 6 min 5#    Passive     Active     Weight Shift        eob flexion hang  Bike X 6 min - ROM    CKC     Calf raises     Leg Press  ecc 3 x 10   X 20  65 #/seat #5, plate #4  35 #   Step ups     1 leg stand     Mini Squatting X 20 - airex    [address] TKE    Balance    Step up and over 4' x 15     LSD TC   LSU    FSU 6' x 20         Lateral walk 2 laps              PRE     Extension  RANGE:    Flexion  RANGE: 93        Quantum machines     Leg press      Leg extension     Leg curl          Manual interventions Patellar mobilizations (all directions) and PROM knee flex x 15 min                    Therapeutic Exercise and NMR EXR  [x] (99609) Provided verbal/tactile cueing for activities related to strengthening, flexibility, endurance, ROM for improvements in LE, proximal hip, and core control with self care, mobility, lifting, ambulation. [x] (57391) Provided verbal/tactile cueing for activities related to improving balance, coordination, kinesthetic sense, posture, motor skill, proprioception  to assist with LE, proximal hip, and core control in self care, mobility, lifting, ambulation and eccentric single leg control.      NMR and Therapeutic Activities:    [x] (28053 or 67822) Provided verbal/tactile cueing for activities related to improving balance, coordination, kinesthetic sense, posture, motor skill, proprioception and motor activation to allow for proper function of core, proximal hip and LE with self care and ADLs  [x] (70593) Gait Re-education- Provided training and instruction to the patient for proper LE, core and proximal hip recruitment and positioning and eccentric body weight control with ambulation re-education including up and down stairs     Home Exercise Program:    [x] (29079) Reviewed/Progressed HEP activities related to strengthening, flexibility, endurance, ROM of core, proximal hip and LE for functional self-care, mobility, lifting and ambulation/stair navigation   [x] (45780)Reviewed/Progressed HEP activities related to improving balance, coordination, kinesthetic sense, posture, motor skill, proprioception of core, proximal hip and LE for self care, mobility, lifting, and ambulation/stair navigation      Manual Treatments:  PROM / STM / Oscillations-Mobs:  G-I, II, III, IV (PA's, Inf., Post.)  [x] (69728) Provided manual therapy to mobilize LE, proximal hip and/or LS spine soft tissue/joints for the purpose of modulating pain, promoting relaxation,  increasing ROM, reducing/eliminating soft tissue swelling/inflammation/restriction, improving soft tissue extensibility and allowing for proper ROM for normal function with self care, mobility, lifting and ambulation. Modalities: CP 10' knee and foot+ heel prop       Charges:  Timed Code Treatment Minutes: 48'   Total Treatment Minutes: 48'     All charges require KX modifier     [] EVAL (LOW) 25295 (typically 20 minutes face-to-face)  [] EVAL (MOD) 28551 (typically 30 minutes face-to-face)  [] EVAL (HIGH) 41458 (typically 45 minutes face-to-face)  [] RE-EVAL   [x] XY(48506) x   2  [] IONTO  [] NMR (91442) [] VASO (game ready x 15')  [x] Manual (96753) x  1   [] Other:  [] TA x      [] Mech Traction (34398)  [] ES(attended) (14397)      [] ES (un) (10744):     GOALS:   GOALS:   Patient stated goal: Patient to be able to walk to gym and resume normal routine. [x] Progressing: [] Met: [] Not Met: [] Adjusted     Therapist goals for Patient:   Short Term Goals: To be achieved in: 2 weeks  1. Independent in HEP and progression per patient tolerance, in order to prevent re-injury. [x] Progressing: [] Met: [] Not Met: [] Adjusted   2. Patient will have a decrease in pain to facilitate improvement in movement, function, and ADLs as indicated by Functional Deficits. [x] Progressing: [] Met: [] Not Met: [] Adjusted     Long Term Goals: To be achieved in: 8-12 weeks  1. Disability index score 50% improved for the LEFS to assist with reaching prior level of function. [x] Progressing: [] Met: [] Not Met: [] Adjusted  2. Patient will demonstrate increased AROM to equal to R knee to allow for proper joint functioning as indicated by patients Functional Deficits. [x] Progressing: [] Met: [] Not Met: [] Adjusted  3. Patient will demonstrate an increase in Strength to good proximal hip strength and control, within 5lb HHD in LE to allow for proper functional mobility as indicated by patients Functional Deficits. [x] Progressing: [] Met: [] Not Met: [] Adjusted  4. Patient will return to normal functional activities without increased symptoms or restriction. [x] Progressing: [] Met: [] Not Met: [] Adjusted  5. Patient to ambulate with normal gait with out AD. [x] Progressing: [] Met: [] Not Met: [] Adjusted      Overall Progression Towards Functional goals/ Treatment Progress Update:  [] Patient is progressing as expected towards functional goals listed. [] Progression is slowed due to complexities/Impairments listed. [] Progression has been slowed due to co-morbidities. [x] Plan just implemented, too soon to assess goals progression <30days   [] Goals require adjustment due to lack of progress  [] Patient is not progressing as expected and requires additional follow up with physician  [] Other    Prognosis for POC: [x] Good [] Fair  [] Poor      Patient requires continued skilled intervention: [x] Yes  [] No    Treatment/Activity Tolerance:  [x] Patient able to complete treatment  [] Patient limited by fatigue  [] Patient limited by pain     [] Patient limited by other medical complications  [] Other:Pt. Tolerated tx well with exercise progression. Patient L knee still stiff but patellar mobility close to WNL; Continue to push ROM. Pt is progressing with flexion but slowly.  Continues to require skilled PT services to increase overall ROM, functional mobility, and endurance for greater functional activity tolerance. PLAN: See eval  [x] Continue per plan of care [] Alter current plan (see comments above)  [] Plan of care initiated [] Hold pending MD visit [] Discharge  Next visit:    Electronically signed by: Ines Scanlon, PT, MPT     Note: If patient does not return for scheduled/ recommended follow up visits, this note will serve as a discharge from care along with most recent update on progress.

## 2022-07-20 ENCOUNTER — HOSPITAL ENCOUNTER (OUTPATIENT)
Dept: PHYSICAL THERAPY | Age: 72
Setting detail: THERAPIES SERIES
Discharge: HOME | End: 2022-07-20
Payer: MEDICARE

## 2022-07-20 PROCEDURE — 97110 THERAPEUTIC EXERCISES: CPT | Performed by: SPECIALIST/TECHNOLOGIST

## 2022-07-20 PROCEDURE — 97140 MANUAL THERAPY 1/> REGIONS: CPT | Performed by: SPECIALIST/TECHNOLOGIST

## 2022-07-20 NOTE — FLOWSHEET NOTE
[facility] and [address],  Sports Performance and Rehabilitation, [facility] [address] [place] [address]  793 [facility],5Th Floor   9 [facility], [address] [place] [address]  Phone: [phone]  Fax: [phone]      Physical Therapy Daily Treatment Note  Date: 2022    Patient Name:  Daylin Tobin    :  1950  MRN: [id number]  Restrictions/Precautions:    Medical/Treatment Diagnosis Information:  Diagnosis: Z98.890, Z87.81 (ICD-10-CM) - Status post open reduction and internal fixation (ORIF) of [id number] (ICD-10-CM) - Closed fracture of left tibial plateau, initial encounter   PT DX: L knee pain M25. 181  Insurance/Certification information:  PT Insurance Information: Medicare/BLBS: no visit limit, insurance covers 80%  Physician Information:  Referring Practitioner: Dr. Stefani Ramos MD  Has the plan of care been signed (Y/N):        []  Yes  [x]  No     Date of Patient follow up with Physician: 22      Is this a Progress Report:     []  Yes  [x]  No        If Yes:  Date Range for reporting period:  Beginning 22  Ending    Progress report will be due (28 Rx or 30 days whichever is less):        Recertification will be due (POC Duration  / 90 days whichever is less): 22         Visit # Insurance Allowable Auth Required   24 No visit limit []  Yes []  No        Functional Scale: 12.5 LEFs / [place] 25    Date assessed:  22     58% LEFS       22      60% LEFS / FOTO 47     22                 Latex Allergy:  [x]NO      []YES    Preferred Language for Healthcare:   [x]English       []other:    Pain level: 1-2/10     SUBJECTIVE:  \"I am feeling ok overall. ...  just feels like it's taking a long time\"       OBJECTIVE:   Observation:  113 degrees   Test measurements:      RESTRICTIONS/PRECAUTIONS: Full wt.  Bearing with crutches and push ROM per MD note on 22          MEDICARE CAP EXCEPTION DOCUMENTATION      I certify that this patient meets one of the below criteria necessary for becoming an exception to the Medicare cap on therapy services:    [x]  The patient has a condition identified by an ICD-10 code that has a direct and significant impact on the need for therapy. (Significantly impacts the rate of recovery.)                   []  The patient has a complexity identified by an ICD-9 code that has a direct and significant impact on the need for therapy. (Significantly impacts the rate of recovery and is associated with a primary condition.)         []  The patient has associated variables that influence the amount of treatment to include:  Social support, self-efficacy/motivation, prognosis, time since onset/acuity. []  The patient has generalized musculoskeletal conditions or a condition affecting multiple sites that will have a direct impact on the rate of recovery. []  The patient had a prior episode of outpatient therapy during this calendar year for a different condition. []  The patient has a mental or cognitive disorder in addition to the condition being treated that will have a direct and significant impact on the rate of recovery. Exercises/Interventions:   HEP:  Access Code: [id number]  URL: [email or web address]  Date: 04/14/2022  Prepared by: Kamila Minor     Exercises  Seated Calf Towel Stretch - 3 x daily - 7 x weekly - 1 sets - 30 reps - 3 hold  Long Sitting Quad Set - 3 x daily - 7 x weekly - 1 sets - 10 reps - 10 hold  Sitting Heel Slide with Towel - 3 x daily - 7 x weekly - 1 sets - 10 reps - 10 hold     DOS 2/25/22    Exercise/Equipment Resistance/Repetitions Other comments   Stretching     Hamstring - EOT 3 x 30\"  Manual    Towel Pull Inclined Calf 3 x 30\"     Hip Flexion     ITB     Groin     Quad                    SLR     Supine flex 3 x 10    Abduction     Adduction     Prone     LAQs SAQs    Isometrics     Quad sets  NV - Russian 10\"on/10\" off/heel prop        Patellar Glides     Medial Superior     Inferior          ROM     ERMI 5'  1' minute holds   Prone hangs X 6 min 5# NV   Passive     Active     Weight Shift        eob flexion hang  Bike X 6 min - ROM    CKC     Calf raises     Leg Press  ecc 3 x 10   X 20  65 #/seat #5, plate #4  35 #   Step ups     1 leg stand     Mini Squatting X 20 - airex NV   [facility] TKE    Balance    Step up and over 4' x 15     LSD TC   LSU    FSU 6' x 20         Lateral walk 2 laps              PRE     Extension  RANGE:    Flexion  RANGE: 93        Quantum machines     Leg press      Leg extension     Leg curl          Manual interventions Patellar mobilizations (all directions) and PROM knee flex x 15 min      STM ITB 10'               Therapeutic Exercise and NMR EXR  [x] (99171) Provided verbal/tactile cueing for activities related to strengthening, flexibility, endurance, ROM for improvements in LE, proximal hip, and core control with self care, mobility, lifting, ambulation. [x] (19966) Provided verbal/tactile cueing for activities related to improving balance, coordination, kinesthetic sense, posture, motor skill, proprioception  to assist with LE, proximal hip, and core control in self care, mobility, lifting, ambulation and eccentric single leg control.      NMR and Therapeutic Activities:    [x] (93210 or 98440) Provided verbal/tactile cueing for activities related to improving balance, coordination, kinesthetic sense, posture, motor skill, proprioception and motor activation to allow for proper function of core, proximal hip and LE with self care and ADLs  [x] (40949) Gait Re-education- Provided training and instruction to the patient for proper LE, core and proximal hip recruitment and positioning and eccentric body weight control with ambulation re-education including up and down stairs     Home Exercise Program:    [x] (42948) Reviewed/Progressed HEP activities related to strengthening, flexibility, endurance, ROM of core, proximal hip and LE for in: 8-12 weeks  1. Disability index score 50% improved for the LEFS to assist with reaching prior level of function. [x] Progressing: [] Met: [] Not Met: [] Adjusted  2. Patient will demonstrate increased AROM to equal to R knee to allow for proper joint functioning as indicated by patients Functional Deficits. [x] Progressing: [] Met: [] Not Met: [] Adjusted  3. Patient will demonstrate an increase in Strength to good proximal hip strength and control, within 5lb HHD in LE to allow for proper functional mobility as indicated by patients Functional Deficits. [x] Progressing: [] Met: [] Not Met: [] Adjusted  4. Patient will return to normal functional activities without increased symptoms or restriction. [x] Progressing: [] Met: [] Not Met: [] Adjusted  5. Patient to ambulate with normal gait with out AD. [x] Progressing: [] Met: [] Not Met: [] Adjusted      Overall Progression Towards Functional goals/ Treatment Progress Update:  [] Patient is progressing as expected towards functional goals listed. [] Progression is slowed due to complexities/Impairments listed. [] Progression has been slowed due to co-morbidities. [x] Plan just implemented, too soon to assess goals progression <30days   [] Goals require adjustment due to lack of progress  [] Patient is not progressing as expected and requires additional follow up with physician  [] Other    Prognosis for POC: [x] Good [] Fair  [] Poor      Patient requires continued skilled intervention: [x] Yes  [] No    Treatment/Activity Tolerance:  [x] Patient able to complete treatment  [] Patient limited by fatigue  [] Patient limited by pain     [] Patient limited by other medical complications  [] Other:Pt. Tolerated tx well with exercise progression. Patient L knee still stiff but patellar mobility close to WNL; Continue to push ROM. Pt is progressing with flexion but slowly.  Continues to require skilled PT services to increase overall ROM, functional mobility, and endurance for greater functional activity tolerance. PLAN: See eval  [x] Continue per plan of care [] Alter current plan (see comments above)  [] Plan of care initiated [] Hold pending MD visit [] Discharge  Next visit:    Electronically signed by: Germán Acosta, PTA  [id number], Frandy Bales, PT, MPT     Note: If patient does not return for scheduled/ recommended follow up visits, this note will serve as a discharge from care along with most recent update on progress.

## 2022-07-28 ENCOUNTER — HOSPITAL ENCOUNTER (OUTPATIENT)
Dept: PHYSICAL THERAPY | Age: 72
Setting detail: THERAPIES SERIES
Discharge: HOME | End: 2022-07-28
Payer: MEDICARE

## 2022-07-28 ENCOUNTER — OFFICE VISIT (OUTPATIENT)
Dept: ORTHOPEDIC SURGERY | Age: 72
End: 2022-07-28
Payer: MEDICARE

## 2022-07-28 VITALS — BODY MASS INDEX: 25.03 KG/M2 | WEIGHT: 136 LBS | HEIGHT: 62 IN

## 2022-07-28 DIAGNOSIS — Z96.642 STATUS POST TOTAL HIP REPLACEMENT, LEFT: ICD-10-CM

## 2022-07-28 DIAGNOSIS — Z96.641 STATUS POST TOTAL HIP REPLACEMENT, RIGHT: Primary | ICD-10-CM

## 2022-07-28 PROCEDURE — 99214 OFFICE O/P EST MOD 30 MIN: CPT | Performed by: ORTHOPAEDIC SURGERY

## 2022-07-28 PROCEDURE — 3017F COLORECTAL CA SCREEN DOC REV: CPT | Performed by: ORTHOPAEDIC SURGERY

## 2022-07-28 PROCEDURE — 97140 MANUAL THERAPY 1/> REGIONS: CPT | Performed by: SPECIALIST/TECHNOLOGIST

## 2022-07-28 PROCEDURE — G8427 DOCREV CUR MEDS BY ELIG CLIN: HCPCS | Performed by: ORTHOPAEDIC SURGERY

## 2022-07-28 PROCEDURE — 1123F ACP DISCUSS/DSCN MKR DOCD: CPT | Performed by: ORTHOPAEDIC SURGERY

## 2022-07-28 PROCEDURE — 1036F TOBACCO NON-USER: CPT | Performed by: ORTHOPAEDIC SURGERY

## 2022-07-28 PROCEDURE — G8399 PT W/DXA RESULTS DOCUMENT: HCPCS | Performed by: ORTHOPAEDIC SURGERY

## 2022-07-28 PROCEDURE — 97110 THERAPEUTIC EXERCISES: CPT | Performed by: SPECIALIST/TECHNOLOGIST

## 2022-07-28 PROCEDURE — G8420 CALC BMI NORM PARAMETERS: HCPCS | Performed by: ORTHOPAEDIC SURGERY

## 2022-07-28 PROCEDURE — 1090F PRES/ABSN URINE INCON ASSESS: CPT | Performed by: ORTHOPAEDIC SURGERY

## 2022-07-28 NOTE — PLAN OF CARE
[facility] and [address],  [facility], [facility] [address]  793 [facility],5Th Floor   Osorio Akbar  Phone: [phone]  Fax: [phone]                 Physical Therapy Re-Certification Plan of Evie Cox        Dear  Dr. Marcia Tucker,    We had the pleasure of treating the following patient for physical therapy services at [address] [place] [address]. A summary of our findings can be found in the updated assessment below. This includes our plan of care. If you have any questions or concerns regarding these findings, please do not hesitate to contact me at the office phone number checked above.   Thank you for the referral.     Physician Signature:________________________________Date:__________________  By signing above (or electronic signature), therapists plan is approved by physician    Date Range Of Visits: 22 - 22  Total Visits to Date: 24  Overall Response to Treatment:   [x]Patient is responding well to treatment and improvement is noted with regards  to goals   []Patient should continue to improve in reasonable time if they continue HEP   []Patient has plateaued and is no longer responding to skilled PT intervention    []Patient is getting worse and would benefit from return to referring MD   []Patient unable to adhere to initial POC   []Other:   Plan:  Continue PT 1 x a wk or prn / for 6 wks       Physical Therapy Daily Treatment Note  Date: 2022    Patient Name:  Lavinia Prakash    :  1950  MRN: [id number]  Restrictions/Precautions:    Medical/Treatment Diagnosis Information:  Diagnosis: Z98.890, Z87.81 (ICD-10-CM) - Status post open reduction and internal fixation (ORIF) of [id number] (ICD-10-CM) - Closed fracture of left tibial plateau, initial encounter   PT DX: L knee pain M25. 882  Insurance/Certification information:  PT Insurance Information: Medicare/BLBS: no visit limit, insurance covers 80%  Physician Information:  Referring Practitioner: Dr. Mu Hilton MD  Has the plan of care been signed (Y/N):        []  Yes  [x]  No     Date of Patient follow up with Physician: 5/11/22      Is this a Progress Report:     []  Yes  [x]  No        If Yes:  Date Range for reporting period:  Beginning 4/14/22  Ending    Progress report will be due (28 Rx or 30 days whichever is less): 0/45/58       Recertification will be due (POC Duration  / 90 days whichever is less): 9/1/22        Visit # Insurance Allowable Auth Required   25 No visit limit []  Yes []  No        Functional Scale: 12.5 LEFs / FOTO 25    Date assessed:  4/14/22     58% LEFS       5/11/22      60% LEFS / FOTO 47     6/23/22      FOTO 55     7/28/22                 Latex Allergy:  [x]NO      []YES    Preferred Language for Healthcare:   [x]English       []other:    Pain level: 0/10     SUBJECTIVE:  \"I feel like my walking is getting better. \"       OBJECTIVE:   Observation:    Test measurements:  Taken on 7/28/22 AROM left knee 116°, ext -9°, PROM knee flex 119°, ext 0°    RESTRICTIONS/PRECAUTIONS: Full wt. Bearing with crutches and push ROM per MD note on 5/11/22          MEDICARE CAP EXCEPTION DOCUMENTATION      I certify that this patient meets one of the below criteria necessary for becoming an exception to the Medicare cap on therapy services:    [x]  The patient has a condition identified by an ICD-10 code that has a direct and significant impact on the need for therapy. (Significantly impacts the rate of recovery.)                   []  The patient has a complexity identified by an ICD-9 code that has a direct and significant impact on the need for therapy. (Significantly impacts the rate of recovery and is associated with a primary condition.)         []  The patient has associated variables that influence the amount of treatment to include:  Social support, self-efficacy/motivation, prognosis, time since onset/acuity. []  The patient has generalized musculoskeletal conditions or a condition affecting multiple sites that will have a direct impact on the rate of recovery. []  The patient had a prior episode of outpatient therapy during this calendar year for a different condition. []  The patient has a mental or cognitive disorder in addition to the condition being treated that will have a direct and significant impact on the rate of recovery. Exercises/Interventions:   HEP:  Access Code: [email or web address]  URL: [email or web address]  Date: 04/14/2022  Prepared by: Shobha Elizalde     Exercises  Seated Calf Towel Stretch - 3 x daily - 7 x weekly - 1 sets - 30 reps - 3 hold  Long Sitting Quad Set - 3 x daily - 7 x weekly - 1 sets - 10 reps - 10 hold  Sitting Heel Slide with Towel - 3 x daily - 7 x weekly - 1 sets - 10 reps - 10 hold     DOS 2/25/22    Exercise/Equipment Resistance/Repetitions Other comments   Stretching     Hamstring - EOT 3 x 30\"  Manual    Towel Pull Inclined Calf 3 x 30\"     Hip Flexion     ITB     Groin     Quad                    SLR     Supine flex 3 x 10    Abduction     Adduction     Prone     LAQs SAQs    Isometrics     Quad sets  NV - Russian 10\"on/10\" off/heel prop        Patellar Glides     Medial     Superior     Inferior          ROM     ERMI 5'  1' minute holds   Prone hangs X 6 min 5# NV   Passive     Active     Weight Shift        eob flexion hang  Bike X 6 min - ROM    CKC     Calf raises     Leg Press  ecc 3 x 10   X 20  65 #/seat #5, plate #4  35 #   Step ups     1 leg stand     Mini Squatting X 20 - airex NV   [facility] TKE    Balance    Step up and over 4' x 15     LSD 2' x 10 TC   LSU    FSU 6' x 20         Lateral walk 2 laps              PRE     Extension  RANGE:    Flexion  RANGE: 93        Quantum machines     Leg press      Leg extension     Leg curl          Manual interventions Patellar mobilizations (all directions) and PROM knee flex x 15 min Therapeutic Exercise and NMR EXR  [x] (22014) Provided verbal/tactile cueing for activities related to strengthening, flexibility, endurance, ROM for improvements in LE, proximal hip, and core control with self care, mobility, lifting, ambulation. [x] (19331) Provided verbal/tactile cueing for activities related to improving balance, coordination, kinesthetic sense, posture, motor skill, proprioception  to assist with LE, proximal hip, and core control in self care, mobility, lifting, ambulation and eccentric single leg control.      NMR and Therapeutic Activities:    [x] (62735 or 78256) Provided verbal/tactile cueing for activities related to improving balance, coordination, kinesthetic sense, posture, motor skill, proprioception and motor activation to allow for proper function of core, proximal hip and LE with self care and ADLs  [x] (32219) Gait Re-education- Provided training and instruction to the patient for proper LE, core and proximal hip recruitment and positioning and eccentric body weight control with ambulation re-education including up and down stairs     Home Exercise Program:    [x] (16825) Reviewed/Progressed HEP activities related to strengthening, flexibility, endurance, ROM of core, proximal hip and LE for functional self-care, mobility, lifting and ambulation/stair navigation   [x] (13651)Reviewed/Progressed HEP activities related to improving balance, coordination, kinesthetic sense, posture, motor skill, proprioception of core, proximal hip and LE for self care, mobility, lifting, and ambulation/stair navigation      Manual Treatments:  PROM / STM / Oscillations-Mobs:  G-I, II, III, IV (PA's, Inf., Post.)  [x] (98405) Provided manual therapy to mobilize LE, proximal hip and/or LS spine soft tissue/joints for the purpose of modulating pain, promoting relaxation,  increasing ROM, reducing/eliminating soft tissue swelling/inflammation/restriction, improving soft tissue extensibility and allowing for proper ROM for normal function with self care, mobility, lifting and ambulation. Modalities: , pt. Had MD appt      Charges:  Timed Code Treatment Minutes: 48'   Total Treatment Minutes: 48'     All charges require KX modifier     [] EVAL (LOW) 09549 (typically 20 minutes face-to-face)  [] EVAL (MOD) 14355 (typically 30 minutes face-to-face)  [] EVAL (HIGH) 98711 (typically 45 minutes face-to-face)  [] RE-EVAL   [x] PX(24629) x   2  [] IONTO  [] NMR (88260) [] VASO (game ready x 15')  [x] Manual (06996) x  1   [] Other:  [] TA x      [] Mech Traction (66331)  [] ES(attended) (98253)      [] ES (un) (96946):     GOALS:   GOALS:   Patient stated goal: Patient to be able to walk to gym and resume normal routine. [x] Progressing: [] Met: [] Not Met: [] Adjusted     Therapist goals for Patient:   Short Term Goals: To be achieved in: 2 weeks  1. Independent in HEP and progression per patient tolerance, in order to prevent re-injury. [x] Progressing: [] Met: [] Not Met: [] Adjusted   2. Patient will have a decrease in pain to facilitate improvement in movement, function, and ADLs as indicated by Functional Deficits. [x] Progressing: [] Met: [] Not Met: [] Adjusted     Long Term Goals: To be achieved in: 8-12 weeks  1. Disability index score 50% improved for the LEFS to assist with reaching prior level of function. [x] Progressing: [] Met: [] Not Met: [] Adjusted  2. Patient will demonstrate increased AROM to equal to R knee to allow for proper joint functioning as indicated by patients Functional Deficits. [x] Progressing: [] Met: [] Not Met: [] Adjusted  3. Patient will demonstrate an increase in Strength to good proximal hip strength and control, within 5lb HHD in LE to allow for proper functional mobility as indicated by patients Functional Deficits. [x] Progressing: [] Met: [] Not Met: [] Adjusted  4.  Patient will return to normal functional activities without increased symptoms or restriction. [x] Progressing: [] Met: [] Not Met: [] Adjusted  5. Patient to ambulate with normal gait with out AD. [x] Progressing: [] Met: [] Not Met: [] Adjusted      Overall Progression Towards Functional goals/ Treatment Progress Update:  [] Patient is progressing as expected towards functional goals listed. [] Progression is slowed due to complexities/Impairments listed. [] Progression has been slowed due to co-morbidities. [x] Plan just implemented, too soon to assess goals progression <30days   [] Goals require adjustment due to lack of progress  [] Patient is not progressing as expected and requires additional follow up with physician  [] Other    Prognosis for POC: [x] Good [] Fair  [] Poor      Patient requires continued skilled intervention: [x] Yes  [] No    Treatment/Activity Tolerance:  [x] Patient able to complete treatment  [] Patient limited by fatigue  [] Patient limited by pain     [] Patient limited by other medical complications  [] Other:Pt. Tolerated tx well with exercise progression. Patient L knee still stiff but patellar mobility close to WNL; Continue to push ROM. Pt is progressing with flexion but slowly. Continues to require skilled PT services to increase overall ROM, functional mobility, and endurance for greater functional activity tolerance. PLAN: See eval  [x] Continue per plan of care [] Alter current plan (see comments above)  [] Plan of care initiated [] Hold pending MD visit [] Discharge  Next visit:    Electronically signed by: Germán Acosta, PTA  [id number], Fradny Bales, PT, MPT     Note: If patient does not return for scheduled/ recommended follow up visits, this note will serve as a discharge from care along with most recent update on progress.

## 2022-07-28 NOTE — PROGRESS NOTES
Patient Name: Sabino Bumpers MRN: [id number]   Age: 67 y.o. YOB: 1950   Sex: female         [address] Complaint   Patient presents with    Follow-up     BILATERAL HIP-RIGHT HARRIET 5/10/21  LEFT HARRIET 8/17/20       HISTORY OF PRESENT ILLNESS   Patient returns for their annual evaluation status post total hip replacement. The patient is doing very well. They have mild complaints of pain. Her bilateral hips have been irritable but not particularly painful  She has been walking with difficulty for some time with the tibial plateau fracture with lateral valgus angulation because of collapse    Having bilateral trochanteric pain at this point with difficulty with sleeping  There is no swelling about the Hip. The patient has returned to normal activities. They deny any calf swelling or numbness or tingling down the leg          PHYSICAL EXAM     Vital Signs: There were no vitals filed for this visit. Examination of the hip laterally shows a well-healed incision. Enteric pain is noted with IT band irritation there is obviously valgus knee on the left side with fixation    Bilateral hip show excellent range of motion there is no instability noted and generally there is no obvious neurologic deficit back motions are limited    Is able to ambulate with her good comfort levels of the local irritation in both hips is noted surprisingly she has very little pain in the tibia    There is mild swelling. There is no deformity. Range of motion is without major pain. There is no calf tenderness. The patient is neurovascularly intact    RADIOLOGY     Xray   Have reviewed the xrays above from 07/28/22   and my impression is:  X-Rays reviewed: AP and lateral x-rays of the hip show excellent alignment of the total hip prosthesis. There is no loosening or fractures noted some lysis of the proximal calcars noted. IMPRESSION   Annual evaluation status post total hip replacement.  Some evidence of trochanteric pain and IT band issues. PLAN   The patient is doing well status post total hip replacement with trochanteric pain. Today we'll continue with normal activities and exercise program with physical therapy. Visit to return in 1 year for repeat evaluation and x-rays of the hip.plan for trochanteric pain and IT band issues. ICD-10-CM    1. Status post total hip replacement, right  Z96.641 XR HIP BILATERAL W AP PELVIS (2 VIEWS)     Ambulatory referral to Physical Therapy      2.  Status post total hip replacement, left  Z96.642 XR HIP BILATERAL W AP PELVIS (2 VIEWS)     Ambulatory referral to Physical Therapy

## 2022-08-03 ENCOUNTER — HOSPITAL ENCOUNTER (OUTPATIENT)
Dept: PHYSICAL THERAPY | Age: 72
Setting detail: THERAPIES SERIES
Discharge: HOME | End: 2022-08-03
Payer: MEDICARE

## 2022-08-03 PROCEDURE — 97110 THERAPEUTIC EXERCISES: CPT | Performed by: SPECIALIST/TECHNOLOGIST

## 2022-08-03 PROCEDURE — 97140 MANUAL THERAPY 1/> REGIONS: CPT | Performed by: SPECIALIST/TECHNOLOGIST

## 2022-08-03 NOTE — FLOWSHEET NOTE
The [address] [facility] and [address],  Sports Performance and Rehabilitation, [facility] 6199 12[address]  [facility] [facility]   Osorio Akbar  Phone: [phone]  Fax: [phone]                   Physical Therapy Daily Treatment Note  Date: 8/3/2022    Patient Name:  Monik Ratliff    :  1950  MRN: [id number]  Restrictions/Precautions:    Medical/Treatment Diagnosis Information:  Diagnosis: Z98.890, Z87.81 (ICD-10-CM) - Status post open reduction and internal fixation (ORIF) of [id number] (ICD-10-CM) - Closed fracture of left tibial plateau, initial encounter   PT DX: L knee pain M25. 194  Insurance/Certification information:  PT Insurance Information: Medicare/[employer]: no visit limit, insurance covers 80%  Physician Information:  Referring Practitioner: Dr. Winnie Mcqueen MD  Has the plan of care been signed (Y/N):        []  Yes  [x]  No     Date of Patient follow up with Physician: 22      Is this a Progress Report:     []  Yes  [x]  No        If Yes:  Date Range for reporting period:  Beginning 22  Ending    Progress report will be due (28 Rx or 30 days whichever is less):        Recertification will be due (POC Duration  / 90 days whichever is less): 22        Visit # Insurance Allowable Auth Required   26 No visit limit []  Yes []  No        Functional Scale: 12.5 LEFs / Felipe Jai 25    Date assessed:  22     58% LEFS       22      60% LEFS / FOTO 47     22      FOTO 55     22                 Latex Allergy:  [x]NO      []YES    Preferred Language for Healthcare:   [x]English       []other:    Pain level: 0/10     SUBJECTIVE:  Pt. Reports the doctor was happy with the progress being made. OBJECTIVE:   Observation:    Test measurements:  Taken on 22 AROM left knee 116°, ext -9°, PROM knee flex 119°, ext 0°    RESTRICTIONS/PRECAUTIONS: Full wt.  Bearing with crutches and push ROM per MD note on 22          MEDICARE CAP EXCEPTION DOCUMENTATION      I certify that this patient meets one of the below criteria necessary for becoming an exception to the Medicare cap on therapy services:    [x]  The patient has a condition identified by an ICD-10 code that has a direct and significant impact on the need for therapy. (Significantly impacts the rate of recovery.)                   []  The patient has a complexity identified by an ICD-9 code that has a direct and significant impact on the need for therapy. (Significantly impacts the rate of recovery and is associated with a primary condition.)         []  The patient has associated variables that influence the amount of treatment to include:  Social support, self-efficacy/motivation, prognosis, time since onset/acuity. []  The patient has generalized musculoskeletal conditions or a condition affecting multiple sites that will have a direct impact on the rate of recovery. []  The patient had a prior episode of outpatient therapy during this calendar year for a different condition. []  The patient has a mental or cognitive disorder in addition to the condition being treated that will have a direct and significant impact on the rate of recovery. Exercises/Interventions:   HEP:  Access Code: [email or web address]  URL: [email or web address]  Date: 04/14/2022  Prepared by: Zully Shear     Exercises  Seated Calf Towel Stretch - 3 x daily - 7 x weekly - 1 sets - 30 reps - 3 hold  Long Sitting Quad Set - 3 x daily - 7 x weekly - 1 sets - 10 reps - 10 hold  Sitting Heel Slide with Towel - 3 x daily - 7 x weekly - 1 sets - 10 reps - 10 hold     DOS 2/25/22    Exercise/Equipment Resistance/Repetitions Other comments   Stretching     Hamstring - EOT 3 x 30\"  Manual    Towel Pull Inclined Calf 3 x 30\"     Hip Flexion     ITB     Groin     Quad                    SLR     Supine flex 2# 3 x 10    Abduction     Adduction     Prone     LAQs SAQs    Isometrics     Quad related to strengthening, flexibility, endurance, ROM of core, proximal hip and LE for functional self-care, mobility, lifting and ambulation/stair navigation   [x] (17166)Reviewed/Progressed HEP activities related to improving balance, coordination, kinesthetic sense, posture, motor skill, proprioception of core, proximal hip and LE for self care, mobility, lifting, and ambulation/stair navigation      Manual Treatments:  PROM / STM / Oscillations-Mobs:  G-I, II, III, IV (PA's, Inf., Post.)  [x] (43245) Provided manual therapy to mobilize LE, proximal hip and/or LS spine soft tissue/joints for the purpose of modulating pain, promoting relaxation,  increasing ROM, reducing/eliminating soft tissue swelling/inflammation/restriction, improving soft tissue extensibility and allowing for proper ROM for normal function with self care, mobility, lifting and ambulation. Modalities: ,     Charges:  Timed Code Treatment Minutes: 48'   Total Treatment Minutes: 48'     All charges require KX modifier     [] EVAL (LOW) 60721 (typically 20 minutes face-to-face)  [] EVAL (MOD) 51162 (typically 30 minutes face-to-face)  [] EVAL (HIGH) 11399 (typically 45 minutes face-to-face)  [] RE-EVAL   [x] KE(17915) x   2  [] IONTO  [] NMR (62398) [] VASO (game ready x 15')  [x] Manual (19252) x  1   [] Other:  [] TA x      [] Mech Traction (05938)  [] ES(attended) (10188)      [] ES (un) (39758):     GOALS:   GOALS:   Patient stated goal: Patient to be able to walk to gym and resume normal routine. [x] Progressing: [] Met: [] Not Met: [] Adjusted     Therapist goals for Patient:   Short Term Goals: To be achieved in: 2 weeks  1. Independent in HEP and progression per patient tolerance, in order to prevent re-injury. [x] Progressing: [] Met: [] Not Met: [] Adjusted   2. Patient will have a decrease in pain to facilitate improvement in movement, function, and ADLs as indicated by Functional Deficits.     [x] Progressing: [] Met: [] Not Met: [] Adjusted     Long Term Goals: To be achieved in: 8-12 weeks  1. Disability index score 50% improved for the LEFS to assist with reaching prior level of function. [x] Progressing: [] Met: [] Not Met: [] Adjusted  2. Patient will demonstrate increased AROM to equal to R knee to allow for proper joint functioning as indicated by patients Functional Deficits. [x] Progressing: [] Met: [] Not Met: [] Adjusted  3. Patient will demonstrate an increase in Strength to good proximal hip strength and control, within 5lb HHD in LE to allow for proper functional mobility as indicated by patients Functional Deficits. [x] Progressing: [] Met: [] Not Met: [] Adjusted  4. Patient will return to normal functional activities without increased symptoms or restriction. [x] Progressing: [] Met: [] Not Met: [] Adjusted  5. Patient to ambulate with normal gait with out AD. [x] Progressing: [] Met: [] Not Met: [] Adjusted      Overall Progression Towards Functional goals/ Treatment Progress Update:  [] Patient is progressing as expected towards functional goals listed. [] Progression is slowed due to complexities/Impairments listed. [] Progression has been slowed due to co-morbidities. [x] Plan just implemented, too soon to assess goals progression <30days   [] Goals require adjustment due to lack of progress  [] Patient is not progressing as expected and requires additional follow up with physician  [] Other    Prognosis for POC: [x] Good [] Fair  [] Poor      Patient requires continued skilled intervention: [x] Yes  [] No    Treatment/Activity Tolerance:  [x] Patient able to complete treatment  [] Patient limited by fatigue  [] Patient limited by pain     [] Patient limited by other medical complications  [] Other:Pt. Tolerated tx well with exercise progression. Patient L knee still stiff but patellar mobility close to WNL; Continue to push ROM. Pt is progressing with flexion but slowly.  Continues to require skilled PT services to increase overall ROM, functional mobility, and endurance for greater functional activity tolerance. PLAN: See eval  [x] Continue per plan of care [] Alter current plan (see comments above)  [] Plan of care initiated [] Hold pending MD visit [] Discharge  Next visit:    Electronically signed by: Yvette High, PTA  [id number], Tahmina Pi, PT, MPT     Note: If patient does not return for scheduled/ recommended follow up visits, this note will serve as a discharge from care along with most recent update on progress.

## 2022-09-02 ENCOUNTER — APPOINTMENT (OUTPATIENT)
Dept: PHYSICAL THERAPY | Age: 72
End: 2022-09-02
Payer: MEDICARE

## 2022-09-07 ENCOUNTER — TELEPHONE (OUTPATIENT)
Dept: ORTHOPEDIC SURGERY | Age: 72
End: 2022-09-07

## 2022-09-07 ENCOUNTER — HOSPITAL ENCOUNTER (OUTPATIENT)
Dept: PHYSICAL THERAPY | Age: 72
Setting detail: THERAPIES SERIES
Discharge: HOME | End: 2022-09-07
Payer: MEDICARE

## 2022-09-07 PROCEDURE — 97110 THERAPEUTIC EXERCISES: CPT | Performed by: SPECIALIST/TECHNOLOGIST

## 2022-09-07 PROCEDURE — 97140 MANUAL THERAPY 1/> REGIONS: CPT | Performed by: SPECIALIST/TECHNOLOGIST

## 2022-09-07 NOTE — FLOWSHEET NOTE
The [facility] and [address],  Sports Performance and Rehabilitation, [facility] [address]199 1246 [address]  [address] [facility],5[address] Floor   Osorio Akbar  Phone: [phone]  Fax: [phone]                   Physical Therapy Daily Treatment Note  Date: 2022    Patient Name:  Reza Tian    :  1950  MRN: [id number]  Restrictions/Precautions:    Medical/Treatment Diagnosis Information:  Diagnosis: Z98.890, Z87.81 (ICD-10-CM) - Status post open reduction and internal fixation (ORIF) of [id number] (ICD-10-CM) - Closed fracture of left tibial plateau, initial encounter   PT DX: L knee pain M25. 496  Insurance/Certification information:  PT Insurance Information: Medicare/[employer]: no visit limit, insurance covers 80%  Physician Information:  Referring Practitioner: Dr. Ron Griffiths MD  Has the plan of care been signed (Y/N):        []  Yes  [x]  No     Date of Patient follow up with Physician: 22      Is this a Progress Report:     []  Yes  [x]  No        If Yes:  Date Range for reporting period:  Beginning 22  Ending    Progress report will be due (28 Rx or 30 days whichever is less):        Recertification will be due (POC Duration  / 90 days whichever is less): 22        Visit # Insurance Allowable Auth Required   27 No visit limit []  Yes []  No        Functional Scale: 12.5 LEFs / Ilona Bench 25    Date assessed:  22     58% LEFS       22      60% LEFS / FOTO 47     22      FOTO 55     22                 Latex Allergy:  [x]NO      []YES    Preferred Language for Healthcare:   [x]English       []other:    Pain level: 0/10     SUBJECTIVE:  Pt. Reports she is having pain behind her knee. Pt. Reports she continues to have difficulties ascending / descending stairs. OBJECTIVE:   Observation:    Test measurements:  Taken on 22 AROM left knee 116°, ext -9°, PROM knee flex 119°, ext 0°    RESTRICTIONS/PRECAUTIONS: Full wt.  Bearing with crutches and push ROM per MD note on 5/11/22          MEDICARE CAP EXCEPTION DOCUMENTATION      I certify that this patient meets one of the below criteria necessary for becoming an exception to the Medicare cap on therapy services:    [x]  The patient has a condition identified by an ICD-10 code that has a direct and significant impact on the need for therapy. (Significantly impacts the rate of recovery.)                   []  The patient has a complexity identified by an ICD-9 code that has a direct and significant impact on the need for therapy. (Significantly impacts the rate of recovery and is associated with a primary condition.)         []  The patient has associated variables that influence the amount of treatment to include:  Social support, self-efficacy/motivation, prognosis, time since onset/acuity. []  The patient has generalized musculoskeletal conditions or a condition affecting multiple sites that will have a direct impact on the rate of recovery. []  The patient had a prior episode of outpatient therapy during this calendar year for a different condition. []  The patient has a mental or cognitive disorder in addition to the condition being treated that will have a direct and significant impact on the rate of recovery. Exercises/Interventions:   HEP:  Access Code: [email or web address]  URL: [email or web address]  Date: 04/14/2022  Prepared by: Zully Shear     Exercises  Seated Calf Towel Stretch - 3 x daily - 7 x weekly - 1 sets - 30 reps - 3 hold  Long Sitting Quad Set - 3 x daily - 7 x weekly - 1 sets - 10 reps - 10 hold  Sitting Heel Slide with Towel - 3 x daily - 7 x weekly - 1 sets - 10 reps - 10 hold     DOS 2/25/22    Exercise/Equipment Resistance/Repetitions Other comments   Stretching     Hamstring - EOT 3 x 30\"  Manual    Towel Pull Inclined Calf 3 x 30\"     Hip Flexion     ITB     Groin     Quad                    SLR     Supine flex    Abduction Adduction     Prone     LAQs SAQs    Isometrics     Quad sets  NV - Russian 10\"on/10\" off/heel prop        Patellar Glides     Medial     Superior     Inferior          ROM     ERMI 5'  1' minute holds   Prone hangs X 6 min 5# NV   Passive     Active     Weight Shift        eob flexion hang  Bike X 6 min - ROM    CKC     Calf raises     Leg Press  ecc 3 x 10 70#  X 20  30#    Step ups     1 leg stand     Mini Squatting X 20 - airex NV   [facility] [place] TKE    KEYON abd 25# x 15 B     Step up and over 4' x 15      LSD 2' x 20 TC   LSU    FSU 6' x 20         Lateral walk Red TB 2 laps NV             PRE     Extension  RANGE:    Flexion  RANGE: 93        Quantum machines     Leg press      Leg extension     Leg curl          Manual interventions Patellar mobilizations (all directions) and PROM knee flex x 15 min      STM distal lateral HS 10'               Therapeutic Exercise and NMR EXR  [x] (98082) Provided verbal/tactile cueing for activities related to strengthening, flexibility, endurance, ROM for improvements in LE, proximal hip, and core control with self care, mobility, lifting, ambulation. [x] (71592) Provided verbal/tactile cueing for activities related to improving balance, coordination, kinesthetic sense, posture, motor skill, proprioception  to assist with LE, proximal hip, and core control in self care, mobility, lifting, ambulation and eccentric single leg control.      NMR and Therapeutic Activities:    [x] (74170 or 22844) Provided verbal/tactile cueing for activities related to improving balance, coordination, kinesthetic sense, posture, motor skill, proprioception and motor activation to allow for proper function of core, proximal hip and LE with self care and ADLs  [x] (59398) Gait Re-education- Provided training and instruction to the patient for proper LE, core and proximal hip recruitment and positioning and eccentric body weight control with ambulation re-education including up and down stairs     Home Exercise Program:    [x] (66103) Reviewed/Progressed HEP activities related to strengthening, flexibility, endurance, ROM of core, proximal hip and LE for functional self-care, mobility, lifting and ambulation/stair navigation   [x] (00443)Reviewed/Progressed HEP activities related to improving balance, coordination, kinesthetic sense, posture, motor skill, proprioception of core, proximal hip and LE for self care, mobility, lifting, and ambulation/stair navigation      Manual Treatments:  PROM / STM / Oscillations-Mobs:  G-I, II, III, IV (PA's, Inf., Post.)  [x] (93364) Provided manual therapy to mobilize LE, proximal hip and/or LS spine soft tissue/joints for the purpose of modulating pain, promoting relaxation,  increasing ROM, reducing/eliminating soft tissue swelling/inflammation/restriction, improving soft tissue extensibility and allowing for proper ROM for normal function with self care, mobility, lifting and ambulation. Modalities: ,     Charges:  Timed Code Treatment Minutes: 48'   Total Treatment Minutes: 48'     All charges require KX modifier     [] EVAL (LOW) 66209 (typically 20 minutes face-to-face)  [] EVAL (MOD) 71658 (typically 30 minutes face-to-face)  [] EVAL (HIGH) 00203 (typically 45 minutes face-to-face)  [] RE-EVAL   [x] QO(44852) x   1  [] IONTO  [] NMR (23678) [] VASO (game ready x 15')  [x] Manual (33601) x  2  [] Other:  [] TA x      [] Mech Traction (04059)  [] ES(attended) (25754)      [] ES (un) (58813):     GOALS:   GOALS:   Patient stated goal: Patient to be able to walk to gym and resume normal routine. [x] Progressing: [] Met: [] Not Met: [] Adjusted     Therapist goals for Patient:   Short Term Goals: To be achieved in: 2 weeks  1. Independent in HEP and progression per patient tolerance, in order to prevent re-injury. [x] Progressing: [] Met: [] Not Met: [] Adjusted   2.  Patient will have a decrease in pain to facilitate improvement in movement, function, and ADLs as indicated by Functional Deficits. [x] Progressing: [] Met: [] Not Met: [] Adjusted     Long Term Goals: To be achieved in: 8-12 weeks  1. Disability index score 50% improved for the LEFS to assist with reaching prior level of function. [x] Progressing: [] Met: [] Not Met: [] Adjusted  2. Patient will demonstrate increased AROM to equal to R knee to allow for proper joint functioning as indicated by patients Functional Deficits. [x] Progressing: [] Met: [] Not Met: [] Adjusted  3. Patient will demonstrate an increase in Strength to good proximal hip strength and control, within 5lb HHD in LE to allow for proper functional mobility as indicated by patients Functional Deficits. [x] Progressing: [] Met: [] Not Met: [] Adjusted  4. Patient will return to normal functional activities without increased symptoms or restriction. [x] Progressing: [] Met: [] Not Met: [] Adjusted  5. Patient to ambulate with normal gait with out AD. [x] Progressing: [] Met: [] Not Met: [] Adjusted      Overall Progression Towards Functional goals/ Treatment Progress Update:  [] Patient is progressing as expected towards functional goals listed. [] Progression is slowed due to complexities/Impairments listed. [] Progression has been slowed due to co-morbidities. [x] Plan just implemented, too soon to assess goals progression <30days   [] Goals require adjustment due to lack of progress  [] Patient is not progressing as expected and requires additional follow up with physician  [] Other    Prognosis for POC: [x] Good [] Fair  [] Poor      Patient requires continued skilled intervention: [x] Yes  [] No    Treatment/Activity Tolerance:  [x] Patient able to complete treatment  [] Patient limited by fatigue  [] Patient limited by pain     [] Patient limited by other medical complications  [] Other:Pt. Tolerated tx well with exercise progression. Patient L knee still stiff but patellar mobility close to WNL; Continue to push ROM.  Pt is progressing with flexion but slowly. Continues to require skilled PT services to increase overall ROM, functional mobility, and endurance for greater functional activity tolerance. PLAN: See eval  [x] Continue per plan of care [] Alter current plan (see comments above)  [] Plan of care initiated [] Hold pending MD visit [] Discharge  Next visit:    Electronically signed by: Higinio Leon, PTA  [id number], Lexy Hale, PT, MPT     Note: If patient does not return for scheduled/ recommended follow up visits, this note will serve as a discharge from care along with most recent update on progress.

## 2022-09-07 NOTE — TELEPHONE ENCOUNTER
Notified [place] [facility] regarding medical records - 2/18/22 to present for Dr. Ron Griffiths - to be mailed to the patient along with the images on cd.

## 2022-09-21 ENCOUNTER — OFFICE VISIT (OUTPATIENT)
Dept: INTERNAL MEDICINE CLINIC | Age: 72
End: 2022-09-21
Payer: MEDICARE

## 2022-09-21 VITALS
HEIGHT: 62 IN | SYSTOLIC BLOOD PRESSURE: 124 MMHG | DIASTOLIC BLOOD PRESSURE: 82 MMHG | WEIGHT: 131 LBS | BODY MASS INDEX: 24.11 KG/M2

## 2022-09-21 DIAGNOSIS — R73.9 HYPERGLYCEMIA: ICD-10-CM

## 2022-09-21 DIAGNOSIS — Z09 HOSPITAL DISCHARGE FOLLOW-UP: ICD-10-CM

## 2022-09-21 DIAGNOSIS — M25.569 KNEE PAIN, UNSPECIFIED CHRONICITY, UNSPECIFIED LATERALITY: ICD-10-CM

## 2022-09-21 DIAGNOSIS — I82.412 ACUTE DEEP VEIN THROMBOSIS (DVT) OF FEMORAL VEIN OF LEFT LOWER EXTREMITY (HCC): Primary | ICD-10-CM

## 2022-09-21 DIAGNOSIS — E78.00 PURE HYPERCHOLESTEROLEMIA: ICD-10-CM

## 2022-09-21 PROCEDURE — 1036F TOBACCO NON-USER: CPT | Performed by: INTERNAL MEDICINE

## 2022-09-21 PROCEDURE — 1090F PRES/ABSN URINE INCON ASSESS: CPT | Performed by: INTERNAL MEDICINE

## 2022-09-21 PROCEDURE — 1111F DSCHRG MED/CURRENT MED MERGE: CPT | Performed by: INTERNAL MEDICINE

## 2022-09-21 PROCEDURE — G8427 DOCREV CUR MEDS BY ELIG CLIN: HCPCS | Performed by: INTERNAL MEDICINE

## 2022-09-21 PROCEDURE — 3017F COLORECTAL CA SCREEN DOC REV: CPT | Performed by: INTERNAL MEDICINE

## 2022-09-21 PROCEDURE — 1123F ACP DISCUSS/DSCN MKR DOCD: CPT | Performed by: INTERNAL MEDICINE

## 2022-09-21 PROCEDURE — 99213 OFFICE O/P EST LOW 20 MIN: CPT | Performed by: INTERNAL MEDICINE

## 2022-09-21 PROCEDURE — G8420 CALC BMI NORM PARAMETERS: HCPCS | Performed by: INTERNAL MEDICINE

## 2022-09-21 PROCEDURE — G8399 PT W/DXA RESULTS DOCUMENT: HCPCS | Performed by: INTERNAL MEDICINE

## 2022-09-21 NOTE — PROGRESS NOTES
CHIEF COMPLAINT: Reza Tian is a 67 y.o. female who presents for : Follow-up DVT    HPI: Patient presented with follow-up DVT she has 1 more week of anticoagulation and then will stop has been feeling fine otherwise    Review of Systems:   Constitutional:  Denies fever or chills   Eyes:  Denies change in visual acuity   HENT:  Denies nasal congestion or sore throat   Respiratory:  Denies cough or shortness of breath   Cardiovascular:  Denies chest pain or edema   GI:  Denies abdominal pain, nausea, vomiting, bloody stools or diarrhea   :  Denies dysuria   Musculoskeletal:  Denies back pain or joint pain   Integument:  Denies rash   Neurologic:  Denies headache, focal weakness or sensory changes   Endocrine:  Denies polyuria or polydipsia   Lymphatic:  Denies swollen glands   Psychiatric:  Denies depression or anxiety     Past Medical History:        Diagnosis Date    Acute deep vein thrombosis (DVT) of femoral vein of left lower extremity ([facility] [address] 2022    Arthritis     Chronic hyponatremia     GERD (gastroesophageal reflux disease)     History of blood transfusion     Hyperlipidemia     Hypertension     no meds currently    OA (osteoarthritis)     Prolonged emergence from general anesthesia     Tibial plateau fracture     Wears partial dentures     upper & lower       Past Surgical History:        Procedure Laterality Date     SECTION      COLONOSCOPY      LEG SURGERY Left 2022    OPEN REDUCTION INTERNAL FIXATION LEFT TIBIAL PLATEAU FRACTURE         SUKUMAR  CPT CODE - 06341 performed by Peggy Flores MD at [place] [address] Left 2020    LEFT TOTAL HIP ARTHROPLASTY ANTERIOR APPROACH performed by Peggy Flores MD at [place] [address] Right 5/10/2021    RIGHT HIP ARTHROPLASTY ANTERIOR APPROACH performed by Peggy Flores MD at [facility] OR       Family History:  Family History   Problem Relation Age of Onset    Breast Cancer Mother 68 Social History:  Social History     Socioeconomic History    Marital status:      Spouse name: None    Number of children: None    Years of education: None    Highest education level: None   Tobacco Use    Smoking status: Former     Packs/day: 1.00     Years: 15.00     Pack years: 15.00     Types: Cigarettes     Quit date: 1983     Years since quittin.7    Smokeless tobacco: Never   Vaping Use    Vaping Use: Never used   Substance and Sexual Activity    Alcohol use: Not Currently     Alcohol/week: 1.0 - 2.0 standard drink     Types: 1 - 2 Shots of liquor per week    Drug use: No    Sexual activity: Yes     Social Determinants of Health     Financial Resource Strain: Low Risk     Difficulty of Paying Living Expenses: Not hard at all   Food Insecurity: No Food Insecurity    Worried About Running Out of Food in the Last Year: Never true    Ran Out of Food in the Last Year: Never true   Physical Activity: Insufficiently Active    Days of Exercise per Week: 3 days    Minutes of Exercise per Session: 20 min         Allergies:  Zanaflex [tizanidine] and Keflet [cephalexin]    Current Medications:    Prior to Admission medications    Medication Sig Start Date End Date Taking?  Authorizing Provider   simvastatin (ZOCOR) 40 MG tablet TAKE 1 TABLET BY MOUTH EVERY NIGHT 22  Yes Abbey Gonzalez MD   rivaroxaban (XARELTO) 20 MG TABS tablet Take 1 tablet by mouth daily (with breakfast) 3/10/22  Yes JESSICA Pitt   acetaminophen (TYLENOL) 325 MG tablet Take 650 mg by mouth every 6 hours as needed for Pain   Yes Historical Provider, MD   calcium carbonate (OSCAL) 500 MG TABS tablet Take 500 mg by mouth daily   Yes Historical Provider, MD   omeprazole (PRILOSEC OTC) 20 MG tablet Take 20 mg by mouth daily    Yes Historical Provider, MD   Multiple Vitamins-Minerals (THERAPEUTIC MULTIVITAMIN-MINERALS) tablet Take 1 tablet by mouth daily   Yes Historical Provider, MD   Vitamin D (CHOLECALCIFEROL) 1000 UNITS CAPS capsule Take 2,000 Units by mouth daily    Yes Historical Provider, MD       Physical Exam:  Vital Signs: /82   Ht 5' 2\" (1.575 m)   Wt 131 lb (59.4 kg)   BMI 23.96 kg/m²   General: Patient appears  non-toxic  HENT: Atraumatic, normocephalic, oral mucosa moist  Lungs:  Clear bilaterally  Heart: Regular rate and rhythm  Abdomen: Non-distended, soft, non-tender  Extremities: No edema  Neuro: Nonfocal    Medical Decision Making and Plan:  Pertinent Labs & Imaging studies reviewed. (See chart for details)      1. Acute deep vein thrombosis (DVT) of femoral vein of left lower extremity (HCC)  Stop anticoagulation after 1 week    2. Hyperglycemia  This problem is stable continue to monitor    3. Pure hypercholesterolemia  This problem is stable on present meds we will follow-up in 1 year    4. Knee pain, unspecified chronicity, unspecified laterality  If he has recurrent pain will refer to Dr. Faizan Pritchett MD, Orthopedic Surgery (Hip; Knee;  Shoulder), [facility]

## 2022-12-08 ENCOUNTER — HOSPITAL ENCOUNTER (OUTPATIENT)
Dept: MAMMOGRAPHY | Age: 72
Discharge: HOME | End: 2022-12-08
Payer: MEDICARE

## 2022-12-08 DIAGNOSIS — Z12.31 BREAST CANCER SCREENING BY MAMMOGRAM: ICD-10-CM

## 2022-12-08 PROCEDURE — 77063 BREAST TOMOSYNTHESIS BI: CPT

## 2023-06-09 SDOH — HEALTH STABILITY: PHYSICAL HEALTH: ON AVERAGE, HOW MANY MINUTES DO YOU ENGAGE IN EXERCISE AT THIS LEVEL?: 60 MIN

## 2023-06-09 SDOH — HEALTH STABILITY: PHYSICAL HEALTH: ON AVERAGE, HOW MANY DAYS PER WEEK DO YOU ENGAGE IN MODERATE TO STRENUOUS EXERCISE (LIKE A BRISK WALK)?: 7 DAYS

## 2023-06-12 ENCOUNTER — OFFICE VISIT (OUTPATIENT)
Dept: ORTHOPEDIC SURGERY | Age: 73
End: 2023-06-12

## 2023-06-12 VITALS — HEIGHT: 62 IN | BODY MASS INDEX: 24.11 KG/M2 | WEIGHT: 131 LBS

## 2023-06-12 DIAGNOSIS — M25.562 ACUTE PAIN OF LEFT KNEE: ICD-10-CM

## 2023-06-12 DIAGNOSIS — M17.32 POST-TRAUMATIC OSTEOARTHRITIS OF LEFT KNEE: ICD-10-CM

## 2023-06-12 DIAGNOSIS — M17.12 PRIMARY OSTEOARTHRITIS OF LEFT KNEE: Primary | ICD-10-CM

## 2023-06-12 RX ORDER — LIDOCAINE HYDROCHLORIDE 10 MG/ML
4 INJECTION, SOLUTION EPIDURAL; INFILTRATION; INTRACAUDAL; PERINEURAL ONCE
Status: COMPLETED | OUTPATIENT
Start: 2023-06-12 | End: 2023-06-12

## 2023-06-12 RX ADMIN — LIDOCAINE HYDROCHLORIDE 4 ML: 10 INJECTION, SOLUTION EPIDURAL; INFILTRATION; INTRACAUDAL; PERINEURAL at 11:37

## 2023-08-07 RX ORDER — SIMVASTATIN 40 MG
TABLET ORAL
Qty: 90 TABLET | Refills: 1 | Status: SHIPPED | OUTPATIENT
Start: 2023-08-07

## 2023-09-11 ENCOUNTER — OFFICE VISIT (OUTPATIENT)
Dept: ORTHOPEDIC SURGERY | Age: 73
End: 2023-09-11
Payer: MEDICARE

## 2023-09-11 VITALS — HEIGHT: 62 IN | WEIGHT: 131 LBS | BODY MASS INDEX: 24.11 KG/M2

## 2023-09-11 DIAGNOSIS — Z96.642 STATUS POST TOTAL HIP REPLACEMENT, LEFT: ICD-10-CM

## 2023-09-11 DIAGNOSIS — Z96.641 STATUS POST TOTAL HIP REPLACEMENT, RIGHT: Primary | ICD-10-CM

## 2023-09-11 PROCEDURE — 1123F ACP DISCUSS/DSCN MKR DOCD: CPT | Performed by: STUDENT IN AN ORGANIZED HEALTH CARE EDUCATION/TRAINING PROGRAM

## 2023-09-11 PROCEDURE — G8427 DOCREV CUR MEDS BY ELIG CLIN: HCPCS | Performed by: STUDENT IN AN ORGANIZED HEALTH CARE EDUCATION/TRAINING PROGRAM

## 2023-09-11 PROCEDURE — 1036F TOBACCO NON-USER: CPT | Performed by: STUDENT IN AN ORGANIZED HEALTH CARE EDUCATION/TRAINING PROGRAM

## 2023-09-11 PROCEDURE — G8399 PT W/DXA RESULTS DOCUMENT: HCPCS | Performed by: STUDENT IN AN ORGANIZED HEALTH CARE EDUCATION/TRAINING PROGRAM

## 2023-09-11 PROCEDURE — 1090F PRES/ABSN URINE INCON ASSESS: CPT | Performed by: STUDENT IN AN ORGANIZED HEALTH CARE EDUCATION/TRAINING PROGRAM

## 2023-09-11 PROCEDURE — G8420 CALC BMI NORM PARAMETERS: HCPCS | Performed by: STUDENT IN AN ORGANIZED HEALTH CARE EDUCATION/TRAINING PROGRAM

## 2023-09-11 PROCEDURE — 3017F COLORECTAL CA SCREEN DOC REV: CPT | Performed by: STUDENT IN AN ORGANIZED HEALTH CARE EDUCATION/TRAINING PROGRAM

## 2023-09-11 PROCEDURE — 99214 OFFICE O/P EST MOD 30 MIN: CPT | Performed by: STUDENT IN AN ORGANIZED HEALTH CARE EDUCATION/TRAINING PROGRAM

## 2023-09-13 NOTE — PROGRESS NOTES
History: 45-year-old female known to me for treatment of left knee posttraumatic arthritis, has had worsening of right outer hip pain. Knee has been functioning well since the last injection. Takes Tylenol as needed otherwise. Exam: She has tenderness over the right trochanteric bursa. She does not have pain with internal/external rotation of the hip. She has preserved abductor strength and no neurovascular compromise distally to the right lower extremity. Imaging: AP pelvis x-ray shows stable position of bilateral hip replacements. I do not think her right hip offset was increased compared to preoperatively. Assessment: 45-year-old female with right trochanteric bursitis also with left knee posttraumatic arthritis. Plan:  I advised that this is likely related to her limping with her knee problem. I advised we could consider a steroid injection if she wants but would recommend starting off with Voltaren gel and home stretching exercises. She does not want to do physical therapy at this time.   Follow-up with me as needed    Jayashree Parada MD

## 2023-09-14 DIAGNOSIS — Z00.00 WELLNESS EXAMINATION: ICD-10-CM

## 2023-09-14 DIAGNOSIS — E06.3 HYPOTHYROIDISM DUE TO HASHIMOTO'S THYROIDITIS: ICD-10-CM

## 2023-09-14 DIAGNOSIS — E03.8 HYPOTHYROIDISM DUE TO HASHIMOTO'S THYROIDITIS: ICD-10-CM

## 2023-09-14 DIAGNOSIS — E78.00 PURE HYPERCHOLESTEROLEMIA: ICD-10-CM

## 2023-09-14 LAB
ALBUMIN SERPL-MCNC: 5 G/DL (ref 3.4–5)
ALBUMIN/GLOB SERPL: 2.5 {RATIO} (ref 1.1–2.2)
ALP SERPL-CCNC: 81 U/L (ref 40–129)
ALT SERPL-CCNC: 16 U/L (ref 10–40)
ANION GAP SERPL CALCULATED.3IONS-SCNC: 7 MMOL/L (ref 3–16)
AST SERPL-CCNC: 20 U/L (ref 15–37)
BASOPHILS # BLD: 0 K/UL (ref 0–0.2)
BASOPHILS NFR BLD: 0.7 %
BILIRUB SERPL-MCNC: 0.6 MG/DL (ref 0–1)
BUN SERPL-MCNC: 20 MG/DL (ref 7–20)
CALCIUM SERPL-MCNC: 10.2 MG/DL (ref 8.3–10.6)
CHLORIDE SERPL-SCNC: 101 MMOL/L (ref 99–110)
CHOLEST SERPL-MCNC: 209 MG/DL (ref 0–199)
CO2 SERPL-SCNC: 29 MMOL/L (ref 21–32)
CREAT SERPL-MCNC: 0.7 MG/DL (ref 0.6–1.2)
DEPRECATED RDW RBC AUTO: 13.1 % (ref 12.4–15.4)
EOSINOPHIL # BLD: 0.1 K/UL (ref 0–0.6)
EOSINOPHIL NFR BLD: 1.6 %
GFR SERPLBLD CREATININE-BSD FMLA CKD-EPI: >60 ML/MIN/{1.73_M2}
GLUCOSE SERPL-MCNC: 88 MG/DL (ref 70–99)
HCT VFR BLD AUTO: 36.1 % (ref 36–48)
HDLC SERPL-MCNC: 76 MG/DL (ref 40–60)
HGB BLD-MCNC: 12.6 G/DL (ref 12–16)
LDLC SERPL CALC-MCNC: 117 MG/DL
LYMPHOCYTES # BLD: 1.3 K/UL (ref 1–5.1)
LYMPHOCYTES NFR BLD: 36.9 %
MCH RBC QN AUTO: 30.3 PG (ref 26–34)
MCHC RBC AUTO-ENTMCNC: 34.8 G/DL (ref 31–36)
MCV RBC AUTO: 87.2 FL (ref 80–100)
MONOCYTES # BLD: 0.3 K/UL (ref 0–1.3)
MONOCYTES NFR BLD: 9.2 %
NEUTROPHILS # BLD: 1.8 K/UL (ref 1.7–7.7)
NEUTROPHILS NFR BLD: 51.6 %
PLATELET # BLD AUTO: 200 K/UL (ref 135–450)
PMV BLD AUTO: 8.2 FL (ref 5–10.5)
POTASSIUM SERPL-SCNC: 4.3 MMOL/L (ref 3.5–5.1)
PROT SERPL-MCNC: 7 G/DL (ref 6.4–8.2)
RBC # BLD AUTO: 4.14 M/UL (ref 4–5.2)
SODIUM SERPL-SCNC: 137 MMOL/L (ref 136–145)
TRIGL SERPL-MCNC: 81 MG/DL (ref 0–150)
TSH SERPL DL<=0.005 MIU/L-ACNC: 3.32 UIU/ML (ref 0.27–4.2)
VLDLC SERPL CALC-MCNC: 16 MG/DL
WBC # BLD AUTO: 3.6 K/UL (ref 4–11)

## 2023-09-19 SDOH — ECONOMIC STABILITY: FOOD INSECURITY: WITHIN THE PAST 12 MONTHS, YOU WORRIED THAT YOUR FOOD WOULD RUN OUT BEFORE YOU GOT MONEY TO BUY MORE.: NEVER TRUE

## 2023-09-19 SDOH — HEALTH STABILITY: PHYSICAL HEALTH: ON AVERAGE, HOW MANY MINUTES DO YOU ENGAGE IN EXERCISE AT THIS LEVEL?: 50 MIN

## 2023-09-19 SDOH — ECONOMIC STABILITY: FOOD INSECURITY: WITHIN THE PAST 12 MONTHS, THE FOOD YOU BOUGHT JUST DIDN'T LAST AND YOU DIDN'T HAVE MONEY TO GET MORE.: NEVER TRUE

## 2023-09-19 SDOH — ECONOMIC STABILITY: HOUSING INSECURITY
IN THE LAST 12 MONTHS, WAS THERE A TIME WHEN YOU DID NOT HAVE A STEADY PLACE TO SLEEP OR SLEPT IN A SHELTER (INCLUDING NOW)?: NO

## 2023-09-19 SDOH — ECONOMIC STABILITY: INCOME INSECURITY: HOW HARD IS IT FOR YOU TO PAY FOR THE VERY BASICS LIKE FOOD, HOUSING, MEDICAL CARE, AND HEATING?: NOT HARD AT ALL

## 2023-09-19 SDOH — HEALTH STABILITY: PHYSICAL HEALTH: ON AVERAGE, HOW MANY DAYS PER WEEK DO YOU ENGAGE IN MODERATE TO STRENUOUS EXERCISE (LIKE A BRISK WALK)?: 7 DAYS

## 2023-09-19 SDOH — ECONOMIC STABILITY: TRANSPORTATION INSECURITY
IN THE PAST 12 MONTHS, HAS LACK OF TRANSPORTATION KEPT YOU FROM MEETINGS, WORK, OR FROM GETTING THINGS NEEDED FOR DAILY LIVING?: NO

## 2023-09-19 ASSESSMENT — PATIENT HEALTH QUESTIONNAIRE - PHQ9
1. LITTLE INTEREST OR PLEASURE IN DOING THINGS: 0
SUM OF ALL RESPONSES TO PHQ QUESTIONS 1-9: 0
SUM OF ALL RESPONSES TO PHQ9 QUESTIONS 1 & 2: 0
SUM OF ALL RESPONSES TO PHQ QUESTIONS 1-9: 0
2. FEELING DOWN, DEPRESSED OR HOPELESS: 0
SUM OF ALL RESPONSES TO PHQ QUESTIONS 1-9: 0
SUM OF ALL RESPONSES TO PHQ QUESTIONS 1-9: 0

## 2023-09-19 ASSESSMENT — LIFESTYLE VARIABLES
HOW MANY STANDARD DRINKS CONTAINING ALCOHOL DO YOU HAVE ON A TYPICAL DAY: 0
HOW OFTEN DO YOU HAVE A DRINK CONTAINING ALCOHOL: NEVER
HOW OFTEN DO YOU HAVE A DRINK CONTAINING ALCOHOL: 1
HOW OFTEN DO YOU HAVE SIX OR MORE DRINKS ON ONE OCCASION: 1
HOW MANY STANDARD DRINKS CONTAINING ALCOHOL DO YOU HAVE ON A TYPICAL DAY: PATIENT DOES NOT DRINK

## 2023-09-22 ENCOUNTER — OFFICE VISIT (OUTPATIENT)
Dept: INTERNAL MEDICINE CLINIC | Age: 73
End: 2023-09-22

## 2023-09-22 VITALS
SYSTOLIC BLOOD PRESSURE: 110 MMHG | WEIGHT: 131.6 LBS | HEIGHT: 61 IN | DIASTOLIC BLOOD PRESSURE: 70 MMHG | BODY MASS INDEX: 24.84 KG/M2 | OXYGEN SATURATION: 100 % | HEART RATE: 67 BPM | TEMPERATURE: 96.9 F

## 2023-09-22 DIAGNOSIS — Z00.00 PE (PHYSICAL EXAM), ANNUAL: Primary | ICD-10-CM

## 2023-09-22 DIAGNOSIS — K21.9 GASTROESOPHAGEAL REFLUX DISEASE WITHOUT ESOPHAGITIS: ICD-10-CM

## 2023-09-22 DIAGNOSIS — E03.8 HYPOTHYROIDISM DUE TO HASHIMOTO'S THYROIDITIS: ICD-10-CM

## 2023-09-22 DIAGNOSIS — Z80.0 FH: COLON CANCER: ICD-10-CM

## 2023-09-22 DIAGNOSIS — E06.3 HYPOTHYROIDISM DUE TO HASHIMOTO'S THYROIDITIS: ICD-10-CM

## 2023-09-22 DIAGNOSIS — I10 BENIGN ESSENTIAL HTN: ICD-10-CM

## 2023-09-22 DIAGNOSIS — E78.00 PURE HYPERCHOLESTEROLEMIA: ICD-10-CM

## 2023-09-22 PROBLEM — I82.412 ACUTE DEEP VEIN THROMBOSIS (DVT) OF FEMORAL VEIN OF LEFT LOWER EXTREMITY (HCC): Status: RESOLVED | Noted: 2022-07-01 | Resolved: 2023-09-22

## 2023-09-22 RX ORDER — MULTIVITAMIN WITH IRON
250 TABLET ORAL DAILY
COMMUNITY

## 2023-10-23 ENCOUNTER — OFFICE VISIT (OUTPATIENT)
Dept: ORTHOPEDIC SURGERY | Age: 73
End: 2023-10-23

## 2023-10-23 DIAGNOSIS — M17.12 PRIMARY OSTEOARTHRITIS OF LEFT KNEE: Primary | ICD-10-CM

## 2023-10-23 RX ORDER — TRIAMCINOLONE ACETONIDE 40 MG/ML
40 INJECTION, SUSPENSION INTRA-ARTICULAR; INTRAMUSCULAR ONCE
Status: COMPLETED | OUTPATIENT
Start: 2023-10-23 | End: 2023-10-23

## 2023-10-23 RX ORDER — LIDOCAINE HYDROCHLORIDE 10 MG/ML
4 INJECTION, SOLUTION INFILTRATION; PERINEURAL ONCE
Status: COMPLETED | OUTPATIENT
Start: 2023-10-23 | End: 2023-10-23

## 2023-10-23 RX ADMIN — LIDOCAINE HYDROCHLORIDE 4 ML: 10 INJECTION, SOLUTION INFILTRATION; PERINEURAL at 11:58

## 2023-10-23 RX ADMIN — TRIAMCINOLONE ACETONIDE 40 MG: 40 INJECTION, SUSPENSION INTRA-ARTICULAR; INTRAMUSCULAR at 11:59

## 2023-10-23 NOTE — PROGRESS NOTES
History: Patient presents for left knee injection. No significant changes in history. Exam: Unchanged, see previous    Procedure: Risks benefits limitations and alternatives to left knee injection were discussed with patient who wished to proceed. Under aseptic technique 4cc 1% lidocaine and 40mg Kenalog were injected into the knee without difficulty. There was no complications in the patient tolerated the procedure well.       Assessment: 67 y/o F with L post trauamtic knee OA    Plan: L knee injection performed today, follow up PRN

## 2023-12-13 ENCOUNTER — HOSPITAL ENCOUNTER (OUTPATIENT)
Dept: MAMMOGRAPHY | Age: 73
Discharge: HOME | End: 2023-12-13
Payer: MEDICARE

## 2023-12-13 VITALS — HEIGHT: 61 IN | BODY MASS INDEX: 23.98 KG/M2 | WEIGHT: 127 LBS

## 2023-12-13 DIAGNOSIS — Z12.31 SCREENING MAMMOGRAM FOR HIGH-RISK PATIENT: ICD-10-CM

## 2023-12-13 PROCEDURE — 77063 BREAST TOMOSYNTHESIS BI: CPT

## 2024-02-07 RX ORDER — SIMVASTATIN 40 MG
40 TABLET ORAL NIGHTLY
Qty: 90 TABLET | Refills: 1 | Status: SHIPPED | OUTPATIENT
Start: 2024-02-07

## 2024-03-25 ENCOUNTER — OFFICE VISIT (OUTPATIENT)
Dept: ORTHOPEDIC SURGERY | Age: 74
End: 2024-03-25
Payer: MEDICARE

## 2024-03-25 VITALS — BODY MASS INDEX: 24.73 KG/M2 | WEIGHT: 131 LBS | HEIGHT: 61 IN

## 2024-03-25 DIAGNOSIS — M17.12 PRIMARY OSTEOARTHRITIS OF LEFT KNEE: Primary | ICD-10-CM

## 2024-03-25 PROCEDURE — 90000 NO LOS: CPT | Performed by: STUDENT IN AN ORGANIZED HEALTH CARE EDUCATION/TRAINING PROGRAM

## 2024-03-25 PROCEDURE — 20610 DRAIN/INJ JOINT/BURSA W/O US: CPT | Performed by: STUDENT IN AN ORGANIZED HEALTH CARE EDUCATION/TRAINING PROGRAM

## 2024-03-25 RX ORDER — TRIAMCINOLONE ACETONIDE 40 MG/ML
40 INJECTION, SUSPENSION INTRA-ARTICULAR; INTRAMUSCULAR ONCE
Status: COMPLETED | OUTPATIENT
Start: 2024-03-25 | End: 2024-03-25

## 2024-03-25 RX ORDER — LIDOCAINE HYDROCHLORIDE 10 MG/ML
4 INJECTION, SOLUTION INFILTRATION; PERINEURAL ONCE
Status: COMPLETED | OUTPATIENT
Start: 2024-03-25 | End: 2024-03-25

## 2024-03-25 RX ORDER — METHYLPREDNISOLONE ACETATE 40 MG/ML
40 INJECTION, SUSPENSION INTRA-ARTICULAR; INTRALESIONAL; INTRAMUSCULAR; SOFT TISSUE ONCE
Status: SHIPPED | OUTPATIENT
Start: 2024-03-25

## 2024-03-25 RX ADMIN — TRIAMCINOLONE ACETONIDE 40 MG: 40 INJECTION, SUSPENSION INTRA-ARTICULAR; INTRAMUSCULAR at 11:16

## 2024-03-25 RX ADMIN — LIDOCAINE HYDROCHLORIDE 4 ML: 10 INJECTION, SOLUTION INFILTRATION; PERINEURAL at 11:13

## 2024-03-26 NOTE — PROGRESS NOTES
History: Patient presents for knee injection. No significant changes in history.     Exam: Unchanged, see previous    Procedure: Risks benefits limitations and alternatives to L knee injection were discussed with patient who wished to proceed.  Under aseptic technique 4cc 1% lidocaine and 40mg Kenalog were injected into the knee without difficulty.  There was no complications in the patient tolerated the procedure well.      Assessment: 72 y/o F with L knee OA    Plan: L knee steroid injection performed today, follow up PRN. She'll continue HEP. Advised to continue steroid if getting relief > 2 months. Candidate for gel or TKA w/ hware removal otherwise.     Jareth Posadas MD

## 2024-08-05 RX ORDER — SIMVASTATIN 40 MG
40 TABLET ORAL NIGHTLY
Qty: 90 TABLET | Refills: 0 | Status: SHIPPED | OUTPATIENT
Start: 2024-08-05

## 2024-09-09 ENCOUNTER — OFFICE VISIT (OUTPATIENT)
Dept: ORTHOPEDIC SURGERY | Age: 74
End: 2024-09-09
Payer: MEDICARE

## 2024-09-09 DIAGNOSIS — M17.12 PRIMARY OSTEOARTHRITIS OF LEFT KNEE: Primary | ICD-10-CM

## 2024-09-09 DIAGNOSIS — E78.00 PURE HYPERCHOLESTEROLEMIA: ICD-10-CM

## 2024-09-09 DIAGNOSIS — E03.8 HYPOTHYROIDISM DUE TO HASHIMOTO'S THYROIDITIS: ICD-10-CM

## 2024-09-09 DIAGNOSIS — M17.32 POST-TRAUMATIC ARTHRITIS OF LEFT LOWER LEG: ICD-10-CM

## 2024-09-09 DIAGNOSIS — E06.3 HYPOTHYROIDISM DUE TO HASHIMOTO'S THYROIDITIS: ICD-10-CM

## 2024-09-09 DIAGNOSIS — I10 BENIGN ESSENTIAL HTN: ICD-10-CM

## 2024-09-09 DIAGNOSIS — Z00.00 PE (PHYSICAL EXAM), ANNUAL: ICD-10-CM

## 2024-09-09 LAB
25(OH)D3 SERPL-MCNC: 59.2 NG/ML
ALBUMIN SERPL-MCNC: 4.7 G/DL (ref 3.4–5)
ALBUMIN/GLOB SERPL: 2.1 {RATIO} (ref 1.1–2.2)
ALP SERPL-CCNC: 67 U/L (ref 40–129)
ALT SERPL-CCNC: 20 U/L (ref 10–40)
ANION GAP SERPL CALCULATED.3IONS-SCNC: 12 MMOL/L (ref 3–16)
AST SERPL-CCNC: 24 U/L (ref 15–37)
BACTERIA URNS QL MICRO: NORMAL /HPF
BASOPHILS # BLD: 0.1 K/UL (ref 0–0.2)
BASOPHILS NFR BLD: 1.3 %
BILIRUB SERPL-MCNC: 0.4 MG/DL (ref 0–1)
BILIRUB UR QL STRIP.AUTO: NEGATIVE
BUN SERPL-MCNC: 18 MG/DL (ref 7–20)
CALCIUM SERPL-MCNC: 10 MG/DL (ref 8.3–10.6)
CHLORIDE SERPL-SCNC: 102 MMOL/L (ref 99–110)
CHOLEST SERPL-MCNC: 200 MG/DL (ref 0–199)
CLARITY UR: CLEAR
CO2 SERPL-SCNC: 26 MMOL/L (ref 21–32)
COLOR UR: YELLOW
CREAT SERPL-MCNC: 0.8 MG/DL (ref 0.6–1.2)
DEPRECATED RDW RBC AUTO: 13.5 % (ref 12.4–15.4)
EOSINOPHIL # BLD: 0.1 K/UL (ref 0–0.6)
EOSINOPHIL NFR BLD: 1.7 %
EPI CELLS #/AREA URNS AUTO: 0 /HPF (ref 0–5)
GFR SERPLBLD CREATININE-BSD FMLA CKD-EPI: 77 ML/MIN/{1.73_M2}
GLUCOSE SERPL-MCNC: 85 MG/DL (ref 70–99)
GLUCOSE UR STRIP.AUTO-MCNC: NEGATIVE MG/DL
HCT VFR BLD AUTO: 36.7 % (ref 36–48)
HDLC SERPL-MCNC: 83 MG/DL (ref 40–60)
HGB BLD-MCNC: 12.4 G/DL (ref 12–16)
HGB UR QL STRIP.AUTO: NEGATIVE
HYALINE CASTS #/AREA URNS AUTO: 0 /LPF (ref 0–8)
KETONES UR STRIP.AUTO-MCNC: NEGATIVE MG/DL
LDLC SERPL CALC-MCNC: 100 MG/DL
LEUKOCYTE ESTERASE UR QL STRIP.AUTO: ABNORMAL
LYMPHOCYTES # BLD: 1.5 K/UL (ref 1–5.1)
LYMPHOCYTES NFR BLD: 29.2 %
MCH RBC QN AUTO: 30 PG (ref 26–34)
MCHC RBC AUTO-ENTMCNC: 33.8 G/DL (ref 31–36)
MCV RBC AUTO: 88.7 FL (ref 80–100)
MONOCYTES # BLD: 0.6 K/UL (ref 0–1.3)
MONOCYTES NFR BLD: 11.9 %
NEUTROPHILS # BLD: 3 K/UL (ref 1.7–7.7)
NEUTROPHILS NFR BLD: 55.9 %
NITRITE UR QL STRIP.AUTO: NEGATIVE
PH UR STRIP.AUTO: 7 [PH] (ref 5–8)
PLATELET # BLD AUTO: 207 K/UL (ref 135–450)
PMV BLD AUTO: 9 FL (ref 5–10.5)
POTASSIUM SERPL-SCNC: 4.5 MMOL/L (ref 3.5–5.1)
PROT SERPL-MCNC: 6.9 G/DL (ref 6.4–8.2)
PROT UR STRIP.AUTO-MCNC: NEGATIVE MG/DL
RBC # BLD AUTO: 4.14 M/UL (ref 4–5.2)
RBC CLUMPS #/AREA URNS AUTO: 1 /HPF (ref 0–4)
SODIUM SERPL-SCNC: 140 MMOL/L (ref 136–145)
SP GR UR STRIP.AUTO: 1.01 (ref 1–1.03)
TRIGL SERPL-MCNC: 84 MG/DL (ref 0–150)
TSH SERPL DL<=0.005 MIU/L-ACNC: 4.34 UIU/ML (ref 0.27–4.2)
UA DIPSTICK W REFLEX MICRO PNL UR: YES
URN SPEC COLLECT METH UR: ABNORMAL
UROBILINOGEN UR STRIP-ACNC: 0.2 E.U./DL
VLDLC SERPL CALC-MCNC: 17 MG/DL
WBC # BLD AUTO: 5.3 K/UL (ref 4–11)
WBC #/AREA URNS AUTO: 1 /HPF (ref 0–5)

## 2024-09-09 PROCEDURE — 90000 NO LOS: CPT | Performed by: STUDENT IN AN ORGANIZED HEALTH CARE EDUCATION/TRAINING PROGRAM

## 2024-09-09 PROCEDURE — 20610 DRAIN/INJ JOINT/BURSA W/O US: CPT | Performed by: STUDENT IN AN ORGANIZED HEALTH CARE EDUCATION/TRAINING PROGRAM

## 2024-09-09 RX ORDER — TRIAMCINOLONE ACETONIDE 40 MG/ML
40 INJECTION, SUSPENSION INTRA-ARTICULAR; INTRAMUSCULAR ONCE
Status: COMPLETED | OUTPATIENT
Start: 2024-09-09 | End: 2024-09-09

## 2024-09-09 RX ORDER — LIDOCAINE HYDROCHLORIDE 10 MG/ML
4 INJECTION, SOLUTION INFILTRATION; PERINEURAL ONCE
Status: COMPLETED | OUTPATIENT
Start: 2024-09-09 | End: 2024-09-09

## 2024-09-09 RX ADMIN — TRIAMCINOLONE ACETONIDE 40 MG: 40 INJECTION, SUSPENSION INTRA-ARTICULAR; INTRAMUSCULAR at 13:36

## 2024-09-09 RX ADMIN — LIDOCAINE HYDROCHLORIDE 4 ML: 10 INJECTION, SOLUTION INFILTRATION; PERINEURAL at 13:31

## 2024-09-10 DIAGNOSIS — E06.3 HYPOTHYROIDISM DUE TO HASHIMOTO'S THYROIDITIS: ICD-10-CM

## 2024-09-10 DIAGNOSIS — E03.8 HYPOTHYROIDISM DUE TO HASHIMOTO'S THYROIDITIS: Primary | ICD-10-CM

## 2024-09-10 DIAGNOSIS — R79.89 ELEVATED TSH: ICD-10-CM

## 2024-09-10 DIAGNOSIS — E03.8 HYPOTHYROIDISM DUE TO HASHIMOTO'S THYROIDITIS: ICD-10-CM

## 2024-09-10 DIAGNOSIS — E06.3 HYPOTHYROIDISM DUE TO HASHIMOTO'S THYROIDITIS: Primary | ICD-10-CM

## 2024-09-10 LAB — T4 FREE SERPL-MCNC: 1.1 NG/DL (ref 0.9–1.8)

## 2024-09-15 SDOH — HEALTH STABILITY: PHYSICAL HEALTH: ON AVERAGE, HOW MANY DAYS PER WEEK DO YOU ENGAGE IN MODERATE TO STRENUOUS EXERCISE (LIKE A BRISK WALK)?: 7 DAYS

## 2024-09-15 SDOH — HEALTH STABILITY: PHYSICAL HEALTH: ON AVERAGE, HOW MANY MINUTES DO YOU ENGAGE IN EXERCISE AT THIS LEVEL?: 40 MIN

## 2024-09-15 ASSESSMENT — PATIENT HEALTH QUESTIONNAIRE - PHQ9
SUM OF ALL RESPONSES TO PHQ9 QUESTIONS 1 & 2: 1
SUM OF ALL RESPONSES TO PHQ QUESTIONS 1-9: 1
1. LITTLE INTEREST OR PLEASURE IN DOING THINGS: NOT AT ALL
SUM OF ALL RESPONSES TO PHQ QUESTIONS 1-9: 1
2. FEELING DOWN, DEPRESSED OR HOPELESS: SEVERAL DAYS

## 2024-09-15 ASSESSMENT — LIFESTYLE VARIABLES
HOW OFTEN DO YOU HAVE A DRINK CONTAINING ALCOHOL: NEVER
HOW MANY STANDARD DRINKS CONTAINING ALCOHOL DO YOU HAVE ON A TYPICAL DAY: 0
HOW OFTEN DO YOU HAVE A DRINK CONTAINING ALCOHOL: 1
HOW MANY STANDARD DRINKS CONTAINING ALCOHOL DO YOU HAVE ON A TYPICAL DAY: PATIENT DOES NOT DRINK

## 2024-09-22 SDOH — ECONOMIC STABILITY: FOOD INSECURITY: WITHIN THE PAST 12 MONTHS, YOU WORRIED THAT YOUR FOOD WOULD RUN OUT BEFORE YOU GOT MONEY TO BUY MORE.: NEVER TRUE

## 2024-09-22 SDOH — ECONOMIC STABILITY: FOOD INSECURITY: WITHIN THE PAST 12 MONTHS, THE FOOD YOU BOUGHT JUST DIDN'T LAST AND YOU DIDN'T HAVE MONEY TO GET MORE.: NEVER TRUE

## 2024-09-22 SDOH — ECONOMIC STABILITY: INCOME INSECURITY: HOW HARD IS IT FOR YOU TO PAY FOR THE VERY BASICS LIKE FOOD, HOUSING, MEDICAL CARE, AND HEATING?: NOT HARD AT ALL

## 2024-09-25 ENCOUNTER — OFFICE VISIT (OUTPATIENT)
Dept: INTERNAL MEDICINE CLINIC | Age: 74
End: 2024-09-25

## 2024-09-25 VITALS
OXYGEN SATURATION: 97 % | HEART RATE: 61 BPM | DIASTOLIC BLOOD PRESSURE: 80 MMHG | HEIGHT: 61 IN | WEIGHT: 123 LBS | BODY MASS INDEX: 23.22 KG/M2 | SYSTOLIC BLOOD PRESSURE: 130 MMHG

## 2024-09-25 DIAGNOSIS — R73.9 HYPERGLYCEMIA: ICD-10-CM

## 2024-09-25 DIAGNOSIS — E03.8 HYPOTHYROIDISM DUE TO HASHIMOTO'S THYROIDITIS: ICD-10-CM

## 2024-09-25 DIAGNOSIS — K21.9 GASTROESOPHAGEAL REFLUX DISEASE WITHOUT ESOPHAGITIS: ICD-10-CM

## 2024-09-25 DIAGNOSIS — E55.9 VITAMIN D DEFICIENCY: ICD-10-CM

## 2024-09-25 DIAGNOSIS — H35.3231 EXUDATIVE AGE-RELATED MACULAR DEGENERATION OF BOTH EYES WITH ACTIVE CHOROIDAL NEOVASCULARIZATION (HCC): ICD-10-CM

## 2024-09-25 DIAGNOSIS — E78.00 PURE HYPERCHOLESTEROLEMIA: ICD-10-CM

## 2024-09-25 DIAGNOSIS — Z00.00 PE (PHYSICAL EXAM), ANNUAL: Primary | ICD-10-CM

## 2024-09-25 DIAGNOSIS — E06.3 HYPOTHYROIDISM DUE TO HASHIMOTO'S THYROIDITIS: ICD-10-CM

## 2024-09-25 DIAGNOSIS — Z80.0 FH: COLON CANCER: ICD-10-CM

## 2024-10-31 RX ORDER — SIMVASTATIN 40 MG
40 TABLET ORAL NIGHTLY
Qty: 90 TABLET | Refills: 0 | Status: SHIPPED | OUTPATIENT
Start: 2024-10-31

## 2024-10-31 NOTE — TELEPHONE ENCOUNTER
Last appointment: 9/25/2024  Next appointment: 10/1/2025  Last refill: 8/5/24  Requested Prescriptions     Pending Prescriptions Disp Refills    simvastatin (ZOCOR) 40 MG tablet 90 tablet 0     Sig: Take 1 tablet by mouth nightly

## 2024-12-13 ENCOUNTER — HOSPITAL ENCOUNTER (OUTPATIENT)
Dept: MAMMOGRAPHY | Age: 74
Discharge: HOME | End: 2024-12-13
Payer: MEDICARE

## 2024-12-13 ENCOUNTER — TELEPHONE (OUTPATIENT)
Dept: MAMMOGRAPHY | Age: 74
End: 2024-12-13

## 2024-12-13 VITALS — WEIGHT: 116 LBS | BODY MASS INDEX: 21.9 KG/M2 | HEIGHT: 61 IN

## 2024-12-13 DIAGNOSIS — E06.3 HYPOTHYROIDISM DUE TO HASHIMOTO'S THYROIDITIS: ICD-10-CM

## 2024-12-13 DIAGNOSIS — Z12.31 OTHER SCREENING MAMMOGRAM: ICD-10-CM

## 2024-12-13 PROCEDURE — 77063 BREAST TOMOSYNTHESIS BI: CPT

## 2024-12-13 NOTE — TELEPHONE ENCOUNTER
Spoke with patient and advised screening mammogram results with the need for a diagnostic mammogram. Advised [facility] phone number [phone] to schedule. Patient agreed to schedule.

## 2024-12-14 LAB
T4 FREE SERPL-MCNC: 1.2 NG/DL (ref 0.9–1.8)
TSH SERPL DL<=0.005 MIU/L-ACNC: 3.13 UIU/ML (ref 0.27–4.2)

## 2024-12-17 ENCOUNTER — HOSPITAL ENCOUNTER (OUTPATIENT)
Dept: WOMENS IMAGING | Age: 74
Discharge: HOME | End: 2024-12-17
Payer: MEDICARE

## 2024-12-17 DIAGNOSIS — R92.8 ABNORMAL MAMMOGRAM: ICD-10-CM

## 2024-12-17 PROCEDURE — G0279 TOMOSYNTHESIS, MAMMO: HCPCS

## 2025-01-27 RX ORDER — SIMVASTATIN 40 MG
40 TABLET ORAL NIGHTLY
Qty: 90 TABLET | Refills: 1 | Status: SHIPPED | OUTPATIENT
Start: 2025-01-27

## 2025-05-24 ENCOUNTER — OFFICE VISIT (OUTPATIENT)
Dept: URGENT CARE | Age: 75
End: 2025-05-24

## 2025-05-24 VITALS
SYSTOLIC BLOOD PRESSURE: 117 MMHG | WEIGHT: 121 LBS | TEMPERATURE: 98.1 F | HEART RATE: 65 BPM | OXYGEN SATURATION: 95 % | BODY MASS INDEX: 22.86 KG/M2 | DIASTOLIC BLOOD PRESSURE: 80 MMHG

## 2025-05-24 DIAGNOSIS — M25.531 WRIST PAIN, ACUTE, RIGHT: ICD-10-CM
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
## 2025-05-24 DIAGNOSIS — S52.591A OTHER CLOSED FRACTURE OF DISTAL END OF RIGHT RADIUS, INITIAL ENCOUNTER: Primary | ICD-10-CM

## 2025-05-24 PROBLEM — R42 POSTURAL DIZZINESS WITH PRESYNCOPE: Status: ACTIVE | Noted: 2020-07-06

## 2025-05-24 PROBLEM — M54.50 LEFT LOW BACK PAIN: Status: ACTIVE | Noted: 2020-07-06

## 2025-05-24 PROBLEM — M16.9 OSTEOARTHRITIS OF HIP: Status: ACTIVE | Noted: 2021-05-06

## 2025-05-24 PROBLEM — H35.3290 EXUDATIVE AGE-RELATED MACULAR DEGENERATION (HCC): Status: ACTIVE | Noted: 2023-11-07

## 2025-05-24 PROBLEM — K91.2 POSTOPERATIVE MALABSORPTION: Status: ACTIVE | Noted: 2025-05-24

## 2025-05-24 PROBLEM — R55 POSTURAL DIZZINESS WITH PRESYNCOPE: Status: ACTIVE | Noted: 2020-07-06

## 2025-05-24 PROBLEM — M17.12 PATELLOFEMORAL ARTHRITIS OF LEFT KNEE: Status: ACTIVE | Noted: 2018-01-26

## 2025-05-24 PROBLEM — S05.02XA CORNEAL ABRASION OF BOTH EYES: Status: RESOLVED | Noted: 2020-08-18 | Resolved: 2025-05-24

## 2025-05-24 PROBLEM — I95.9 HYPOTENSION: Status: ACTIVE | Noted: 2020-07-06

## 2025-05-24 PROBLEM — Z23 NEED FOR PROPHYLACTIC VACCINATION AGAINST DIPHTHERIA-TETANUS-PERTUSSIS (DTP): Status: RESOLVED | Noted: 2020-08-13 | Resolved: 2025-05-24

## 2025-05-24 PROBLEM — S05.01XA CORNEAL ABRASION OF BOTH EYES: Status: RESOLVED | Noted: 2020-08-18 | Resolved: 2025-05-24

## 2025-05-24 PROBLEM — D50.9 IRON DEFICIENCY ANEMIA: Status: RESOLVED | Noted: 2025-05-24 | Resolved: 2025-05-24

## 2025-05-24 PROBLEM — R73.9 HYPERGLYCEMIA: Status: RESOLVED | Noted: 2020-08-18 | Resolved: 2025-05-24

## 2025-05-24 PROBLEM — R00.1 BRADYCARDIA: Status: RESOLVED | Noted: 2020-07-06 | Resolved: 2025-05-24

## 2025-05-24 PROBLEM — S82.142A TIBIAL PLATEAU FRACTURE, LEFT: Status: ACTIVE | Noted: 2022-02-19

## 2025-05-24 NOTE — PROGRESS NOTES
HPI.    Physical Exam  Vitals reviewed.   Constitutional:       General: She is not in acute distress.     Appearance: Normal appearance. She is not ill-appearing.   HENT:      Head: Normocephalic and atraumatic.      Right Ear: External ear normal.      Left Ear: External ear normal.      Nose: Nose normal.      Mouth/Throat:      Mouth: Mucous membranes are moist.   Eyes:      General: Vision grossly intact. Gaze aligned appropriately.      Extraocular Movements: Extraocular movements intact.      Conjunctiva/sclera: Conjunctivae normal.      Pupils: Pupils are equal, round, and reactive to light.   Cardiovascular:      Rate and Rhythm: Normal rate and regular rhythm.      Heart sounds: Normal heart sounds.   Pulmonary:      Effort: Pulmonary effort is normal.      Breath sounds: Normal breath sounds.   Musculoskeletal:      Right forearm: Normal.      Left forearm: Normal.      Right wrist: Swelling, tenderness and bony tenderness present. No deformity, lacerations, snuff box tenderness or crepitus. Decreased range of motion. Normal pulse.      Left wrist: Normal.      Right hand: Normal.      Left hand: Normal.        Hands:       Cervical back: Full passive range of motion without pain, normal range of motion and neck supple. No rigidity. Normal range of motion.   Skin:     General: Skin is warm and dry.   Neurological:      Mental Status: She is alert and oriented to person, place, and time.   Psychiatric:         Attention and Perception: Attention normal.         Speech: Speech normal.         Behavior: Behavior is cooperative.         PROCEDURES:  Unless otherwise noted below, none     ORTHOPEDIC INJURY TREATMENT    Date/Time: 5/25/2025 9:00 AM    Performed by: Danica Posey  Authorized by: Shine Carter PA-C  Injury location: wrist  Location details: right wrist  Injury type: fracture  Fracture type: distal radius  Pre-procedure neurovascular assessment: neurovascularly intact  Pre-procedure distal

## 2025-05-24 NOTE — PATIENT INSTRUCTIONS
Dena,    Thank you for trusting [facility] with your care. Your decision to come to us means a lot and we are honored to be part of your healthcare journey. We value your trust and hope your experience with us was positive and met your expectations.    We're always looking for ways to improve, and your feedback is incredibly important to us. You will receive a text or email soon asking you how your visit went. for If you could take a moment to share your thoughts, it would mean the world to us. Your input helps us better serve you and others in the community.     Thank you again for choosing us. We're grateful for the opportunity to care for you and your loved ones. We hope to see you again - though we always wish you health and wellness!    Warm regards,    [facility]    Shine Carter PA-C, Carole (Registration), Danica (Radiation Tech, Medical Assistant), and Leonora (Medical Assistant)      XR WRIST RIGHT (MIN 3 VIEWS)   Final Result   1. Suggestion of nondisplaced distal radial fracture.  This requires clinical   correlation.   2. Osteoarthritis.             X-ray findings show concerns for a fracture of the forearm just behind the wrist on the thumb side (distal radius fracture).  A wrist splint applied in clinic to immobilize the injury  Continue use of over the counter pain meds per their packaging instructions for pain and inflammation treatment.  Rest and elevate the wrist and apply cold compresses intermittently as needed.  Cold compresses for 15-20 minutes, with 30-60 minutes between applications.  Follow up with the referral placed to orthopedics to continue evaluation and treatment of the injury.

## 2025-05-27 ENCOUNTER — OFFICE VISIT (OUTPATIENT)
Dept: ORTHOPEDIC SURGERY | Age: 75
End: 2025-05-27

## 2025-05-27 VITALS — BODY MASS INDEX: 22.84 KG/M2 | WEIGHT: 121 LBS | HEIGHT: 61 IN

## 2025-05-27 DIAGNOSIS — S52.551A OTHER CLOSED EXTRA-ARTICULAR FRACTURE OF DISTAL END OF RIGHT RADIUS, INITIAL ENCOUNTER: Primary | ICD-10-CM

## 2025-05-27 NOTE — PROGRESS NOTES
Chief Complaint    Follow-up (Np Right Wrist - Distal Fibula (Urgent Care 5/24))      History of Present Illness:  Dean Mcbride is a 74 y.o. female who presents to the office today for new problem.  Patient here with a chief complaint of right wrist pain.  Patient fell while she was working in the yard.  She was seen in the emergency room on May 25, 2025.  She fell on May 24, 2025.  She was diagnosed with a distal radius fracture.  Pain concentrated over her distal radius region.    Medical History:  Past Medical History:   Diagnosis Date    Acute deep vein thrombosis (DVT) of femoral vein of left lower extremity (HCC) 07/01/2022    Arthritis     Bradycardia 07/06/2020    Chronic back pain 2019    Chronic hyponatremia     Closed fracture of left proximal humerus 08/15/2016    Exudative age-related macular degeneration of both eyes with active choroidal neovascularization (HCC) 09/25/2024    GERD (gastroesophageal reflux disease)     History of blood transfusion     Hyperlipidemia     Hypertension     no meds currently    Iron deficiency anemia 05/24/2025    OA (osteoarthritis)     Prolonged emergence from general anesthesia     Tibial plateau fracture 02/18/2022    Wears partial dentures     upper & lower     Patient Active Problem List    Diagnosis Date Noted    Acute deep vein thrombosis (DVT) of femoral vein of left lower extremity (HCC) 07/01/2022    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

## 2025-06-03 ENCOUNTER — OFFICE VISIT (OUTPATIENT)
Dept: ORTHOPEDIC SURGERY | Age: 75
End: 2025-06-03
Payer: MEDICARE

## 2025-06-03 VITALS — BODY MASS INDEX: 22.84 KG/M2 | WEIGHT: 121 LBS | HEIGHT: 61 IN

## 2025-06-03 DIAGNOSIS — S52.551A OTHER CLOSED EXTRA-ARTICULAR FRACTURE OF DISTAL END OF RIGHT RADIUS, INITIAL ENCOUNTER: Primary | ICD-10-CM

## 2025-06-03 PROCEDURE — 99213 OFFICE O/P EST LOW 20 MIN: CPT | Performed by: PHYSICIAN ASSISTANT

## 2025-06-03 PROCEDURE — 1036F TOBACCO NON-USER: CPT | Performed by: PHYSICIAN ASSISTANT

## 2025-06-03 PROCEDURE — 3017F COLORECTAL CA SCREEN DOC REV: CPT | Performed by: PHYSICIAN ASSISTANT

## 2025-06-03 PROCEDURE — G8420 CALC BMI NORM PARAMETERS: HCPCS | Performed by: PHYSICIAN ASSISTANT

## 2025-06-03 PROCEDURE — 25600 CLTX DST RDL FX/EPHYS SEP WO: CPT | Performed by: PHYSICIAN ASSISTANT

## 2025-06-03 PROCEDURE — 1123F ACP DISCUSS/DSCN MKR DOCD: CPT | Performed by: PHYSICIAN ASSISTANT

## 2025-06-03 PROCEDURE — G8399 PT W/DXA RESULTS DOCUMENT: HCPCS | Performed by: PHYSICIAN ASSISTANT

## 2025-06-03 PROCEDURE — 1090F PRES/ABSN URINE INCON ASSESS: CPT | Performed by: PHYSICIAN ASSISTANT

## 2025-06-03 PROCEDURE — G8428 CUR MEDS NOT DOCUMENT: HCPCS | Performed by: PHYSICIAN ASSISTANT

## 2025-06-03 NOTE — PROGRESS NOTES
Chief Complaint    Follow-up (Ck Right Wrist - Distal Fibula (DOI 5/24/25) XR in cast)      History of Present Illness:  Dena Mcbride is a 74 y.o. female who presents to the office today for follow-up visit.  Patient being treated for a right distal radius fracture.  We did place her into a short arm cast with molding at her last visit.  Her wrist pain is doing better    Previous history: Patient who presents to the office today for new problem.  Patient here with a chief complaint of right wrist pain.  Patient fell while she was working in the yard.  She was seen in the emergency room on May 25, 2025.  She fell on May 24, 2025.  She was diagnosed with a distal radius fracture.  Pain concentrated over her distal radius region.    Medical History:  Past Medical History:   Diagnosis Date    Acute deep vein thrombosis (DVT) of femoral vein of left lower extremity (HCC) 07/01/2022    Arthritis     Bradycardia 07/06/2020    Chronic back pain 2019    Chronic hyponatremia     Closed fracture of left proximal humerus 08/15/2016    Exudative age-related macular degeneration of both eyes with active choroidal neovascularization (HCC) 09/25/2024    GERD (gastroesophageal reflux disease)     History of blood transfusion     Hyperlipidemia     Hypertension     no meds currently    Iron deficiency anemia 05/24/2025    OA (osteoarthritis)     Prolonged emergence from general anesthesia     Tibial plateau fracture 02/18/2022    Wears partial dentures     upper & lower     Patient Active Problem List    Diagnosis Date Noted    Acute deep vein thrombosis (DVT) of femoral vein of left lower extremity (HCC) 07/01/2022    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

## 2025-06-16 ENCOUNTER — TELEPHONE (OUTPATIENT)
Dept: INTERNAL MEDICINE CLINIC | Age: 75
End: 2025-06-16

## 2025-06-16 DIAGNOSIS — N64.4 BREAST PAIN, LEFT: Primary | ICD-10-CM

## 2025-06-16 NOTE — TELEPHONE ENCOUNTER
They are calling to see if Dr. Philip signed the order for a Diagnostic L Mammogram.  It was faxed last week.    The procedure is scheduled for tomorrow (06/17/25).    Please call and advise.

## 2025-06-17 ENCOUNTER — HOSPITAL ENCOUNTER (OUTPATIENT)
Dept: WOMENS IMAGING | Age: 75
Discharge: HOME | End: 2025-06-17
Payer: MEDICARE

## 2025-06-17 ENCOUNTER — RESULTS FOLLOW-UP (OUTPATIENT)
Dept: INTERNAL MEDICINE CLINIC | Age: 75
End: 2025-06-17

## 2025-06-17 VITALS — WEIGHT: 121 LBS | HEIGHT: 61 IN | BODY MASS INDEX: 22.84 KG/M2

## 2025-06-17 DIAGNOSIS — N64.4 BREAST PAIN, LEFT: ICD-10-CM

## 2025-06-17 PROCEDURE — 77065 DX MAMMO INCL CAD UNI: CPT

## 2025-06-24 ENCOUNTER — OFFICE VISIT (OUTPATIENT)
Dept: ORTHOPEDIC SURGERY | Age: 75
End: 2025-06-24
Payer: MEDICARE

## 2025-06-24 VITALS — HEIGHT: 61 IN | WEIGHT: 121 LBS | BODY MASS INDEX: 22.84 KG/M2

## 2025-06-24 DIAGNOSIS — S52.551A OTHER CLOSED EXTRA-ARTICULAR FRACTURE OF DISTAL END OF RIGHT RADIUS, INITIAL ENCOUNTER: Primary | ICD-10-CM

## 2025-06-24 PROCEDURE — L3908 WHO COCK-UP NONMOLDE PRE OTS: HCPCS | Performed by: PHYSICIAN ASSISTANT

## 2025-06-24 PROCEDURE — 99024 POSTOP FOLLOW-UP VISIT: CPT | Performed by: PHYSICIAN ASSISTANT

## 2025-06-24 NOTE — PROGRESS NOTES
Chief Complaint    Follow-up (Ck Right Wrist  - Distal Radius (DOI 05/24/2025) )      History of Present Illness:  Dena Mcbride is a 75 y.o. female who presents to the office today for a follow-up visit.  Patient being treated for right distal radius fracture.  Patient 4 weeks from injury.  Patient doing better.    Previous history: Patient who presents to the office today for follow-up visit.  Patient being treated for a right distal radius fracture.  We did place her into a short arm cast with molding at her last visit.  Her wrist pain is doing better    Previous history: Patient who presents to the office today for new problem.  Patient here with a chief complaint of right wrist pain.  Patient fell while she was working in the yard.  She was seen in the emergency room on May 25, 2025.  She fell on May 24, 2025.  She was diagnosed with a distal radius fracture.  Pain concentrated over her distal radius region.    Medical History:  Past Medical History:   Diagnosis Date    Acute deep vein thrombosis (DVT) of femoral vein of left lower extremity (HCC) 07/01/2022    Arthritis     Bradycardia 07/06/2020    Chronic back pain 2019    Chronic hyponatremia     Closed fracture of left proximal humerus 08/15/2016    Exudative age-related macular degeneration of both eyes with active choroidal neovascularization (HCC) 09/25/2024    GERD (gastroesophageal reflux disease)     History of blood transfusion     Hyperlipidemia     Hypertension     no meds currently    Iron deficiency anemia 05/24/2025    OA (osteoarthritis)     Prolonged emergence from general anesthesia     Tibial plateau fracture 02/18/2022    Wears partial dentures     upper & lower     Patient Active Problem List    Diagnosis Date Noted    Acute deep vein thrombosis (DVT) of femoral vein of left lower extremity (HCC) 07/01/2022    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

## 2025-07-07 ENCOUNTER — HOSPITAL ENCOUNTER (OUTPATIENT)
Dept: OCCUPATIONAL THERAPY | Age: 75
Setting detail: THERAPIES SERIES
Discharge: HOME | End: 2025-07-07
Payer: MEDICARE

## 2025-07-07 DIAGNOSIS — M25.631 STIFFNESS OF RIGHT WRIST JOINT: Primary | ICD-10-CM

## 2025-07-07 PROCEDURE — 97165 OT EVAL LOW COMPLEX 30 MIN: CPT | Performed by: OCCUPATIONAL THERAPIST

## 2025-07-07 PROCEDURE — 97110 THERAPEUTIC EXERCISES: CPT | Performed by: OCCUPATIONAL THERAPIST

## 2025-07-07 PROCEDURE — 97022 WHIRLPOOL THERAPY: CPT | Performed by: OCCUPATIONAL THERAPIST

## 2025-07-07 NOTE — PLAN OF CARE
Latex allergy:  NO  Pacemaker:    NO  Contraindications for Manipulation: None  Other:    Red Flags:  None    Suicide Screening:   The patient did not verbalize a primary behavioral concern, suicidal ideation, suicidal intent, or demonstrate suicidal behaviors.    Preferred Language for Healthcare:   [x] English       [] other:    SUBJECTIVE EXAMINATION     Patient stated complaint: fell in her garden        Test used Initial score  7/7/25 07/07/2025   Pain Summary VAS 1/10     Functional questionnaire Quick DASH     Other:              Pain:  Pain location: radial sided wrist and ulnar side   Patient describes pain to be intermittent  Pain decreases with: Resting  Pain increases with: Activity and Movement    Occupation/School:  Work/School Status: Retired  Job Duties/Demands: NA  PLOF/CLOF:     Home Environment: lives with      Sport/ Recreation/ Leisure/ Hobbies: gardening, cooking     Review Of Systems (ROS):  [x] Performed Review of systems (Integumentary, CardioPulmonary, Neurological) by intake and observation. Intake form has been scanned into medical record. Patient has been instructed to contact their primary care physician regarding ROS issues if not already being addressed at this time.    [x] Patient history, allergies, meds reviewed. Medical chart reviewed. See intake form.     OBJECTIVE EXAMINATION     Hand Dominance: right    Objective Measures: 7/7/25   PAIN 1/10   Quick DASH 54%   Digit  ROM         Index     MP:  PIP:  DIP:                              Long MP:  PIP:  DIP:                              Ring MP:  PIP:  DIP:                              Small MP:  PIP:  DIP:   Digits tip to DPFC in cm Index:  Long:  Ring:  Small:   Thumb ROM WNL    Thumb opposition  R:  L:   Thumb Radial/Palmar abd ROM R:  L:   Wrist ROM Ext/Flex R: 50/30  L: 70/70   Rad/Uln dev ROM R:  L:   Forearm ROM  Sup/pron R: 70/60  L:   Elbow ROM Ext/flex R:  L:   Shoulder Flex  Shoulder Abd  Shoulder IR/ER

## 2025-07-14 ENCOUNTER — HOSPITAL ENCOUNTER (OUTPATIENT)
Dept: OCCUPATIONAL THERAPY | Age: 75
Setting detail: THERAPIES SERIES
Discharge: HOME | End: 2025-07-14
Payer: MEDICARE

## 2025-07-14 PROCEDURE — 97140 MANUAL THERAPY 1/> REGIONS: CPT | Performed by: OCCUPATIONAL THERAPIST

## 2025-07-14 PROCEDURE — 97022 WHIRLPOOL THERAPY: CPT | Performed by: OCCUPATIONAL THERAPIST

## 2025-07-14 PROCEDURE — 97110 THERAPEUTIC EXERCISES: CPT | Performed by: OCCUPATIONAL THERAPIST

## 2025-07-14 NOTE — FLOWSHEET NOTE
[facility] - Outpatient Rehabilitation and Therapy: [address], [place] [address] office: [phone] fax: [phone]     Occupational Therapy Initial Evaluation Certification      Dear Aleksander Browning PA-C,    We had the pleasure of evaluating the following patient for occupational therapy services at [facility].  A summary of our findings can be found in the initial assessment below.  This includes our plan of care.  If you have any questions or concerns regarding these findings, please do not hesitate to contact me at the office phone number listed above.  Thank you for the referral.     Physician Signature:_______________________________Date:__________________  By signing above (or electronic signature), therapist’s plan is approved by physician       Occupational Therapy: TREATMENT/PROGRESS NOTE   Patient: Dena Mcbride (75 y.o. female)   Examination Date: 2025   :  1950 MRN: [id number]   Visit #: 2  Insurance Allowable Auth Needed    []Yes    []No    Insurance: Payor: MEDICARE / Plan: MEDICARE PART A AND B / Product Type: *No Product type* /   Insurance ID: [id number] - (Medicare)  Secondary Insurance (if applicable): [place] BCBS   Treatment Diagnosis:   No diagnosis found.     Medical Diagnosis:  Right wrist pain [M25.531]   Referring Physician: Aleksander Browning PA-C  PCP: Naga Philip MD     Plan of care signed (Y/N): sent on evaluation    Date of Patient follow up with Physician:      Plan of Care Report:   POC update due: (10 visits /OR AUTH LIMITS, whichever is less)                                             Medical History:  Date of Onset: 25  Date of Surgery:  Comorbidities:  None  Relevant Medical History: CTS                                          Precautions/ Contra-indications:           Latex allergy:  NO  Pacemaker:    NO  Contraindications for Manipulation: None  Other:    Red Flags:  None    Suicide

## 2025-07-17 ENCOUNTER — HOSPITAL ENCOUNTER (OUTPATIENT)
Dept: OCCUPATIONAL THERAPY | Age: 75
Setting detail: THERAPIES SERIES
Discharge: HOME | End: 2025-07-17
Payer: MEDICARE

## 2025-07-17 PROCEDURE — 97022 WHIRLPOOL THERAPY: CPT | Performed by: OCCUPATIONAL THERAPIST

## 2025-07-17 PROCEDURE — 97140 MANUAL THERAPY 1/> REGIONS: CPT | Performed by: OCCUPATIONAL THERAPIST

## 2025-07-17 PROCEDURE — 97110 THERAPEUTIC EXERCISES: CPT | Performed by: OCCUPATIONAL THERAPIST

## 2025-07-17 NOTE — FLOWSHEET NOTE
address the deficits outlined in the patients goals      Return to Play: NA    Prognosis for POC: [x] Good [] Fair  [] Poor    Patient requires continued skilled intervention: [x] Yes  [] No      CHARGE CAPTURE     OT CHARGE GRID   CPT Code (TIMED) minutes # CPT Code (UNTIMED) #     Therex (81397)  20 1  EVAL:LOW (33045 - Typically 30 minutes face-to-face)     Neuromusc. Re-ed (96071)    Re-Eval (56237)     Manual (46162) 10 1  Estim Unattended (01578)     Ther. Act (58136)    Parraffin (73157)     Gait (81986)    Fluidotherapy (95512) 1    ADL Training (43526)    Dry Needle 1-2 muscle (20698)     Iontophoresis (54692)    Dry Needle 3+ muscle (60446)     Ultrasound (47798)    VASO (10303)     Estim Attended (50770)    Group Therapy (24053)     Other:    Other:    Total Timed Code Tx Minutes 30 2  1     L Code:     Total Treatment Minutes 40        Charge Justification:  (70086) THERAPEUTIC EXERCISE - Provided verbal/tactile cueing for HEP and/or activities related to strengthening, flexibility, endurance, ROM performed to prevent loss of range of motion, maintain or improve muscular strength or increase flexibility, following either an injury or surgery.   (34541) NEUROMUSCULAR RE-EDUCATION - Provided therapeutic procedure on activities related to neuromuscular reeducation of movement, balance, coordination, kinesthetic sense, posture, and/or proprioception for sitting and/or standing activities. Provided HEP review and/or progression.      GOALS     Patient stated goal:   [] Progressing: [] Met: [] Not Met: [] Adjusted    Therapist goals for Patient:   Short Term Goals: To be achieved in 2 weeks  1. Independent in HEP and progression per patient tolerance to progress toward goals.   [] Progressing: [x] Met: [] Not Met: [] Adjusted  2. Patient will have a decrease in pain to facilitate improvement in movement, function, and ADLs as indicated by Functional Deficits.    [] Progressing: [x] Met: [] Not Met: []

## 2025-07-21 ENCOUNTER — HOSPITAL ENCOUNTER (OUTPATIENT)
Dept: OCCUPATIONAL THERAPY | Age: 75
Setting detail: THERAPIES SERIES
Discharge: HOME | End: 2025-07-21
Payer: MEDICARE

## 2025-07-21 PROCEDURE — 97018 PARAFFIN BATH THERAPY: CPT | Performed by: OCCUPATIONAL THERAPIST

## 2025-07-21 PROCEDURE — 97140 MANUAL THERAPY 1/> REGIONS: CPT | Performed by: OCCUPATIONAL THERAPIST

## 2025-07-21 PROCEDURE — 97110 THERAPEUTIC EXERCISES: CPT | Performed by: OCCUPATIONAL THERAPIST

## 2025-07-21 NOTE — FLOWSHEET NOTE
[facility] - Outpatient Rehabilitation and Therapy: [address], [place] [address] office: [phone] fax: [phone]     Occupational Therapy Initial Evaluation Certification      Dear Aleksander Browning PA-C,    We had the pleasure of evaluating the following patient for occupational therapy services at [facility] Outpatient Occupational Therapy.  A summary of our findings can be found in the initial assessment below.  This includes our plan of care.  If you have any questions or concerns regarding these findings, please do not hesitate to contact me at the office phone number listed above.  Thank you for the referral.     Physician Signature:_______________________________Date:__________________  By signing above (or electronic signature), therapist’s plan is approved by physician       Occupational Therapy: TREATMENT/PROGRESS NOTE   Patient: Dena Mcbride (75 y.o. female)   Examination Date: 2025   :  1950 MRN: [id number]   Visit #: 4  Insurance Allowable Auth Needed    []Yes    []No    Insurance: Payor: MEDICARE / Plan: MEDICARE PART A AND B / Product Type: *No Product type* /   Insurance ID: [id number] - (Medicare)  Secondary Insurance (if applicable): [place] BCBS   Treatment Diagnosis:   No diagnosis found.     Medical Diagnosis:  Right wrist pain [M25.531]   Referring Physician: Aleksander Browning PA-C  PCP: Naga Philip MD     Plan of care signed (Y/N): sent on evaluation    Date of Patient follow up with Physician:      Plan of Care Report:   POC update due: (10 visits /OR AUTH LIMITS, whichever is less)                                             Medical History:  Date of Onset: 25  Date of Surgery:  Comorbidities:  None  Relevant Medical History: CTS                                          Precautions/ Contra-indications:           Latex allergy:  NO  Pacemaker:    NO  Contraindications for Manipulation: None  Other:    Red Flags:  None    Suicide

## 2025-07-24 ENCOUNTER — HOSPITAL ENCOUNTER (OUTPATIENT)
Dept: OCCUPATIONAL THERAPY | Age: 75
Setting detail: THERAPIES SERIES
Discharge: HOME | End: 2025-07-24
Payer: MEDICARE

## 2025-07-24 PROCEDURE — 97140 MANUAL THERAPY 1/> REGIONS: CPT | Performed by: OCCUPATIONAL THERAPIST

## 2025-07-24 PROCEDURE — 97018 PARAFFIN BATH THERAPY: CPT | Performed by: OCCUPATIONAL THERAPIST

## 2025-07-24 PROCEDURE — 97110 THERAPEUTIC EXERCISES: CPT | Performed by: OCCUPATIONAL THERAPIST

## 2025-07-24 NOTE — FLOWSHEET NOTE
[facility] - Outpatient Rehabilitation and Therapy: [address], [place] [address] office: [phone] fax: [phone]     Occupational Therapy Initial Evaluation Certification      Dear Aleksander Browning PA-C,    We had the pleasure of evaluating the following patient for occupational therapy services at [facility] Outpatient Occupational Therapy.  A summary of our findings can be found in the initial assessment below.  This includes our plan of care.  If you have any questions or concerns regarding these findings, please do not hesitate to contact me at the office phone number listed above.  Thank you for the referral.     Physician Signature:_______________________________Date:__________________  By signing above (or electronic signature), therapist’s plan is approved by physician       Occupational Therapy: TREATMENT/PROGRESS NOTE   Patient: Dena Mcbride (75 y.o. female)   Examination Date: 2025   :  1950 MRN: [id number]   Visit #: 5  Insurance Allowable Auth Needed    []Yes    []No    Insurance: Payor: MEDICARE / Plan: MEDICARE PART A AND B / Product Type: *No Product type* /   Insurance ID: [id number] - (Medicare)  Secondary Insurance (if applicable): [place] BCBS   Treatment Diagnosis:   No diagnosis found.     Medical Diagnosis:  Right wrist pain [M25.531]   Referring Physician: Aleksander Browning PA-C  PCP: Naga Philip MD     Plan of care signed (Y/N): sent on evaluation    Date of Patient follow up with Physician:      Plan of Care Report:   POC update due: (10 visits /OR AUTH LIMITS, whichever is less)                                             Medical History:  Date of Onset: 25  Date of Surgery:  Comorbidities:  None  Relevant Medical History: CTS                                          Precautions/ Contra-indications:           Latex allergy:  NO  Pacemaker:    NO  Contraindications for Manipulation: None  Other:    Red Flags:  None    Suicide

## 2025-07-31 ENCOUNTER — HOSPITAL ENCOUNTER (OUTPATIENT)
Dept: OCCUPATIONAL THERAPY | Age: 75
Setting detail: THERAPIES SERIES
Discharge: HOME | End: 2025-07-31
Payer: MEDICARE

## 2025-07-31 PROCEDURE — 97140 MANUAL THERAPY 1/> REGIONS: CPT | Performed by: OCCUPATIONAL THERAPIST

## 2025-07-31 PROCEDURE — 97018 PARAFFIN BATH THERAPY: CPT | Performed by: OCCUPATIONAL THERAPIST

## 2025-07-31 PROCEDURE — 97110 THERAPEUTIC EXERCISES: CPT | Performed by: OCCUPATIONAL THERAPIST

## 2025-07-31 RX ORDER — SIMVASTATIN 40 MG
40 TABLET ORAL NIGHTLY
Qty: 90 TABLET | Refills: 1 | Status: SHIPPED | OUTPATIENT
Start: 2025-07-31

## 2025-07-31 NOTE — FLOWSHEET NOTE
Met: [] Not Met: [] Adjusted  2. Patient will have a decrease in pain to facilitate improvement in movement, function, and ADLs as indicated by Functional Deficits.    [] Progressing: [x] Met: [] Not Met: [] Adjusted    Long Term Goals: To be achieved in 8 weeks (through 9/7/25), including patient directed goals to address patient identified performance deficits:  1. Pt to be independent in graded HEP progression with a good level of effort and compliance.  [] Progressing: [x] Met: [] Not Met: [] Adjusted  2. Pt to report a score of </= 20 % on the [employer] D[employer] disability questionnaire for increased performance with carrying, moving, and handling objects.  [] Progressing: [] Met: [] Not Met: [] Adjusted  3. Pt will demonstrate increased ROM to R wrist to 60/60 for improved independence with dressing.  [] Progressing: [] Met: [] Not Met: [] Adjusted  4. Pt will demonstrate increased strength to R  to 20# for improved independence with opening things.  [] Progressing: [] Met: [] Not Met: [] Adjusted  5. Pt will have a decrease in pain to 0-2/10 to facilitate .  [] Progressing: [] Met: [] Not Met: [] Adjusted    TREATMENT PLAN     Frequency/Duration: 1-2x/week for 8-10 weeks for the following treatment interventions:    Interventions:  [x] Therapeutic exercise including: strength training, ROM, including postural re-education.   [x] NMR activation and proprioception, including postural re-education.    [x] Manual therapy as indicated to include: PROM, Gr I-IV mobilizations, and STM  [x] Modalities as needed that may include: Thermal Agents  [x] Patient education on joint protection, postural re-education, activity modification, progression of HEP.        [] Aquatic Therapy    Plan: POC initiated as per evaluation    Electronically Signed by: Natasha Garrett, LESTERR/L [id number]  Date: 07/31/2025    Note: Portions of this note have been templated and/or copied from initial evaluation, reassessments and prior notes for

## 2025-08-07 ENCOUNTER — HOSPITAL ENCOUNTER (OUTPATIENT)
Dept: OCCUPATIONAL THERAPY | Age: 75
Setting detail: THERAPIES SERIES
Discharge: HOME | End: 2025-08-07
Payer: MEDICARE

## 2025-08-07 PROCEDURE — 97140 MANUAL THERAPY 1/> REGIONS: CPT | Performed by: OCCUPATIONAL THERAPIST

## 2025-08-07 PROCEDURE — 97110 THERAPEUTIC EXERCISES: CPT | Performed by: OCCUPATIONAL THERAPIST

## 2025-08-07 PROCEDURE — 97018 PARAFFIN BATH THERAPY: CPT | Performed by: OCCUPATIONAL THERAPIST

## 2025-08-11 ENCOUNTER — OFFICE VISIT (OUTPATIENT)
Dept: ORTHOPEDIC SURGERY | Age: 75
End: 2025-08-11

## 2025-08-11 VITALS — WEIGHT: 121 LBS | BODY MASS INDEX: 22.84 KG/M2 | HEIGHT: 61 IN

## 2025-08-11 DIAGNOSIS — S52.551A OTHER CLOSED EXTRA-ARTICULAR FRACTURE OF DISTAL END OF RIGHT RADIUS, INITIAL ENCOUNTER: Primary | ICD-10-CM

## 2025-08-11 PROCEDURE — 99024 POSTOP FOLLOW-UP VISIT: CPT | Performed by: PHYSICIAN ASSISTANT

## 2025-08-14 ENCOUNTER — APPOINTMENT (OUTPATIENT)
Dept: OCCUPATIONAL THERAPY | Age: 75
End: 2025-08-14
Payer: MEDICARE

## 2025-08-21 ENCOUNTER — APPOINTMENT (OUTPATIENT)
Dept: OCCUPATIONAL THERAPY | Age: 75
End: 2025-08-21
Payer: MEDICARE

## 2025-08-28 ENCOUNTER — APPOINTMENT (OUTPATIENT)
Dept: OCCUPATIONAL THERAPY | Age: 75
End: 2025-08-28
Payer: MEDICARE

## (undated) DEVICE — COVER LT HNDL BLU PLAS

## (undated) DEVICE — ORTHO PRE OP PACK: Brand: MEDLINE INDUSTRIES, INC.

## (undated) DEVICE — COAXIAL HIGH FLOW TIP WITH SOFT SHIELD

## (undated) DEVICE — SUTURE MCRYL SZ 4-0 L27IN ABSRB UD L19MM PS-2 1/2 CIR PRIM Y426H

## (undated) DEVICE — DRESSING FOAM W4XL4IN SIL FACE BORD ADH PD SUP ABSRB COR

## (undated) DEVICE — SUTURE ETHBND EXCEL SZ 0 L18IN NONABSORBABLE GRN L36MM CT-1 CX21D

## (undated) DEVICE — DRILL BIT LOCKING, MEDIUM: Brand: AXSOS

## (undated) DEVICE — OPTIFOAM GENTLE SA, POSTOP, 4X10: Brand: MEDLINE

## (undated) DEVICE — GOWN,SIRUS,POLYRNF,BRTHSLV,XLN/XL,20/CS: Brand: MEDLINE

## (undated) DEVICE — PAD,ABDOMINAL,8"X10",ST,LF: Brand: MEDLINE

## (undated) DEVICE — CONTAINER,SPECIMEN,PNEU TUBE,3OZ,OR STRL: Brand: MEDLINE

## (undated) DEVICE — BLANKET WRM W29.9XL79.1IN UP BODY FORC AIR MISTRAL-AIR

## (undated) DEVICE — GLOVE ORANGE PI 8   MSG9080

## (undated) DEVICE — GARMENT,MEDLINE,DVT,INT,CALF,MED, GEN2: Brand: MEDLINE

## (undated) DEVICE — STAPLER SKIN H3.9MM WIRE DIA0.58MM CRWN 6.9MM 35 STPL FIX

## (undated) DEVICE — SOLUTION IV 1000ML 0.9% SOD CHL

## (undated) DEVICE — CALIBRATED DRILL BIT , AO: Brand: AXSOS

## (undated) DEVICE — C-ARMOR C-ARM EQUIPMENT COVERS CLEAR STERILE UNIVERSAL FIT 12 PER CASE: Brand: C-ARMOR

## (undated) DEVICE — GAUZE,SPONGE,4"X4",8PLY,STRL,LF,10/TRAY: Brand: MEDLINE

## (undated) DEVICE — DUAL CUT SAGITTAL BLADE

## (undated) DEVICE — SYSTEM SKIN CLSR 22CM DERMBND PRINEO

## (undated) DEVICE — SUTURE STRATAFIX SPRL SZ 1 L14IN ABSRB VLT L48CM CTX 1/2 SXPD2B405

## (undated) DEVICE — DISPOSABLE ORTHOPAEDIC PROTECTOR: Brand: ALEXIS ® ORTHOPAEDIC PROTECTOR

## (undated) DEVICE — 1010 S-DRAPE TOWEL DRAPE 10/BX: Brand: STERI-DRAPE™

## (undated) DEVICE — SOLUTION IV IRRIG WATER 1000ML POUR BRL 2F7114

## (undated) DEVICE — SUTURE VCRL + SZ 2-0 L18IN ABSRB UD CT1 L36MM 1/2 CIR VCP839D

## (undated) DEVICE — JEWISH HOSPITAL TURNOVER KIT: Brand: MEDLINE INDUSTRIES, INC.

## (undated) DEVICE — GLOVE SURG SZ 8 L12IN FNGR THK79MIL GRN LTX FREE

## (undated) DEVICE — COUNTER NDL 40 COUNT HLD 70 NUM FOAM BLK SGL MAG W BLDE REMV

## (undated) DEVICE — INTENDED USE FOR SURGICAL MARKING ON INTACT SKIN, ALSO PROVIDES A PERMANENT METHOD OF IDENTIFYING OBJECTS IN THE OPERATING ROOM: Brand: WRITESITE® PLUS MINI PREP RESISTANT MARKER

## (undated) DEVICE — BIPOLAR SEALER 23-112-1 AQM 6.0: Brand: AQUAMANTYS ®

## (undated) DEVICE — DECANTER BAG 9": Brand: MEDLINE INDUSTRIES, INC.

## (undated) DEVICE — SUTURE ETHBND EXCEL SZ 2 L30IN NONABSORBABLE GRN L40MM V-37 MX69G

## (undated) DEVICE — E-Z CLEAN, NON-STICK, PTFE COATED, ELECTROSURGICAL BLADE ELECTRODE, 2.5 INCH (6.35 CM): Brand: EZ CLEAN

## (undated) DEVICE — R3 20 DEGREE XLPE ACETABULAR LINER                                    32MM INNER DIAMETER X OUTER DIAMETER 50MM
Type: IMPLANTABLE DEVICE | Site: HIP | Status: NON-FUNCTIONAL
Brand: R3
Removed: 2021-05-10

## (undated) DEVICE — 450 ML BOTTLE OF 0.05% CHLORHEXIDINE GLUCONATE IN 99.95% STERILE WATER FOR IRRIGATION, USP AND APPLICATOR.: Brand: IRRISEPT ANTIMICROBIAL WOUND LAVAGE

## (undated) DEVICE — SUTURE MCRYL SZ 2-0 L18IN ABSRB VLT L36MM CT-1 1/2 CIR Y739D

## (undated) DEVICE — DRAPE C ARM W46XL120IN XLN

## (undated) DEVICE — DRAPE C ARM UNIV W41XL74IN CLR PLAS XR VELC CLSR POLY STRP

## (undated) DEVICE — 3M™ STERI-DRAPE™ INSTRUMENT POUCH 1018: Brand: STERI-DRAPE™

## (undated) DEVICE — BANDAGE COBAN 4 IN COMPR W4INXL5YD FOAM COHESIVE QUIK STK SELF ADH SFT

## (undated) DEVICE — NEEDLE HYPO 22GA L1.5IN BLK POLYPR HUB S STL REG BVL STR

## (undated) DEVICE — PENCIL SMK EVAC ALL IN 1 DSGN ENH VISIBILITY IMPROVED AIR

## (undated) DEVICE — FRAME FIXATOR DIAM.3.0MM, AO FITTING: Brand: AXSOS

## (undated) DEVICE — 3M™ STERI-DRAPE™ U-DRAPE 1015: Brand: STERI-DRAPE™

## (undated) DEVICE — YANKAUER,OPEN TIP,W/O VENT,STERILE: Brand: MEDLINE INDUSTRIES, INC.

## (undated) DEVICE — SUTURE MCRYL SZ 0 L18IN ABSRB VLT L36MM CT-1 1/2 CIR Y740D

## (undated) DEVICE — DRESSING FOAM W3XL3IN GENTLE SIL FACE BORD ADH PD SUP ABSRB

## (undated) DEVICE — GLOVE SURG SZ 75 CRM LTX FREE POLYISOPRENE POLYMER BEAD ANTI

## (undated) DEVICE — UNTHREADED GUIDE WIRE: Brand: FIXOS

## (undated) DEVICE — PEEL-AWAY HOOD: Brand: FLYTE, SURGICOOL

## (undated) DEVICE — APPLICATOR MEDICATED 26 CC SOLUTION HI LT ORNG CHLORAPREP

## (undated) DEVICE — PICO 7 10CM X 20CM: Brand: PICO™ 7

## (undated) DEVICE — HANDPIECE SUCTION TUBING INTERPULSE 10FT

## (undated) DEVICE — SURE SET-DOUBLE BASIN-LF: Brand: MEDLINE INDUSTRIES, INC.

## (undated) DEVICE — BIT DRL 2.5 MM ORTHOLOC

## (undated) DEVICE — REFLECTION FLEXIBLE DRILL 35MM: Brand: REFLECTION

## (undated) DEVICE — PACK PROCEDURE SURG TOT HIP

## (undated) DEVICE — SUTURE VCRL + SZ 0 L18IN ABSRB UD L36MM CT-1 1/2 CIR VCP840D

## (undated) DEVICE — SOLUTION IV IRRIG POUR BRL 0.9% SODIUM CHL 2F7124

## (undated) DEVICE — PLATE ES AD W 9FT CRD 2

## (undated) DEVICE — CORTEX SCREW
Type: IMPLANTABLE DEVICE | Site: LEG | Status: NON-FUNCTIONAL
Brand: AXSOS

## (undated) DEVICE — UNDERGLOVE SURG SZ 8 BLU LTX FREE SYN POLYISOPRENE POLYMER

## (undated) DEVICE — HOOD: Brand: FLYTE

## (undated) DEVICE — HOOD, PEEL-AWAY: Brand: FLYTE

## (undated) DEVICE — SPLINT THMB W4XL30IN FBRGLS PD PRECUT LTWT DURABLE FAST SET

## (undated) DEVICE — DRILL BIT NON-LOCKING, SHORT: Brand: AXSOS

## (undated) DEVICE — CUFF RESTRN WRST OR ANK 45FT AD FOAM

## (undated) DEVICE — DRESSING THERABOND 3D ANTIMIC CNTCT SYS 15INCHX10INCH

## (undated) DEVICE — STANDARD HYPODERMIC NEEDLE,POLYPROPYLENE HUB: Brand: MONOJECT

## (undated) DEVICE — TOTAL KNEE: Brand: MEDLINE INDUSTRIES, INC.